# Patient Record
Sex: FEMALE | URBAN - METROPOLITAN AREA
[De-identification: names, ages, dates, MRNs, and addresses within clinical notes are randomized per-mention and may not be internally consistent; named-entity substitution may affect disease eponyms.]

---

## 2018-10-03 ENCOUNTER — OUTPATIENT (OUTPATIENT)
Dept: OUTPATIENT SERVICES | Facility: HOSPITAL | Age: 73
LOS: 1 days | Discharge: HOME | End: 2018-10-03
Payer: MEDICARE

## 2018-10-03 VITALS
WEIGHT: 167.99 LBS | OXYGEN SATURATION: 98 % | TEMPERATURE: 98 F | HEART RATE: 76 BPM | HEIGHT: 66 IN | SYSTOLIC BLOOD PRESSURE: 133 MMHG | DIASTOLIC BLOOD PRESSURE: 65 MMHG | RESPIRATION RATE: 16 BRPM

## 2018-10-03 DIAGNOSIS — Z98.1 ARTHRODESIS STATUS: Chronic | ICD-10-CM

## 2018-10-03 DIAGNOSIS — Z29.9 ENCOUNTER FOR PROPHYLACTIC MEASURES, UNSPECIFIED: ICD-10-CM

## 2018-10-03 DIAGNOSIS — M17.11 UNILATERAL PRIMARY OSTEOARTHRITIS, RIGHT KNEE: ICD-10-CM

## 2018-10-03 LAB
ABO RH CONFIRMATION: SIGNIFICANT CHANGE UP
ANION GAP SERPL CALC-SCNC: 7 MMOL/L — SIGNIFICANT CHANGE UP (ref 5–17)
APPEARANCE UR: CLEAR — SIGNIFICANT CHANGE UP
BASOPHILS # BLD AUTO: 0.08 K/UL — SIGNIFICANT CHANGE UP (ref 0–0.2)
BASOPHILS NFR BLD AUTO: 1.1 % — SIGNIFICANT CHANGE UP (ref 0–2)
BILIRUB UR-MCNC: NEGATIVE — SIGNIFICANT CHANGE UP
BLD GP AB SCN SERPL QL: SIGNIFICANT CHANGE UP
BUN SERPL-MCNC: 33 MG/DL — HIGH (ref 7–23)
CALCIUM SERPL-MCNC: 9 MG/DL — SIGNIFICANT CHANGE UP (ref 8.5–10.1)
CHLORIDE SERPL-SCNC: 105 MMOL/L — SIGNIFICANT CHANGE UP (ref 96–108)
CO2 SERPL-SCNC: 28 MMOL/L — SIGNIFICANT CHANGE UP (ref 22–31)
COLOR SPEC: YELLOW — SIGNIFICANT CHANGE UP
CREAT SERPL-MCNC: 0.76 MG/DL — SIGNIFICANT CHANGE UP (ref 0.5–1.3)
DIFF PNL FLD: ABNORMAL
EOSINOPHIL # BLD AUTO: 0.11 K/UL — SIGNIFICANT CHANGE UP (ref 0–0.5)
EOSINOPHIL NFR BLD AUTO: 1.5 % — SIGNIFICANT CHANGE UP (ref 0–6)
EPI CELLS # UR: SIGNIFICANT CHANGE UP
GLUCOSE SERPL-MCNC: 78 MG/DL — SIGNIFICANT CHANGE UP (ref 70–99)
GLUCOSE UR QL: NEGATIVE MG/DL — SIGNIFICANT CHANGE UP
HCT VFR BLD CALC: 43.2 % — SIGNIFICANT CHANGE UP (ref 34.5–45)
HGB BLD-MCNC: 14.4 G/DL — SIGNIFICANT CHANGE UP (ref 11.5–15.5)
IMM GRANULOCYTES NFR BLD AUTO: 0.5 % — SIGNIFICANT CHANGE UP (ref 0–1.5)
KETONES UR-MCNC: NEGATIVE — SIGNIFICANT CHANGE UP
LEUKOCYTE ESTERASE UR-ACNC: ABNORMAL
LYMPHOCYTES # BLD AUTO: 2.38 K/UL — SIGNIFICANT CHANGE UP (ref 1–3.3)
LYMPHOCYTES # BLD AUTO: 31.5 % — SIGNIFICANT CHANGE UP (ref 13–44)
MCHC RBC-ENTMCNC: 32.2 PG — SIGNIFICANT CHANGE UP (ref 27–34)
MCHC RBC-ENTMCNC: 33.3 GM/DL — SIGNIFICANT CHANGE UP (ref 32–36)
MCV RBC AUTO: 96.6 FL — SIGNIFICANT CHANGE UP (ref 80–100)
MONOCYTES # BLD AUTO: 0.71 K/UL — SIGNIFICANT CHANGE UP (ref 0–0.9)
MONOCYTES NFR BLD AUTO: 9.4 % — SIGNIFICANT CHANGE UP (ref 2–14)
MRSA PCR RESULT.: SIGNIFICANT CHANGE UP
NEUTROPHILS # BLD AUTO: 4.23 K/UL — SIGNIFICANT CHANGE UP (ref 1.8–7.4)
NEUTROPHILS NFR BLD AUTO: 56 % — SIGNIFICANT CHANGE UP (ref 43–77)
NITRITE UR-MCNC: NEGATIVE — SIGNIFICANT CHANGE UP
NRBC # BLD: 0 /100 WBCS — SIGNIFICANT CHANGE UP (ref 0–0)
PH UR: 6 — SIGNIFICANT CHANGE UP (ref 5–8)
PLATELET # BLD AUTO: 236 K/UL — SIGNIFICANT CHANGE UP (ref 150–400)
POTASSIUM SERPL-MCNC: 3.8 MMOL/L — SIGNIFICANT CHANGE UP (ref 3.5–5.3)
POTASSIUM SERPL-SCNC: 3.8 MMOL/L — SIGNIFICANT CHANGE UP (ref 3.5–5.3)
PROT UR-MCNC: NEGATIVE MG/DL — SIGNIFICANT CHANGE UP
RBC # BLD: 4.47 M/UL — SIGNIFICANT CHANGE UP (ref 3.8–5.2)
RBC # FLD: 13.2 % — SIGNIFICANT CHANGE UP (ref 10.3–14.5)
RBC CASTS # UR COMP ASSIST: SIGNIFICANT CHANGE UP /HPF (ref 0–4)
S AUREUS DNA NOSE QL NAA+PROBE: SIGNIFICANT CHANGE UP
SODIUM SERPL-SCNC: 140 MMOL/L — SIGNIFICANT CHANGE UP (ref 135–145)
SP GR SPEC: 1.01 — SIGNIFICANT CHANGE UP (ref 1.01–1.02)
TYPE + AB SCN PNL BLD: SIGNIFICANT CHANGE UP
UROBILINOGEN FLD QL: NEGATIVE MG/DL — SIGNIFICANT CHANGE UP
WBC # BLD: 7.55 K/UL — SIGNIFICANT CHANGE UP (ref 3.8–10.5)
WBC # FLD AUTO: 7.55 K/UL — SIGNIFICANT CHANGE UP (ref 3.8–10.5)
WBC UR QL: SIGNIFICANT CHANGE UP

## 2018-10-03 PROCEDURE — 71046 X-RAY EXAM CHEST 2 VIEWS: CPT | Mod: 26

## 2018-10-03 NOTE — H&P PST ADULT - PROBLEM SELECTOR PLAN 1
The Caprini score indicates that this patient is at high risk for a VTE event (score => 6).    Surgical patients in this group will benefit from both pharmacologic prophylaxis and intermittent compression devices.    The surgical team will determine the balance between VTE risk and bleeding risk, and other clinical considerations.

## 2018-10-03 NOTE — H&P PST ADULT - ASSESSMENT
73 year old female presents to PST for Right TKR  1. PST instructions given ; NPO post midnight   2. Pt instructed to take following meds with sip of water   3. EZ wash instructions given & mupirocin instructions given  4. Medical Evaluation with Dr Byrd   5. coags on day of surgery       CAPRINI SCORE [CLOT]    AGE RELATED RISK FACTORS                                                       MOBILITY RELATED FACTORS  [ ] Age 41-60 years                                            (1 Point)                  [ ] Bed rest                                                        (1 Point)  [ ] Age: 61-74 years                                           (2 Points)                 [ ] Plaster cast                                                   (2 Points)  [ ] Age= 75 years                                              (3 Points)                 [ ] Bed bound for more than 72 hours                 (2 Points)    DISEASE RELATED RISK FACTORS                                               GENDER SPECIFIC FACTORS  [ ] Edema in the lower extremities                       (1 Point)                  [ ] Pregnancy                                                     (1 Point)  [ ] Varicose veins                                               (1 Point)                  [ ] Post-partum < 6 weeks                                   (1 Point)             [ ] BMI > 25 Kg/m2                                            (1 Point)                  [ ] Hormonal therapy  or oral contraception          (1 Point)                 [ ] Sepsis (in the previous month)                        (1 Point)                  [ ] History of pregnancy complications                 (1 point)  [ ] Pneumonia or serious lung disease                                               [ ] Unexplained or recurrent                     (1 Point)           (in the previous month)                               (1 Point)  [ ] Abnormal pulmonary function test                     (1 Point)                 SURGERY RELATED RISK FACTORS  [ ] Acute myocardial infarction                              (1 Point)                 [ ]  Section                                             (1 Point)  [ ] Congestive heart failure (in the previous month)  (1 Point)               [ ] Minor surgery                                                  (1 Point)   [ ] Inflammatory bowel disease                             (1 Point)                 [ ] Arthroscopic surgery                                        (2 Points)  [ ] Central venous access                                      (2 Points)                [ ] General surgery lasting more than 45 minutes   (2 Points)       [ ] Stroke (in the previous month)                          (5 Points)               [ ] Elective arthroplasty                                         (5 Points)                                                                                                                                               HEMATOLOGY RELATED FACTORS                                                 TRAUMA RELATED RISK FACTORS  [ ] Prior episodes of VTE                                     (3 Points)                 [ ] Fracture of the hip, pelvis, or leg                       (5 Points)  [ ] Positive family history for VTE                         (3 Points)                 [ ] Acute spinal cord injury (in the previous month)  (5 Points)  [ ] Prothrombin 63216 A                                     (3 Points)                 [ ] Paralysis  (less than 1 month)                             (5 Points)  [ ] Factor V Leiden                                             (3 Points)                  [ ] Multiple Trauma within 1 month                        (5 Points)  [ ] Lupus anticoagulants                                     (3 Points)                                                           [ ] Anticardiolipin antibodies                               (3 Points)                                                       [ ] High homocysteine in the blood                      (3 Points)                                             [ ] Other congenital or acquired thrombophilia      (3 Points)                                                [ ] Heparin induced thrombocytopenia                  (3 Points)                                          Total Score [          ] 73 year old female presents to PST for Right TKR  1. PST instructions given ; NPO post midnight   2. labs ordered as per surgeon request   3. EZ wash instructions given & mupirocin instructions given  4. Medical Evaluation with Dr Byrd   5. coags on day of surgery       CAPRINI SCORE [CLOT]    AGE RELATED RISK FACTORS                                                       MOBILITY RELATED FACTORS  [ ] Age 41-60 years                                            (1 Point)                  [ ] Bed rest                                                        (1 Point)  [ ] Age: 61-74 years                                           (2 Points)                 [ ] Plaster cast                                                   (2 Points)  [ ] Age= 75 years                                              (3 Points)                 [ ] Bed bound for more than 72 hours                 (2 Points)    DISEASE RELATED RISK FACTORS                                               GENDER SPECIFIC FACTORS  [ ] Edema in the lower extremities                       (1 Point)                  [ ] Pregnancy                                                     (1 Point)  [ ] Varicose veins                                               (1 Point)                  [ ] Post-partum < 6 weeks                                   (1 Point)             [ ] BMI > 25 Kg/m2                                            (1 Point)                  [ ] Hormonal therapy  or oral contraception          (1 Point)                 [ ] Sepsis (in the previous month)                        (1 Point)                  [ ] History of pregnancy complications                 (1 point)  [ ] Pneumonia or serious lung disease                                               [ ] Unexplained or recurrent                     (1 Point)           (in the previous month)                               (1 Point)  [ ] Abnormal pulmonary function test                     (1 Point)                 SURGERY RELATED RISK FACTORS  [ ] Acute myocardial infarction                              (1 Point)                 [ ]  Section                                             (1 Point)  [ ] Congestive heart failure (in the previous month)  (1 Point)               [ ] Minor surgery                                                  (1 Point)   [ ] Inflammatory bowel disease                             (1 Point)                 [ ] Arthroscopic surgery                                        (2 Points)  [ ] Central venous access                                      (2 Points)                [ ] General surgery lasting more than 45 minutes   (2 Points)       [ ] Stroke (in the previous month)                          (5 Points)               [ ] Elective arthroplasty                                         (5 Points)                                                                                                                                               HEMATOLOGY RELATED FACTORS                                                 TRAUMA RELATED RISK FACTORS  [ ] Prior episodes of VTE                                     (3 Points)                 [ ] Fracture of the hip, pelvis, or leg                       (5 Points)  [ ] Positive family history for VTE                         (3 Points)                 [ ] Acute spinal cord injury (in the previous month)  (5 Points)  [ ] Prothrombin 00236 A                                     (3 Points)                 [ ] Paralysis  (less than 1 month)                             (5 Points)  [ ] Factor V Leiden                                             (3 Points)                  [ ] Multiple Trauma within 1 month                        (5 Points)  [ ] Lupus anticoagulants                                     (3 Points)                                                           [ ] Anticardiolipin antibodies                               (3 Points)                                                       [ ] High homocysteine in the blood                      (3 Points)                                             [ ] Other congenital or acquired thrombophilia      (3 Points)                                                [ ] Heparin induced thrombocytopenia                  (3 Points)                                          Total Score [          ] 73 year old female presents to PST for Right TKR  1. PST instructions given ; NPO post midnight   2. labs ordered as per surgeon request   3. EZ wash instructions given & mupirocin instructions given  4. Medical Evaluation with Dr Byrd   5. coags on day of surgery       CAPRINI SCORE [CLOT]    AGE RELATED RISK FACTORS                                                       MOBILITY RELATED FACTORS  [ ] Age 41-60 years                                            (1 Point)                  [ ] Bed rest                                                        (1 Point)  [ ] Age: 61-74 years                                           (2 Points)                 [ ] Plaster cast                                                   (2 Points)  [x ] Age= 75 years                                              (3 Points)                 [ ] Bed bound for more than 72 hours                 (2 Points)    DISEASE RELATED RISK FACTORS                                               GENDER SPECIFIC FACTORS  [ ] Edema in the lower extremities                       (1 Point)                  [ ] Pregnancy                                                     (1 Point)  [ ] Varicose veins                                               (1 Point)                  [ ] Post-partum < 6 weeks                                   (1 Point)             [x ] BMI > 25 Kg/m2                                            (1 Point)                  [ ] Hormonal therapy  or oral contraception          (1 Point)                 [ ] Sepsis (in the previous month)                        (1 Point)                  [ ] History of pregnancy complications                 (1 point)  [ ] Pneumonia or serious lung disease                                               [ ] Unexplained or recurrent                     (1 Point)           (in the previous month)                               (1 Point)  [ ] Abnormal pulmonary function test                     (1 Point)                 SURGERY RELATED RISK FACTORS  [ ] Acute myocardial infarction                              (1 Point)                 [ ]  Section                                             (1 Point)  [ ] Congestive heart failure (in the previous month)  (1 Point)               [ ] Minor surgery                                                  (1 Point)   [ ] Inflammatory bowel disease                             (1 Point)                 [ ] Arthroscopic surgery                                        (2 Points)  [ ] Central venous access                                      (2 Points)                [ ] General surgery lasting more than 45 minutes   (2 Points)       [ ] Stroke (in the previous month)                          (5 Points)               [x] Elective arthroplasty                                         (5 Points)                                                                                                                                               HEMATOLOGY RELATED FACTORS                                                 TRAUMA RELATED RISK FACTORS  [ ] Prior episodes of VTE                                     (3 Points)                 [ ] Fracture of the hip, pelvis, or leg                       (5 Points)  [ ] Positive family history for VTE                         (3 Points)                 [ ] Acute spinal cord injury (in the previous month)  (5 Points)  [ ] Prothrombin 79526 A                                     (3 Points)                 [ ] Paralysis  (less than 1 month)                             (5 Points)  [ ] Factor V Leiden                                             (3 Points)                  [ ] Multiple Trauma within 1 month                        (5 Points)  [ ] Lupus anticoagulants                                     (3 Points)                                                           [ ] Anticardiolipin antibodies                               (3 Points)                                                       [ ] High homocysteine in the blood                      (3 Points)                                             [ ] Other congenital or acquired thrombophilia      (3 Points)                                                [ ] Heparin induced thrombocytopenia                  (3 Points)                                          Total Score [    9      ]

## 2018-10-03 NOTE — H&P PST ADULT - HISTORY OF PRESENT ILLNESS
73 year old female c/o right knee pain due to OA she presents to PST for Right Total knee replacement

## 2018-10-03 NOTE — H&P PST ADULT - NSANTHOSAYNRD_GEN_A_CORE
No. MARIANNA screening performed.  STOP BANG Legend: 0-2 = LOW Risk; 3-4 = INTERMEDIATE Risk; 5-8 = HIGH Risk

## 2018-10-23 ENCOUNTER — TRANSCRIPTION ENCOUNTER (OUTPATIENT)
Age: 73
End: 2018-10-23

## 2018-10-23 ENCOUNTER — INPATIENT (INPATIENT)
Facility: HOSPITAL | Age: 73
LOS: 2 days | Discharge: HOME HEALTH | End: 2018-10-26
Attending: ORTHOPAEDIC SURGERY | Admitting: ORTHOPAEDIC SURGERY
Payer: MEDICARE

## 2018-10-23 ENCOUNTER — RESULT REVIEW (OUTPATIENT)
Age: 73
End: 2018-10-23

## 2018-10-23 VITALS
SYSTOLIC BLOOD PRESSURE: 128 MMHG | HEIGHT: 65 IN | RESPIRATION RATE: 16 BRPM | DIASTOLIC BLOOD PRESSURE: 52 MMHG | TEMPERATURE: 99 F | WEIGHT: 167.99 LBS | OXYGEN SATURATION: 98 % | HEART RATE: 78 BPM

## 2018-10-23 DIAGNOSIS — Z98.1 ARTHRODESIS STATUS: Chronic | ICD-10-CM

## 2018-10-23 LAB
ANION GAP SERPL CALC-SCNC: 8 MMOL/L — SIGNIFICANT CHANGE UP (ref 5–17)
APTT BLD: 29.3 SEC — SIGNIFICANT CHANGE UP (ref 27.5–37.4)
BUN SERPL-MCNC: 19 MG/DL — SIGNIFICANT CHANGE UP (ref 7–23)
CALCIUM SERPL-MCNC: 8.6 MG/DL — SIGNIFICANT CHANGE UP (ref 8.5–10.1)
CHLORIDE SERPL-SCNC: 110 MMOL/L — HIGH (ref 96–108)
CO2 SERPL-SCNC: 23 MMOL/L — SIGNIFICANT CHANGE UP (ref 22–31)
CREAT SERPL-MCNC: 0.71 MG/DL — SIGNIFICANT CHANGE UP (ref 0.5–1.3)
GLUCOSE SERPL-MCNC: 93 MG/DL — SIGNIFICANT CHANGE UP (ref 70–99)
HCT VFR BLD CALC: 35 % — SIGNIFICANT CHANGE UP (ref 34.5–45)
HGB BLD-MCNC: 11.6 G/DL — SIGNIFICANT CHANGE UP (ref 11.5–15.5)
INR BLD: 1.07 RATIO — SIGNIFICANT CHANGE UP (ref 0.88–1.16)
MCHC RBC-ENTMCNC: 32.6 PG — SIGNIFICANT CHANGE UP (ref 27–34)
MCHC RBC-ENTMCNC: 33.1 GM/DL — SIGNIFICANT CHANGE UP (ref 32–36)
MCV RBC AUTO: 98.3 FL — SIGNIFICANT CHANGE UP (ref 80–100)
NRBC # BLD: 0 /100 WBCS — SIGNIFICANT CHANGE UP (ref 0–0)
PLATELET # BLD AUTO: 199 K/UL — SIGNIFICANT CHANGE UP (ref 150–400)
POTASSIUM SERPL-MCNC: 3.7 MMOL/L — SIGNIFICANT CHANGE UP (ref 3.5–5.3)
POTASSIUM SERPL-SCNC: 3.7 MMOL/L — SIGNIFICANT CHANGE UP (ref 3.5–5.3)
PROTHROM AB SERPL-ACNC: 11.6 SEC — SIGNIFICANT CHANGE UP (ref 9.8–12.7)
RBC # BLD: 3.56 M/UL — LOW (ref 3.8–5.2)
RBC # FLD: 13.2 % — SIGNIFICANT CHANGE UP (ref 10.3–14.5)
SODIUM SERPL-SCNC: 141 MMOL/L — SIGNIFICANT CHANGE UP (ref 135–145)
WBC # BLD: 7 K/UL — SIGNIFICANT CHANGE UP (ref 3.8–10.5)
WBC # FLD AUTO: 7 K/UL — SIGNIFICANT CHANGE UP (ref 3.8–10.5)

## 2018-10-23 PROCEDURE — 99223 1ST HOSP IP/OBS HIGH 75: CPT

## 2018-10-23 PROCEDURE — 73560 X-RAY EXAM OF KNEE 1 OR 2: CPT | Mod: 26,RT

## 2018-10-23 PROCEDURE — 88305 TISSUE EXAM BY PATHOLOGIST: CPT | Mod: 26

## 2018-10-23 RX ORDER — RIVAROXABAN 15 MG-20MG
10 KIT ORAL DAILY
Qty: 0 | Refills: 0 | Status: DISCONTINUED | OUTPATIENT
Start: 2018-10-23 | End: 2018-10-26

## 2018-10-23 RX ORDER — DOCUSATE SODIUM 100 MG
1 CAPSULE ORAL
Qty: 14 | Refills: 0 | OUTPATIENT
Start: 2018-10-23 | End: 2018-10-29

## 2018-10-23 RX ORDER — INFLUENZA VIRUS VACCINE 15; 15; 15; 15 UG/.5ML; UG/.5ML; UG/.5ML; UG/.5ML
0.5 SUSPENSION INTRAMUSCULAR ONCE
Qty: 0 | Refills: 0 | Status: COMPLETED | OUTPATIENT
Start: 2018-10-23 | End: 2018-10-24

## 2018-10-23 RX ORDER — PANTOPRAZOLE SODIUM 20 MG/1
1 TABLET, DELAYED RELEASE ORAL
Qty: 30 | Refills: 0 | OUTPATIENT
Start: 2018-10-23 | End: 2018-11-21

## 2018-10-23 RX ORDER — ONDANSETRON 8 MG/1
4 TABLET, FILM COATED ORAL EVERY 6 HOURS
Qty: 0 | Refills: 0 | Status: DISCONTINUED | OUTPATIENT
Start: 2018-10-23 | End: 2018-10-26

## 2018-10-23 RX ORDER — ACETAMINOPHEN 500 MG
650 TABLET ORAL EVERY 6 HOURS
Qty: 0 | Refills: 0 | Status: DISCONTINUED | OUTPATIENT
Start: 2018-10-23 | End: 2018-10-26

## 2018-10-23 RX ORDER — ONDANSETRON 8 MG/1
4 TABLET, FILM COATED ORAL ONCE
Qty: 0 | Refills: 0 | Status: DISCONTINUED | OUTPATIENT
Start: 2018-10-23 | End: 2018-10-23

## 2018-10-23 RX ORDER — CELECOXIB 200 MG/1
200 CAPSULE ORAL ONCE
Qty: 0 | Refills: 0 | Status: COMPLETED | OUTPATIENT
Start: 2018-10-23 | End: 2018-10-23

## 2018-10-23 RX ORDER — HYDROMORPHONE HYDROCHLORIDE 2 MG/ML
0.5 INJECTION INTRAMUSCULAR; INTRAVENOUS; SUBCUTANEOUS
Qty: 0 | Refills: 0 | Status: DISCONTINUED | OUTPATIENT
Start: 2018-10-23 | End: 2018-10-26

## 2018-10-23 RX ORDER — SENNA PLUS 8.6 MG/1
2 TABLET ORAL AT BEDTIME
Qty: 0 | Refills: 0 | Status: DISCONTINUED | OUTPATIENT
Start: 2018-10-23 | End: 2018-10-26

## 2018-10-23 RX ORDER — ACETAMINOPHEN 500 MG
650 TABLET ORAL ONCE
Qty: 0 | Refills: 0 | Status: COMPLETED | OUTPATIENT
Start: 2018-10-23 | End: 2018-10-23

## 2018-10-23 RX ORDER — CELECOXIB 200 MG/1
200 CAPSULE ORAL
Qty: 0 | Refills: 0 | Status: DISCONTINUED | OUTPATIENT
Start: 2018-10-23 | End: 2018-10-26

## 2018-10-23 RX ORDER — POLYETHYLENE GLYCOL 3350 17 G/17G
17 POWDER, FOR SOLUTION ORAL DAILY
Qty: 0 | Refills: 0 | Status: DISCONTINUED | OUTPATIENT
Start: 2018-10-23 | End: 2018-10-26

## 2018-10-23 RX ORDER — CEFAZOLIN SODIUM 1 G
2000 VIAL (EA) INJECTION EVERY 8 HOURS
Qty: 0 | Refills: 0 | Status: COMPLETED | OUTPATIENT
Start: 2018-10-23 | End: 2018-10-24

## 2018-10-23 RX ORDER — ONDANSETRON 8 MG/1
4 TABLET, FILM COATED ORAL EVERY 6 HOURS
Qty: 0 | Refills: 0 | Status: DISCONTINUED | OUTPATIENT
Start: 2018-10-23 | End: 2018-10-23

## 2018-10-23 RX ORDER — OXYCODONE HYDROCHLORIDE 5 MG/1
10 TABLET ORAL EVERY 4 HOURS
Qty: 0 | Refills: 0 | Status: DISCONTINUED | OUTPATIENT
Start: 2018-10-23 | End: 2018-10-26

## 2018-10-23 RX ORDER — DOCUSATE SODIUM 100 MG
100 CAPSULE ORAL THREE TIMES A DAY
Qty: 0 | Refills: 0 | Status: DISCONTINUED | OUTPATIENT
Start: 2018-10-23 | End: 2018-10-26

## 2018-10-23 RX ORDER — FENTANYL CITRATE 50 UG/ML
25 INJECTION INTRAVENOUS
Qty: 0 | Refills: 0 | Status: DISCONTINUED | OUTPATIENT
Start: 2018-10-23 | End: 2018-10-23

## 2018-10-23 RX ORDER — SODIUM CHLORIDE 9 MG/ML
1000 INJECTION, SOLUTION INTRAVENOUS
Qty: 0 | Refills: 0 | Status: DISCONTINUED | OUTPATIENT
Start: 2018-10-23 | End: 2018-10-24

## 2018-10-23 RX ORDER — MAGNESIUM HYDROXIDE 400 MG/1
30 TABLET, CHEWABLE ORAL DAILY
Qty: 0 | Refills: 0 | Status: DISCONTINUED | OUTPATIENT
Start: 2018-10-23 | End: 2018-10-26

## 2018-10-23 RX ORDER — OXYCODONE HYDROCHLORIDE 5 MG/1
5 TABLET ORAL ONCE
Qty: 0 | Refills: 0 | Status: DISCONTINUED | OUTPATIENT
Start: 2018-10-23 | End: 2018-10-23

## 2018-10-23 RX ORDER — DOCUSATE SODIUM 100 MG
100 CAPSULE ORAL EVERY 12 HOURS
Qty: 0 | Refills: 0 | Status: DISCONTINUED | OUTPATIENT
Start: 2018-10-23 | End: 2018-10-24

## 2018-10-23 RX ORDER — SODIUM CHLORIDE 9 MG/ML
1000 INJECTION, SOLUTION INTRAVENOUS
Qty: 0 | Refills: 0 | Status: DISCONTINUED | OUTPATIENT
Start: 2018-10-23 | End: 2018-10-23

## 2018-10-23 RX ORDER — OXYCODONE HYDROCHLORIDE 5 MG/1
10 TABLET ORAL ONCE
Qty: 0 | Refills: 0 | Status: DISCONTINUED | OUTPATIENT
Start: 2018-10-23 | End: 2018-10-23

## 2018-10-23 RX ORDER — OXYCODONE HYDROCHLORIDE 5 MG/1
10 TABLET ORAL EVERY 12 HOURS
Qty: 0 | Refills: 0 | Status: DISCONTINUED | OUTPATIENT
Start: 2018-10-23 | End: 2018-10-26

## 2018-10-23 RX ORDER — OXYCODONE HYDROCHLORIDE 5 MG/1
5 TABLET ORAL EVERY 4 HOURS
Qty: 0 | Refills: 0 | Status: DISCONTINUED | OUTPATIENT
Start: 2018-10-23 | End: 2018-10-26

## 2018-10-23 RX ORDER — PANTOPRAZOLE SODIUM 20 MG/1
40 TABLET, DELAYED RELEASE ORAL ONCE
Qty: 0 | Refills: 0 | Status: COMPLETED | OUTPATIENT
Start: 2018-10-23 | End: 2018-10-23

## 2018-10-23 RX ADMIN — CELECOXIB 200 MILLIGRAM(S): 200 CAPSULE ORAL at 12:12

## 2018-10-23 RX ADMIN — OXYCODONE HYDROCHLORIDE 10 MILLIGRAM(S): 5 TABLET ORAL at 19:00

## 2018-10-23 RX ADMIN — OXYCODONE HYDROCHLORIDE 10 MILLIGRAM(S): 5 TABLET ORAL at 12:12

## 2018-10-23 RX ADMIN — Medication 100 MILLIGRAM(S): at 18:01

## 2018-10-23 RX ADMIN — Medication 650 MILLIGRAM(S): at 12:12

## 2018-10-23 RX ADMIN — PANTOPRAZOLE SODIUM 40 MILLIGRAM(S): 20 TABLET, DELAYED RELEASE ORAL at 12:12

## 2018-10-23 RX ADMIN — Medication 650 MILLIGRAM(S): at 18:01

## 2018-10-23 RX ADMIN — CELECOXIB 200 MILLIGRAM(S): 200 CAPSULE ORAL at 12:13

## 2018-10-23 RX ADMIN — Medication 650 MILLIGRAM(S): at 12:13

## 2018-10-23 RX ADMIN — RIVAROXABAN 10 MILLIGRAM(S): KIT at 22:04

## 2018-10-23 RX ADMIN — OXYCODONE HYDROCHLORIDE 10 MILLIGRAM(S): 5 TABLET ORAL at 12:13

## 2018-10-23 RX ADMIN — Medication 650 MILLIGRAM(S): at 19:00

## 2018-10-23 RX ADMIN — OXYCODONE HYDROCHLORIDE 10 MILLIGRAM(S): 5 TABLET ORAL at 18:01

## 2018-10-23 RX ADMIN — Medication 100 MILLIGRAM(S): at 22:05

## 2018-10-23 NOTE — DISCHARGE NOTE ADULT - MEDICATION SUMMARY - MEDICATIONS TO TAKE
I will START or STAY ON the medications listed below when I get home from the hospital:    Percocet 5/325 oral tablet  -- 1 tab(s) by mouth every 4 hours as needed for severe pain x 7 days MDD:6  -- Caution federal law prohibits the transfer of this drug to any person other  than the person for whom it was prescribed.  May cause drowsiness.  Alcohol may intensify this effect.  Use care when operating dangerous machinery.  This prescription cannot be refilled.  This product contains acetaminophen.  Do not use  with any other product containing acetaminophen to prevent possible liver damage.  Using more of this medication than prescribed may cause serious breathing problems.    -- Indication: For As needed for severe pain    Colace 100 mg oral capsule  -- 1 cap(s) by mouth 2 times a day while taking Percocet  -- Medication should be taken with plenty of water.    -- Indication: For Stool softener    Fish Oil oral capsule  -- 1 tab po daily  -- Indication: For Home med    Glucosamine Chondroitin oral capsule  -- 1 cap(s) by mouth once a day  -- Indication: For Home med    Protonix 40 mg oral delayed release tablet  -- 1 tab(s) by mouth once a day while on blood clot prevention medications  -- It is very important that you take or use this exactly as directed.  Do not skip doses or discontinue unless directed by your doctor.  Obtain medical advice before taking any non-prescription drugs as some may affect the action of this medication.  Swallow whole.  Do not crush.    -- Indication: For GI protection while taking blood clot prevention medication    Calcium 600+D 600 mg-200 intl units oral tablet  -- 1 tab(s) by mouth 2 times a day  -- Indication: For  Home med    Multiple Vitamins oral tablet  -- 1 tab(s) by mouth once a day  -- Indication: For Home med I will START or STAY ON the medications listed below when I get home from the hospital:    Percocet 5/325 oral tablet  -- 1 tab(s) by mouth every 4 hours as needed for severe pain x 7 days MDD:6  -- Caution federal law prohibits the transfer of this drug to any person other  than the person for whom it was prescribed.  May cause drowsiness.  Alcohol may intensify this effect.  Use care when operating dangerous machinery.  This prescription cannot be refilled.  This product contains acetaminophen.  Do not use  with any other product containing acetaminophen to prevent possible liver damage.  Using more of this medication than prescribed may cause serious breathing problems.    -- Indication: For As needed for severe pain    rivaroxaban 10 mg oral tablet  -- 1 tab by mouth once daily with dinner, as directed by [facility] [phone] MDD:10mg  -- Indication: For Blood clot prevention    Colace 100 mg oral capsule  -- 1 cap(s) by mouth 2 times a day while taking Percocet  -- Medication should be taken with plenty of water.    -- Indication: For Stool softener    Fish Oil oral capsule  -- 1 tab po daily  -- Indication: For Home med    Glucosamine Chondroitin oral capsule  -- 1 cap(s) by mouth once a day  -- Indication: For Home med    Protonix 40 mg oral delayed release tablet  -- 1 tab(s) by mouth once a day while on blood clot prevention medications  -- It is very important that you take or use this exactly as directed.  Do not skip doses or discontinue unless directed by your doctor.  Obtain medical advice before taking any non-prescription drugs as some may affect the action of this medication.  Swallow whole.  Do not crush.    -- Indication: For GI protection while taking blood clot prevention medication    Calcium 600+D 600 mg-200 intl units oral tablet  -- 1 tab(s) by mouth 2 times a day  -- Indication: For  Home med    Multiple Vitamins oral tablet  -- 1 tab(s) by mouth once a day  -- Indication: For Home med I will START or STAY ON the medications listed below when I get home from the hospital:    Percocet 5/325 oral tablet  -- 1 tab(s) by mouth every 4 hours as needed for severe pain x 7 days MDD:6  -- Caution federal law prohibits the transfer of this drug to any person other  than the person for whom it was prescribed.  May cause drowsiness.  Alcohol may intensify this effect.  Use care when operating dangerous machinery.  This prescription cannot be refilled.  This product contains acetaminophen.  Do not use  with any other product containing acetaminophen to prevent possible liver damage.  Using more of this medication than prescribed may cause serious breathing problems.    -- Indication: For As needed for severe pain    rivaroxaban 10 mg oral tablet  -- 1 tab by mouth once daily with dinner, as directed by [facility] [phone] MDD:10mg  -- Indication: For Blood clot prevention    Colace 100 mg oral capsule  -- 1 cap(s) by mouth 2 times a day while taking Percocet  -- Medication should be taken with plenty of water.    -- Indication: For Stool softener    Fish Oil oral capsule  -- 1 tab po daily  -- Indication: For Home med    Glucosamine Chondroitin oral capsule  -- 1 cap(s) by mouth once a day  -- Indication: For Home med    Protonix 40 mg oral delayed release tablet  -- 1 tab(s) by mouth once a day while on blood clot prevention medications  -- It is very important that you take or use this exactly as directed.  Do not skip doses or discontinue unless directed by your doctor.  Obtain medical advice before taking any non-prescription drugs as some may affect the action of this medication.  Swallow whole.  Do not crush.    -- Indication: For GI protection while taking blood clot prevention medication    Calcium 600+D 600 mg-200 intl units oral tablet  -- 1 tab(s) by mouth 2 times a day  -- Indication: For  Home med    Multiple Vitamins oral tablet  -- 1 tab(s) by mouth once a day  -- Indication: For Home med

## 2018-10-23 NOTE — DISCHARGE NOTE ADULT - PLAN OF CARE
Improved pain, Improved function, Return to ADLs Discharge Instructions for Right Total Knee Arthroplasty    1.  Diet: Resume previous diet  2. Activity: WBAT, Rolling walker, Daily PT. Gentle ROM 0-full as tolerated. Walk plenty.  Wear immobilizer only while asleep x 3 weeks.   3. Call with: fever over 101, wound redness, drainage or open area, calf pain/calf swelling  4. Wound Care: Remove old and place new Aquacel  bandage to Knee every 7 days. No bandage needed after staple removal.  5. RN to Remove Staples Post Op Day #14 (11/6/18) so long as wound is healed, no drainage or open area. OK to Shower with Aquacel; Must be and Aquacel bandage to shower.  Avoid direct water beating on bandage.  Continue ICE packs to knee.  6. DVT PE Prophylaxis: Managed by Anticoag Team. See Anticoagulation Instructions. See Med Rec.  7. Continue Protonix daily while on Anticoagulant. an eRx has been sent to your pharmacy.  8. Labs: Check H&H weekly while on Anticoagulation. Check INR if on Coumadin.  9. Follow Up: Dr. Hallman 1 month;  Call to schedule.  10. Pain medications:  If going home, eRX sent to your pharmacy for . Discharge Instructions for Right Total Knee Arthroplasty    1.  Diet: Resume previous diet  2. Activity: WBAT, Rolling walker, Daily PT. Gentle ROM 0-full as tolerated. Walk plenty.  Wear immobilizer only while asleep x 3 weeks.   3. Call with: fever over 101, wound redness, drainage or open area, calf pain/calf swelling  4. Wound Care: Remove old and place new Aquacel  bandage to Knee every 7 days. No bandage needed after staple removal.  5. RN to Remove Staples Post Op Day #14 (11/6/18) so long as wound is healed, no drainage or open area. OK to Shower with Aquacel; Must be and Aquacel bandage to shower.  Avoid direct water beating on bandage.  Continue ICE packs to knee.  6. DVT PE Prophylaxis: Managed by Anticoag Team. See Anticoagulation Instructions. See Med Rec.  7. Continue Protonix daily while on Anticoagulant. an eRx has been sent to your pharmacy.  8. Labs: Check H&H weekly while on Anticoagulation.  9. Follow Up: Dr. Hallman 1 month;  Call to schedule.  10. Pain medications:  If going home, eRX sent to your pharmacy for . Discharge Instructions for Right Total Knee Arthroplasty    1.  Diet: Resume previous diet  2. Activity: WBAT, Rolling walker, Daily PT. Gentle ROM 0-full as tolerated. Walk plenty.  Wear immobilizer only while asleep x 3 weeks.   3. Call with: fever over 101, wound redness, drainage or open area, calf pain/calf swelling  4. Wound Care: Remove old and place new Aquacel  bandage to Knee every 7 days. No bandage needed after staple removal.  5. RN to Remove Staples Post Op Day #14 (11/6/18) so long as wound is healed, no drainage or open area. OK to Shower with Aquacel; Must be and Aquacel bandage to shower.  Avoid direct water beating on bandage.  Continue ICE packs to knee.  6. DVT PE Prophylaxis: Managed by Anticoag Team. See Anticoagulation Instructions. See Med Rec. Xarelto.   7. Continue Protonix daily while on Anticoagulant. an eRx has been sent to your pharmacy.  8. Labs: Check H&H weekly while on Anticoagulation.  9. Follow Up: Dr. Hallman 1 month;  Call to schedule.  10. Pain medications:  If going home, eRX sent to your pharmacy for .

## 2018-10-23 NOTE — DISCHARGE NOTE ADULT - PATIENT PORTAL LINK FT
You can access the [facility], offered by [facility], by registering with the following website: [email or web address]

## 2018-10-23 NOTE — PROGRESS NOTE ADULT - SUBJECTIVE AND OBJECTIVE BOX
pt seen at bedside doing well, min pain right knee, denies N/T RLE     PE right knee   dressing c/d/i   compartments soft non tender   FROM ankle and toes  + EHL FHL GS TA   SILT   DP intact

## 2018-10-23 NOTE — DISCHARGE NOTE ADULT - CARE PLAN
Principal Discharge DX:	Primary osteoarthritis of right knee  Goal:	Improved pain, Improved function, Return to ADLs  Assessment and plan of treatment:	Discharge Instructions for Right Total Knee Arthroplasty    1.  Diet: Resume previous diet  2. Activity: WBAT, Rolling walker, Daily PT. Gentle ROM 0-full as tolerated. Walk plenty.  Wear immobilizer only while asleep x 3 weeks.   3. Call with: fever over 101, wound redness, drainage or open area, calf pain/calf swelling  4. Wound Care: Remove old and place new Aquacel  bandage to Knee every 7 days. No bandage needed after staple removal.  5. RN to Remove Staples Post Op Day #14 (11/6/18) so long as wound is healed, no drainage or open area. OK to Shower with Aquacel; Must be and Aquacel bandage to shower.  Avoid direct water beating on bandage.  Continue ICE packs to knee.  6. DVT PE Prophylaxis: Managed by Anticoag Team. See Anticoagulation Instructions. See Med Rec.  7. Continue Protonix daily while on Anticoagulant. an eRx has been sent to your pharmacy.  8. Labs: Check H&H weekly while on Anticoagulation. Check INR if on Coumadin.  9. Follow Up: Dr. Hallman 1 month;  Call to schedule.  10. Pain medications:  If going home, eRX sent to your pharmacy for . Principal Discharge DX:	Primary osteoarthritis of right knee  Goal:	Improved pain, Improved function, Return to ADLs  Assessment and plan of treatment:	Discharge Instructions for Right Total Knee Arthroplasty    1.  Diet: Resume previous diet  2. Activity: WBAT, Rolling walker, Daily PT. Gentle ROM 0-full as tolerated. Walk plenty.  Wear immobilizer only while asleep x 3 weeks.   3. Call with: fever over 101, wound redness, drainage or open area, calf pain/calf swelling  4. Wound Care: Remove old and place new Aquacel  bandage to Knee every 7 days. No bandage needed after staple removal.  5. RN to Remove Staples Post Op Day #14 (11/6/18) so long as wound is healed, no drainage or open area. OK to Shower with Aquacel; Must be and Aquacel bandage to shower.  Avoid direct water beating on bandage.  Continue ICE packs to knee.  6. DVT PE Prophylaxis: Managed by Anticoag Team. See Anticoagulation Instructions. See Med Rec.  7. Continue Protonix daily while on Anticoagulant. an eRx has been sent to your pharmacy.  8. Labs: Check H&H weekly while on Anticoagulation.  9. Follow Up: Dr. Hallman 1 month;  Call to schedule.  10. Pain medications:  If going home, eRX sent to your pharmacy for . Principal Discharge DX:	Primary osteoarthritis of right knee  Goal:	Improved pain, Improved function, Return to ADLs  Assessment and plan of treatment:	Discharge Instructions for Right Total Knee Arthroplasty    1.  Diet: Resume previous diet  2. Activity: WBAT, Rolling walker, Daily PT. Gentle ROM 0-full as tolerated. Walk plenty.  Wear immobilizer only while asleep x 3 weeks.   3. Call with: fever over 101, wound redness, drainage or open area, calf pain/calf swelling  4. Wound Care: Remove old and place new Aquacel  bandage to Knee every 7 days. No bandage needed after staple removal.  5. RN to Remove Staples Post Op Day #14 (11/6/18) so long as wound is healed, no drainage or open area. OK to Shower with Aquacel; Must be and Aquacel bandage to shower.  Avoid direct water beating on bandage.  Continue ICE packs to knee.  6. DVT PE Prophylaxis: Managed by Anticoag Team. See Anticoagulation Instructions. See Med Rec. Xarelto.   7. Continue Protonix daily while on Anticoagulant. an eRx has been sent to your pharmacy.  8. Labs: Check H&H weekly while on Anticoagulation.  9. Follow Up: Dr. Hallman 1 month;  Call to schedule.  10. Pain medications:  If going home, eRX sent to your pharmacy for .

## 2018-10-23 NOTE — DISCHARGE NOTE ADULT - HOSPITAL COURSE
H&P:  Pt is a 73y Female   PAST MEDICAL & SURGICAL HISTORY:  Erbs muscular dystrophy: left arm  Thyroid cyst  Osteoarthritis  H/O spinal fusion: 1997 lumbar     Now s/p Right Total Knee Arthroplasty. Pt is afebrile with stable vital signs. Pain is controlled. Alert and Oriented. Exam reveals intact EHL FHL TA GS, +DP. Dressing is clean and dry with a New Aquacel bandage on.    Vital Signs ****    Labs ****    Hospital Course:  Patient presented to [facility] medically cleared for elective Right Total Knee Replacement Surgery, having failed outpatient conservative management. Prophylactic antibiotics were started before the procedure and continued for 24 hours. They were admitted after surgery to the orthopedic floor. There were no complications during the hospital stay. All home medications were continued. **Pt received **U PRBC post op for Acute Blood loss Anemia.    Routine consults were obtained from the Anticoagulation Team for DVT/PE prophylaxis, from Physical Therapy for twice daily PT, and from the Hospitalist for Medical Co-management. Patient was placed on Coumadin and SC heparin until therapeutic vs ECASA 325 BID *** for anticoagulation.  Pertinent home medications were continued.  Daily labs were followed.      On POD 0 pt was stable overnight. Pt received twice daily PT and a new Aquacel dressing was applied prior to discharge. The plan is for DC to home with home PT** or to Rehab for ongoing PT**.  The orthopedic Attending is aware and agrees. H&P:  Pt is a 73y Female   PAST MEDICAL & SURGICAL HISTORY:  Erbs muscular dystrophy: left arm  Thyroid cyst  Osteoarthritis  H/O spinal fusion: 1997 lumbar     Now s/p Right Total Knee Arthroplasty. Pt is afebrile with stable vital signs. Pain is controlled. Alert and Oriented. Exam reveals intact EHL FHL TA GS, +DP. Dressing is clean and dry with a New Aquacel bandage on.    Vital Signs ****    Labs ****    Hospital Course:  Patient presented to [facility] medically cleared for elective Right Total Knee Replacement Surgery, having failed outpatient conservative management. Prophylactic antibiotics were started before the procedure and continued for 24 hours. They were admitted after surgery to the orthopedic floor. There were no complications during the hospital stay. All home medications were continued. **Pt received **U PRBC post op for Acute Blood loss Anemia.    Routine consults were obtained from the Anticoagulation Team for DVT/PE prophylaxis, from Physical Therapy for twice daily PT, and from the Hospitalist for Medical Co-management. Patient was placed on Xarelto for anticoagulation.  Pertinent home medications were continued.  Daily labs were followed.      On POD 0 pt was stable overnight. Pt received twice daily PT and a new Aquacel dressing was applied prior to discharge. The plan is for DC to home with home PT** or to Rehab for ongoing PT**.  The orthopedic Attending is aware and agrees. H&P:  Pt is a 73y Female   PAST MEDICAL & SURGICAL HISTORY:  Erbs muscular dystrophy: left arm  Thyroid cyst  Osteoarthritis  H/O spinal fusion: 1997 lumbar     Now s/p Right Total Knee Arthroplasty. Pt is afebrile with stable vital signs. Pain is controlled. Alert and Oriented. Exam reveals intact EHL FHL TA GS, +DP. Dressing is clean and dry with a New Aquacel bandage on.    Vitals**  Labs**      Hospital Course:  Patient presented to [facility] medically cleared for elective Right Total Knee Replacement Surgery, having failed outpatient conservative management. Prophylactic antibiotics were started before the procedure and continued for 24 hours. They were admitted after surgery to the orthopedic floor. She received fluid boluses for low BP POD1. There were no other complications during the hospital stay. All home medications were continued.     Routine consults were obtained from the Anticoagulation Team for DVT/PE prophylaxis, from Physical Therapy for twice daily PT, and from the Hospitalist for Medical Co-management. Patient was placed on Xarelto for anticoagulation.  Pertinent home medications were continued.  Daily labs were followed.      On POD 0 pt was stable overnight. Pt received twice daily PT and a new Aquacel dressing was applied prior to discharge. The plan is for DC to home with home PT.  The orthopedic Attending is aware and agrees. H&P:  Pt is a 73y Female   PAST MEDICAL & SURGICAL HISTORY:  Erbs muscular dystrophy: left arm  Thyroid cyst  Osteoarthritis  H/O spinal fusion: 1997 lumbar     Now s/p Right Total Knee Arthroplasty. Pt is afebrile with stable vital signs. Pain is controlled. Alert and Oriented. Exam reveals intact EHL FHL TA GS, +DP. Dressing is clean and dry with a New Aquacel bandage on.    Vitals  Vital Signs Last 24 Hrs  T(C): 37.1 (25 Oct 2018 23:20), Max: 37.1 (25 Oct 2018 23:20)  T(F): 98.7 (25 Oct 2018 23:20), Max: 98.7 (25 Oct 2018 23:20)  HR: 80 (25 Oct 2018 23:20) (73 - 80)  BP: 131/59 (25 Oct 2018 23:20) (107/49 - 131/59)  BP(mean): --  RR: 16 (25 Oct 2018 23:20) (16 - 16)  SpO2: 97% (25 Oct 2018 23:20) (96% - 97%)    Labs                          9.7    6.24  )-----------( 226      ( 26 Oct 2018 05:26 )             29.1       Hospital Course:  Patient presented to [facility] medically cleared for elective Right Total Knee Replacement Surgery, having failed outpatient conservative management. Prophylactic antibiotics were started before the procedure and continued for 24 hours. They were admitted after surgery to the orthopedic floor. She received fluid boluses for low BP POD1. There were no other complications during the hospital stay. All home medications were continued.     Routine consults were obtained from the Anticoagulation Team for DVT/PE prophylaxis, from Physical Therapy for twice daily PT, and from the Hospitalist for Medical Co-management. Patient was placed on Xarelto for anticoagulation.  Pertinent home medications were continued.  Daily labs were followed.      On POD 3 pt was stable overnight. Pt received twice daily PT and a new Aquacel dressing was applied prior to discharge. The plan is for DC to home with home PT.  The orthopedic Attending is aware and agrees.

## 2018-10-23 NOTE — CONSULT NOTE ADULT - ASSESSMENT
A:  72 yo female S/P right TKR with high thrombosis risk due to age, surgery and BMI      D/W pt her options for anticoagulation, pt requesting Xarelto.  Risks vs benefits discussed and is in agreement to use xarelto      P: D/W pt risks vs benefits and is agreeable to use Xarelto    Start Xarelto 10 mg po today and cont x 12 days  Protonix 40 mg po daily while on Xarelto  daily CBC, BMP  Venodynes BLE  INC Mobility as kelby    Thank you for the consult, will follow

## 2018-10-23 NOTE — PROGRESS NOTE ADULT - ASSESSMENT
A: 73F s/p Right TKA     P;  WBAT RLE   therapy   DVT ppx   post op abx   venodynes  incentive spirometer   follow labs  no pillow under knee   pain control

## 2018-10-23 NOTE — DISCHARGE NOTE ADULT - ABILITY TO HEAR (WITH HEARING AID OR HEARING APPLIANCE IF NORMALLY USED):
"10/12/2018       RE: Carri Estrada  [address] [facility]  [address] [place] [id number]     Dear Colleague,    Thank you for referring your patient, Carri Estrada, to the [facility] SURGICAL WEIGHT MANAGEMENT at [facility]. Please see a copy of my visit note below.    Carri Estrada is a 41 year old female presents today for new weight management nutrition consultation.  Patient was referred by Dr Ramirez(10/12/18).    Estimated body mass index is 38.72 kg/(m^2) as calculated from the following:    Height as of an earlier encounter on 10/12/18: 1.651 m (5' 5\").    Weight as of an earlier encounter on 10/12/18: 105.6 kg (232 lb 11.2 oz).     Nutrition history  See MD note for details.  Vegetarian going towards vegan  Allergy to tree nuts per patient    Breakfast: banana and coffee(black)  Lunch: leftovers  Dinner: noodles, vegetables and tofu, rice, frozen pizza, roasted vegetables, burritos ( cooks)  Snacks: PB and celery, occasionally chips  Beverages: water, alcohol 2-4 beers 4 times per week  Out to eat: 1-2 times per week    Nutrition Prescription  1300 calories/day (per MD)  [id number]  Starting Topiramate with MD 10/12/18    Nutrition Diagnosis  Food and nutrition related knowledge deficit r/t lack of prior exposure to calorie counting and nutrition education aeb pt unable to verbalize understanding of 2615-5819 calorie/day diet for weight loss.    Nutrition Intervention  Materials/education provided on 1595-7512 calorie/day diet with portion control and healthy food choices (What to Eat handout), meal and snack planning and websites, sample meal plans.  Patient reported feeling overwhelmed with trying to count calories, she has counted in the past but was eating mostly prepackaged foods at that point in time.  Discussed portion sizes and meals and 1500 calorie menu, discussed starting at 1500 calories and decreasing to 1300 once able to consistently plan 1500 " calorie days.  Patient uses a pea protein powder(plans to go completely vegan) discussed using shakes as meal replacements.    Patient Understanding: good  Expected Compliance: good  Follow-Up Plans: food tracking     Nutrition Goals  1) Follow 1500 calorie/day diet  2) Limit alcohol to three days per week  3) Walking two times per week    Follow-Up:  PRN    Time spent with patient: 45 minutes.      Again, thank you for allowing me to participate in the care of your patient.      Sincerely,    Gillian Padilla RD       Adequate: hears normal conversation without difficulty

## 2018-10-23 NOTE — DISCHARGE NOTE ADULT - CARE PROVIDER_API CALL
Deangelo Hallman (MD), Orthopaedic Surgery  [address] [place] [address]  Phone: [phone]  Fax: [phone]

## 2018-10-24 LAB
ANION GAP SERPL CALC-SCNC: 6 MMOL/L — SIGNIFICANT CHANGE UP (ref 5–17)
BUN SERPL-MCNC: 20 MG/DL — SIGNIFICANT CHANGE UP (ref 7–23)
CALCIUM SERPL-MCNC: 8.7 MG/DL — SIGNIFICANT CHANGE UP (ref 8.5–10.1)
CHLORIDE SERPL-SCNC: 106 MMOL/L — SIGNIFICANT CHANGE UP (ref 96–108)
CO2 SERPL-SCNC: 28 MMOL/L — SIGNIFICANT CHANGE UP (ref 22–31)
CREAT SERPL-MCNC: 0.76 MG/DL — SIGNIFICANT CHANGE UP (ref 0.5–1.3)
GLUCOSE SERPL-MCNC: 114 MG/DL — HIGH (ref 70–99)
HCT VFR BLD CALC: 34.3 % — LOW (ref 34.5–45)
HGB BLD-MCNC: 11.4 G/DL — LOW (ref 11.5–15.5)
MCHC RBC-ENTMCNC: 32.1 PG — SIGNIFICANT CHANGE UP (ref 27–34)
MCHC RBC-ENTMCNC: 33.2 GM/DL — SIGNIFICANT CHANGE UP (ref 32–36)
MCV RBC AUTO: 96.6 FL — SIGNIFICANT CHANGE UP (ref 80–100)
NRBC # BLD: 0 /100 WBCS — SIGNIFICANT CHANGE UP (ref 0–0)
PLATELET # BLD AUTO: 223 K/UL — SIGNIFICANT CHANGE UP (ref 150–400)
POTASSIUM SERPL-MCNC: 4.4 MMOL/L — SIGNIFICANT CHANGE UP (ref 3.5–5.3)
POTASSIUM SERPL-SCNC: 4.4 MMOL/L — SIGNIFICANT CHANGE UP (ref 3.5–5.3)
RBC # BLD: 3.55 M/UL — LOW (ref 3.8–5.2)
RBC # FLD: 13.1 % — SIGNIFICANT CHANGE UP (ref 10.3–14.5)
SODIUM SERPL-SCNC: 140 MMOL/L — SIGNIFICANT CHANGE UP (ref 135–145)
WBC # BLD: 10.47 K/UL — SIGNIFICANT CHANGE UP (ref 3.8–10.5)
WBC # FLD AUTO: 10.47 K/UL — SIGNIFICANT CHANGE UP (ref 3.8–10.5)

## 2018-10-24 PROCEDURE — 99233 SBSQ HOSP IP/OBS HIGH 50: CPT

## 2018-10-24 RX ORDER — SODIUM CHLORIDE 9 MG/ML
500 INJECTION INTRAMUSCULAR; INTRAVENOUS; SUBCUTANEOUS ONCE
Qty: 0 | Refills: 0 | Status: COMPLETED | OUTPATIENT
Start: 2018-10-24 | End: 2018-10-24

## 2018-10-24 RX ORDER — ACETAMINOPHEN 500 MG
1000 TABLET ORAL ONCE
Qty: 0 | Refills: 0 | Status: COMPLETED | OUTPATIENT
Start: 2018-10-24 | End: 2018-10-24

## 2018-10-24 RX ORDER — SODIUM CHLORIDE 9 MG/ML
1000 INJECTION INTRAMUSCULAR; INTRAVENOUS; SUBCUTANEOUS
Qty: 0 | Refills: 0 | Status: DISCONTINUED | OUTPATIENT
Start: 2018-10-24 | End: 2018-10-26

## 2018-10-24 RX ORDER — RIVAROXABAN 15 MG-20MG
1 KIT ORAL
Qty: 10 | Refills: 0 | OUTPATIENT
Start: 2018-10-24 | End: 2018-11-02

## 2018-10-24 RX ADMIN — SODIUM CHLORIDE 500 MILLILITER(S): 9 INJECTION INTRAMUSCULAR; INTRAVENOUS; SUBCUTANEOUS at 18:48

## 2018-10-24 RX ADMIN — OXYCODONE HYDROCHLORIDE 10 MILLIGRAM(S): 5 TABLET ORAL at 10:56

## 2018-10-24 RX ADMIN — Medication 1 TABLET(S): at 10:56

## 2018-10-24 RX ADMIN — OXYCODONE HYDROCHLORIDE 10 MILLIGRAM(S): 5 TABLET ORAL at 06:44

## 2018-10-24 RX ADMIN — SODIUM CHLORIDE 75 MILLILITER(S): 9 INJECTION INTRAMUSCULAR; INTRAVENOUS; SUBCUTANEOUS at 16:19

## 2018-10-24 RX ADMIN — Medication 100 MILLIGRAM(S): at 10:56

## 2018-10-24 RX ADMIN — Medication 400 MILLIGRAM(S): at 18:48

## 2018-10-24 RX ADMIN — CELECOXIB 200 MILLIGRAM(S): 200 CAPSULE ORAL at 08:13

## 2018-10-24 RX ADMIN — POLYETHYLENE GLYCOL 3350 17 GRAM(S): 17 POWDER, FOR SOLUTION ORAL at 10:56

## 2018-10-24 RX ADMIN — Medication 100 MILLIGRAM(S): at 05:42

## 2018-10-24 RX ADMIN — RIVAROXABAN 10 MILLIGRAM(S): KIT at 21:39

## 2018-10-24 RX ADMIN — OXYCODONE HYDROCHLORIDE 10 MILLIGRAM(S): 5 TABLET ORAL at 11:30

## 2018-10-24 RX ADMIN — INFLUENZA VIRUS VACCINE 0.5 MILLILITER(S): 15; 15; 15; 15 SUSPENSION INTRAMUSCULAR at 10:57

## 2018-10-24 RX ADMIN — OXYCODONE HYDROCHLORIDE 5 MILLIGRAM(S): 5 TABLET ORAL at 16:18

## 2018-10-24 RX ADMIN — OXYCODONE HYDROCHLORIDE 10 MILLIGRAM(S): 5 TABLET ORAL at 06:14

## 2018-10-24 RX ADMIN — Medication 650 MILLIGRAM(S): at 10:59

## 2018-10-24 RX ADMIN — Medication 1000 MILLIGRAM(S): at 19:18

## 2018-10-24 RX ADMIN — Medication 650 MILLIGRAM(S): at 11:30

## 2018-10-24 RX ADMIN — SODIUM CHLORIDE 500 MILLILITER(S): 9 INJECTION INTRAMUSCULAR; INTRAVENOUS; SUBCUTANEOUS at 16:19

## 2018-10-24 RX ADMIN — OXYCODONE HYDROCHLORIDE 5 MILLIGRAM(S): 5 TABLET ORAL at 16:50

## 2018-10-24 NOTE — CONSULT NOTE ADULT - SUBJECTIVE AND OBJECTIVE BOX
HPI:  72 y/o female with oa s/p right TKR.  Medicine consult requested for post op medical management    10/24/2018: BP dropped yest and felt a bit dizzy but now feels better; voiding fine; right knee is a bit sore but pain is controlled currently; no n/v/f/c; no cp, sob    PAST MEDICAL & SURGICAL HISTORY:  Erbs muscular dystrophy: left arm  Thyroid cyst  Osteoarthritis  H/O spinal fusion:  lumbar      FAMILY HISTORY:   MOTHER  AGE 73, MI  FATHER  IN HIS 90'S - DEMENTIA      SOCIAL HISTORY:  no smoking, POS 2 COCKTAILS NIGHTLY,  no drugs    REVIEW OF SYSTEMS:   All 10 systems reviewed in detailed and found to be negative with the exception of what has already been described above    MEDICATIONS  (STANDING):  acetaminophen   Tablet .. 650 milliGRAM(s) Oral every 6 hours  celecoxib 200 milliGRAM(s) Oral with breakfast  docusate sodium 100 milliGRAM(s) Oral every 12 hours  docusate sodium 100 milliGRAM(s) Oral three times a day  influenza   Vaccine 0.5 milliLiter(s) IntraMuscular once  lactated ringers. 1000 milliLiter(s) (75 mL/Hr) IV Continuous <Continuous>  multivitamin 1 Tablet(s) Oral daily  oxyCODONE  ER Tablet 10 milliGRAM(s) Oral every 12 hours  polyethylene glycol 3350 17 Gram(s) Oral daily  rivaroxaban 10 milliGRAM(s) Oral daily    MEDICATIONS  (PRN):  acetaminophen   Tablet .. 650 milliGRAM(s) Oral every 6 hours PRN Temp greater or equal to 38C (100.4F)  aluminum hydroxide/magnesium hydroxide/simethicone Suspension 30 milliLiter(s) Oral four times a day PRN Indigestion  HYDROmorphone  Injectable 0.5 milliGRAM(s) IV Push every 3 hours PRN Severe Pain (7 - 10)  magnesium hydroxide Suspension 30 milliLiter(s) Oral daily PRN Constipation  ondansetron Injectable 4 milliGRAM(s) IV Push every 6 hours PRN Nausea and/or Vomiting  oxyCODONE    IR 5 milliGRAM(s) Oral every 4 hours PRN Mild Pain (1 - 3)  oxyCODONE    IR 10 milliGRAM(s) Oral every 4 hours PRN Moderate Pain (4 - 6)  senna 2 Tablet(s) Oral at bedtime PRN Constipation      Allergies    No Known Allergies    Intolerances          PHYSICAL EXAM:    Vital Signs Last 24 Hrs  T(C): 36.2 (24 Oct 2018 03:36), Max: 37.1 (23 Oct 2018 11:51)  T(F): 97.2 (24 Oct 2018 03:36), Max: 98.7 (23 Oct 2018 11:51)  HR: 64 (24 Oct 2018 03:36) (64 - 92)  BP: 97/48 (24 Oct 2018 03:36) (97/43 - 139/53)  BP(mean): --  RR: 16 (23 Oct 2018 19:06) (11 - 17)  SpO2: 99% (24 Oct 2018 03:36) (96% - 100%)    GEN: A and O, NAD,  mood stable  HEENT:   NC/AT, EOMI, no oropharyngeal lesions    NECK:   supple    CV:  +S1, +S2, regular, no murmurs or rubs    RESP:   lungs clear to auscultation bilaterally, no wheezing, rales, rhonchi, good air entry bilaterally    GI:  abdomen soft, non-tender, non-distended, normal BS,  no abdominal masses, no palpable masses    RECTAL:  not examined    :  not examined    MSK:   normal muscle tone, no atrophy, no rigidity, no contractions    EXT:   no clubbing, no cyanosis, RIGHT KNEE EDEMA, DRESSING C/D/I  no calf pain, swelling or erythema    VASCULAR:  pulses equal and symmetric in the upper and lower extremities    NEURO:  AAOX3, no focal neurological deficits, follows all commands, able to move extremities spontaneously    SKIN:  no ulcers, lesions or rashes    LABS/IMAGIN.4   10.47 )-----------( 223      ( 24 Oct 2018 06:09 )             34.3     10-    140  |  106  |  20  ----------------------------<  114<H>  4.4   |  28  |  0.76    Ca    8.7      24 Oct 2018 06:09            PT/INR - ( 23 Oct 2018 12:43 )   PT: 11.6 sec;   INR: 1.07 ratio         PTT - ( 23 Oct 2018 12:43 )  PTT:29.3 sec                                  EKG:     NSR@86M, INC RBBB

## 2018-10-24 NOTE — PHYSICAL THERAPY INITIAL EVALUATION ADULT - GENERAL OBSERVATIONS, REHAB EVAL
Pt rec'd supine in bed, pleasant and cooperative with PT, no acute complaints at rest, dressing to right knee C/D/I.

## 2018-10-24 NOTE — CONSULT NOTE ADULT - ASSESSMENT
74 Y/O FEMALE WITH THE ABOVE MED HX S/P RIGHT TKR    *POSTPROCEDURAL STATE - POD# 1  PAIN CONTROL  ENCOURAGED IS  PHYSICAL THERAPY    * ANEMIA - SECONDARY TO ACUTE BLOOD LOSS  H/H STABLE    *POSTPROCEDURAL HYPOTENSION - LIKELY FROM PAIN MEDS  BOLUS IF SYMPTOMATIC AGAIN    *DVT PROPHY - ON XARELTO AS PER ANTICOAG SERVICES

## 2018-10-24 NOTE — PROGRESS NOTE ADULT - SUBJECTIVE AND OBJECTIVE BOX
Pt seen at bedside doing well, mild pain right knee, denies N/T RLE.   No overnight events.  Pain well controlled. No paresthesias.     Vital Signs Last 24 Hrs  T(C): 36.2 (24 Oct 2018 03:36), Max: 37.1 (23 Oct 2018 11:51)  T(F): 97.2 (24 Oct 2018 03:36), Max: 98.7 (23 Oct 2018 11:51)  HR: 64 (24 Oct 2018 03:36) (64 - 92)  BP: 97/48 (24 Oct 2018 03:36) (97/43 - 139/53)  BP(mean): --  RR: 16 (23 Oct 2018 19:06) (11 - 17)  SpO2: 99% (24 Oct 2018 03:36) (96% - 100%)    PE:  Right knee:  dressing c/d/i   compartments soft non tender   FROM ankle and toes  + EHL FHL GS TA   SILT throughout.  2+ DP/PT intact   Brisk capillary refill.  Foot well perfused/warm.     am labs pending

## 2018-10-24 NOTE — PROGRESS NOTE ADULT - SUBJECTIVE AND OBJECTIVE BOX
HPI:    Patient is a 73y old  Female who presents with a chief complaint of inc right knee pain, h/o OA, now S/P Right TKA (23 Oct 2018 15:22)    Consulted by Dr. Hallman   for VTE prophylaxis, risk stratification, and anticoagulation management.    PAST MEDICAL & SURGICAL HISTORY:  Erbs muscular dystrophy: left arm  Thyroid cyst  Osteoarthritis  H/O spinal fusion:  lumbar    CrCl: 81.7    Caprini VTE Risk Score:CAPRINI SCORE [CLOT]    AGE RELATED RISK FACTORS                                                       MOBILITY RELATED FACTORS  [ ] Age 41-60 years                                            (1 Point)                  [ ] Bed rest                                                        (1 Point)  [ ] Age: 61-74 years                                           (2 Points)                 [ ] Plaster cast                                                   (2 Points)  [x ] Age= 75 years                                              (3 Points)                 [ ] Bed bound for more than 72 hours                 (2 Points)    DISEASE RELATED RISK FACTORS                                               GENDER SPECIFIC FACTORS  [ ] Edema in the lower extremities                       (1 Point)                  [ ] Pregnancy                                                     (1 Point)  [ ] Varicose veins                                               (1 Point)                  [ ] Post-partum < 6 weeks                                   (1 Point)             [x ] BMI > 25 Kg/m2                                            (1 Point)                  [ ] Hormonal therapy  or oral contraception          (1 Point)                 [ ] Sepsis (in the previous month)                        (1 Point)                  [ ] History of pregnancy complications                 (1 point)  [ ] Pneumonia or serious lung disease                                               [ ] Unexplained or recurrent                     (1 Point)           (in the previous month)                               (1 Point)  [ ] Abnormal pulmonary function test                     (1 Point)                 SURGERY RELATED RISK FACTORS  [ ] Acute myocardial infarction                              (1 Point)                 [ ]  Section                                             (1 Point)  [ ] Congestive heart failure (in the previous month)  (1 Point)               [ ] Minor surgery                                                  (1 Point)   [ ] Inflammatory bowel disease                             (1 Point)                 [ ] Arthroscopic surgery                                        (2 Points)  [ ] Central venous access                                      (2 Points)                [ ] General surgery lasting more than 45 minutes   (2 Points)       [ ] Stroke (in the previous month)                          (5 Points)               [ x] Elective arthroplasty                                         (5 Points)            [ ] H/O malignancy ( present or previous)            ( 2 points)                                                                                                                                   HEMATOLOGY RELATED FACTORS                                                 TRAUMA RELATED RISK FACTORS  [ ] Prior episodes of VTE                                     (3 Points)                 [ ] Fracture of the hip, pelvis, or leg                       (5 Points)  [ ] Positive family history for VTE                         (3 Points)                 [ ] Acute spinal cord injury (in the previous month)  (5 Points)  [ ] Prothrombin 85585 A                                     (3 Points)                 [ ] Paralysis  (less than 1 month)                             (5 Points)  [ ] Factor V Leiden                                             (3 Points)                  [ ] Multiple Trauma within 1 month                        (5 Points)  [ ] Lupus anticoagulants                                     (3 Points)                                                           [ ] Anticardiolipin antibodies                               (3 Points)                                                       [ ] High homocysteine in the blood                      (3 Points)                                             [ ] Other congenital or acquired thrombophilia      (3 Points)                                                [ ] Heparin induced thrombocytopenia                  (3 Points)                                          Total Score [    9      ]      IMPROVE Bleeding Risk Score #2.5    Falls Risk:   High ( x )  Mod (  )  Low (  )    10/24: Patient seen at bedside. With "throbbing pain" in knee, awaiting medication. Discussed Xarelto with patient. Will send prescription to pharmacy.    FAMILY HISTORY:    Denies any personal or familial history of clotting or bleeding disorders.    Allergies    No Known Allergies    Intolerances    REVIEW OF SYSTEMS    (  )Fever	     (  )Constipation	(  )SOB				(  )Headache	(  )Dysuria  (  )Chills	     (  )Melena	(  )Dyspnea present on exertion	                    (  )Dizziness                    (  )Polyuria  (  )Nausea	     (  )Hematochezia	(  )Cough			                    (  )Syncope   	(  )Hematuria  (  )Vomiting    (  )Chest Pain	(  )Wheezing			(  )Weakness  (  )Diarrhea     (  )Palpitations	(  )Anorexia			(  )Myalgia    All other review of systems negative: Yes    PHYSICAL EXAM:    Constitutional: Appears Well    Neurological: A& O x 3    Skin: Warm    Respiratory and Thorax: normal effort; Breath sounds: normal; No rales/wheezing/rhonchi  	  Cardiovascular: S1, S2, regular, NMBR	    Gastrointestinal: BS + x 4Q, nontender	    Genitourinary:  Bladder nondistended, nontender    Musculoskeletal:   General Right:   no muscle/joint tenderness,   normal tone, no joint swelling,   ROM: limited	    General Left:   no muscle/joint tenderness,   normal tone, no joint swelling,   ROM: full      Knee:  Right: Incision: ; Dressing CDI;       Lower extrems:   Right: no calf tenderness              negative manju's sign               + pedal pulses    Left:   no calf tenderness              negative manju's sign               + pedal pulses                        11.4   10.47 )-----------( 223      ( 24 Oct 2018 06:09 )             34.3       10-24    140  |  106  |  20  ----------------------------<  114<H>  4.4   |  28  |  0.76    Ca    8.7      24 Oct 2018 06:09        PT/INR - ( 23 Oct 2018 12:43 )   PT: 11.6 sec;   INR: 1.07 ratio         PTT - ( 23 Oct 2018 12:43 )  PTT:29.3 sec                            11.6   7.00  )-----------( 199      ( 23 Oct 2018 15:42 )             35.0       10-23    141  |  110<H>  |  19  ----------------------------<  93  3.7   |  23  |  0.71    Ca    8.6      23 Oct 2018 15:42        PT/INR - ( 23 Oct 2018 12:43 )   PT: 11.6 sec;   INR: 1.07 ratio         PTT - ( 23 Oct 2018 12:43 )  PTT:29.3 sec				    MEDICATIONS  (STANDING):  acetaminophen   Tablet .. 650 milliGRAM(s) Oral every 6 hours  celecoxib 200 milliGRAM(s) Oral with breakfast  docusate sodium 100 milliGRAM(s) Oral every 12 hours  influenza   Vaccine 0.5 milliLiter(s) IntraMuscular once  lactated ringers. 1000 milliLiter(s) IV Continuous <Continuous>  multivitamin 1 Tablet(s) Oral daily  oxyCODONE  ER Tablet 10 milliGRAM(s) Oral every 12 hours  rivaroxaban 10 milliGRAM(s) Oral daily    Vital Signs Last 24 Hrs  T(C): 36.8 (10-24-18 @ 11:02), Max: 37.1 (10-23-18 @ 11:51)  T(F): 98.2 (10-24-18 @ 11:02), Max: 98.7 (10-23-18 @ 11:51)  HR: 67 (10-24-18 @ 11:02) (64 - 92)  BP: 96/44 (10-24-18 @ 11:02) (96/44 - 139/53)  BP(mean): --  RR: 16 (10-24-18 @ 11:02) (11 - 17)  SpO2: 98% (10-24-18 @ 11:02) (96% - 100%)    DVT Prophylaxis:  LMWH                   (  )  Heparin SQ           (  )  Coumadin             (  )  Xarelto                  (x  )  Eliquis                   (  )  Venodynes           (x  )  Ambulation          (  )  UFH                       (  )  Contraindicated  (  )

## 2018-10-24 NOTE — PHYSICAL THERAPY INITIAL EVALUATION ADULT - RANGE OF MOTION EXAMINATION, REHAB EVAL
bilateral lower extremity ROM was WFL (within functional limits)/LUE shoulder flexion PROM limited 0-100 deg/Right UE ROM was WFL (within functional limits)

## 2018-10-24 NOTE — PROGRESS NOTE ADULT - ASSESSMENT
A: 73F s/p Right TKA POD # 1    Pain control prn  WBAT RLE   PT/OT, OOB this am  DVT ppx per AC   post op abx x 24 hrs  venodynes B/L LE  incentive spirometer   fu am labs  no pillow under knee   Dispo planning

## 2018-10-24 NOTE — PROGRESS NOTE ADULT - ASSESSMENT
A:  72 yo female S/P right TKR with high thrombosis risk due to age, surgery and BMI      D/W pt her options for anticoagulation, pt requesting Xarelto.  Risks vs benefits discussed and is in agreement to use xarelto      P: D/W pt risks vs benefits and is agreeable to use Xarelto    :: Xarelto 10 mg po daily x 12 days  ::Protonix 40 mg po daily while on Xarelto  ::Daily CBC, BMP  ::Venodynes BLE  ::INC Mobility as kelby    Will continue to follow.

## 2018-10-25 LAB
ANION GAP SERPL CALC-SCNC: 7 MMOL/L — SIGNIFICANT CHANGE UP (ref 5–17)
BUN SERPL-MCNC: 20 MG/DL — SIGNIFICANT CHANGE UP (ref 7–23)
CALCIUM SERPL-MCNC: 8.2 MG/DL — LOW (ref 8.5–10.1)
CHLORIDE SERPL-SCNC: 110 MMOL/L — HIGH (ref 96–108)
CO2 SERPL-SCNC: 27 MMOL/L — SIGNIFICANT CHANGE UP (ref 22–31)
CREAT SERPL-MCNC: 0.57 MG/DL — SIGNIFICANT CHANGE UP (ref 0.5–1.3)
GLUCOSE SERPL-MCNC: 91 MG/DL — SIGNIFICANT CHANGE UP (ref 70–99)
HCT VFR BLD CALC: 29.5 % — LOW (ref 34.5–45)
HGB BLD-MCNC: 9.8 G/DL — LOW (ref 11.5–15.5)
MCHC RBC-ENTMCNC: 31.8 PG — SIGNIFICANT CHANGE UP (ref 27–34)
MCHC RBC-ENTMCNC: 33.2 GM/DL — SIGNIFICANT CHANGE UP (ref 32–36)
MCV RBC AUTO: 95.8 FL — SIGNIFICANT CHANGE UP (ref 80–100)
NRBC # BLD: 0 /100 WBCS — SIGNIFICANT CHANGE UP (ref 0–0)
PLATELET # BLD AUTO: 204 K/UL — SIGNIFICANT CHANGE UP (ref 150–400)
POTASSIUM SERPL-MCNC: 3.9 MMOL/L — SIGNIFICANT CHANGE UP (ref 3.5–5.3)
POTASSIUM SERPL-SCNC: 3.9 MMOL/L — SIGNIFICANT CHANGE UP (ref 3.5–5.3)
RBC # BLD: 3.08 M/UL — LOW (ref 3.8–5.2)
RBC # FLD: 13.4 % — SIGNIFICANT CHANGE UP (ref 10.3–14.5)
SODIUM SERPL-SCNC: 144 MMOL/L — SIGNIFICANT CHANGE UP (ref 135–145)
SURGICAL PATHOLOGY FINAL REPORT - CH: SIGNIFICANT CHANGE UP
WBC # BLD: 6.13 K/UL — SIGNIFICANT CHANGE UP (ref 3.8–10.5)
WBC # FLD AUTO: 6.13 K/UL — SIGNIFICANT CHANGE UP (ref 3.8–10.5)

## 2018-10-25 PROCEDURE — 99233 SBSQ HOSP IP/OBS HIGH 50: CPT

## 2018-10-25 RX ORDER — PANTOPRAZOLE SODIUM 20 MG/1
40 TABLET, DELAYED RELEASE ORAL
Qty: 0 | Refills: 0 | Status: DISCONTINUED | OUTPATIENT
Start: 2018-10-25 | End: 2018-10-26

## 2018-10-25 RX ADMIN — Medication 1 TABLET(S): at 10:51

## 2018-10-25 RX ADMIN — Medication 650 MILLIGRAM(S): at 23:32

## 2018-10-25 RX ADMIN — Medication 650 MILLIGRAM(S): at 12:20

## 2018-10-25 RX ADMIN — Medication 650 MILLIGRAM(S): at 12:19

## 2018-10-25 RX ADMIN — CELECOXIB 200 MILLIGRAM(S): 200 CAPSULE ORAL at 10:50

## 2018-10-25 RX ADMIN — Medication 650 MILLIGRAM(S): at 05:22

## 2018-10-25 RX ADMIN — Medication 650 MILLIGRAM(S): at 17:31

## 2018-10-25 RX ADMIN — Medication 650 MILLIGRAM(S): at 05:21

## 2018-10-25 RX ADMIN — OXYCODONE HYDROCHLORIDE 5 MILLIGRAM(S): 5 TABLET ORAL at 20:53

## 2018-10-25 RX ADMIN — CELECOXIB 200 MILLIGRAM(S): 200 CAPSULE ORAL at 10:51

## 2018-10-25 RX ADMIN — OXYCODONE HYDROCHLORIDE 5 MILLIGRAM(S): 5 TABLET ORAL at 20:23

## 2018-10-25 RX ADMIN — Medication 650 MILLIGRAM(S): at 01:09

## 2018-10-25 RX ADMIN — Medication 100 MILLIGRAM(S): at 05:21

## 2018-10-25 RX ADMIN — PANTOPRAZOLE SODIUM 40 MILLIGRAM(S): 20 TABLET, DELAYED RELEASE ORAL at 12:19

## 2018-10-25 RX ADMIN — Medication 100 MILLIGRAM(S): at 12:19

## 2018-10-25 RX ADMIN — POLYETHYLENE GLYCOL 3350 17 GRAM(S): 17 POWDER, FOR SOLUTION ORAL at 10:51

## 2018-10-25 RX ADMIN — RIVAROXABAN 10 MILLIGRAM(S): KIT at 22:05

## 2018-10-25 NOTE — PROGRESS NOTE ADULT - ASSESSMENT
74 Y/O FEMALE WITH THE ABOVE MED HX S/P RIGHT TKR    *POSTPROCEDURAL STATE - POD# 2  PAIN CONTROL  ENCOURAGED IS  PHYSICAL THERAPY    * ANEMIA - SECONDARY TO ACUTE BLOOD LOSS  H/H STABLE    *POSTPROCEDURAL HYPOTENSION - s/p multiple boluses and ivf -- cont ivf until after lunch, then monitor off fluids; bp better this am and dizziness resolving    *DVT PROPHY - ON XARELTO AS PER ANTICOAG SERVICES

## 2018-10-25 NOTE — PROGRESS NOTE ADULT - SUBJECTIVE AND OBJECTIVE BOX
Orthopedics    POD 2 Total Knee Arthroplasty  Pain is controlled. Pt feeling a lot better. No more lightheadedness since yesterday. No nausea or vomiting. Up in chair this AM tolerating well.    Vital Signs Last 24 Hrs  T(C): 36.9 (10-25-18 @ 05:28), Max: 36.9 (10-24-18 @ 21:46)  T(F): 98.4 (10-25-18 @ 05:28), Max: 98.5 (10-24-18 @ 21:46)  HR: 74 (10-25-18 @ 05:28) (60 - 74)  BP: 114/49 (10-25-18 @ 05:28) (96/44 - 114/49)  BP(mean): --  RR: 16 (10-25-18 @ 05:28) (16 - 16)  SpO2: 95% (10-25-18 @ 05:28) (95% - 100%)                        9.8    6.13  )-----------( 204      ( 25 Oct 2018 06:37 )             29.5     25 Oct 2018 06:37    144    |  110    |  20     ----------------------------<  91     3.9     |  27     |  0.57     Ca    8.2        25 Oct 2018 06:37      PT/INR - ( 23 Oct 2018 12:43 )   PT: 11.6 sec;   INR: 1.07 ratio         PTT - ( 23 Oct 2018 12:43 )  PTT:29.3 sec  Exam:  NAD AAOx3  Dressing clean and dry  +EHL FHL TA GS  SILT toes 1-5  +DP  Calf Soft NT    A/P:  Stable POD 2 RIGHT Total Knee Arthroplasty  -Analgesia  -DVT PE ppx  -OOB PT  -DC planning Home tomorrow

## 2018-10-25 NOTE — PROGRESS NOTE ADULT - ASSESSMENT
A:  72 yo female S/P right TKR with high thrombosis risk due to age, surgery and BMI  10/25 Gave pt literature on Xarelto, discussed side effects . Pt has no concerns . HH 9.8/ 29.5 . Continue with Xarelto.      P:     :: Xarelto 10 mg po daily x 12 days  ::Protonix 40 mg po daily while on Xarelto  ::Daily CBC, BMP  ::Venodynes BLE  ::INC Mobility as kelby    Will continue to follow. A:  72 yo female S/P right TKR with high thrombosis risk due to age, surgery and BMI        P:     :: Xarelto 10 mg po daily x 12 days  ::Protonix 40 mg po daily while on Xarelto  ::Daily CBC, BMP  ::Venodynes BLE  ::INC Mobility as kelby    Will continue to follow.

## 2018-10-25 NOTE — PROGRESS NOTE ADULT - SUBJECTIVE AND OBJECTIVE BOX
10/25/18: No cp, sob, n/v/f/c; dizziness yesterday now resolved after fluids; right knee pain is better controlled;    REVIEW OF SYSTEMS:   All 10 systems reviewed in detailed and found to be negative with the exception of what has already been described above        PHYSICAL EXAM:    Vital Signs Last 24 Hrs  T(C): 36.9 (25 Oct 2018 05:28), Max: 36.9 (24 Oct 2018 21:46)  T(F): 98.4 (25 Oct 2018 05:28), Max: 98.5 (24 Oct 2018 21:46)  HR: 74 (25 Oct 2018 05:28) (60 - 74)  BP: 114/49 (25 Oct 2018 05:28) (96/44 - 114/49)  BP(mean): --  RR: 16 (25 Oct 2018 05:28) (16 - 16)  SpO2: 95% (25 Oct 2018 05:28) (95% - 100%)    GEN: A and O, NAD,  mood stable, sitting up in chair and eating  HEENT:   NC/AT, EOMI, no oropharyngeal lesions    NECK:   supple    CV:  +S1, +S2, regular, no murmurs or rubs    RESP:   lungs clear to auscultation bilaterally, no wheezing, rales, rhonchi, good air entry bilaterally    GI:  abdomen soft, non-tender, non-distended, normal BS,  no abdominal masses, no palpable masses    RECTAL:  not examined    :  not examined    MSK:   normal muscle tone, no atrophy, no rigidity, no contractions    EXT:   no clubbing, no cyanosis, RIGHT KNEE EDEMA, DRESSING C/D/I  no calf pain, swelling or erythema    VASCULAR:  pulses equal and symmetric in the upper and lower extremities    NEURO:  AAOX3, no focal neurological deficits, follows all commands, able to move extremities spontaneously    SKIN:  no ulcers, lesions or rashes    LABS/IMAGIN.8    6.13  )-----------( 204      ( 25 Oct 2018 06:37 )             29.5     10-25    144  |  110<H>  |  20  ----------------------------<  91  3.9   |  27  |  0.57    Ca    8.2<L>      25 Oct 2018 06:37            PT/INR - ( 23 Oct 2018 12:43 )   PT: 11.6 sec;   INR: 1.07 ratio         PTT - ( 23 Oct 2018 12:43 )  PTT:29.3 sec      MEDICATIONS  (STANDING):  acetaminophen   Tablet .. 650 milliGRAM(s) Oral every 6 hours  celecoxib 200 milliGRAM(s) Oral with breakfast  docusate sodium 100 milliGRAM(s) Oral three times a day  multivitamin 1 Tablet(s) Oral daily  oxyCODONE  ER Tablet 10 milliGRAM(s) Oral every 12 hours  polyethylene glycol 3350 17 Gram(s) Oral daily  rivaroxaban 10 milliGRAM(s) Oral daily  sodium chloride 0.9%. 1000 milliLiter(s) (75 mL/Hr) IV Continuous <Continuous>    MEDICATIONS  (PRN):  acetaminophen   Tablet .. 650 milliGRAM(s) Oral every 6 hours PRN Temp greater or equal to 38C (100.4F)  aluminum hydroxide/magnesium hydroxide/simethicone Suspension 30 milliLiter(s) Oral four times a day PRN Indigestion  bisacodyl Suppository 10 milliGRAM(s) Rectal daily PRN If no bowel movement by postoperative day #2  HYDROmorphone  Injectable 0.5 milliGRAM(s) IV Push every 3 hours PRN Severe Pain (7 - 10)  magnesium hydroxide Suspension 30 milliLiter(s) Oral daily PRN Constipation  ondansetron Injectable 4 milliGRAM(s) IV Push every 6 hours PRN Nausea and/or Vomiting  oxyCODONE    IR 5 milliGRAM(s) Oral every 4 hours PRN Mild Pain (1 - 3)  oxyCODONE    IR 10 milliGRAM(s) Oral every 4 hours PRN Moderate Pain (4 - 6)  senna 2 Tablet(s) Oral at bedtime PRN Constipation

## 2018-10-25 NOTE — PROGRESS NOTE ADULT - SUBJECTIVE AND OBJECTIVE BOX
HPI:    Patient is a 73y old  Female who presents with a chief complaint of inc right knee pain, h/o OA, now S/P Right TKA (23 Oct 2018 15:22)    Consulted by Dr. Hallman   for VTE prophylaxis, risk stratification, and anticoagulation management.    PAST MEDICAL & SURGICAL HISTORY:  Erbs muscular dystrophy: left arm  Thyroid cyst  Osteoarthritis  H/O spinal fusion:  lumbar    CrCl: 102.31    Caprini VTE Risk Score:CAPRINI SCORE [CLOT]    AGE RELATED RISK FACTORS                                                       MOBILITY RELATED FACTORS  [ ] Age 41-60 years                                            (1 Point)                  [ ] Bed rest                                                        (1 Point)  [ ] Age: 61-74 years                                           (2 Points)                 [ ] Plaster cast                                                   (2 Points)  [x ] Age= 75 years                                              (3 Points)                 [ ] Bed bound for more than 72 hours                 (2 Points)    DISEASE RELATED RISK FACTORS                                               GENDER SPECIFIC FACTORS  [ ] Edema in the lower extremities                       (1 Point)                  [ ] Pregnancy                                                     (1 Point)  [ ] Varicose veins                                               (1 Point)                  [ ] Post-partum < 6 weeks                                   (1 Point)             [x ] BMI > 25 Kg/m2                                            (1 Point)                  [ ] Hormonal therapy  or oral contraception          (1 Point)                 [ ] Sepsis (in the previous month)                        (1 Point)                  [ ] History of pregnancy complications                 (1 point)  [ ] Pneumonia or serious lung disease                                               [ ] Unexplained or recurrent                     (1 Point)           (in the previous month)                               (1 Point)  [ ] Abnormal pulmonary function test                     (1 Point)                 SURGERY RELATED RISK FACTORS  [ ] Acute myocardial infarction                              (1 Point)                 [ ]  Section                                             (1 Point)  [ ] Congestive heart failure (in the previous month)  (1 Point)               [ ] Minor surgery                                                  (1 Point)   [ ] Inflammatory bowel disease                             (1 Point)                 [ ] Arthroscopic surgery                                        (2 Points)  [ ] Central venous access                                      (2 Points)                [ ] General surgery lasting more than 45 minutes   (2 Points)       [ ] Stroke (in the previous month)                          (5 Points)               [ x] Elective arthroplasty                                         (5 Points)            [ ] H/O malignancy ( present or previous)            ( 2 points)                                                                                                                                   HEMATOLOGY RELATED FACTORS                                                 TRAUMA RELATED RISK FACTORS  [ ] Prior episodes of VTE                                     (3 Points)                 [ ] Fracture of the hip, pelvis, or leg                       (5 Points)  [ ] Positive family history for VTE                         (3 Points)                 [ ] Acute spinal cord injury (in the previous month)  (5 Points)  [ ] Prothrombin 98089 A                                     (3 Points)                 [ ] Paralysis  (less than 1 month)                             (5 Points)  [ ] Factor V Leiden                                             (3 Points)                  [ ] Multiple Trauma within 1 month                        (5 Points)  [ ] Lupus anticoagulants                                     (3 Points)                                                           [ ] Anticardiolipin antibodies                               (3 Points)                                                       [ ] High homocysteine in the blood                      (3 Points)                                             [ ] Other congenital or acquired thrombophilia      (3 Points)                                                [ ] Heparin induced thrombocytopenia                  (3 Points)                                          Total Score [ 9]      IMPROVE Bleeding Risk Score #2.5    Falls Risk:   High (  )  Mod ( x )  Low (  )    10/24: Patient seen at bedside. With "throbbing pain" in knee, awaiting medication. Discussed Xarelto with patient. Will send prescription to pharmacy.  10/ 25 Pt seen at bedside, aao x 4 ,  gave literature on Xarelto. C/o pain and swelling in knee . Ortho residents aware,.    FAMILY HISTORY:    Denies any personal or familial history of clotting or bleeding disorders.    Allergies    No Known Allergies    Intolerances    REVIEW OF SYSTEMS    (  )Fever	     (  )Constipation	(  )SOB				(  )Headache	(  )Dysuria  (  )Chills	     (  )Melena	(  )Dyspnea present on exertion	                    (  )Dizziness                    (  )Polyuria  (  )Nausea	     (  )Hematochezia	(  )Cough			                    (  )Syncope   	(  )Hematuria  (  )Vomiting    (  )Chest Pain	(  )Wheezing			(  )Weakness  (  )Diarrhea     (  )Palpitations	(  )Anorexia			(  )Myalgia    All other review of systems negative: Yes    PHYSICAL EXAM:    Constitutional: Appears Well, WN, healthy    Neurological: A& O x 4    Skin: Warm, dry pink     Respiratory and Thorax: normal effort; Breath sounds: clear bilaterally No rales/wheezing/rhonchi  	  Cardiovascular: S1, S2, normal.  regular rate and rhythm  NMBR	    Gastrointestinal: BS + x 4Q, nontender	    Genitourinary:  Bladder nondistended, nontender    Musculoskeletal:   General Right:   no muscle/joint tenderness,   normal tone, no joint swelling,   ROM: limited	    General Left:   no muscle/joint tenderness,   normal tone, no joint swelling,   ROM: full      Knee:  Right: Incision: ; Dressing CDI; slight old bloody drainage under aquacel dressing  knee swollen, warm , slight redness      Lower extrems:   Right: no calf tenderness              negative manju's sign               + pedal pulses  + edema knee    Left:   no calf tenderness              negative manju's sign               + pedal pulses   - edema                         9.8    6.13  )-----------( 204      ( 25 Oct 2018 06:37 )             29.5       10-25    144  |  110<H>  |  20  ----------------------------<  91  3.9   |  27  |  0.57    Ca    8.2<L>      25 Oct 2018 06:37        PT/INR - ( 23 Oct 2018 12:43 )   PT: 11.6 sec;   INR: 1.07 ratio         PTT - ( 23 Oct 2018 12:43 )  PTT:29.3 sec                     11.4   10.47 )-----------( 223      ( 24 Oct 2018 06:09 )             34.3       10    140  |  106  |  20  ----------------------------<  114<H>  4.4   |  28  |  0.76    Ca    8.7      24 Oct 2018 06:09        PT/INR - ( 23 Oct 2018 12:43 )   PT: 11.6 sec;   INR: 1.07 ratio         PTT - ( 23 Oct 2018 12:43 )  PTT:29.3 sec                              11.6   7.00  )-----------( 199      ( 23 Oct 2018 15:42 )             35.0       10  141  |  110<H>  |  19  ----------------------------<  93  3.7   |  23  |  0.71    Ca    8.6      23 Oct 2018 15:42        PT/INR - ( 23 Oct 2018 12:43 )   PT: 11.6 sec;   INR: 1.07 ratio         PTT - ( 23 Oct 2018 12:43 )  PTT:29.3 sec				  MEDICATIONS  (STANDING):  acetaminophen   Tablet .. 650 milliGRAM(s) Oral every 6 hours  celecoxib 200 milliGRAM(s) Oral with breakfast  docusate sodium 100 milliGRAM(s) Oral three times a day  multivitamin 1 Tablet(s) Oral daily  oxyCODONE  ER Tablet 10 milliGRAM(s) Oral every 12 hours  pantoprazole    Tablet 40 milliGRAM(s) Oral before breakfast  polyethylene glycol 3350 17 Gram(s) Oral daily  rivaroxaban 10 milliGRAM(s) Oral daily  sodium chloride 0.9%. 1000 milliLiter(s) IV Continuous <Continuous>    Vital Signs Last 24 Hrs  T(C): 36.9 (10-25-18 @ 11:07), Max: 36.9 (10-24-18 @ 21:46)  T(F): 98.5 (10-25-18 @ 11:07), Max: 98.5 (10-24-18 @ 21:46)  HR: 73 (10-25-18 @ 11:07) (60 - 74)  BP: 107/53 (10-25-18 @ 11:07) (101/36 - 114/49)  BP(mean): --  RR: 16 (10-25-18 @ 11:07) (16 - 16)  SpO2: 97% (10-25-18 @ 11:07) (95% - 100%)      DVT Prophylaxis:  LMWH                   (  )  Heparin SQ           (  )  Coumadin             (  )  Xarelto                  (x  )  Eliquis                   (  )  Venodynes           (x  )  Ambulation          (  x)  UFH                       (  )  Contraindicated  (  )

## 2018-10-25 NOTE — PROGRESS NOTE ADULT - SUBJECTIVE AND OBJECTIVE BOX
Patient seen and examined. Pain controlled. No acute events overnight, Denies any fever/chills/cp/SOB.    MEDICATIONS  (STANDING):  acetaminophen   Tablet .. 650 milliGRAM(s) Oral every 6 hours  celecoxib 200 milliGRAM(s) Oral with breakfast  docusate sodium 100 milliGRAM(s) Oral three times a day  multivitamin 1 Tablet(s) Oral daily  oxyCODONE  ER Tablet 10 milliGRAM(s) Oral every 12 hours  polyethylene glycol 3350 17 Gram(s) Oral daily  rivaroxaban 10 milliGRAM(s) Oral daily  sodium chloride 0.9%. 1000 milliLiter(s) IV Continuous <Continuous>    Allergies    No Known Allergies    Intolerances                            11.4   10.47 )-----------( 223      ( 24 Oct 2018 06:09 )             34.3     24 Oct 2018 06:09    140    |  106    |  20     ----------------------------<  114    4.4     |  28     |  0.76     Ca    8.7        24 Oct 2018 06:09      PT/INR - ( 23 Oct 2018 12:43 )   PT: 11.6 sec;   INR: 1.07 ratio         PTT - ( 23 Oct 2018 12:43 )  PTT:29.3 sec  Vital Signs Last 24 Hrs  T(C): 36.9 (10-25-18 @ 05:28), Max: 36.9 (10-24-18 @ 21:46)  T(F): 98.4 (10-25-18 @ 05:28), Max: 98.5 (10-24-18 @ 21:46)  HR: 74 (10-25-18 @ 05:28) (60 - 74)  BP: 114/49 (10-25-18 @ 05:28) (96/44 - 114/49)  RR: 16 (10-25-18 @ 05:28) (16 - 16)  SpO2: 95% (10-25-18 @ 05:28) (95% - 100%)    Physical Exam  Gen: NAD  RLE:   Dressing c/d/i in KI  +ehl/fhl/ta/gs function  L2-S1 silt  Dp/pt pulse intact  No calf ttp  Compartments soft    A/P: 73y Female sp R TKA POD 2  KI removed in AM  Pain control  DVT ppx- Xarelto per AC  PT/WBAT/OOB  FU labs  Ice/elevate  Medical management appreciated  Incentive spirometry  Dispo planning- Home today vs Tomorrow

## 2018-10-26 VITALS
DIASTOLIC BLOOD PRESSURE: 48 MMHG | OXYGEN SATURATION: 98 % | HEART RATE: 88 BPM | RESPIRATION RATE: 17 BRPM | TEMPERATURE: 98 F | SYSTOLIC BLOOD PRESSURE: 107 MMHG

## 2018-10-26 LAB
ANION GAP SERPL CALC-SCNC: 6 MMOL/L — SIGNIFICANT CHANGE UP (ref 5–17)
BUN SERPL-MCNC: 13 MG/DL — SIGNIFICANT CHANGE UP (ref 7–23)
CALCIUM SERPL-MCNC: 8.3 MG/DL — LOW (ref 8.5–10.1)
CHLORIDE SERPL-SCNC: 111 MMOL/L — HIGH (ref 96–108)
CO2 SERPL-SCNC: 27 MMOL/L — SIGNIFICANT CHANGE UP (ref 22–31)
CREAT SERPL-MCNC: 0.49 MG/DL — LOW (ref 0.5–1.3)
GLUCOSE SERPL-MCNC: 90 MG/DL — SIGNIFICANT CHANGE UP (ref 70–99)
HCT VFR BLD CALC: 29.1 % — LOW (ref 34.5–45)
HGB BLD-MCNC: 9.7 G/DL — LOW (ref 11.5–15.5)
MCHC RBC-ENTMCNC: 31.9 PG — SIGNIFICANT CHANGE UP (ref 27–34)
MCHC RBC-ENTMCNC: 33.3 GM/DL — SIGNIFICANT CHANGE UP (ref 32–36)
MCV RBC AUTO: 95.7 FL — SIGNIFICANT CHANGE UP (ref 80–100)
NRBC # BLD: 0 /100 WBCS — SIGNIFICANT CHANGE UP (ref 0–0)
PLATELET # BLD AUTO: 226 K/UL — SIGNIFICANT CHANGE UP (ref 150–400)
POTASSIUM SERPL-MCNC: 4.1 MMOL/L — SIGNIFICANT CHANGE UP (ref 3.5–5.3)
POTASSIUM SERPL-SCNC: 4.1 MMOL/L — SIGNIFICANT CHANGE UP (ref 3.5–5.3)
RBC # BLD: 3.04 M/UL — LOW (ref 3.8–5.2)
RBC # FLD: 13.4 % — SIGNIFICANT CHANGE UP (ref 10.3–14.5)
SODIUM SERPL-SCNC: 144 MMOL/L — SIGNIFICANT CHANGE UP (ref 135–145)
WBC # BLD: 6.24 K/UL — SIGNIFICANT CHANGE UP (ref 3.8–10.5)
WBC # FLD AUTO: 6.24 K/UL — SIGNIFICANT CHANGE UP (ref 3.8–10.5)

## 2018-10-26 PROCEDURE — 99233 SBSQ HOSP IP/OBS HIGH 50: CPT

## 2018-10-26 RX ADMIN — Medication 650 MILLIGRAM(S): at 12:43

## 2018-10-26 RX ADMIN — CELECOXIB 200 MILLIGRAM(S): 200 CAPSULE ORAL at 08:46

## 2018-10-26 RX ADMIN — Medication 650 MILLIGRAM(S): at 05:43

## 2018-10-26 RX ADMIN — Medication 1 TABLET(S): at 12:43

## 2018-10-26 RX ADMIN — PANTOPRAZOLE SODIUM 40 MILLIGRAM(S): 20 TABLET, DELAYED RELEASE ORAL at 05:43

## 2018-10-26 RX ADMIN — POLYETHYLENE GLYCOL 3350 17 GRAM(S): 17 POWDER, FOR SOLUTION ORAL at 12:43

## 2018-10-26 RX ADMIN — Medication 650 MILLIGRAM(S): at 00:02

## 2018-10-26 RX ADMIN — Medication 100 MILLIGRAM(S): at 15:06

## 2018-10-26 NOTE — PROGRESS NOTE ADULT - ASSESSMENT
74 Y/O FEMALE WITH THE ABOVE MED HX S/P RIGHT TKR    *POSTPROCEDURAL STATE - POD# 3  PAIN CONTROL  ENCOURAGED IS  PHYSICAL THERAPY    * ANEMIA - SECONDARY TO ACUTE BLOOD LOSS  H/H STABLE    *POSTPROCEDURAL HYPOTENSION -NOW RESOLVED    *DVT PROPHY - ON XARELTO AS PER ANTICOAG SERVICES

## 2018-10-26 NOTE — PROGRESS NOTE ADULT - SUBJECTIVE AND OBJECTIVE BOX
Patient seen and examined. Pain controlled. No acute events overnight    Vital Signs Last 24 Hrs  T(C): 37.1 (10-25-18 @ 23:20), Max: 37.1 (10-25-18 @ 23:20)  T(F): 98.7 (10-25-18 @ 23:20), Max: 98.7 (10-25-18 @ 23:20)  HR: 80 (10-25-18 @ 23:20) (73 - 80)  BP: 131/59 (10-25-18 @ 23:20) (107/49 - 131/59)  BP(mean): --  RR: 16 (10-25-18 @ 23:20) (16 - 16)  SpO2: 97% (10-25-18 @ 23:20) (96% - 97%)    Physical Exam  Gen: NAD  RLE:   Dressing c/d/i  +EHL/FHL/Gsc/TA  SILT L3-S1  DP +  Compartments soft  No calf ttp    A/P: 73y Female sp R TKA  dressing changed  Pain control  DVT ppx  PT/OT, WBAT  FU labs  Dispo planning, DC home today  D/w attending

## 2018-10-26 NOTE — PROGRESS NOTE ADULT - SUBJECTIVE AND OBJECTIVE BOX
HPI:    Patient is a 73y old  Female who presents with a chief complaint of inc right knee pain, h/o OA, now S/P Right TKA (23 Oct 2018 15:22)    Consulted by Dr. Hallman   for VTE prophylaxis, risk stratification, and anticoagulation management.    PAST MEDICAL & SURGICAL HISTORY:  Erbs muscular dystrophy: left arm  Thyroid cyst  Osteoarthritis  H/O spinal fusion:  lumbar    CrCl: 102.31    Caprini VTE Risk Score:CAPRINI SCORE [CLOT]    AGE RELATED RISK FACTORS                                                       MOBILITY RELATED FACTORS  [ ] Age 41-60 years                                            (1 Point)                  [ ] Bed rest                                                        (1 Point)  [ ] Age: 61-74 years                                           (2 Points)                 [ ] Plaster cast                                                   (2 Points)  [x ] Age= 75 years                                              (3 Points)                 [ ] Bed bound for more than 72 hours                 (2 Points)    DISEASE RELATED RISK FACTORS                                               GENDER SPECIFIC FACTORS  [ ] Edema in the lower extremities                       (1 Point)                  [ ] Pregnancy                                                     (1 Point)  [ ] Varicose veins                                               (1 Point)                  [ ] Post-partum < 6 weeks                                   (1 Point)             [x ] BMI > 25 Kg/m2                                            (1 Point)                  [ ] Hormonal therapy  or oral contraception          (1 Point)                 [ ] Sepsis (in the previous month)                        (1 Point)                  [ ] History of pregnancy complications                 (1 point)  [ ] Pneumonia or serious lung disease                                               [ ] Unexplained or recurrent                     (1 Point)           (in the previous month)                               (1 Point)  [ ] Abnormal pulmonary function test                     (1 Point)                 SURGERY RELATED RISK FACTORS  [ ] Acute myocardial infarction                              (1 Point)                 [ ]  Section                                             (1 Point)  [ ] Congestive heart failure (in the previous month)  (1 Point)               [ ] Minor surgery                                                  (1 Point)   [ ] Inflammatory bowel disease                             (1 Point)                 [ ] Arthroscopic surgery                                        (2 Points)  [ ] Central venous access                                      (2 Points)                [ ] General surgery lasting more than 45 minutes   (2 Points)       [ ] Stroke (in the previous month)                          (5 Points)               [ x] Elective arthroplasty                                         (5 Points)            [ ] H/O malignancy ( present or previous)            ( 2 points)                                                                                                                                   HEMATOLOGY RELATED FACTORS                                                 TRAUMA RELATED RISK FACTORS  [ ] Prior episodes of VTE                                     (3 Points)                 [ ] Fracture of the hip, pelvis, or leg                       (5 Points)  [ ] Positive family history for VTE                         (3 Points)                 [ ] Acute spinal cord injury (in the previous month)  (5 Points)  [ ] Prothrombin 81164 A                                     (3 Points)                 [ ] Paralysis  (less than 1 month)                             (5 Points)  [ ] Factor V Leiden                                             (3 Points)                  [ ] Multiple Trauma within 1 month                        (5 Points)  [ ] Lupus anticoagulants                                     (3 Points)                                                           [ ] Anticardiolipin antibodies                               (3 Points)                                                       [ ] High homocysteine in the blood                      (3 Points)                                             [ ] Other congenital or acquired thrombophilia      (3 Points)                                                [ ] Heparin induced thrombocytopenia                  (3 Points)                                          Total Score [ 9]      IMPROVE Bleeding Risk Score #2.5    Falls Risk:   High (  )  Mod ( x )  Low (  )    10/24: Patient seen at bedside. With "throbbing pain" in knee, awaiting medication. Discussed Xarelto with patient. Will send prescription to pharmacy.  10/ 25 Pt seen at bedside, aao x 4 ,  gave literature on Xarelto. C/o pain and swelling in knee . Ortho residents aware,.  10-26-18 Pt seen at bedside oob in chair, Swelling less as per pt.  discussed her Xarelto for anticoagulation.  Questions answered will reinforce as needed. States she is going home today.   FAMILY HISTORY:    Denies any personal or familial history of clotting or bleeding disorders.    Allergies    No Known Allergies    Intolerances    REVIEW OF SYSTEMS    (  )Fever	     (  )Constipation	(  )SOB				(  )Headache	(  )Dysuria  (  )Chills	     (  )Melena	(  )Dyspnea present on exertion	                    (  )Dizziness                    (  )Polyuria  (  )Nausea	     (  )Hematochezia	(  )Cough			                    (  )Syncope   	(  )Hematuria  (  )Vomiting    (  )Chest Pain	(  )Wheezing			(  )Weakness  (  )Diarrhea     (  )Palpitations	(  )Anorexia			(  )Myalgia    All other review of systems negative: Yes    PHYSICAL EXAM:    Constitutional: Appears Well, WN, healthy    Neurological: A& O x 4    Skin: Warm, dry pink     Respiratory and Thorax: normal effort; Breath sounds: clear bilaterally No rales/wheezing/rhonchi  	  Cardiovascular: S1, S2, normal.  regular rate and rhythm  NMBR	    Gastrointestinal: BS + x 4Q, nontender	    Genitourinary:  Bladder nondistended, nontender    Musculoskeletal:   General Right:   no muscle/joint tenderness,   normal tone, no joint swelling,   ROM: limited	    General Left:   no muscle/joint tenderness,   normal tone, no joint swelling,   ROM: full      Knee:  Right: Incision: ; Dressing CDI;knee swollen      Lower extrems:   Right: no calf tenderness              negative manju's sign               + pedal pulses  + edema knee    Left:   no calf tenderness              negative manju's sign               + pedal pulses   - edema                         9.7    6.24  )-----------( 226      ( 26 Oct 2018 05:26 )             29.1       10    144  |  111<H>  |  13  ----------------------------<  90  4.1   |  27  |  0.49<L>    Ca    8.3<L>      26 Oct 2018 05:26                              9.8    6.13  )-----------( 204      ( 25 Oct 2018 06:37 )             29.5       10    144  |  110<H>  |  20  ----------------------------<  91  3.9   |  27  |  0.57    Ca    8.2<L>      25 Oct 2018 06:37        PT/INR - ( 23 Oct 2018 12:43 )   PT: 11.6 sec;   INR: 1.07 ratio         PTT - ( 23 Oct 2018 12:43 )  PTT:29.3 sec                     11.4   10.47 )-----------( 223      ( 24 Oct 2018 06:09 )             34.3       10-    140  |  106  |  20  ----------------------------<  114<H>  4.4   |  28  |  0.76    Ca    8.7      24 Oct 2018 06:09        PT/INR - ( 23 Oct 2018 12:43 )   PT: 11.6 sec;   INR: 1.07 ratio         PTT - ( 23 Oct 2018 12:43 )  PTT:29.3 sec                              11.6   7.00  )-----------( 199      ( 23 Oct 2018 15:42 )             35.0       10  141  |  110<H>  |  19  ----------------------------<  93  3.7   |  23  |  0.71    Ca    8.6      23 Oct 2018 15:42        PT/INR - ( 23 Oct 2018 12:43 )   PT: 11.6 sec;   INR: 1.07 ratio         PTT - ( 23 Oct 2018 12:43 )  PTT:29.3 sec				  MEDICATIONS  (STANDING):  acetaminophen   Tablet .. 650 milliGRAM(s) Oral every 6 hours  celecoxib 200 milliGRAM(s) Oral with breakfast  docusate sodium 100 milliGRAM(s) Oral three times a day  multivitamin 1 Tablet(s) Oral daily  oxyCODONE  ER Tablet 10 milliGRAM(s) Oral every 12 hours  pantoprazole    Tablet 40 milliGRAM(s) Oral before breakfast  polyethylene glycol 3350 17 Gram(s) Oral daily  rivaroxaban 10 milliGRAM(s) Oral daily  sodium chloride 0.9%. 1000 milliLiter(s) IV Continuous <Continuous>      Vital Signs Last 24 Hrs  T(C): 37.1 (10-25-18 @ 23:20), Max: 37.1 (10-25-18 @ 23:20)  T(F): 98.7 (10-25-18 @ 23:20), Max: 98.7 (10-25-18 @ 23:20)  HR: 80 (10-25-18 @ 23:20) (79 - 80)  BP: 131/59 (10-25-18 @ 23:20) (107/49 - 131/59)  BP(mean): --  RR: 16 (10-25-18 @ 23:20) (16 - 16)  SpO2: 97% (10-25-18 @ 23:20) (96% - 97%)    DVT Prophylaxis:  LMWH                   (  )  Heparin SQ           (  )  Coumadin             (  )  Xarelto                  (x  )  Eliquis                   (  )  Venodynes           (x  )  Ambulation          (  x)  UFH                       (  )  Contraindicated  (  )

## 2018-10-26 NOTE — PROGRESS NOTE ADULT - SUBJECTIVE AND OBJECTIVE BOX
10/25/18: No cp, sob, n/v/f/c; dizziness yesterday now resolved after fluids; right knee pain is better controlled;  10/26/18: No cp, sob, n/v/f/c; no further dizzy spells - did well with PT yest    REVIEW OF SYSTEMS:   All 10 systems reviewed in detailed and found to be negative with the exception of what has already been described above        PHYSICAL EXAM:    Vital Signs Last 24 Hrs  T(C): 37.1 (25 Oct 2018 23:20), Max: 37.1 (25 Oct 2018 23:20)  T(F): 98.7 (25 Oct 2018 23:20), Max: 98.7 (25 Oct 2018 23:20)  HR: 80 (25 Oct 2018 23:20) (73 - 80)  BP: 131/59 (25 Oct 2018 23:20) (107/49 - 131/59)  BP(mean): --  RR: 16 (25 Oct 2018 23:20) (16 - 16)  SpO2: 97% (25 Oct 2018 23:20) (96% - 97%)    GEN: A and O, NAD,  mood stable  HEENT:   NC/AT, EOMI, no oropharyngeal lesions    NECK:   supple    CV:  +S1, +S2, regular, no murmurs or rubs    RESP:   lungs clear to auscultation bilaterally, no wheezing, rales, rhonchi, good air entry bilaterally    GI:  abdomen soft, non-tender, non-distended, normal BS,  no abdominal masses, no palpable masses    RECTAL:  not examined    :  not examined    MSK:   normal muscle tone, no atrophy, no rigidity, no contractions    EXT:   no clubbing, no cyanosis, RIGHT KNEE EDEMA, DRESSING C/D/I  no calf pain, swelling or erythema    VASCULAR:  pulses equal and symmetric in the upper and lower extremities    NEURO:  AAOX3, no focal neurological deficits, follows all commands, able to move extremities spontaneously    SKIN:  no ulcers, lesions or rashes    LABS/IMAGIN.7    6.24  )-----------( 226      ( 26 Oct 2018 05:26 )             29.1     10-26    144  |  111<H>  |  13  ----------------------------<  90  4.1   |  27  |  0.49<L>    Ca    8.3<L>      26 Oct 2018 05:26      MEDICATIONS  (STANDING):  acetaminophen   Tablet .. 650 milliGRAM(s) Oral every 6 hours  celecoxib 200 milliGRAM(s) Oral with breakfast  docusate sodium 100 milliGRAM(s) Oral three times a day  multivitamin 1 Tablet(s) Oral daily  oxyCODONE  ER Tablet 10 milliGRAM(s) Oral every 12 hours  pantoprazole    Tablet 40 milliGRAM(s) Oral before breakfast  polyethylene glycol 3350 17 Gram(s) Oral daily  rivaroxaban 10 milliGRAM(s) Oral daily  sodium chloride 0.9%. 1000 milliLiter(s) (75 mL/Hr) IV Continuous <Continuous>    MEDICATIONS  (PRN):  acetaminophen   Tablet .. 650 milliGRAM(s) Oral every 6 hours PRN Temp greater or equal to 38C (100.4F)  aluminum hydroxide/magnesium hydroxide/simethicone Suspension 30 milliLiter(s) Oral four times a day PRN Indigestion  bisacodyl Suppository 10 milliGRAM(s) Rectal daily PRN If no bowel movement by postoperative day #2  HYDROmorphone  Injectable 0.5 milliGRAM(s) IV Push every 3 hours PRN Severe Pain (7 - 10)  magnesium hydroxide Suspension 30 milliLiter(s) Oral daily PRN Constipation  ondansetron Injectable 4 milliGRAM(s) IV Push every 6 hours PRN Nausea and/or Vomiting  oxyCODONE    IR 5 milliGRAM(s) Oral every 4 hours PRN Mild Pain (1 - 3)  oxyCODONE    IR 10 milliGRAM(s) Oral every 4 hours PRN Moderate Pain (4 - 6)  senna 2 Tablet(s) Oral at bedtime PRN Constipation

## 2018-10-26 NOTE — PROGRESS NOTE ADULT - ASSESSMENT
A:  74 yo female S/P right TKR with high thrombosis risk due to age, surgery and BMI    P:     :: Xarelto 10 mg po daily x 12 days  ::Protonix 40 mg po daily while on Xarelto  ::Repeat cbc/bmp next week as home draw.  ::Jennifer BLE  ::INC Mobility as [facility]  dispo:  home  Will continue to follow.

## 2018-10-31 DIAGNOSIS — Z90.721 ACQUIRED ABSENCE OF OVARIES, UNILATERAL: ICD-10-CM

## 2018-10-31 DIAGNOSIS — Z86.010 PERSONAL HISTORY OF COLONIC POLYPS: ICD-10-CM

## 2018-10-31 DIAGNOSIS — M17.11 UNILATERAL PRIMARY OSTEOARTHRITIS, RIGHT KNEE: ICD-10-CM

## 2018-10-31 DIAGNOSIS — I95.81 POSTPROCEDURAL HYPOTENSION: ICD-10-CM

## 2018-10-31 DIAGNOSIS — D62 ACUTE POSTHEMORRHAGIC ANEMIA: ICD-10-CM

## 2018-10-31 DIAGNOSIS — G71.02 FACIOSCAPULOHUMERAL MUSCULAR DYSTROPHY: ICD-10-CM

## 2018-10-31 DIAGNOSIS — I45.10 UNSPECIFIED RIGHT BUNDLE-BRANCH BLOCK: ICD-10-CM

## 2018-10-31 DIAGNOSIS — E04.2 NONTOXIC MULTINODULAR GOITER: ICD-10-CM

## 2018-11-07 ENCOUNTER — APPOINTMENT (OUTPATIENT)
Dept: CARDIOLOGY | Facility: CLINIC | Age: 73
End: 2018-11-07

## 2018-11-08 PROBLEM — Z00.00 ENCOUNTER FOR PREVENTIVE HEALTH EXAMINATION: Status: ACTIVE | Noted: 2018-11-08

## 2018-11-23 ENCOUNTER — TRANSCRIPTION ENCOUNTER (OUTPATIENT)
Age: 73
End: 2018-11-23

## 2019-01-27 ENCOUNTER — EMERGENCY (EMERGENCY)
Facility: HOSPITAL | Age: 74
LOS: 0 days | Discharge: HOME | End: 2019-01-27
Attending: EMERGENCY MEDICINE | Admitting: EMERGENCY MEDICINE
Payer: MEDICARE

## 2019-01-27 VITALS — WEIGHT: 164.91 LBS | HEIGHT: 66 IN

## 2019-01-27 VITALS
RESPIRATION RATE: 17 BRPM | DIASTOLIC BLOOD PRESSURE: 70 MMHG | HEART RATE: 66 BPM | OXYGEN SATURATION: 100 % | SYSTOLIC BLOOD PRESSURE: 130 MMHG | TEMPERATURE: 98 F

## 2019-01-27 DIAGNOSIS — R51 HEADACHE: ICD-10-CM

## 2019-01-27 DIAGNOSIS — M19.90 UNSPECIFIED OSTEOARTHRITIS, UNSPECIFIED SITE: ICD-10-CM

## 2019-01-27 DIAGNOSIS — Z98.1 ARTHRODESIS STATUS: Chronic | ICD-10-CM
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
## 2019-01-27 DIAGNOSIS — S02.2XXA FRACTURE OF NASAL BONES, INITIAL ENCOUNTER FOR CLOSED FRACTURE: ICD-10-CM
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
## 2019-01-27 PROBLEM — G71.0 MUSCULAR DYSTROPHY: Chronic | Status: ACTIVE | Noted: 2018-10-23

## 2019-01-27 PROBLEM — E04.1 NONTOXIC SINGLE THYROID NODULE: Chronic | Status: ACTIVE | Noted: 2018-10-03

## 2019-01-27 PROCEDURE — 99284 EMERGENCY DEPT VISIT MOD MDM: CPT | Mod: 25

## 2019-01-27 PROCEDURE — 76376 3D RENDER W/INTRP POSTPROCES: CPT | Mod: 26

## 2019-01-27 PROCEDURE — 70486 CT MAXILLOFACIAL W/O DYE: CPT | Mod: 26

## 2019-01-27 NOTE — ED ADULT NURSE NOTE - OBJECTIVE STATEMENT
Pt presents of trip and fall up 2 steps yesterday evening at 6pm. Pt denies blood thinners and LOC. Pt has bruising around both eyes. Pt is A&Ox3. Pt denies vomiting but reports mild nausea. Pt reports pain relief with ice packs. Pt reports normal PO intake.

## 2019-01-27 NOTE — ED STATDOCS - OBJECTIVE STATEMENT
72 y/o female with a PMHx of osteoarthritis presents to the ED s/p mechanical fall yesterday. Pt was walking up 2 steps, fell forward, and hit face on the floor. +left periorbital bruising. +facial pain. No LOC. Denies visual changes. Pt took Aleve PTA for pain. No anticoagulant use.

## 2019-01-27 NOTE — ED CLERICAL - NS ED CLERK NOTE PRE-ARRIVAL INFORMATION; ADDITIONAL PRE-ARRIVAL INFORMATION
This patient is enrolled in the comprehensive joint replacement (CJR) program and has active care navigation.  This patient can be followed up by the care navigation team within 24 hours. To arrange close follow-up or to obtain additional clinical information about this patient, please call the contact number above. Please call the hospitalist for medical consultation ([phone]) PRIOR to admission or observation.  The hospitalist is part of the care team and can assist in acute medical management. Please call the orthopedic team () for ALL patients who require admission.

## 2019-01-27 NOTE — ED STATDOCS - CARE PLAN
Principal Discharge DX:	Facial injury, initial encounter  Secondary Diagnosis:	Fall in home, initial encounter

## 2019-01-27 NOTE — ED STATDOCS - PROGRESS NOTE DETAILS
Pt is a 72 y/o F with no PMH p/w facial injury s/p mechanical fall at home last night. States she was walking up stairs when she fell forward directly on her face. States there was minimal bleeding from her nose. Denies headache, visual changes, chest pain, sob. States she has some facial pain, aleve provided some relief.   On PE: periorbital ecchymosis, L>R. no neurologic deficits. pt speaking and ambulating without difficulty.  Plan: CT face, reeval. -Argelia Gauthier PA-C Pt stable. Discussed CT results and strict return precautions. Pt understands and agrees with dc plan. -Argelia Gauthier PA-C

## 2019-01-27 NOTE — ED ADULT NURSE NOTE - NSIMPLEMENTINTERV_GEN_ALL_ED
Implemented All Fall Risk Interventions:  [place] to call system. Call bell, personal items and telephone within reach. Instruct patient to call for assistance. Room bathroom lighting operational. Non-slip footwear when patient is off stretcher. Physically safe environment: no spills, clutter or unnecessary equipment. Stretcher in lowest position, wheels locked, appropriate side rails in place. Provide visual cue, wrist band, yellow gown, etc. Monitor gait and stability. Monitor for mental status changes and reorient to person, place, and time. Review medications for side effects contributing to fall risk. Reinforce activity limits and safety measures with patient and family.

## 2019-01-27 NOTE — ED STATDOCS - SKIN, MLM
skin warm, dry and intact. +bilateral periorbital bruising, TTP right face, right side of nasal bridge, swelling of nasal bridge, no orbital trauma.

## 2019-01-27 NOTE — ED ADULT TRIAGE NOTE - CHIEF COMPLAINT QUOTE
pt presents to ED s/p mechanical trip and fall on bathrobe last night around 6 PM. pt reports hitting front of face onto stone floor, denies loc, not on blood thinners. pt w/ ecchymosis to L orbital area. denies vision changes. c/o 3/10 forehead pain. took an aleeve 1.5 hrs PTA w/ relief. pt ambulating w/ steady gait at triage, offered wheelchair but declined. denies other pain/complaints.

## 2019-02-23 ENCOUNTER — EMERGENCY (EMERGENCY)
Facility: HOSPITAL | Age: 74
LOS: 0 days | Discharge: HOME | End: 2019-02-23
Attending: EMERGENCY MEDICINE | Admitting: EMERGENCY MEDICINE
Payer: MEDICARE

## 2019-02-23 VITALS — WEIGHT: 162.92 LBS | HEIGHT: 66 IN

## 2019-02-23 VITALS
OXYGEN SATURATION: 100 % | TEMPERATURE: 98 F | SYSTOLIC BLOOD PRESSURE: 106 MMHG | DIASTOLIC BLOOD PRESSURE: 49 MMHG | RESPIRATION RATE: 18 BRPM | HEART RATE: 84 BPM

## 2019-02-23 DIAGNOSIS — Z98.1 ARTHRODESIS STATUS: Chronic | ICD-10-CM

## 2019-02-23 DIAGNOSIS — G71.02 FACIOSCAPULOHUMERAL MUSCULAR DYSTROPHY: ICD-10-CM

## 2019-02-23 DIAGNOSIS — R10.9 UNSPECIFIED ABDOMINAL PAIN: ICD-10-CM

## 2019-02-23 DIAGNOSIS — R11.0 NAUSEA: ICD-10-CM

## 2019-02-23 DIAGNOSIS — N13.2 HYDRONEPHROSIS WITH RENAL AND URETERAL CALCULOUS OBSTRUCTION: ICD-10-CM

## 2019-02-23 LAB
ALBUMIN SERPL ELPH-MCNC: 2.8 G/DL — LOW (ref 3.3–5)
ALP SERPL-CCNC: 115 U/L — SIGNIFICANT CHANGE UP (ref 40–120)
ALT FLD-CCNC: 28 U/L — SIGNIFICANT CHANGE UP (ref 12–78)
ANION GAP SERPL CALC-SCNC: 10 MMOL/L — SIGNIFICANT CHANGE UP (ref 5–17)
ANISOCYTOSIS BLD QL: SLIGHT — SIGNIFICANT CHANGE UP
APPEARANCE UR: CLEAR — SIGNIFICANT CHANGE UP
APTT BLD: 29.4 SEC — SIGNIFICANT CHANGE UP (ref 27.5–36.3)
AST SERPL-CCNC: 33 U/L — SIGNIFICANT CHANGE UP (ref 15–37)
BASOPHILS # BLD AUTO: 0 K/UL — SIGNIFICANT CHANGE UP (ref 0–0.2)
BASOPHILS NFR BLD AUTO: 0 % — SIGNIFICANT CHANGE UP (ref 0–2)
BILIRUB SERPL-MCNC: 0.5 MG/DL — SIGNIFICANT CHANGE UP (ref 0.2–1.2)
BILIRUB UR-MCNC: NEGATIVE — SIGNIFICANT CHANGE UP
BUN SERPL-MCNC: 60 MG/DL — HIGH (ref 7–23)
CALCIUM SERPL-MCNC: 9.1 MG/DL — SIGNIFICANT CHANGE UP (ref 8.5–10.1)
CHLORIDE SERPL-SCNC: 102 MMOL/L — SIGNIFICANT CHANGE UP (ref 96–108)
CO2 SERPL-SCNC: 25 MMOL/L — SIGNIFICANT CHANGE UP (ref 22–31)
COLOR SPEC: YELLOW — SIGNIFICANT CHANGE UP
COMMENT - URINE: SIGNIFICANT CHANGE UP
CREAT SERPL-MCNC: 1.72 MG/DL — HIGH (ref 0.5–1.3)
DIFF PNL FLD: ABNORMAL
ELLIPTOCYTES BLD QL SMEAR: SLIGHT — SIGNIFICANT CHANGE UP
EOSINOPHIL # BLD AUTO: 0 K/UL — SIGNIFICANT CHANGE UP (ref 0–0.5)
EOSINOPHIL NFR BLD AUTO: 0 % — SIGNIFICANT CHANGE UP (ref 0–6)
GLUCOSE SERPL-MCNC: 106 MG/DL — HIGH (ref 70–99)
GLUCOSE UR QL: NEGATIVE MG/DL — SIGNIFICANT CHANGE UP
HCT VFR BLD CALC: 39.7 % — SIGNIFICANT CHANGE UP (ref 34.5–45)
HGB BLD-MCNC: 13.7 G/DL — SIGNIFICANT CHANGE UP (ref 11.5–15.5)
INR BLD: 0.97 RATIO — SIGNIFICANT CHANGE UP (ref 0.88–1.16)
KETONES UR-MCNC: NEGATIVE — SIGNIFICANT CHANGE UP
LEUKOCYTE ESTERASE UR-ACNC: ABNORMAL
LIDOCAIN IGE QN: 98 U/L — SIGNIFICANT CHANGE UP (ref 73–393)
LYMPHOCYTES # BLD AUTO: 1.05 K/UL — SIGNIFICANT CHANGE UP (ref 1–3.3)
LYMPHOCYTES # BLD AUTO: 9 % — LOW (ref 13–44)
MANUAL SMEAR VERIFICATION: SIGNIFICANT CHANGE UP
MCHC RBC-ENTMCNC: 31.4 PG — SIGNIFICANT CHANGE UP (ref 27–34)
MCHC RBC-ENTMCNC: 34.5 GM/DL — SIGNIFICANT CHANGE UP (ref 32–36)
MCV RBC AUTO: 90.8 FL — SIGNIFICANT CHANGE UP (ref 80–100)
MONOCYTES # BLD AUTO: 0.23 K/UL — SIGNIFICANT CHANGE UP (ref 0–0.9)
MONOCYTES NFR BLD AUTO: 2 % — SIGNIFICANT CHANGE UP (ref 2–14)
NEUTROPHILS # BLD AUTO: 10.12 K/UL — HIGH (ref 1.8–7.4)
NEUTROPHILS NFR BLD AUTO: 76 % — SIGNIFICANT CHANGE UP (ref 43–77)
NEUTS BAND # BLD: 11 % — HIGH (ref 0–8)
NITRITE UR-MCNC: NEGATIVE — SIGNIFICANT CHANGE UP
NRBC # BLD: 0 /100 — SIGNIFICANT CHANGE UP (ref 0–0)
NRBC # BLD: SIGNIFICANT CHANGE UP /100 WBCS (ref 0–0)
PH UR: 6 — SIGNIFICANT CHANGE UP (ref 5–8)
PLAT MORPH BLD: NORMAL — SIGNIFICANT CHANGE UP
PLATELET # BLD AUTO: 116 K/UL — LOW (ref 150–400)
PLATELET COUNT - ESTIMATE: ABNORMAL
POIKILOCYTOSIS BLD QL AUTO: SLIGHT — SIGNIFICANT CHANGE UP
POTASSIUM SERPL-MCNC: 3.5 MMOL/L — SIGNIFICANT CHANGE UP (ref 3.5–5.3)
POTASSIUM SERPL-SCNC: 3.5 MMOL/L — SIGNIFICANT CHANGE UP (ref 3.5–5.3)
PROT SERPL-MCNC: 6.9 GM/DL — SIGNIFICANT CHANGE UP (ref 6–8.3)
PROT UR-MCNC: 100 MG/DL
PROTHROM AB SERPL-ACNC: 10.7 SEC — SIGNIFICANT CHANGE UP (ref 10–12.9)
RBC # BLD: 4.37 M/UL — SIGNIFICANT CHANGE UP (ref 3.8–5.2)
RBC # FLD: 14.2 % — SIGNIFICANT CHANGE UP (ref 10.3–14.5)
RBC BLD AUTO: SIGNIFICANT CHANGE UP
RBC CASTS # UR COMP ASSIST: SIGNIFICANT CHANGE UP /HPF (ref 0–4)
SODIUM SERPL-SCNC: 137 MMOL/L — SIGNIFICANT CHANGE UP (ref 135–145)
SP GR SPEC: 1.01 — SIGNIFICANT CHANGE UP (ref 1.01–1.02)
UROBILINOGEN FLD QL: NEGATIVE MG/DL — SIGNIFICANT CHANGE UP
VARIANT LYMPHS # BLD: 2 % — SIGNIFICANT CHANGE UP (ref 0–6)
WBC # BLD: 11.63 K/UL — HIGH (ref 3.8–10.5)
WBC # FLD AUTO: 11.63 K/UL — HIGH (ref 3.8–10.5)
WBC UR QL: >50

## 2019-02-23 PROCEDURE — 74176 CT ABD & PELVIS W/O CONTRAST: CPT | Mod: 26

## 2019-02-23 PROCEDURE — 76705 ECHO EXAM OF ABDOMEN: CPT | Mod: 26

## 2019-02-23 PROCEDURE — 99285 EMERGENCY DEPT VISIT HI MDM: CPT

## 2019-02-23 RX ORDER — CEFDINIR 250 MG/5ML
1 POWDER, FOR SUSPENSION ORAL
Qty: 14 | Refills: 0
Start: 2019-02-23 | End: 2019-03-01

## 2019-02-23 RX ORDER — OXYCODONE AND ACETAMINOPHEN 5; 325 MG/1; MG/1
1 TABLET ORAL
Qty: 12 | Refills: 0
Start: 2019-02-23

## 2019-02-23 RX ORDER — CEFTRIAXONE 500 MG/1
1000 INJECTION, POWDER, FOR SOLUTION INTRAMUSCULAR; INTRAVENOUS ONCE
Qty: 0 | Refills: 0 | Status: COMPLETED | OUTPATIENT
Start: 2019-02-23 | End: 2019-02-23

## 2019-02-23 RX ORDER — SODIUM CHLORIDE 9 MG/ML
1000 INJECTION INTRAMUSCULAR; INTRAVENOUS; SUBCUTANEOUS ONCE
Qty: 0 | Refills: 0 | Status: COMPLETED | OUTPATIENT
Start: 2019-02-23 | End: 2019-02-23

## 2019-02-23 RX ORDER — KETOROLAC TROMETHAMINE 30 MG/ML
30 SYRINGE (ML) INJECTION ONCE
Qty: 0 | Refills: 0 | Status: DISCONTINUED | OUTPATIENT
Start: 2019-02-23 | End: 2019-02-23

## 2019-02-23 RX ORDER — ONDANSETRON 8 MG/1
4 TABLET, FILM COATED ORAL ONCE
Qty: 0 | Refills: 0 | Status: COMPLETED | OUTPATIENT
Start: 2019-02-23 | End: 2019-02-23

## 2019-02-23 RX ORDER — LIDOCAINE 4 G/100G
1 CREAM TOPICAL ONCE
Qty: 0 | Refills: 0 | Status: COMPLETED | OUTPATIENT
Start: 2019-02-23 | End: 2019-02-23

## 2019-02-23 RX ADMIN — SODIUM CHLORIDE 1000 MILLILITER(S): 9 INJECTION INTRAMUSCULAR; INTRAVENOUS; SUBCUTANEOUS at 12:51

## 2019-02-23 RX ADMIN — SODIUM CHLORIDE 1000 MILLILITER(S): 9 INJECTION INTRAMUSCULAR; INTRAVENOUS; SUBCUTANEOUS at 13:50

## 2019-02-23 RX ADMIN — Medication 30 MILLIGRAM(S): at 16:36

## 2019-02-23 RX ADMIN — CEFTRIAXONE 1000 MILLIGRAM(S): 500 INJECTION, POWDER, FOR SOLUTION INTRAMUSCULAR; INTRAVENOUS at 16:36

## 2019-02-23 RX ADMIN — LIDOCAINE 1 PATCH: 4 CREAM TOPICAL at 14:27

## 2019-02-23 RX ADMIN — ONDANSETRON 4 MILLIGRAM(S): 8 TABLET, FILM COATED ORAL at 12:51

## 2019-02-23 NOTE — ED POST DISCHARGE NOTE - RESULT SUMMARY
called pharm was closed, resent to 24 hour pharm and left message to cancel rx at [facility] -González Rios PA-C

## 2019-02-23 NOTE — ED STATDOCS - CLINICAL SUMMARY MEDICAL DECISION MAKING FREE TEXT BOX
Patient reporting 3-4 days of left flank pain.  CT with +stone.  Case reviewed with urology, will treat with antibiotics and pain control.  ER precautions reviewed with patient. Patient reporting 3-4 days of left flank pain.  CT with +stone.  Case reviewed with urology, will treat with antibiotics and pain control, plan to follow up with Dr. Craig.  ER Strict return precautions reviewed with patient.

## 2019-02-23 NOTE — ED POST DISCHARGE NOTE - DETAILS
Patient called regarding +blood culture. Advised to return to ED as soon as possible. Pt states she will come to ED today. - Kevin Perez PA-C

## 2019-02-23 NOTE — ED STATDOCS - PROGRESS NOTE DETAILS
Patient seen and evaluated with ED attending at intake, ED attending note and orders reviewed, will continue with patient follow up and care -González Rios PA-C Reviewed all lab and CT results with patient.  +8mm stone, no sign of urine infection, she is afebrile and pain is controlled.  Case d/w Dr. Carig who recommends antibiotics and pain control and she can follow up in the office for stent placement.  Patient plans to go away for two weeks, strict ER precautions reviewed for any new or worsening symptoms.  -González Rios PA-C Reviewed all lab and CT results with patient.  +8mm stone, positive leukocytes on UA, negative nitrites, she is afebrile and pain is controlled.  Case d/w Dr. Craig who recommends antibiotics and pain control and she can follow up in the office for stent placement.  Patient plans to go away for two weeks, strict ER precautions reviewed for any new or worsening symptoms.  -González Rios PA-C

## 2019-02-23 NOTE — ED STATDOCS - OBJECTIVE STATEMENT
72 y/o female with PMHx of OA, s/p L spine fusion presents to the ED c/o urinary frequency x few days. +nausea, chills. Pt initially had radiating LUQ abd pain x4 days. Reports that yesterday, pt had onset of back pain. Saw Dr. Dorado this morning and was sent to ED for further evaluation. UA in office showed blood and WBC. Denies fever. PCP/Dr. Byrd.

## 2019-02-23 NOTE — ED ADULT TRIAGE NOTE - CHIEF COMPLAINT QUOTE
patient seen by dr reed today sent to ER for further evaluation.  reports increasesed urinary frequency starting on thursday,n UA shoed blood and WBC,  +dryheaves, chills.  patient states abdominal pain started in luq, then moved to llq and eventually to ruq and today radiated to right shoulder and upper back. 9/10 pain.  no hall.  denies diarrhea

## 2019-02-23 NOTE — ED ADULT NURSE NOTE - NSIMPLEMENTINTERV_GEN_ALL_ED
Implemented All Universal Safety Interventions:  [place] to call system. Call bell, personal items and telephone within reach. Instruct patient to call for assistance. Room bathroom lighting operational. Non-slip footwear when patient is off stretcher. Physically safe environment: no spills, clutter or unnecessary equipment. Stretcher in lowest position, wheels locked, appropriate side rails in place.

## 2019-02-23 NOTE — ED ADULT NURSE NOTE - OBJECTIVE STATEMENT
c/o LUQ abd pain that moved to lower abd pain and now to uppper back, and uriniary frequency with occasional burning

## 2019-02-24 ENCOUNTER — INPATIENT (INPATIENT)
Facility: HOSPITAL | Age: 74
LOS: 10 days | Discharge: HOME HEALTH | End: 2019-03-07
Attending: FAMILY MEDICINE | Admitting: FAMILY MEDICINE
Payer: MEDICARE

## 2019-02-24 VITALS — HEIGHT: 66 IN | WEIGHT: 162.92 LBS

## 2019-02-24 DIAGNOSIS — N30.00 ACUTE CYSTITIS WITHOUT HEMATURIA: ICD-10-CM

## 2019-02-24 DIAGNOSIS — N39.0 URINARY TRACT INFECTION, SITE NOT SPECIFIED: ICD-10-CM

## 2019-02-24 DIAGNOSIS — Z98.1 ARTHRODESIS STATUS: Chronic | ICD-10-CM

## 2019-02-24 DIAGNOSIS — R78.81 BACTEREMIA: ICD-10-CM

## 2019-02-24 DIAGNOSIS — N20.0 CALCULUS OF KIDNEY: ICD-10-CM

## 2019-02-24 LAB
-  CANDIDA ALBICANS: SIGNIFICANT CHANGE UP
-  CANDIDA GLABRATA: SIGNIFICANT CHANGE UP
-  CANDIDA KRUSEI: SIGNIFICANT CHANGE UP
-  CANDIDA PARAPSILOSIS: SIGNIFICANT CHANGE UP
-  CANDIDA TROPICALIS: SIGNIFICANT CHANGE UP
-  COAGULASE NEGATIVE STAPHYLOCOCCUS: SIGNIFICANT CHANGE UP
-  K. PNEUMONIAE GROUP: SIGNIFICANT CHANGE UP
-  KPC RESISTANCE GENE: SIGNIFICANT CHANGE UP
-  STREPTOCOCCUS SP. (NOT GRP A, B OR S PNEUMONIAE): SIGNIFICANT CHANGE UP
A BAUMANNII DNA SPEC QL NAA+PROBE: SIGNIFICANT CHANGE UP
ALBUMIN SERPL ELPH-MCNC: 2.6 G/DL — LOW (ref 3.3–5)
ALP SERPL-CCNC: 121 U/L — HIGH (ref 40–120)
ALT FLD-CCNC: 25 U/L — SIGNIFICANT CHANGE UP (ref 12–78)
ANION GAP SERPL CALC-SCNC: 12 MMOL/L — SIGNIFICANT CHANGE UP (ref 5–17)
ANISOCYTOSIS BLD QL: SLIGHT — SIGNIFICANT CHANGE UP
APPEARANCE UR: CLEAR — SIGNIFICANT CHANGE UP
APTT BLD: 26.2 SEC — LOW (ref 27.5–36.3)
AST SERPL-CCNC: 22 U/L — SIGNIFICANT CHANGE UP (ref 15–37)
BACTERIA # UR AUTO: ABNORMAL
BASOPHILS # BLD AUTO: 0 K/UL — SIGNIFICANT CHANGE UP (ref 0–0.2)
BASOPHILS NFR BLD AUTO: 0 % — SIGNIFICANT CHANGE UP (ref 0–2)
BILIRUB SERPL-MCNC: 0.4 MG/DL — SIGNIFICANT CHANGE UP (ref 0.2–1.2)
BILIRUB UR-MCNC: NEGATIVE — SIGNIFICANT CHANGE UP
BUN SERPL-MCNC: 49 MG/DL — HIGH (ref 7–23)
CALCIUM SERPL-MCNC: 8.6 MG/DL — SIGNIFICANT CHANGE UP (ref 8.5–10.1)
CHLORIDE SERPL-SCNC: 102 MMOL/L — SIGNIFICANT CHANGE UP (ref 96–108)
CO2 SERPL-SCNC: 23 MMOL/L — SIGNIFICANT CHANGE UP (ref 22–31)
COLOR SPEC: YELLOW — SIGNIFICANT CHANGE UP
COMMENT - URINE: SIGNIFICANT CHANGE UP
CREAT SERPL-MCNC: 1.33 MG/DL — HIGH (ref 0.5–1.3)
DIFF PNL FLD: ABNORMAL
E CLOAC COMP DNA BLD POS QL NAA+PROBE: SIGNIFICANT CHANGE UP
E COLI DNA BLD POS QL NAA+NON-PROBE: SIGNIFICANT CHANGE UP
ENTEROCOC DNA BLD POS QL NAA+NON-PROBE: SIGNIFICANT CHANGE UP
ENTEROCOC DNA BLD POS QL NAA+NON-PROBE: SIGNIFICANT CHANGE UP
EOSINOPHIL # BLD AUTO: 0.11 K/UL — SIGNIFICANT CHANGE UP (ref 0–0.5)
EOSINOPHIL NFR BLD AUTO: 1 % — SIGNIFICANT CHANGE UP (ref 0–6)
EPI CELLS # UR: SIGNIFICANT CHANGE UP
GLUCOSE SERPL-MCNC: 100 MG/DL — HIGH (ref 70–99)
GLUCOSE UR QL: NEGATIVE MG/DL — SIGNIFICANT CHANGE UP
GP B STREP DNA BLD POS QL NAA+NON-PROBE: SIGNIFICANT CHANGE UP
GRAM STN FLD: SIGNIFICANT CHANGE UP
HAEM INFLU DNA BLD POS QL NAA+NON-PROBE: SIGNIFICANT CHANGE UP
HCT VFR BLD CALC: 36 % — SIGNIFICANT CHANGE UP (ref 34.5–45)
HGB BLD-MCNC: 12.5 G/DL — SIGNIFICANT CHANGE UP (ref 11.5–15.5)
INR BLD: 1.03 RATIO — SIGNIFICANT CHANGE UP (ref 0.88–1.16)
K OXYTOCA DNA BLD POS QL NAA+NON-PROBE: SIGNIFICANT CHANGE UP
KETONES UR-MCNC: ABNORMAL
L MONOCYTOG DNA BLD POS QL NAA+NON-PROBE: SIGNIFICANT CHANGE UP
LACTATE SERPL-SCNC: 1.1 MMOL/L — SIGNIFICANT CHANGE UP (ref 0.7–2)
LEUKOCYTE ESTERASE UR-ACNC: ABNORMAL
LYMPHOCYTES # BLD AUTO: 0.23 K/UL — LOW (ref 1–3.3)
LYMPHOCYTES # BLD AUTO: 2 % — LOW (ref 13–44)
MANUAL SMEAR VERIFICATION: SIGNIFICANT CHANGE UP
MCHC RBC-ENTMCNC: 31.6 PG — SIGNIFICANT CHANGE UP (ref 27–34)
MCHC RBC-ENTMCNC: 34.7 GM/DL — SIGNIFICANT CHANGE UP (ref 32–36)
MCV RBC AUTO: 90.9 FL — SIGNIFICANT CHANGE UP (ref 80–100)
METHOD TYPE: SIGNIFICANT CHANGE UP
MONOCYTES # BLD AUTO: 1.13 K/UL — HIGH (ref 0–0.9)
MONOCYTES NFR BLD AUTO: 10 % — SIGNIFICANT CHANGE UP (ref 2–14)
MRSA SPEC QL CULT: SIGNIFICANT CHANGE UP
MSSA DNA SPEC QL NAA+PROBE: SIGNIFICANT CHANGE UP
N MEN ISLT CULT: SIGNIFICANT CHANGE UP
NEUTROPHILS # BLD AUTO: 9.74 K/UL — HIGH (ref 1.8–7.4)
NEUTROPHILS NFR BLD AUTO: 86 % — HIGH (ref 43–77)
NITRITE UR-MCNC: NEGATIVE — SIGNIFICANT CHANGE UP
NRBC # BLD: 0 /100 — SIGNIFICANT CHANGE UP (ref 0–0)
NRBC # BLD: SIGNIFICANT CHANGE UP /100 WBCS (ref 0–0)
P AERUGINOSA DNA BLD POS NAA+NON-PROBE: SIGNIFICANT CHANGE UP
PH UR: 5 — SIGNIFICANT CHANGE UP (ref 5–8)
PLAT MORPH BLD: NORMAL — SIGNIFICANT CHANGE UP
PLATELET # BLD AUTO: 128 K/UL — LOW (ref 150–400)
POIKILOCYTOSIS BLD QL AUTO: SLIGHT — SIGNIFICANT CHANGE UP
POTASSIUM SERPL-MCNC: 3.5 MMOL/L — SIGNIFICANT CHANGE UP (ref 3.5–5.3)
POTASSIUM SERPL-SCNC: 3.5 MMOL/L — SIGNIFICANT CHANGE UP (ref 3.5–5.3)
PROT SERPL-MCNC: 6.6 GM/DL — SIGNIFICANT CHANGE UP (ref 6–8.3)
PROT UR-MCNC: 100 MG/DL
PROTHROM AB SERPL-ACNC: 11.4 SEC — SIGNIFICANT CHANGE UP (ref 10–12.9)
RBC # BLD: 3.96 M/UL — SIGNIFICANT CHANGE UP (ref 3.8–5.2)
RBC # FLD: 14.6 % — HIGH (ref 10.3–14.5)
RBC BLD AUTO: ABNORMAL
RBC CASTS # UR COMP ASSIST: ABNORMAL /HPF (ref 0–4)
S MARCESCENS DNA BLD POS NAA+NON-PROBE: SIGNIFICANT CHANGE UP
S PNEUM DNA BLD POS QL NAA+NON-PROBE: SIGNIFICANT CHANGE UP
S PYO DNA BLD POS QL NAA+NON-PROBE: SIGNIFICANT CHANGE UP
SODIUM SERPL-SCNC: 137 MMOL/L — SIGNIFICANT CHANGE UP (ref 135–145)
SP GR SPEC: 1.01 — SIGNIFICANT CHANGE UP (ref 1.01–1.02)
TOXIC GRANULES BLD QL SMEAR: PRESENT — SIGNIFICANT CHANGE UP
UROBILINOGEN FLD QL: NEGATIVE MG/DL — SIGNIFICANT CHANGE UP
VARIANT LYMPHS # BLD: 1 % — SIGNIFICANT CHANGE UP (ref 0–6)
WBC # BLD: 11.33 K/UL — HIGH (ref 3.8–10.5)
WBC # FLD AUTO: 11.33 K/UL — HIGH (ref 3.8–10.5)
WBC UR QL: ABNORMAL

## 2019-02-24 PROCEDURE — 99222 1ST HOSP IP/OBS MODERATE 55: CPT

## 2019-02-24 PROCEDURE — 93010 ELECTROCARDIOGRAM REPORT: CPT

## 2019-02-24 PROCEDURE — 99285 EMERGENCY DEPT VISIT HI MDM: CPT

## 2019-02-24 RX ORDER — OXYCODONE AND ACETAMINOPHEN 5; 325 MG/1; MG/1
1 TABLET ORAL EVERY 4 HOURS
Qty: 0 | Refills: 0 | Status: DISCONTINUED | OUTPATIENT
Start: 2019-02-24 | End: 2019-03-02

## 2019-02-24 RX ORDER — CHOLECALCIFEROL (VITAMIN D3) 125 MCG
1000 CAPSULE ORAL DAILY
Qty: 0 | Refills: 0 | Status: DISCONTINUED | OUTPATIENT
Start: 2019-02-24 | End: 2019-03-07

## 2019-02-24 RX ORDER — MORPHINE SULFATE 50 MG/1
5 CAPSULE, EXTENDED RELEASE ORAL ONCE
Qty: 0 | Refills: 0 | Status: DISCONTINUED | OUTPATIENT
Start: 2019-02-24 | End: 2019-02-24

## 2019-02-24 RX ORDER — MORPHINE SULFATE 50 MG/1
4 CAPSULE, EXTENDED RELEASE ORAL ONCE
Qty: 0 | Refills: 0 | Status: DISCONTINUED | OUTPATIENT
Start: 2019-02-24 | End: 2019-02-24

## 2019-02-24 RX ORDER — HEPARIN SODIUM 5000 [USP'U]/ML
5000 INJECTION INTRAVENOUS; SUBCUTANEOUS EVERY 12 HOURS
Qty: 0 | Refills: 0 | Status: DISCONTINUED | OUTPATIENT
Start: 2019-02-24 | End: 2019-02-28

## 2019-02-24 RX ORDER — CEFTRIAXONE 500 MG/1
1 INJECTION, POWDER, FOR SOLUTION INTRAMUSCULAR; INTRAVENOUS ONCE
Qty: 0 | Refills: 0 | Status: DISCONTINUED | OUTPATIENT
Start: 2019-02-24 | End: 2019-02-24

## 2019-02-24 RX ORDER — CEFTRIAXONE 500 MG/1
1000 INJECTION, POWDER, FOR SOLUTION INTRAMUSCULAR; INTRAVENOUS ONCE
Qty: 0 | Refills: 0 | Status: COMPLETED | OUTPATIENT
Start: 2019-02-24 | End: 2019-02-24

## 2019-02-24 RX ORDER — SODIUM CHLORIDE 9 MG/ML
1000 INJECTION INTRAMUSCULAR; INTRAVENOUS; SUBCUTANEOUS
Qty: 0 | Refills: 0 | Status: DISCONTINUED | OUTPATIENT
Start: 2019-02-24 | End: 2019-02-25

## 2019-02-24 RX ADMIN — MORPHINE SULFATE 5 MILLIGRAM(S): 50 CAPSULE, EXTENDED RELEASE ORAL at 17:14

## 2019-02-24 RX ADMIN — MORPHINE SULFATE 4 MILLIGRAM(S): 50 CAPSULE, EXTENDED RELEASE ORAL at 11:53

## 2019-02-24 RX ADMIN — HEPARIN SODIUM 5000 UNIT(S): 5000 INJECTION INTRAVENOUS; SUBCUTANEOUS at 16:58

## 2019-02-24 RX ADMIN — MORPHINE SULFATE 5 MILLIGRAM(S): 50 CAPSULE, EXTENDED RELEASE ORAL at 16:59

## 2019-02-24 RX ADMIN — MORPHINE SULFATE 4 MILLIGRAM(S): 50 CAPSULE, EXTENDED RELEASE ORAL at 13:58

## 2019-02-24 RX ADMIN — SODIUM CHLORIDE 125 MILLILITER(S): 9 INJECTION INTRAMUSCULAR; INTRAVENOUS; SUBCUTANEOUS at 12:57

## 2019-02-24 RX ADMIN — CEFTRIAXONE 1000 MILLIGRAM(S): 500 INJECTION, POWDER, FOR SOLUTION INTRAMUSCULAR; INTRAVENOUS at 11:53

## 2019-02-24 RX ADMIN — OXYCODONE AND ACETAMINOPHEN 1 TABLET(S): 5; 325 TABLET ORAL at 22:59

## 2019-02-24 RX ADMIN — OXYCODONE AND ACETAMINOPHEN 1 TABLET(S): 5; 325 TABLET ORAL at 18:44

## 2019-02-24 NOTE — ED ADULT TRIAGE NOTE - CHIEF COMPLAINT QUOTE
pt presents to ED from home for + blood cultures. pt recently seen in ED for generalized body aches, dx w/ kidney stone as per pt. pt reports continued generalized pain minimally relieved by oxycodone.

## 2019-02-24 NOTE — CONSULT NOTE ADULT - PROBLEM SELECTOR RECOMMENDATION 9
Stone probably there for some time as there is no real hydro noted for such a large stone.  Advise Rx UTI, will request nephrostomy if obstruction increases etc.  Stone will be treated after patient well cured of infection.

## 2019-02-24 NOTE — H&P ADULT - NSHPREVIEWOFSYSTEMS_GEN_ALL_CORE
Review of Systems: as in HPI,     Eyes: no changes in vision    ENT/Mouth: no changes    Cardiovascular: no chest pain    Respiratory: no SOB    Gastrointestinal: no diarrhea, no nausea, no vomiting    Genitourinary:  L flank discomfort ,  dysuria,     Breast: no pain    Musculoskeletal: no pain    Integumentary: no itching    Neurological: No Headache, no tremor,    Psychiatric: no suicidal ideations    Endocrine: no excessive thirst,     Hematologic/Lymphatic: no swollen glands    Allergic/Immunologic: no itching

## 2019-02-24 NOTE — ED PROVIDER NOTE - CONSTITUTIONAL, MLM
normal... Well appearing, well nourished, awake, alert, oriented to person, place, time/situation +mild to moderate distress

## 2019-02-24 NOTE — ED PROVIDER NOTE - OBJECTIVE STATEMENT
74 y/o female with a PMHx of Erb's muscular dystrophy, thyroid cyst, osteoarthritis presents to the ED from home regarding positive blood cultures. Pt was seen in ED yesterday for generalized body aches and was diagnosed with a kidney stone. +urine sent home, sent home, called today with +blood cultures. Pt states she still has pain minimally relieved by Oxycodone. Now c/o pain above left shoulder blade. PCP: Dr. Diana Byrd.

## 2019-02-24 NOTE — ED STATDOCS - NS_ ATTENDINGSCRIBEDETAILS _ED_A_ED_FT
I Nacho Soto MD saw and examined the patient. Scribe documented for me and under my supervision. I have modified the scribe's documentation where necessary to reflect my history, physical exam and other relevant documentations pertinent to the care of the patient.

## 2019-02-24 NOTE — H&P ADULT - ASSESSMENT
72 y/o female with PMHx of DJD, s/p L spine fusion, R TKR,  who was seen in  ER on 2/23/19 for L flank pain, uper back pain, chills,dysuria, nausea, and was diagnoestd with L UVJ 0.8 cm stone, UTI, was given IV AB and discharged home on PO antibiotics. Her BC from 2/23/19 grew gram negative rods in the anaerobic bottle and she was called back to the ER. The patient states her symptoms are improved today.

## 2019-02-24 NOTE — ED STATDOCS - PROGRESS NOTE DETAILS
Riccardo Villeda for attending Dr. Soto: 74 y/o female with a PMHx of Erb's muscular dystrophy, thyroid cyst, osteoarthritis presents to the ED from home regarding positive blood cultures. Pt was seen in ED yesterday for generalized body aches and was diagnosed with a kidney stone. Pt states she still has pain minimally relieved by Oxycodone. PCP: Dr. Diana Byrd. No other complaints at this time. Will send pt to main ED for further evaluation.

## 2019-02-24 NOTE — H&P ADULT - NSHPPHYSICALEXAM_GEN_ALL_CORE
Physical Exam: Vital Signs Last 24 Hrs  T(C): 37 (24 Feb 2019 10:36), Max: 37 (24 Feb 2019 10:36)  T(F): 98.6 (24 Feb 2019 10:36), Max: 98.6 (24 Feb 2019 10:36)  HR: 90 (24 Feb 2019 13:31) (83 - 91)  BP: 97/68 (24 Feb 2019 13:31) (95/45 - 118/68)  BP(mean): --  RR: 18 (24 Feb 2019 10:36) (18 - 18)  SpO2: 99% (24 Feb 2019 10:36) (99% - 100%)        HEENT: PRRL EOMI    MOUTH/TEETH/GUMS: Clear    NECK: no JVD    LUNGS: Clear    HEART: S1,S2 RR    ABDOMEN: soft nontender    EXTREMITIES: leg edema:     MUSCULOSKELETAL: no joint swelling.    NEURO: no tremor, no focal signs.    SKIN: no rash    : CVA negative,

## 2019-02-24 NOTE — H&P ADULT - HISTORY OF PRESENT ILLNESS
The patient is a 72 y/o female with PMHx of DJD, s/p L spine fusion, R TKR, L arm Erb's palsy who was seen in  ER on 19 for L flank pain, uper back pain, chills,dysuria, nausea, and was diagnoestd with L UVJ 0.8 cm stone, UTI, was given IV AB and discharged home on PO antibiotics. Her BC from 19 grew gram negative rods in the anaerobic bottle and she was called back to the ER. The patient states her symptoms are improved today.     Family Hx.: Mother had MI at age 74,  father  of dementia at age 95.

## 2019-02-24 NOTE — H&P ADULT - NSHPLABSRESULTS_GEN_ALL_CORE
12.5   11.33 )-----------( 128      ( 24 Feb 2019 11:48 )             36.0       02-24    137  |  102  |  49<H>  ----------------------------<  100<H>  3.5   |  23  |  1.33<H>    Ca    8.6      24 Feb 2019 11:48    TPro  6.6  /  Alb  2.6<L>  /  TBili  0.4  /  DBili  x   /  AST  22  /  ALT  25  /  AlkPhos  121<H>  02-24    UA lg blood, wbc>50,  nitrates neg.  CT abd / pelvis : Mild left hydroureteronephrosis secondary to a 0.8 cm calculus at the   left ureterovesicular junction.

## 2019-02-24 NOTE — ED PROVIDER NOTE - CLINICAL SUMMARY MEDICAL DECISION MAKING FREE TEXT BOX
74 y/o female with a PMHx of Erb's muscular dystrophy, thyroid cyst, osteoarthritis presents to the ED from home regarding positive blood cultures. Pt was seen in ED yesterday for generalized body aches and was diagnosed with a kidney stone. +urine sent home, sent home, called today with +blood cultures. Now c/o left shoulder blade pain. Plan: IV abx, fluids, discuss with  and admit

## 2019-02-25 LAB
-  AMIKACIN: SIGNIFICANT CHANGE UP
-  AMPICILLIN/SULBACTAM: SIGNIFICANT CHANGE UP
-  AMPICILLIN: SIGNIFICANT CHANGE UP
-  AZTREONAM: SIGNIFICANT CHANGE UP
-  CEFAZOLIN: SIGNIFICANT CHANGE UP
-  CEFEPIME: SIGNIFICANT CHANGE UP
-  CEFOXITIN: SIGNIFICANT CHANGE UP
-  CEFTRIAXONE: SIGNIFICANT CHANGE UP
-  CIPROFLOXACIN: SIGNIFICANT CHANGE UP
-  ERTAPENEM: SIGNIFICANT CHANGE UP
-  GENTAMICIN: SIGNIFICANT CHANGE UP
-  LEVOFLOXACIN: SIGNIFICANT CHANGE UP
-  MEROPENEM: SIGNIFICANT CHANGE UP
-  NITROFURANTOIN: SIGNIFICANT CHANGE UP
-  PIPERACILLIN/TAZOBACTAM: SIGNIFICANT CHANGE UP
-  TOBRAMYCIN: SIGNIFICANT CHANGE UP
-  TRIMETHOPRIM/SULFAMETHOXAZOLE: SIGNIFICANT CHANGE UP
ANION GAP SERPL CALC-SCNC: 9 MMOL/L — SIGNIFICANT CHANGE UP (ref 5–17)
BASOPHILS # BLD AUTO: 0 K/UL — SIGNIFICANT CHANGE UP (ref 0–0.2)
BASOPHILS NFR BLD AUTO: 0 % — SIGNIFICANT CHANGE UP (ref 0–2)
BUN SERPL-MCNC: 26 MG/DL — HIGH (ref 7–23)
CALCIUM SERPL-MCNC: 8.3 MG/DL — LOW (ref 8.5–10.1)
CHLORIDE SERPL-SCNC: 107 MMOL/L — SIGNIFICANT CHANGE UP (ref 96–108)
CO2 SERPL-SCNC: 23 MMOL/L — SIGNIFICANT CHANGE UP (ref 22–31)
CREAT SERPL-MCNC: 0.82 MG/DL — SIGNIFICANT CHANGE UP (ref 0.5–1.3)
CULTURE RESULTS: SIGNIFICANT CHANGE UP
EOSINOPHIL # BLD AUTO: 0 K/UL — SIGNIFICANT CHANGE UP (ref 0–0.5)
EOSINOPHIL NFR BLD AUTO: 0 % — SIGNIFICANT CHANGE UP (ref 0–6)
GLUCOSE SERPL-MCNC: 118 MG/DL — HIGH (ref 70–99)
HCT VFR BLD CALC: 33.4 % — LOW (ref 34.5–45)
HGB BLD-MCNC: 11.4 G/DL — LOW (ref 11.5–15.5)
LYMPHOCYTES # BLD AUTO: 0.25 K/UL — LOW (ref 1–3.3)
LYMPHOCYTES # BLD AUTO: 2 % — LOW (ref 13–44)
MANUAL SMEAR VERIFICATION: SIGNIFICANT CHANGE UP
MCHC RBC-ENTMCNC: 31.2 PG — SIGNIFICANT CHANGE UP (ref 27–34)
MCHC RBC-ENTMCNC: 34.1 GM/DL — SIGNIFICANT CHANGE UP (ref 32–36)
MCV RBC AUTO: 91.5 FL — SIGNIFICANT CHANGE UP (ref 80–100)
METHOD TYPE: SIGNIFICANT CHANGE UP
MONOCYTES # BLD AUTO: 2.02 K/UL — HIGH (ref 0–0.9)
MONOCYTES NFR BLD AUTO: 16 % — HIGH (ref 2–14)
NEUTROPHILS # BLD AUTO: 10.36 K/UL — HIGH (ref 1.8–7.4)
NEUTROPHILS NFR BLD AUTO: 81 % — HIGH (ref 43–77)
NEUTS BAND # BLD: 1 % — SIGNIFICANT CHANGE UP (ref 0–8)
NRBC # BLD: 0 /100 — SIGNIFICANT CHANGE UP (ref 0–0)
NRBC # BLD: SIGNIFICANT CHANGE UP /100 WBCS (ref 0–0)
ORGANISM # SPEC MICROSCOPIC CNT: SIGNIFICANT CHANGE UP
ORGANISM # SPEC MICROSCOPIC CNT: SIGNIFICANT CHANGE UP
PLAT MORPH BLD: NORMAL — SIGNIFICANT CHANGE UP
PLATELET # BLD AUTO: 130 K/UL — LOW (ref 150–400)
POTASSIUM SERPL-MCNC: 3.4 MMOL/L — LOW (ref 3.5–5.3)
POTASSIUM SERPL-SCNC: 3.4 MMOL/L — LOW (ref 3.5–5.3)
RBC # BLD: 3.65 M/UL — LOW (ref 3.8–5.2)
RBC # FLD: 14.8 % — HIGH (ref 10.3–14.5)
RBC BLD AUTO: SIGNIFICANT CHANGE UP
SODIUM SERPL-SCNC: 139 MMOL/L — SIGNIFICANT CHANGE UP (ref 135–145)
SPECIMEN SOURCE: SIGNIFICANT CHANGE UP
TOXIC GRANULES BLD QL SMEAR: PRESENT — SIGNIFICANT CHANGE UP
WBC # BLD: 12.63 K/UL — HIGH (ref 3.8–10.5)
WBC # FLD AUTO: 12.63 K/UL — HIGH (ref 3.8–10.5)

## 2019-02-25 RX ORDER — CEFTRIAXONE 500 MG/1
2000 INJECTION, POWDER, FOR SOLUTION INTRAMUSCULAR; INTRAVENOUS EVERY 24 HOURS
Qty: 0 | Refills: 0 | Status: DISCONTINUED | OUTPATIENT
Start: 2019-02-25 | End: 2019-02-27

## 2019-02-25 RX ORDER — DOCUSATE SODIUM 100 MG
100 CAPSULE ORAL ONCE
Qty: 0 | Refills: 0 | Status: COMPLETED | OUTPATIENT
Start: 2019-02-25 | End: 2019-02-25

## 2019-02-25 RX ORDER — SODIUM CHLORIDE 9 MG/ML
1000 INJECTION INTRAMUSCULAR; INTRAVENOUS; SUBCUTANEOUS
Qty: 0 | Refills: 0 | Status: DISCONTINUED | OUTPATIENT
Start: 2019-02-25 | End: 2019-02-27

## 2019-02-25 RX ORDER — CEFTRIAXONE 500 MG/1
1000 INJECTION, POWDER, FOR SOLUTION INTRAMUSCULAR; INTRAVENOUS ONCE
Qty: 0 | Refills: 0 | Status: DISCONTINUED | OUTPATIENT
Start: 2019-02-25 | End: 2019-02-25

## 2019-02-25 RX ORDER — CEFTRIAXONE 500 MG/1
INJECTION, POWDER, FOR SOLUTION INTRAMUSCULAR; INTRAVENOUS
Qty: 0 | Refills: 0 | Status: DISCONTINUED | OUTPATIENT
Start: 2019-02-25 | End: 2019-02-25

## 2019-02-25 RX ADMIN — SODIUM CHLORIDE 60 MILLILITER(S): 9 INJECTION INTRAMUSCULAR; INTRAVENOUS; SUBCUTANEOUS at 22:10

## 2019-02-25 RX ADMIN — OXYCODONE AND ACETAMINOPHEN 1 TABLET(S): 5; 325 TABLET ORAL at 16:28

## 2019-02-25 RX ADMIN — HEPARIN SODIUM 5000 UNIT(S): 5000 INJECTION INTRAVENOUS; SUBCUTANEOUS at 05:02

## 2019-02-25 RX ADMIN — CEFTRIAXONE 2000 MILLIGRAM(S): 500 INJECTION, POWDER, FOR SOLUTION INTRAMUSCULAR; INTRAVENOUS at 12:58

## 2019-02-25 RX ADMIN — Medication 100 MILLIGRAM(S): at 22:10

## 2019-02-25 RX ADMIN — SODIUM CHLORIDE 125 MILLILITER(S): 9 INJECTION INTRAMUSCULAR; INTRAVENOUS; SUBCUTANEOUS at 12:57

## 2019-02-25 RX ADMIN — SODIUM CHLORIDE 125 MILLILITER(S): 9 INJECTION INTRAMUSCULAR; INTRAVENOUS; SUBCUTANEOUS at 02:26

## 2019-02-25 RX ADMIN — OXYCODONE AND ACETAMINOPHEN 1 TABLET(S): 5; 325 TABLET ORAL at 21:21

## 2019-02-25 RX ADMIN — Medication 1000 UNIT(S): at 12:59

## 2019-02-25 RX ADMIN — OXYCODONE AND ACETAMINOPHEN 1 TABLET(S): 5; 325 TABLET ORAL at 05:03

## 2019-02-25 RX ADMIN — OXYCODONE AND ACETAMINOPHEN 1 TABLET(S): 5; 325 TABLET ORAL at 20:51

## 2019-02-25 RX ADMIN — HEPARIN SODIUM 5000 UNIT(S): 5000 INJECTION INTRAVENOUS; SUBCUTANEOUS at 20:51

## 2019-02-25 NOTE — PROGRESS NOTE ADULT - SUBJECTIVE AND OBJECTIVE BOX
History of Present Illness: 	  The patient is a 72 y/o female with PMHx of DJD, s/p L spine fusion, R TKR, L arm Erb's palsy who was seen in  ER on 19 for L flank pain, uper back pain, chills,dysuria, nausea, and was diagnoestd with L UVJ 0.8 cm stone, UTI, was given IV AB and discharged home on PO antibiotics. Her BC from 19 grew gram negative rods in the anaerobic bottle and she was called back to the ER. The patient states her symptoms are improved today.     Family Hx.: Mother had MI at age 74,  father  of dementia at age 95.   - report thoracic spine pain    HEENT: PRRL EOMI    	MOUTH/TEETH/GUMS: Clear    	NECK: no JVD    	LUNGS: Clear    	HEART: S1,S2 RR    	ABDOMEN: soft nontender    	EXTREMITIES: leg edema:     	MUSCULOSKELETAL: no joint swelling.has upper thoracic spine tenderness    	NEURO: no tremor, no focal signs.    	SKIN: no rash      PHYSICAL EXAM:    Daily     Daily     ICU Vital Signs Last 24 Hrs  T(C): 36.7 (2019 11:43), Max: 37.4 (2019 16:11)  T(F): 98.1 (2019 11:43), Max: 99.3 (2019 16:11)  HR: 91 (2019 11:43) (87 - 91)  BP: 123/53 (2019 11:43) (115/51 - 130/24)  BP(mean): --  ABP: --  ABP(mean): --  RR: 18 (2019 11:43) (18 - 18)  SpO2: 92% (2019 11:43) (92% - 100%)                                11.4   12.63 )-----------( 130      ( 2019 06:41 )             33.4       CBC Full  -  ( 2019 06:41 )  WBC Count : 12.63 K/uL  Hemoglobin : 11.4 g/dL  Hematocrit : 33.4 %  Platelet Count - Automated : 130 K/uL  Mean Cell Volume : 91.5 fl  Mean Cell Hemoglobin : 31.2 pg  Mean Cell Hemoglobin Concentration : 34.1 gm/dL  Auto Neutrophil # : 10.36 K/uL  Auto Lymphocyte # : 0.25 K/uL  Auto Monocyte # : 2.02 K/uL  Auto Eosinophil # : 0.00 K/uL  Auto Basophil # : 0.00 K/uL  Auto Neutrophil % : 81.0 %  Auto Lymphocyte % : 2.0 %  Auto Monocyte % : 16.0 %  Auto Eosinophil % : 0.0 %  Auto Basophil % : 0.0 %          139  |  107  |  26<H>  ----------------------------<  118<H>  3.4<L>   |  23  |  0.82    Ca    8.3<L>      2019 06:41    TPro  6.6  /  Alb  2.6<L>  /  TBili  0.4  /  DBili  x   /  AST  22  /  ALT  25  /  AlkPhos  121<H>        LIVER FUNCTIONS - ( 2019 11:48 )  Alb: 2.6 g/dL / Pro: 6.6 gm/dL / ALK PHOS: 121 U/L / ALT: 25 U/L / AST: 22 U/L / GGT: x             PT/INR - ( 2019 11:48 )   PT: 11.4 sec;   INR: 1.03 ratio         PTT - ( 2019 11:48 )  PTT:26.2 sec          Urinalysis Basic - ( 2019 22:00 )    Color: Yellow / Appearance: Clear / S.010 / pH: x  Gluc: x / Ketone: Trace  / Bili: Negative / Urobili: Negative mg/dL   Blood: x / Protein: 100 mg/dL / Nitrite: Negative   Leuk Esterase: Moderate / RBC: 6-10 /HPF / WBC 11-25   Sq Epi: x / Non Sq Epi: Few / Bacteria: Moderate            MEDICATIONS  (STANDING):  cefTRIAXone Injectable. 2000 milliGRAM(s) IV Push every 24 hours  cholecalciferol 1000 Unit(s) Oral daily  heparin  Injectable 5000 Unit(s) SubCutaneous every 12 hours  sodium chloride 0.9%. 1000 milliLiter(s) (125 mL/Hr) IV Continuous <Continuous>

## 2019-02-25 NOTE — PROGRESS NOTE ADULT - ASSESSMENT
· Assessment		  72 y/o female with PMHx of DJD, s/p L spine fusion, R TKR,  who was seen in  ER on 2/23/19 for L flank pain, uper back pain, chills,dysuria, nausea, and was diagnoestd with L UVJ 0.8 cm stone, UTI, was given IV AB and discharged home on PO antibiotics. Her BC from 2/23/19 grew gram negative rods in the anaerobic bottle and she was called back to the ER. The patient states her symptoms are improved today.      Bacteremia. likely secondary to UTI and renal stones   on IV Ceftriaxone   pending sensitivity,   ID consult apprceiated     Kidney stone without hydronephrosis.    follow up urology as putpatient,   urology did not recommend inpatient uro procedure  d/c vit c megadose    # upper back pain /hx of fall 3 weeks ago   will do xray spine  # DVT prophylaxis  SCD

## 2019-02-25 NOTE — CONSULT NOTE ADULT - ASSESSMENT
72 y/o female with PMHx of DJD, s/p L spine fusion, R TKR, L arm Erb's palsy who was seen in  ER on 2/23/19 for L flank pain, upper back pain, chills, dysuria, nausea, and was diagnoestd with L UVJ 0.8 cm stone, UTI, was given IV AB and discharged home on PO antibiotics. Her BC from 2/23/19 grew gram negative rods in the anaerobic bottle and she was called back to the ER. Eval by urology, blood cx/urine cx growing GNRs was given IV rocephin.     1. GNR sepsis-bacteremia/pyelonephritis-cystitis/L UVJ stone   - agree with rocephin increase to [id number]   - f/u id/sensitivities of urine/blood cx  - imaging reviewed  - urology eval appreciated   - further stone tx in future  - has some upper back pain ? further imaging had previous fall in January  - monitor temps  - tolerating abx well so far; no side effects noted  - reason for abx use and side effects reviewed with patient  - supportive care  - fu cbc

## 2019-02-26 LAB
-  AMIKACIN: SIGNIFICANT CHANGE UP
-  AMPICILLIN/SULBACTAM: SIGNIFICANT CHANGE UP
-  AMPICILLIN: SIGNIFICANT CHANGE UP
-  AZTREONAM: SIGNIFICANT CHANGE UP
-  CEFAZOLIN: SIGNIFICANT CHANGE UP
-  CEFEPIME: SIGNIFICANT CHANGE UP
-  CEFOXITIN: SIGNIFICANT CHANGE UP
-  CEFTRIAXONE: SIGNIFICANT CHANGE UP
-  CIPROFLOXACIN: SIGNIFICANT CHANGE UP
-  ERTAPENEM: SIGNIFICANT CHANGE UP
-  GENTAMICIN: SIGNIFICANT CHANGE UP
-  LEVOFLOXACIN: SIGNIFICANT CHANGE UP
-  MEROPENEM: SIGNIFICANT CHANGE UP
-  PIPERACILLIN/TAZOBACTAM: SIGNIFICANT CHANGE UP
-  TOBRAMYCIN: SIGNIFICANT CHANGE UP
-  TRIMETHOPRIM/SULFAMETHOXAZOLE: SIGNIFICANT CHANGE UP
ANION GAP SERPL CALC-SCNC: 8 MMOL/L — SIGNIFICANT CHANGE UP (ref 5–17)
BASOPHILS # BLD AUTO: 0.07 K/UL — SIGNIFICANT CHANGE UP (ref 0–0.2)
BASOPHILS NFR BLD AUTO: 0.7 % — SIGNIFICANT CHANGE UP (ref 0–2)
BUN SERPL-MCNC: 15 MG/DL — SIGNIFICANT CHANGE UP (ref 7–23)
CALCIUM SERPL-MCNC: 7.8 MG/DL — LOW (ref 8.5–10.1)
CHLORIDE SERPL-SCNC: 108 MMOL/L — SIGNIFICANT CHANGE UP (ref 96–108)
CO2 SERPL-SCNC: 25 MMOL/L — SIGNIFICANT CHANGE UP (ref 22–31)
CREAT SERPL-MCNC: 0.59 MG/DL — SIGNIFICANT CHANGE UP (ref 0.5–1.3)
CULTURE RESULTS: NO GROWTH — SIGNIFICANT CHANGE UP
CULTURE RESULTS: SIGNIFICANT CHANGE UP
CULTURE RESULTS: SIGNIFICANT CHANGE UP
EOSINOPHIL # BLD AUTO: 0.05 K/UL — SIGNIFICANT CHANGE UP (ref 0–0.5)
EOSINOPHIL NFR BLD AUTO: 0.5 % — SIGNIFICANT CHANGE UP (ref 0–6)
GLUCOSE SERPL-MCNC: 110 MG/DL — HIGH (ref 70–99)
HCT VFR BLD CALC: 30.3 % — LOW (ref 34.5–45)
HGB BLD-MCNC: 10.6 G/DL — LOW (ref 11.5–15.5)
IMM GRANULOCYTES NFR BLD AUTO: 1.8 % — HIGH (ref 0–1.5)
LYMPHOCYTES # BLD AUTO: 1.11 K/UL — SIGNIFICANT CHANGE UP (ref 1–3.3)
LYMPHOCYTES # BLD AUTO: 11.3 % — LOW (ref 13–44)
MCHC RBC-ENTMCNC: 31.5 PG — SIGNIFICANT CHANGE UP (ref 27–34)
MCHC RBC-ENTMCNC: 35 GM/DL — SIGNIFICANT CHANGE UP (ref 32–36)
MCV RBC AUTO: 90.2 FL — SIGNIFICANT CHANGE UP (ref 80–100)
METHOD TYPE: SIGNIFICANT CHANGE UP
MONOCYTES # BLD AUTO: 1.16 K/UL — HIGH (ref 0–0.9)
MONOCYTES NFR BLD AUTO: 11.8 % — SIGNIFICANT CHANGE UP (ref 2–14)
NEUTROPHILS # BLD AUTO: 7.29 K/UL — SIGNIFICANT CHANGE UP (ref 1.8–7.4)
NEUTROPHILS NFR BLD AUTO: 73.9 % — SIGNIFICANT CHANGE UP (ref 43–77)
NRBC # BLD: 0 /100 WBCS — SIGNIFICANT CHANGE UP (ref 0–0)
ORGANISM # SPEC MICROSCOPIC CNT: SIGNIFICANT CHANGE UP
PLATELET # BLD AUTO: 149 K/UL — LOW (ref 150–400)
POTASSIUM SERPL-MCNC: 3.4 MMOL/L — LOW (ref 3.5–5.3)
POTASSIUM SERPL-SCNC: 3.4 MMOL/L — LOW (ref 3.5–5.3)
RBC # BLD: 3.36 M/UL — LOW (ref 3.8–5.2)
RBC # FLD: 14.7 % — HIGH (ref 10.3–14.5)
SODIUM SERPL-SCNC: 141 MMOL/L — SIGNIFICANT CHANGE UP (ref 135–145)
SPECIMEN SOURCE: SIGNIFICANT CHANGE UP
WBC # BLD: 9.86 K/UL — SIGNIFICANT CHANGE UP (ref 3.8–10.5)
WBC # FLD AUTO: 9.86 K/UL — SIGNIFICANT CHANGE UP (ref 3.8–10.5)

## 2019-02-26 PROCEDURE — 93010 ELECTROCARDIOGRAM REPORT: CPT

## 2019-02-26 PROCEDURE — 72040 X-RAY EXAM NECK SPINE 2-3 VW: CPT | Mod: 26

## 2019-02-26 PROCEDURE — 71110 X-RAY EXAM RIBS BIL 3 VIEWS: CPT | Mod: 26

## 2019-02-26 PROCEDURE — 72070 X-RAY EXAM THORAC SPINE 2VWS: CPT | Mod: 26

## 2019-02-26 PROCEDURE — 71045 X-RAY EXAM CHEST 1 VIEW: CPT | Mod: 26,59

## 2019-02-26 PROCEDURE — 93970 EXTREMITY STUDY: CPT | Mod: 26

## 2019-02-26 PROCEDURE — 74018 RADEX ABDOMEN 1 VIEW: CPT | Mod: 26

## 2019-02-26 RX ORDER — LIDOCAINE 4 G/100G
1 CREAM TOPICAL EVERY 24 HOURS
Qty: 0 | Refills: 0 | Status: DISCONTINUED | OUTPATIENT
Start: 2019-02-26 | End: 2019-03-07

## 2019-02-26 RX ORDER — POLYETHYLENE GLYCOL 3350 17 G/17G
17 POWDER, FOR SOLUTION ORAL DAILY
Qty: 0 | Refills: 0 | Status: DISCONTINUED | OUTPATIENT
Start: 2019-02-26 | End: 2019-03-07

## 2019-02-26 RX ORDER — POTASSIUM CHLORIDE 20 MEQ
40 PACKET (EA) ORAL ONCE
Qty: 0 | Refills: 0 | Status: COMPLETED | OUTPATIENT
Start: 2019-02-26 | End: 2019-02-26

## 2019-02-26 RX ORDER — DOCUSATE SODIUM 100 MG
100 CAPSULE ORAL THREE TIMES A DAY
Qty: 0 | Refills: 0 | Status: DISCONTINUED | OUTPATIENT
Start: 2019-02-26 | End: 2019-03-07

## 2019-02-26 RX ORDER — SENNA PLUS 8.6 MG/1
2 TABLET ORAL AT BEDTIME
Qty: 0 | Refills: 0 | Status: DISCONTINUED | OUTPATIENT
Start: 2019-02-26 | End: 2019-03-07

## 2019-02-26 RX ORDER — FUROSEMIDE 40 MG
40 TABLET ORAL ONCE
Qty: 0 | Refills: 0 | Status: COMPLETED | OUTPATIENT
Start: 2019-02-26 | End: 2019-02-26

## 2019-02-26 RX ORDER — HYDROMORPHONE HYDROCHLORIDE 2 MG/ML
0.2 INJECTION INTRAMUSCULAR; INTRAVENOUS; SUBCUTANEOUS EVERY 4 HOURS
Qty: 0 | Refills: 0 | Status: DISCONTINUED | OUTPATIENT
Start: 2019-02-26 | End: 2019-02-27

## 2019-02-26 RX ADMIN — LIDOCAINE 1 PATCH: 4 CREAM TOPICAL at 11:56

## 2019-02-26 RX ADMIN — CEFTRIAXONE 2000 MILLIGRAM(S): 500 INJECTION, POWDER, FOR SOLUTION INTRAMUSCULAR; INTRAVENOUS at 11:37

## 2019-02-26 RX ADMIN — Medication 40 MILLIGRAM(S): at 17:52

## 2019-02-26 RX ADMIN — OXYCODONE AND ACETAMINOPHEN 1 TABLET(S): 5; 325 TABLET ORAL at 05:00

## 2019-02-26 RX ADMIN — POLYETHYLENE GLYCOL 3350 17 GRAM(S): 17 POWDER, FOR SOLUTION ORAL at 11:34

## 2019-02-26 RX ADMIN — OXYCODONE AND ACETAMINOPHEN 1 TABLET(S): 5; 325 TABLET ORAL at 05:30

## 2019-02-26 RX ADMIN — OXYCODONE AND ACETAMINOPHEN 1 TABLET(S): 5; 325 TABLET ORAL at 21:07

## 2019-02-26 RX ADMIN — OXYCODONE AND ACETAMINOPHEN 1 TABLET(S): 5; 325 TABLET ORAL at 00:58

## 2019-02-26 RX ADMIN — OXYCODONE AND ACETAMINOPHEN 1 TABLET(S): 5; 325 TABLET ORAL at 01:28

## 2019-02-26 RX ADMIN — Medication 1000 UNIT(S): at 11:33

## 2019-02-26 RX ADMIN — OXYCODONE AND ACETAMINOPHEN 1 TABLET(S): 5; 325 TABLET ORAL at 21:37

## 2019-02-26 RX ADMIN — HEPARIN SODIUM 5000 UNIT(S): 5000 INJECTION INTRAVENOUS; SUBCUTANEOUS at 05:02

## 2019-02-26 RX ADMIN — HYDROMORPHONE HYDROCHLORIDE 0.2 MILLIGRAM(S): 2 INJECTION INTRAMUSCULAR; INTRAVENOUS; SUBCUTANEOUS at 15:28

## 2019-02-26 RX ADMIN — Medication 40 MILLIEQUIVALENT(S): at 11:33

## 2019-02-26 RX ADMIN — OXYCODONE AND ACETAMINOPHEN 1 TABLET(S): 5; 325 TABLET ORAL at 10:10

## 2019-02-26 RX ADMIN — Medication 100 MILLIGRAM(S): at 21:07

## 2019-02-26 RX ADMIN — SENNA PLUS 2 TABLET(S): 8.6 TABLET ORAL at 21:07

## 2019-02-26 RX ADMIN — LIDOCAINE 1 PATCH: 4 CREAM TOPICAL at 19:13

## 2019-02-26 RX ADMIN — HEPARIN SODIUM 5000 UNIT(S): 5000 INJECTION INTRAVENOUS; SUBCUTANEOUS at 17:17

## 2019-02-26 RX ADMIN — LIDOCAINE 1 PATCH: 4 CREAM TOPICAL at 23:34

## 2019-02-26 NOTE — CONSULT NOTE ADULT - ASSESSMENT
- new bilateral effusions likely secondary to fluids received with the sepsis rule out chf and could also be related to third spacing with the low albumin  - upper mid back pain likely musculosketal and spine related and un likely PE with the less clinical and pain is worsening with the movement with no clear associated respiratory symptoms   - renal stone with urosepsis treated with the rocephin with the negative cultures .with the repeat   - significant arthritis of spine     PLAN     - continue with the antibiotics for the P.E .  - suggest diuretics with the new effusions to get negative balance   - analgesics for the pain   - follow up up of the echo to know EF   - if new respiratory symptoms develop then would consider ct scan . - new bilateral effusions likely secondary to fluids received with the sepsis rule out chf and could also be related to third spacing with the low albumin  - upper mid back pain likely musculosketal and spine related and un likely PE with the less clinical and pain is worsening with the movement with no clear associated respiratory symptoms   - renal stone with urosepsis treated with the rocephin with the negative cultures .with the repeat   - significant arthritis of spine     PLAN     - continue with the antibiotics for the sepsis .  - suggest diuretics with the new effusions to get negative balance   - analgesics for the pain   - follow up up of the echo to know EF   - if new respiratory symptoms develop then would consider ct scan .

## 2019-02-26 NOTE — PROGRESS NOTE ADULT - SUBJECTIVE AND OBJECTIVE BOX
Date of service: 02-26-19 @ 12:28    pt seen and examined  urinating w/o difficulty  afebrile  still w/ upper back pain    ROS: no fever or chills; denies dizziness, no HA, no SOB or cough, no abdominal pain, no diarrhea or constipation; no legs pain, no rashes    MEDICATIONS  (STANDING):  cefTRIAXone Injectable. 2000 milliGRAM(s) IV Push every 24 hours  cholecalciferol 1000 Unit(s) Oral daily  docusate sodium 100 milliGRAM(s) Oral three times a day  heparin  Injectable 5000 Unit(s) SubCutaneous every 12 hours  lidocaine   Patch 1 Patch Transdermal every 24 hours  polyethylene glycol 3350 17 Gram(s) Oral daily  senna 2 Tablet(s) Oral at bedtime  sodium chloride 0.9%. 1000 milliLiter(s) (60 mL/Hr) IV Continuous <Continuous>      Vital Signs Last 24 Hrs  T(C): 37.4 (26 Feb 2019 11:27), Max: 37.4 (25 Feb 2019 21:04)  T(F): 99.3 (26 Feb 2019 11:27), Max: 99.3 (25 Feb 2019 21:04)  HR: 82 (26 Feb 2019 11:27) (82 - 95)  BP: 136/60 (26 Feb 2019 11:27) (136/60 - 143/51)  BP(mean): --  RR: 18 (26 Feb 2019 11:27) (18 - 19)  SpO2: 96% (26 Feb 2019 11:27) (95% - 96%)      PE:  Constitutional: frail looking  HEENT: NC/AT, EOMI, PERRLA, conjunctivae clear; ears and nose atraumatic; pharynx benign  Neck: supple; thyroid not palpable  Back: no tenderness  Respiratory: respiratory effort normal; clear to auscultation  Cardiovascular: S1S2 regular, no murmurs  Abdomen: soft, not tender, not distended, positive BS; liver and spleen WNL  Genitourinary: no suprapubic tenderness L CVA tenderness  Lymphatic: no LN palpable  Musculoskeletal: no muscle tenderness, no joint swelling or tenderness  Extremities: no pedal edema  Neurological/ Psychiatric: moving all extremities  Skin: no rashes; no palpable lesions    Labs: all available labs reviewed                        10.6   9.86  )-----------( 149      ( 26 Feb 2019 07:03 )             30.3     02-26    141  |  108  |  15  ----------------------------<  110<H>  3.4<L>   |  25  |  0.59    Ca    7.8<L>      26 Feb 2019 07:03        Culture - Urine (02.24.19 @ 22:00)    Specimen Source: .Urine Clean Catch (Midstream)    Culture Results:   No growth    Culture - Blood (02.24.19 @ 11:39)    Specimen Source: .Blood None    Culture Results:   No growth to date.    Culture - Blood (02.23.19 @ 12:47)    Gram Stain:   Growth in anaerobic bottle: Gram Negative Rods  Growth in aerobic bottle: Gram Negative Rods    -  Amikacin: S <=16    -  Ampicillin: S <=8 These ampicillin results predict results for amoxicillin    -  Ampicillin/Sulbactam: S <=8/4    -  Aztreonam: S <=4    -  Cefazolin: S <=8    -  Multidrug (KPC pos) resistant organism: Nondet    -  Staphylococcus aureus: Nondet    -  Methicillin resistant Staphylococcus aureus (MRSA): Nondet    -  Coagulase negative Staphylococcus: Nondet    -  Enterococcus species: Nondet    -  Vancomycin resistant Enterococcus sp.: Nondet    -  Escherichia coli: Nondet    -  Klebsiella oxytoca: Nondet    -  Klebsiella pneumoniae: Nondet    -  Serratia marcescens: Nondet    -  Haemophilus influenzae: Nondet    -  Listeria monocytogenes: Nondet    -  Neisseria meningitidis: Nondet    -  Pseudomonas aeruginosa: Nondet    -  Acinetobacter baumanii: Nondet    -  Enterobacter cloacae complex: Nondet    -  Streptococcus sp. (Not Grp A, B or S pneumoniae): Nondet    -  Streptococcus agalactiae (Group B): Nondet    -  Streptococcus pyogenes (Group A): Nondet    -  Streptococcus pneumoniae: Nondet    -  Candida albicans: Nondet    -  Candida glabrata: Nondet    -  Candida krusei: Nondet    -  Candida parapsilosis: Nondet    -  Candida tropicalis: Nondet    -  Cefepime: S <=4    -  Cefoxitin: S <=8    -  Ceftriaxone: S <=1 Enterobacter, Citrobacter, and Serratia may develop resistance during prolonged therapy    -  Ciprofloxacin: S <=1    -  Ertapenem: S <=1    -  Gentamicin: S <=4    -  Levofloxacin: S <=2    -  Meropenem: S <=1    -  Piperacillin/Tazobactam: S <=16    -  Tobramycin: S <=4    -  Trimethoprim/Sulfamethoxazole: S <=2/38    Specimen Source: .Blood None    Organism: Blood Culture PCR    Organism: Proteus mirabilis    Culture Results:   Growth in aerobic and anaerobic bottles: Proteus mirabilis  "Due to technical problems, Proteus sp. will Not be reported as part of  the BCID panel until further notice"  ***Blood Panel PCR results on this specimen are available  approximately 3 hours after the Gram stain result.***  Gram stain, PCR, and/or culture results may not always  correspond due to difference in methodologies.  ************************************************************  This PCR assay was performed using [employer].  The following targets are tested for: Enterococcus,  vancomycin resistant enterococci, Listeria monocytogenes,  coagulase negative staphylococci, S. aureus,  methicillin resistant S. aureus, Streptococcus agalactiae  (Group B), S. pneumoniae, S. pyogenes (Group A),  Acinetobacter baumannii, Enterobacter cloacae, E. coli,  Klebsiella oxytoca, K. pneumoniae, Proteus sp.,  Serratia marcescens, Haemophilus influenzae,  Neisseria meningitidis, Pseudomonas aeruginosa, Candida  albicans, C. glabrata, C krusei, C parapsilosis,  C. tropicalis and the KPC resistance gene.    Organism Identification: Blood Culture PCR  Proteus mirabilis    Method Type: PCR    Method Type: BENITO        Radiology: all available radiological tests reviewed    EXAM:  CT ABDOMEN AND PELVIS                            PROCEDURE DATE:  02/23/2019          INTERPRETATION:  Exam Type:  CT ABDOMEN AND PELVIS   Date and Time: 2/23/2019 2:23 PM  Indication: Abdominal pain and hematuria  Compared to: Ultrasound abdomen 2/23/2019  Technique: CT of the ABDOMEN and PELVIS:  No intravenous contrast was   administered at physician request. This limits sensitivity for certain   processes. Oral contrast was not administered.    COMMENTS:    LOWER LUNGS AND PLEURA: Trace to small left pleural effusion with minimal   left basilar atelectasis. Coronary vascular calcification.    LIVER: Hepatomegaly. Surface contour nodularity suggesting cirrhosis. No   focal hepatic masses.  BILE DUCTS: normal caliber.  GALLBLADDER:     no wall thickening. Gallstones are not excluded.  PANCREAS: within normal limits.  SPLEEN: within normal limits.  ADRENALS: within normal limits.    KIDNEYS, URETERS AND BLADDER: Mild left hydroureteronephrosis secondary   to a 0.8 cm calculus at the left ureterovesicular junction. Minimal left   perinephric stranding and trace left perinephric fluid. Nonobstructive   left intrarenal calculi. No right hydronephrosis. Right ureter appears   unremarkable. Bladder is otherwise within normal limits.    PELVIS: Fundal uterine calcifications. No adnexal masses. No pelvic   adenopathy or pelvic free fluid.       BOWEL: The unopacified bowel is of normal course and caliber without   evidence of obstruction or bowel wall thickening. Periampullaryduodenal   diverticula..  PERITONEUM: no ascites or free air, no fluid collection.  RETROPERITONEUM: within normal limits.      VESSELS: atherosclerotic changes.      ABDOMINAL WALL: within normal limits.  BONES: Degenerative changes. Prior pedicle fusion.     IMPRESSION:     Mild left hydroureteronephrosis secondary to a 0.8 cm calculus at the   left ureterovesicular junction.        Advanced directives addressed: full resuscitation

## 2019-02-26 NOTE — PROGRESS NOTE ADULT - ASSESSMENT
· Assessment		  74 y/o female with PMHx of DJD, s/p L spine fusion, R TKR,  who was seen in  ER on 2/23/19 for L flank pain, uper back pain, chills,dysuria, nausea, and was diagnoestd with L UVJ 0.8 cm stone, UTI, was given IV AB and discharged home on PO antibiotics. Her BC from 2/23/19 grew gram negative rods in the anaerobic bottle and she was called back to the ER. The patient states her symptoms are improved today.      Bacteremia. likely secondary to UTI and renal stones   on IV Ceftriaxone   , anticipating switch to po in am  ID consult apprceiated  Kidney stone without  significant hydronephrosis.    follow up urology as outpatient  urology did not recommend inpatient urological  intervention  d/c vit c megadose    # upper back pain/pleuritic /hx of fall 3 weeks ago  left pleural effusion vs pleuritis   doub acute cholecystitis (abd US neg)  doubt PE (no tachycardia ,not hypoxic, no calf pain )or aortic dissection, no significant BP differential B/l arms ,awaiting officieal report CXR and xray spine r/o fx  will consider Xray rib series, EKG r/o pericarditis , echo ,clinically no signs of pericardial tamponade , will also do venous doppler  pulm consult      # DVT prophylaxis  lovenox SC · Assessment		  72 y/o female with PMHx of DJD, s/p L spine fusion, R TKR,  who was seen in  ER on 2/23/19 for L flank pain, uper back pain, chills,dysuria, nausea, and was diagnoestd with L UVJ 0.8 cm stone, UTI, was given IV AB and discharged home on PO antibiotics. Her BC from 2/23/19 grew gram negative rods in the anaerobic bottle and she was called back to the ER. The patient states her symptoms are improved today.      Bacteremia. likely secondary to UTI and renal stones   on IV Ceftriaxone   , anticipating switch to po in am  ID consult apprceiated  Kidney stone without  significant hydronephrosis.    follow up urology as outpatient  urology did not recommend inpatient urological  intervention  d/c vit c megadose    # upper back pain/pleuritic /hx of fall 3 weeks ago  left pleural effusion vs pleuritis   doub acute cholecystitis (abd US neg)  doubt PE (no tachycardia ,not hypoxic, no calf pain )or aortic dissection, no significant BP differential B/l arms ,awaiting officieal report CXR and xray spine r/o fx  will consider Xray rib series, EKG no acute ST/T abnormalities ,unlikely pericarditis , echo ,clinically no signs of pericardial tamponade , will also do venous doppler  pulm consult      # DVT prophylaxis  lovenox SC

## 2019-02-26 NOTE — PROGRESS NOTE ADULT - ASSESSMENT
74 y/o female with PMHx of DJD, s/p L spine fusion, R TKR, L arm Erb's palsy who was seen in  ER on 2/23/19 for L flank pain, upper back pain, chills, dysuria, nausea, and was diagnoestd with L UVJ 0.8 cm stone, UTI, was given IV AB and discharged home on PO antibiotics. Her BC from 2/23/19 grew gram negative rods in the anaerobic bottle and she was called back to the ER. Eval by urology, blood cx/urine cx growing GNRs was given IV rocephin.     1. proteus sepsis-bacteremia/pyelonephritis-cystitis/L UVJ stone  - on rocephin increase to [id number] #2  - blood cx-urine cx 2/23 proteus S cephalosporins repeat cx 2/24 (-)  - switch to po ceftin in am complete abx course until 3/5 10 day course  - imaging reviewed  - urology eval appreciated   - further stone tx in future  - has some upper back pain ? further imaging had previous fall in January ? T12 compression fx  - monitor temps  - tolerating abx well so far; no side effects noted  - reason for abx use and side effects reviewed with patient  - supportive care  - fu cbc

## 2019-02-26 NOTE — CDI QUERY NOTE - NSCDIOTHERTXTBX2_GEN_ALL_CORE_FT
Creatinine Trend Noted:  Creatinine, Serum: 0.59 mg/dL [0.50 - 1.30] (02-26-19)  Creatinine, Serum: 0.82 mg/dL [0.50 - 1.30] (02-25-19)  Creatinine, Serum: 1.33 mg/dL <H> [0.50 - 1.30] (02-24-19)  Creatinine, Serum: 1.72 mg/dL <H> [0.50 - 1.30] (02-23-19)    [facility] policy based on KDIGO guidelines defines REYNA (applicable to both adult and pediatric patients) as any of the following;  •	Increase Creatinine = 0.3 mg/dl from baseline within 48 hours; or  •	Increase in Creatinine level to = 1.5x baseline, which is known or presumed to have occurred within the prior 7 days; or    Can you please clarify if the above clinical indicators can support a diagnosis?  A) REYNA  B) Other, please specify  C) Creatinine levels not significant

## 2019-02-26 NOTE — PROGRESS NOTE ADULT - SUBJECTIVE AND OBJECTIVE BOX
· Subjective and Objective: 	  History of Present Illness: 	  The patient is a 72 y/o female with PMHx of DJD, s/p L spine fusion, R TKR, L arm Erb's palsy who was seen in  ER on 19 for L flank pain, uper back pain, chills,dysuria, nausea, and was diagnoestd with L UVJ 0.8 cm stone, UTI, was given IV AB and discharged home on PO antibiotics. Her BC from 19 grew gram negative rods in the anaerobic bottle and she was called back to the ER. The patient states her symptoms are improved today.     Family Hx.: Mother had MI at age 74,  father  of dementia at age 95.   - report thoracic spine pain  - still with intractable upper back pain,     HEENT: PRRL EOMI    	MOUTH/TEETH/GUMS: Clear    	NECK: no JVD    	LUNGS: Clear    	HEART: S1,S2 RR    	ABDOMEN: soft nontender    	EXTREMITIES: leg edema:   MUSCULOSKELETAL: no joint swelling.has upper thoracic spine tenderness    	NEURO: no tremor, no focal signs.    	SKIN: no rash      PHYSICAL EXAM:    Daily     Daily     ICU Vital Signs Last 24 Hrs  T(C): 37.4 (2019 11:27), Max: 37.4 (2019 21:04)  T(F): 99.3 (2019 11:27), Max: 99.3 (2019 21:04)  HR: 82 (2019 11:27) (82 - 95)  BP: 136/60 (2019 11:27) (136/60 - 143/51)  BP(mean): --  ABP: --  ABP(mean): --  RR: 18 (2019 11:27) (18 - 19)  SpO2: 96% (2019 11:27) (95% - 96%)                                10.6   9.86  )-----------( 149      ( 2019 07:03 )             30.3       CBC Full  -  ( 2019 07:03 )  WBC Count : 9.86 K/uL  Hemoglobin : 10.6 g/dL  Hematocrit : 30.3 %  Platelet Count - Automated : 149 K/uL  Mean Cell Volume : 90.2 fl  Mean Cell Hemoglobin : 31.5 pg  Mean Cell Hemoglobin Concentration : 35.0 gm/dL  Auto Neutrophil # : 7.29 K/uL  Auto Lymphocyte # : 1.11 K/uL  Auto Monocyte # : 1.16 K/uL  Auto Eosinophil # : 0.05 K/uL  Auto Basophil # : 0.07 K/uL  Auto Neutrophil % : 73.9 %  Auto Lymphocyte % : 11.3 %  Auto Monocyte % : 11.8 %  Auto Eosinophil % : 0.5 %  Auto Basophil % : 0.7 %          141  |  108  |  15  ----------------------------<  110<H>  3.4<L>   |  25  |  0.59    Ca    7.8<L>      2019 07:03                      Urinalysis Basic - ( 2019 22:00 )    Color: Yellow / Appearance: Clear / S.010 / pH: x  Gluc: x / Ketone: Trace  / Bili: Negative / Urobili: Negative mg/dL   Blood: x / Protein: 100 mg/dL / Nitrite: Negative   Leuk Esterase: Moderate / RBC: 6-10 /HPF / WBC 11-25   Sq Epi: x / Non Sq Epi: Few / Bacteria: Moderate            MEDICATIONS  (STANDING):  cefTRIAXone Injectable. 2000 milliGRAM(s) IV Push every 24 hours  cholecalciferol 1000 Unit(s) Oral daily  docusate sodium 100 milliGRAM(s) Oral three times a day  heparin  Injectable 5000 Unit(s) SubCutaneous every 12 hours  lidocaine   Patch 1 Patch Transdermal every 24 hours  polyethylene glycol 3350 17 Gram(s) Oral daily  senna 2 Tablet(s) Oral at bedtime  sodium chloride 0.9%. 1000 milliLiter(s) (60 mL/Hr) IV Continuous <Continuous>

## 2019-02-26 NOTE — PROGRESS NOTE ADULT - SUBJECTIVE AND OBJECTIVE BOX
CHIEF COMPLAINT:Ureterolithiasis    HISTORY OF PRESENT ILLNESS:Creatinine normal, WBC, normal, urine culture normal, no  sx    PAST MEDICAL & SURGICAL HISTORY:  Erbs muscular dystrophy: left arm  Thyroid cyst  Osteoarthritis  H/O spinal fusion:  lumbar      REVIEW OF SYSTEMS:    CONSTITUTIONAL: No weakness, fevers or chills  EYES/ENT: No visual changes;  No vertigo or throat pain   NECK: No pain or stiffness  RESPIRATORY: No cough, wheezing, hemoptysis; No shortness of breath  CARDIOVASCULAR: No chest pain or palpitations  GASTROINTESTINAL: No abdominal or epigastric pain. No nausea, vomiting, or hematemesis; No diarrhea or constipation. No melena or hematochezia.  GENITOURINARY: No dysuria, frequency or hematuria  NEUROLOGICAL: No numbness or weakness  SKIN: No itching, burning, rashes, or lesions   All other review of systems is negative unless indicated above.    MEDICATIONS  (STANDING):  cefTRIAXone Injectable. 2000 milliGRAM(s) IV Push every 24 hours  cholecalciferol 1000 Unit(s) Oral daily  docusate sodium 100 milliGRAM(s) Oral three times a day  heparin  Injectable 5000 Unit(s) SubCutaneous every 12 hours  lidocaine   Patch 1 Patch Transdermal every 24 hours  polyethylene glycol 3350 17 Gram(s) Oral daily  senna 2 Tablet(s) Oral at bedtime  sodium chloride 0.9%. 1000 milliLiter(s) (60 mL/Hr) IV Continuous <Continuous>    MEDICATIONS  (PRN):  HYDROmorphone  Injectable 0.2 milliGRAM(s) IV Push every 4 hours PRN Severe Pain (7 - 10)  oxyCODONE    5 mG/acetaminophen 325 mG 1 Tablet(s) Oral every 4 hours PRN Moderate Pain (4 - 6)      Allergies    No Known Allergies    Intolerances        SOCIAL HISTORY:    FAMILY HISTORY:      Vital Signs Last 24 Hrs  T(C): 37.9 (2019 17:51), Max: 37.9 (2019 17:51)  T(F): 100.3 (2019 17:51), Max: 100.3 (2019 17:51)  HR: 100 (2019 17:51) (82 - 100)  BP: 152/72 (2019 17:51) (136/60 - 152/72)  BP(mean): --  RR: 18 (2019 11:27) (18 - 19)  SpO2: 96% (2019 11:27) (95% - 96%)    PHYSICAL EXAM:    Constitutional: NAD, well-developed  HEENT: JORGE, EOMI, Normal Hearing, MMM  Neck: No LAD, No JVD  Back: Normal spine flexure, No CVA tenderness  Respiratory: CTAB   Cardiovascular: S1 and S2, RRR, no M/G/R  Abd: BS+, soft, NT/ND, No CVAT  : Normal phallus,open meatus,bilateral descended testes, no masses  LEIGHA: Normal prostate, no masses  Extremities: No peripheral edema  Vascular: 2+ peripheral pulses  Neurological: A/O x 3, no focal deficits  Psychiatric: Normal mood, normal affect  Musculoskeletal: 5/5 strength b/l upper and lower extremities  Skin: No rashes    LABS:                        10.6   9.86  )-----------( 149      ( 2019 07:03 )             30.3     02-26    141  |  108  |  15  ----------------------------<  110<H>  3.4<L>   |  25  |  0.59    Ca    7.8<L>      2019 07:03        Urinalysis Basic - ( 2019 22:00 )    Color: Yellow / Appearance: Clear / S.010 / pH: x  Gluc: x / Ketone: Trace  / Bili: Negative / Urobili: Negative mg/dL   Blood: x / Protein: 100 mg/dL / Nitrite: Negative   Leuk Esterase: Moderate / RBC: 6-10 /HPF / WBC 11-25   Sq Epi: x / Non Sq Epi: Few / Bacteria: Moderate      Urine Culture:     RADIOLOGY & ADDITIONAL STUDIES:

## 2019-02-27 LAB
ADD ON TEST-SPECIMEN IN LAB: SIGNIFICANT CHANGE UP
ALBUMIN SERPL ELPH-MCNC: 2 G/DL — LOW (ref 3.3–5)
ALP SERPL-CCNC: 171 U/L — HIGH (ref 40–120)
ALT FLD-CCNC: 43 U/L — SIGNIFICANT CHANGE UP (ref 12–78)
ANION GAP SERPL CALC-SCNC: 8 MMOL/L — SIGNIFICANT CHANGE UP (ref 5–17)
ANISOCYTOSIS BLD QL: SLIGHT — SIGNIFICANT CHANGE UP
AST SERPL-CCNC: 30 U/L — SIGNIFICANT CHANGE UP (ref 15–37)
BASOPHILS # BLD AUTO: 0 K/UL — SIGNIFICANT CHANGE UP (ref 0–0.2)
BASOPHILS NFR BLD AUTO: 0 % — SIGNIFICANT CHANGE UP (ref 0–2)
BILIRUB DIRECT SERPL-MCNC: 0.2 MG/DL — SIGNIFICANT CHANGE UP (ref 0–0.2)
BILIRUB INDIRECT FLD-MCNC: 0.3 MG/DL — SIGNIFICANT CHANGE UP (ref 0.2–1)
BILIRUB SERPL-MCNC: 0.5 MG/DL — SIGNIFICANT CHANGE UP (ref 0.2–1.2)
BUN SERPL-MCNC: 9 MG/DL — SIGNIFICANT CHANGE UP (ref 7–23)
CALCIUM SERPL-MCNC: 8.5 MG/DL — SIGNIFICANT CHANGE UP (ref 8.5–10.1)
CHLORIDE SERPL-SCNC: 104 MMOL/L — SIGNIFICANT CHANGE UP (ref 96–108)
CO2 SERPL-SCNC: 28 MMOL/L — SIGNIFICANT CHANGE UP (ref 22–31)
CREAT SERPL-MCNC: 0.53 MG/DL — SIGNIFICANT CHANGE UP (ref 0.5–1.3)
EOSINOPHIL # BLD AUTO: 0 K/UL — SIGNIFICANT CHANGE UP (ref 0–0.5)
EOSINOPHIL NFR BLD AUTO: 0 % — SIGNIFICANT CHANGE UP (ref 0–6)
ERYTHROCYTE [SEDIMENTATION RATE] IN BLOOD: 63 MM/HR — HIGH (ref 0–20)
GIANT PLATELETS BLD QL SMEAR: PRESENT — SIGNIFICANT CHANGE UP
GLUCOSE SERPL-MCNC: 105 MG/DL — HIGH (ref 70–99)
HCT VFR BLD CALC: 33.2 % — LOW (ref 34.5–45)
HGB BLD-MCNC: 11.6 G/DL — SIGNIFICANT CHANGE UP (ref 11.5–15.5)
LYMPHOCYTES # BLD AUTO: 1.07 K/UL — SIGNIFICANT CHANGE UP (ref 1–3.3)
LYMPHOCYTES # BLD AUTO: 12 % — LOW (ref 13–44)
MANUAL SMEAR VERIFICATION: SIGNIFICANT CHANGE UP
MCHC RBC-ENTMCNC: 31.3 PG — SIGNIFICANT CHANGE UP (ref 27–34)
MCHC RBC-ENTMCNC: 34.9 GM/DL — SIGNIFICANT CHANGE UP (ref 32–36)
MCV RBC AUTO: 89.5 FL — SIGNIFICANT CHANGE UP (ref 80–100)
METAMYELOCYTES # FLD: 1 % — HIGH (ref 0–0)
MICROCYTES BLD QL: SLIGHT — SIGNIFICANT CHANGE UP
MONOCYTES # BLD AUTO: 0.98 K/UL — HIGH (ref 0–0.9)
MONOCYTES NFR BLD AUTO: 11 % — SIGNIFICANT CHANGE UP (ref 2–14)
NEUTROPHILS # BLD AUTO: 6.69 K/UL — SIGNIFICANT CHANGE UP (ref 1.8–7.4)
NEUTROPHILS NFR BLD AUTO: 74 % — SIGNIFICANT CHANGE UP (ref 43–77)
NEUTS BAND # BLD: 1 % — SIGNIFICANT CHANGE UP (ref 0–8)
NRBC # BLD: 0 /100 — SIGNIFICANT CHANGE UP (ref 0–0)
NRBC # BLD: SIGNIFICANT CHANGE UP /100 WBCS (ref 0–0)
PLAT MORPH BLD: ABNORMAL
PLATELET # BLD AUTO: 228 K/UL — SIGNIFICANT CHANGE UP (ref 150–400)
POTASSIUM SERPL-MCNC: 3.5 MMOL/L — SIGNIFICANT CHANGE UP (ref 3.5–5.3)
POTASSIUM SERPL-SCNC: 3.5 MMOL/L — SIGNIFICANT CHANGE UP (ref 3.5–5.3)
PROT SERPL-MCNC: 5.9 GM/DL — LOW (ref 6–8.3)
RBC # BLD: 3.71 M/UL — LOW (ref 3.8–5.2)
RBC # FLD: 14.7 % — HIGH (ref 10.3–14.5)
RBC BLD AUTO: ABNORMAL
SODIUM SERPL-SCNC: 140 MMOL/L — SIGNIFICANT CHANGE UP (ref 135–145)
VARIANT LYMPHS # BLD: 1 % — SIGNIFICANT CHANGE UP (ref 0–6)
WBC # BLD: 8.92 K/UL — SIGNIFICANT CHANGE UP (ref 3.8–10.5)
WBC # FLD AUTO: 8.92 K/UL — SIGNIFICANT CHANGE UP (ref 3.8–10.5)

## 2019-02-27 PROCEDURE — 72110 X-RAY EXAM L-2 SPINE 4/>VWS: CPT | Mod: 26

## 2019-02-27 PROCEDURE — 71260 CT THORAX DX C+: CPT | Mod: 26

## 2019-02-27 PROCEDURE — 93306 TTE W/DOPPLER COMPLETE: CPT | Mod: 26

## 2019-02-27 PROCEDURE — 74177 CT ABD & PELVIS W/CONTRAST: CPT | Mod: 26

## 2019-02-27 RX ORDER — FUROSEMIDE 40 MG
40 TABLET ORAL ONCE
Qty: 0 | Refills: 0 | Status: COMPLETED | OUTPATIENT
Start: 2019-02-27 | End: 2019-02-27

## 2019-02-27 RX ORDER — POTASSIUM CHLORIDE 20 MEQ
40 PACKET (EA) ORAL ONCE
Qty: 0 | Refills: 0 | Status: COMPLETED | OUTPATIENT
Start: 2019-02-27 | End: 2019-02-27

## 2019-02-27 RX ORDER — HYDROMORPHONE HYDROCHLORIDE 2 MG/ML
0.5 INJECTION INTRAMUSCULAR; INTRAVENOUS; SUBCUTANEOUS EVERY 6 HOURS
Qty: 0 | Refills: 0 | Status: DISCONTINUED | OUTPATIENT
Start: 2019-02-27 | End: 2019-03-02

## 2019-02-27 RX ORDER — SODIUM CHLORIDE 9 MG/ML
1000 INJECTION INTRAMUSCULAR; INTRAVENOUS; SUBCUTANEOUS
Qty: 0 | Refills: 0 | Status: DISCONTINUED | OUTPATIENT
Start: 2019-02-27 | End: 2019-02-28

## 2019-02-27 RX ORDER — CEFTRIAXONE 500 MG/1
1 INJECTION, POWDER, FOR SOLUTION INTRAMUSCULAR; INTRAVENOUS EVERY 24 HOURS
Qty: 0 | Refills: 0 | Status: DISCONTINUED | OUTPATIENT
Start: 2019-02-27 | End: 2019-02-27

## 2019-02-27 RX ORDER — MINERAL OIL
133 OIL (ML) MISCELLANEOUS ONCE
Qty: 0 | Refills: 0 | Status: COMPLETED | OUTPATIENT
Start: 2019-02-27 | End: 2019-02-27

## 2019-02-27 RX ORDER — MINERAL OIL
133 OIL (ML) MISCELLANEOUS ONCE
Qty: 0 | Refills: 0 | Status: DISCONTINUED | OUTPATIENT
Start: 2019-02-27 | End: 2019-02-27

## 2019-02-27 RX ORDER — ACETAMINOPHEN 500 MG
650 TABLET ORAL EVERY 6 HOURS
Qty: 0 | Refills: 0 | Status: DISCONTINUED | OUTPATIENT
Start: 2019-02-27 | End: 2019-03-07

## 2019-02-27 RX ORDER — CEFTRIAXONE 500 MG/1
1000 INJECTION, POWDER, FOR SOLUTION INTRAMUSCULAR; INTRAVENOUS EVERY 24 HOURS
Qty: 0 | Refills: 0 | Status: DISCONTINUED | OUTPATIENT
Start: 2019-02-27 | End: 2019-02-28

## 2019-02-27 RX ORDER — CYCLOBENZAPRINE HYDROCHLORIDE 10 MG/1
5 TABLET, FILM COATED ORAL THREE TIMES A DAY
Qty: 0 | Refills: 0 | Status: DISCONTINUED | OUTPATIENT
Start: 2019-02-27 | End: 2019-03-07

## 2019-02-27 RX ORDER — CEFUROXIME AXETIL 250 MG
500 TABLET ORAL EVERY 12 HOURS
Qty: 0 | Refills: 0 | Status: DISCONTINUED | OUTPATIENT
Start: 2019-02-27 | End: 2019-02-27

## 2019-02-27 RX ADMIN — Medication 40 MILLIGRAM(S): at 10:23

## 2019-02-27 RX ADMIN — LIDOCAINE 1 PATCH: 4 CREAM TOPICAL at 19:00

## 2019-02-27 RX ADMIN — HYDROMORPHONE HYDROCHLORIDE 0.5 MILLIGRAM(S): 2 INJECTION INTRAMUSCULAR; INTRAVENOUS; SUBCUTANEOUS at 10:38

## 2019-02-27 RX ADMIN — Medication 100 MILLIGRAM(S): at 05:18

## 2019-02-27 RX ADMIN — Medication 100 MILLIGRAM(S): at 12:52

## 2019-02-27 RX ADMIN — Medication 40 MILLIEQUIVALENT(S): at 12:52

## 2019-02-27 RX ADMIN — HEPARIN SODIUM 5000 UNIT(S): 5000 INJECTION INTRAVENOUS; SUBCUTANEOUS at 05:18

## 2019-02-27 RX ADMIN — OXYCODONE AND ACETAMINOPHEN 1 TABLET(S): 5; 325 TABLET ORAL at 12:17

## 2019-02-27 RX ADMIN — SODIUM CHLORIDE 60 MILLILITER(S): 9 INJECTION INTRAMUSCULAR; INTRAVENOUS; SUBCUTANEOUS at 17:26

## 2019-02-27 RX ADMIN — Medication 133 MILLILITER(S): at 12:53

## 2019-02-27 RX ADMIN — Medication 1000 UNIT(S): at 10:23

## 2019-02-27 RX ADMIN — POLYETHYLENE GLYCOL 3350 17 GRAM(S): 17 POWDER, FOR SOLUTION ORAL at 10:22

## 2019-02-27 RX ADMIN — Medication 650 MILLIGRAM(S): at 23:12

## 2019-02-27 RX ADMIN — LIDOCAINE 1 PATCH: 4 CREAM TOPICAL at 10:27

## 2019-02-27 RX ADMIN — HYDROMORPHONE HYDROCHLORIDE 0.5 MILLIGRAM(S): 2 INJECTION INTRAMUSCULAR; INTRAVENOUS; SUBCUTANEOUS at 23:12

## 2019-02-27 RX ADMIN — HEPARIN SODIUM 5000 UNIT(S): 5000 INJECTION INTRAVENOUS; SUBCUTANEOUS at 17:25

## 2019-02-27 RX ADMIN — OXYCODONE AND ACETAMINOPHEN 1 TABLET(S): 5; 325 TABLET ORAL at 17:24

## 2019-02-27 RX ADMIN — HYDROMORPHONE HYDROCHLORIDE 0.5 MILLIGRAM(S): 2 INJECTION INTRAMUSCULAR; INTRAVENOUS; SUBCUTANEOUS at 10:23

## 2019-02-27 RX ADMIN — Medication 650 MILLIGRAM(S): at 12:52

## 2019-02-27 RX ADMIN — OXYCODONE AND ACETAMINOPHEN 1 TABLET(S): 5; 325 TABLET ORAL at 05:50

## 2019-02-27 RX ADMIN — CYCLOBENZAPRINE HYDROCHLORIDE 5 MILLIGRAM(S): 10 TABLET, FILM COATED ORAL at 12:52

## 2019-02-27 RX ADMIN — CEFTRIAXONE 1000 MILLIGRAM(S): 500 INJECTION, POWDER, FOR SOLUTION INTRAMUSCULAR; INTRAVENOUS at 13:27

## 2019-02-27 RX ADMIN — SODIUM CHLORIDE 250 MILLILITER(S): 9 INJECTION INTRAMUSCULAR; INTRAVENOUS; SUBCUTANEOUS at 12:17

## 2019-02-27 RX ADMIN — SODIUM CHLORIDE 250 MILLILITER(S): 9 INJECTION INTRAMUSCULAR; INTRAVENOUS; SUBCUTANEOUS at 13:26

## 2019-02-27 RX ADMIN — CYCLOBENZAPRINE HYDROCHLORIDE 5 MILLIGRAM(S): 10 TABLET, FILM COATED ORAL at 23:13

## 2019-02-27 RX ADMIN — OXYCODONE AND ACETAMINOPHEN 1 TABLET(S): 5; 325 TABLET ORAL at 01:49

## 2019-02-27 RX ADMIN — OXYCODONE AND ACETAMINOPHEN 1 TABLET(S): 5; 325 TABLET ORAL at 01:19

## 2019-02-27 RX ADMIN — OXYCODONE AND ACETAMINOPHEN 1 TABLET(S): 5; 325 TABLET ORAL at 05:20

## 2019-02-27 RX ADMIN — OXYCODONE AND ACETAMINOPHEN 1 TABLET(S): 5; 325 TABLET ORAL at 13:17

## 2019-02-27 NOTE — PROGRESS NOTE ADULT - SUBJECTIVE AND OBJECTIVE BOX
· Subjective and Objective: 	  History of Present Illness: 	  The patient is a 74 y/o female with PMHx of DJD, s/p L spine fusion, R TKR, L arm Erb's palsy who was seen in  ER on 19 for L flank pain, uper back pain, chills,dysuria, nausea, and was diagnoestd with L UVJ 0.8 cm stone, UTI, was given IV AB and discharged home on PO antibiotics. Her BC from 19 grew gram negative rods in the anaerobic bottle and she was called back to the ER. The patient states her symptoms are improved today.     Family Hx.: Mother had MI at age 74,  father  of dementia at age 95.   - report thoracic spine pain  - still with intractable upper back pain, no BM for 7days, pasing gases, no nausea    HEENT: PRRL EOMI    	MOUTH/TEETH/GUMS: Clear    	NECK: no JVD    	LUNGS:inspiratory crackles bibasilar lung fields    	HEART: S1,S2 RR    	ABDOMEN: soft  mild RUq tenderness  2EXTREMITIES:  mild leg edema:   MUSCULOSKELETAL: no joint swelling.has upper thoracic spine tenderness      PHYSICAL EXAM:    Daily     Daily     ICU Vital Signs Last 24 Hrs  T(C): 37.7 (2019 16:48), Max: 38.3 (2019 11:05)  T(F): 99.9 (2019 16:48), Max: 101 (2019 11:05)  HR: 109 (2019 11:00) (93 - 109)  BP: 144/52 (2019 11:00) (127/58 - 153/68)  BP(mean): --  ABP: --  ABP(mean): --  RR: 18 (2019 11:00) (18 - 19)  SpO2: 96% (2019 11:00) (95% - 97%)                              11.6   8.92  )-----------( 228      ( 2019 07:24 )             33.2       CBC Full  -  ( 2019 07:24 )  WBC Count : 8.92 K/uL  Hemoglobin : 11.6 g/dL  Hematocrit : 33.2 %  Platelet Count - Automated : 228 K/uL  Mean Cell Volume : 89.5 fl  Mean Cell Hemoglobin : 31.3 pg  Mean Cell Hemoglobin Concentration : 34.9 gm/dL  Auto Neutrophil # : 6.69 K/uL  Auto Lymphocyte # : 1.07 K/uL  Auto Monocyte # : 0.98 K/uL  Auto Eosinophil # : 0.00 K/uL  Auto Basophil # : 0.00 K/uL  Auto Neutrophil % : 74.0 %  Auto Lymphocyte % : 12.0 %  Auto Monocyte % : 11.0 %  Auto Eosinophil % : 0.0 %  Auto Basophil % : 0.0 %          140  |  104  |  9   ----------------------------<  105<H>  3.5   |  28  |  0.53    Ca    8.5      2019 07:24    TPro  5.9<L>  /  Alb  2.0<L>  /  TBili  0.5  /  DBili  0.2  /  AST  30  /  ALT  43  /  AlkPhos  171<H>        LIVER FUNCTIONS - ( 2019 07:24 )  Alb: 2.0 g/dL / Pro: 5.9 gm/dL / ALK PHOS: 171 U/L / ALT: 43 U/L / AST: 30 U/L / GGT: x                               MEDICATIONS  (STANDING):  cefTRIAXone Injectable. 1000 milliGRAM(s) IV Push every 24 hours  cholecalciferol 1000 Unit(s) Oral daily  cyclobenzaprine 5 milliGRAM(s) Oral three times a day  docusate sodium 100 milliGRAM(s) Oral three times a day  heparin  Injectable 5000 Unit(s) SubCutaneous every 12 hours  lidocaine   Patch 1 Patch Transdermal every 24 hours  polyethylene glycol 3350 17 Gram(s) Oral daily  senna 2 Tablet(s) Oral at bedtime  sodium chloride 0.9%. 1000 milliLiter(s) (250 mL/Hr) IV Continuous <Continuous>  sodium chloride 0.9%. 1000 milliLiter(s) (60 mL/Hr) IV Continuous <Continuous>

## 2019-02-27 NOTE — CONSULT NOTE ADULT - ASSESSMENT
73F with thoracic back pain    FU ESR/CRP  FU MRI whole spine to rule out infectious process - may also demonstrate acute compression fracture to explain pain  Further recommendations pending MRI results  Please contact with worsening or change in symptoms  Pain control  Discussed with Dr Kwon, who agrees with above

## 2019-02-27 NOTE — CDI QUERY NOTE - NSCDIOTHERTXTBX2_GEN_ALL_CORE_FT
Creatinine Trend Noted:  Creatinine, Serum: 0.59 mg/dL [0.50 - 1.30] (02-26-19)  Creatinine, Serum: 0.82 mg/dL [0.50 - 1.30] (02-25-19)  Creatinine, Serum: 1.33 mg/dL <H> [0.50 - 1.30] (02-24-19)  Creatinine, Serum: 1.72 mg/dL <H> [0.50 - 1.30] (02-23-19)    [facility] policy based on KDIGO guidelines defines REYNA (applicable to both adult and pediatric patients) as any of the following;  •	Increase Creatinine = 0.3 mg/dl from baseline within 48 hours; or  •	Increase in Creatinine level to = 1.5x baseline, which is known or presumed to have occurred within the prior 7 days; or    Can you please clarify if the above clinical indicators can support a diagnosis?  A) REYNA  B) Other, please specify  C) Creatinine levels not significant

## 2019-02-27 NOTE — PROGRESS NOTE ADULT - SUBJECTIVE AND OBJECTIVE BOX
SUBJECTIVE     patient still has some back discomfort   mild cough and diuresed well with the lasix yesterday no sob or wheezing       PAST MEDICAL & SURGICAL HISTORY:  Erbs muscular dystrophy: left arm  Thyroid cyst  Osteoarthritis  H/O spinal fusion: 1997 lumbar    OBJECTIVE   Vital Signs Last 24 Hrs  T(C): 37 (27 Feb 2019 05:25), Max: 37.9 (26 Feb 2019 17:51)  T(F): 98.6 (27 Feb 2019 05:25), Max: 100.3 (26 Feb 2019 17:51)  HR: 93 (27 Feb 2019 05:25) (82 - 100)  BP: 137/72 (27 Feb 2019 05:25) (127/58 - 152/72)  BP(mean): --  RR: 19 (27 Feb 2019 05:25) (18 - 19)  SpO2: 96% (27 Feb 2019 05:25) (95% - 96%)    Review of systems   as dictated in the history of present illness with the review of other systems non contributory     PHYSICAL EXAM:  Constitutional: , awake and alert, not in distress.  HEENT: Normo cephalic atraumatic  Neck: Soft and supple, No J.V.D   Respiratory: vesicular breathing has decreased breath sounds in the bases   Cardiovascular: S1 and S2, regular rate .   Gastrointestinal:  soft, nontender,   Extremities 1 plus leg edema with no calf tenderness   Neurological: No new  focal deficits.    MEDICATIONS  (STANDING):  cefTRIAXone Injectable. 2000 milliGRAM(s) IV Push every 24 hours  cholecalciferol 1000 Unit(s) Oral daily  docusate sodium 100 milliGRAM(s) Oral three times a day  heparin  Injectable 5000 Unit(s) SubCutaneous every 12 hours  lidocaine   Patch 1 Patch Transdermal every 24 hours  polyethylene glycol 3350 17 Gram(s) Oral daily  senna 2 Tablet(s) Oral at bedtime  sodium chloride 0.9%. 1000 milliLiter(s) (60 mL/Hr) IV Continuous <Continuous>                            11.6   8.92  )-----------( 228      ( 27 Feb 2019 07:24 )             33.2     02-27    140  |  104  |  9   ----------------------------<  105<H>  3.5   |  28  |  0.53    Ca    8.5      27 Feb 2019 07:24        Culture - Urine (collected 24 Feb 2019 22:00)  Source: .Urine Clean Catch (Midstream)  Final Report (26 Feb 2019 07:27):    No growth    Culture - Blood (collected 24 Feb 2019 11:48)  Source: .Blood None  Preliminary Report (25 Feb 2019 18:02):    No growth to date.    Culture - Blood (collected 24 Feb 2019 11:39)  Source: .Blood None  Preliminary Report (25 Feb 2019 18:02):    No growth to date.         < from: Xray Chest 1 View- PORTABLE-Routine (02.26.19 @ 09:30) >  NTERPRETATION:  CLINICAL INDICATION:  r/o pna, pt with pleuritic back   pain    TECHNIQUE: Single view AP chest radiograph was performed.    COMPARISON:  Chest radiograph dated 10/3/2018.    FINDINGS:    There are small bilateral pleural effusions with associated bibasilar   passive atelectasis. There is redemonstration of chronic mild-moderate   biapical pleural/parenchymal scarring. No pneumothorax or vascular   congestion.    The cardiac silhouette size is within normal limits.    Redemonstration of partially imaged lumbar surgical fusion hardware.    IMPRESSION:    Small bilateral pleural effusions and associated bibasilar passive   atelectasis.        < end of copied text >  < from: Xray Chest 1 View- PORTABLE-Routine (02.26.19 @ 09:30) >  NTERPRETATION:  CLINICAL INDICATION:  r/o pna, pt with pleuritic back   pain    TECHNIQUE: Single view AP chest radiograph was performed.    COMPARISON:  Chest radiograph dated 10/3/2018.    FINDINGS:    There are small bilateral pleural effusions with associated bibasilar   passive atelectasis. There is redemonstration of chronic mild-moderate   biapical pleural/parenchymal scarring. No pneumothorax or vascular   congestion.    The cardiac silhouette size is within normal limits.    Redemonstration of partially imaged lumbar surgical fusion hardware.    IMPRESSION:    Small bilateral pleural effusions and associated bibasilar passive   atelectasis.

## 2019-02-27 NOTE — CDI QUERY NOTE - NSCDIOTHERTXTBX_GEN_ALL_CORE_HH
73 year old note with bacteremia, UTI, and kidney stone.    SUPPORTING DOCUMENTATION AND/OR CLINICAL EVIDENCE:  Vital Signs on Admission: Temp 98.6, HR 80-95, B/P /58-68, RR 18, Spo2  on R/A.   WBC: WBC Count: 9.86 K/uL [3.80 - 10.50] (02-26-19)  WBC Count: 12.63 K/uL <H> [3.80 - 10.50] (02-25-19)  WBC Count: 11.33 K/uL <H> [3.80 - 10.50] (02-24-19)  WBC Count: 11.63 K/uL <H> [3.80 - 10.50] (02-23-19)                                  Lactate: Lactate, Blood: 1.1 mmol/L [0.7 - 2.0] (02-24-19)  2/25 ID Note: GNR sepsis-bacteremia/pyelonephritis-cystitis/L UVJ stone   2/25 Hospitalist Note:  Bacteremia. likely secondary to UTI and renal stones     Please clarify if sepsis is ruled in or ruled out, and if ruled in if present on admission?  A) Sepsis ruled in, present on admission  B) Early sepsis, present on admission  C Sepsis ruled out, Bacteremia only  D) Other, please specify  E) Not clinically significant

## 2019-02-27 NOTE — PROGRESS NOTE ADULT - ASSESSMENT
74 y/o female with PMHx of DJD, s/p L spine fusion, R TKR, L arm Erb's palsy who was seen in  ER on 2/23/19 for L flank pain, upper back pain, chills, dysuria, nausea, and was diagnoestd with L UVJ 0.8 cm stone, UTI, was given IV AB and discharged home on PO antibiotics. Her BC from 2/23/19 grew gram negative rods in the anaerobic bottle and she was called back to the ER. Eval by urology, blood cx/urine cx growing GNRs was given IV rocephin.     1. proteus sepsis-bacteremia/pyelonephritis-cystitis/L UVJ stone  -  improving still w/ back pain   - ortho to eval, xray reviewed, plan for CT for further eval  - blood cx-urine cx 2/23 proteus S cephalosporins repeat cx 2/24 (-)  - switch to po ceftin - complete abx course until 3/5 10 day course  - imaging reviewed  - urology eval appreciated   - further stone tx in future  - has some upper back pain ? further imaging had previous fall in January ? T12 compression fx  - monitor temps  - tolerating abx well so far; no side effects noted  - reason for abx use and side effects reviewed with patient  - supportive care  - fu cbc 74 y/o female with PMHx of DJD, s/p L spine fusion, R TKR, L arm Erb's palsy who was seen in  ER on 2/23/19 for L flank pain, upper back pain, chills, dysuria, nausea, and was diagnoestd with L UVJ 0.8 cm stone, UTI, was given IV AB and discharged home on PO antibiotics. Her BC from 2/23/19 grew gram negative rods in the anaerobic bottle and she was called back to the ER. Eval by urology, blood cx/urine cx growing GNRs was given IV rocephin.     1. proteus sepsis-bacteremia/pyelonephritis-cystitis/L UVJ stone  - persistent back pain, fever noted   - ortho to eval, xray reviewed, plan for CT for further eval, unable to get MRI  - constipated-abd distension - on stool regimen - passing gas ?ileus   - consider repeat abd imaging  - if fevers persist, recheck blood cx  - blood cx-urine cx 2/23 proteus S cephalosporins repeat cx 2/24 (-)  - on IV rocephin [id number] #[id number] continue with abx coverage  - imaging reviewed  - urology eval appreciated   - further stone tx in future  - monitor temps  - tolerating abx well so far; no side effects noted  - reason for abx use and side effects reviewed with patient  - fu cbc

## 2019-02-27 NOTE — PROGRESS NOTE ADULT - ASSESSMENT
· Assessment		  74 y/o female with PMHx of DJD, s/p L spine fusion, R TKR,  who was seen in  ER on 2/23/19 for L flank pain, uper back pain, chills,dysuria, nausea, and was diagnoestd with L UVJ 0.8 cm stone, UTI, was given IV AB and discharged home on PO antibiotics. Her BC from 2/23/19 grew gram negative rods in the anaerobic bottle and she was called back to the ER. The patient states her symptoms are improved today.      #Bacteremia/sepsis present at time of admission . likely secondary to UTI/pyelonephritis  and renal stones  #now new fever/DDX r/o thoarcic spine discitis vs r/o intrabdominal infection  vs left pleural effusion ? PNA  will pan scan Chest and abd/pelvis  would await orthospine to decide if spine hardware compatible tiwth MRI, the would consider MRI with keith of thoracic spien  continue  IV Ceftriaxone  for now  slow cautious  IVF if no significant pleural effusion on CT chest, due to risk of volume overload  ID consult apprceiated  Kidney stone without  significant hydronephrosis.    follow up urology as outpatient  urology did not recommend inpatient urological  intervention  d/c vit c megadose    #liver cirrhosis hepatomegaly/stable  f/u as outpatient with gi  cautious fluid IV due to risk of volume overload      # DVT prophylaxis  lovenox SCheparin SC · Assessment		  74 y/o female with PMHx of DJD, s/p L spine fusion, R TKR,  who was seen in  ER on 2/23/19 for L flank pain, uper back pain, chills,dysuria, nausea, and was diagnoestd with L UVJ 0.8 cm stone, UTI, was given IV AB and discharged home on PO antibiotics. Her BC from 2/23/19 grew gram negative rods in the anaerobic bottle and she was called back to the ER. The patient states her symptoms are improved today.      #Bacteremia/sepsis present at time of admission . likely secondary to UTI/pyelonephritis  and renal stones  #now new fever/DDX r/o thoarcic spine discitis vs r/o intrabdominal infection  vs left pleural effusion ? PNA  will pan scan Chest and abd/pelvis  would await orthospine to decide if spine hardware compatible tiwth MRI, the would consider MRI with keith of thoracic spien  continue  IV Ceftriaxone  for now  slow cautious  IVF if no significant pleural effusion on CT chest, due to risk of volume overload  ID consult apprceiated  Kidney stone without  significant hydronephrosis.    follow up urology as outpatient  urology did not recommend inpatient urological  intervention  d/c vit c megadose    #liver cirrhosis hepatomegaly/stable  f/u as outpatient with gi  cautious fluid IV due to risk of volume overload    # on admission mild REYNA from prerenal azotemia which has now resolved with IVF      # DVT prophylaxis  lovenox SCheparin SC

## 2019-02-27 NOTE — PROGRESS NOTE ADULT - SUBJECTIVE AND OBJECTIVE BOX
Date of service: 02-27-19 @ 10:46    pt seen and examined  afebrile  c/o upper back pain   flank pain/dysuria resolved    ROS: no fever or chills; denies dizziness, no HA, no SOB or cough, no abdominal pain, no diarrhea or constipation; no legs pain, no rashes      MEDICATIONS  (STANDING):  cefuroxime   Tablet 500 milliGRAM(s) Oral every 12 hours  cholecalciferol 1000 Unit(s) Oral daily  docusate sodium 100 milliGRAM(s) Oral three times a day  heparin  Injectable 5000 Unit(s) SubCutaneous every 12 hours  lidocaine   Patch 1 Patch Transdermal every 24 hours  polyethylene glycol 3350 17 Gram(s) Oral daily  senna 2 Tablet(s) Oral at bedtime  sodium chloride 0.9%. 1000 milliLiter(s) (60 mL/Hr) IV Continuous <Continuous>      Vital Signs Last 24 Hrs  T(C): 37 (27 Feb 2019 05:25), Max: 37.9 (26 Feb 2019 17:51)  T(F): 98.6 (27 Feb 2019 05:25), Max: 100.3 (26 Feb 2019 17:51)  HR: 101 (27 Feb 2019 10:41) (82 - 101)  BP: 153/68 (27 Feb 2019 10:41) (127/58 - 153/68)  BP(mean): --  RR: 18 (27 Feb 2019 10:41) (18 - 19)  SpO2: 97% (27 Feb 2019 10:41) (95% - 97%)      PE:  Constitutional: frail looking  HEENT: NC/AT, EOMI, PERRLA, conjunctivae clear; ears and nose atraumatic; pharynx benign  Neck: supple; thyroid not palpable  Back: no tenderness  Respiratory: respiratory effort normal; clear to auscultation  Cardiovascular: S1S2 regular, no murmurs  Abdomen: soft, not tender, not distended, positive BS; liver and spleen WNL  Genitourinary: no suprapubic tenderness L CVA tenderness  Lymphatic: no LN palpable  Musculoskeletal: no muscle tenderness, no joint swelling or tenderness  Extremities: no pedal edema  Neurological/ Psychiatric: moving all extremities  Skin: no rashes; no palpable lesions    Labs: all available labs reviewed                                   11.6   8.92  )-----------( 228      ( 27 Feb 2019 07:24 )             33.2     02-27    140  |  104  |  9   ----------------------------<  105<H>  3.5   |  28  |  0.53    Ca    8.5      27 Feb 2019 07:24        Cultures:     Culture - Urine (02.24.19 @ 22:00)    Specimen Source: .Urine Clean Catch (Midstream)    Culture Results:   No growth    Culture - Blood (02.24.19 @ 11:39)    Specimen Source: .Blood None    Culture Results:   No growth to date.    Culture - Blood (02.23.19 @ 12:47)    Gram Stain:   Growth in anaerobic bottle: Gram Negative Rods  Growth in aerobic bottle: Gram Negative Rods    -  Amikacin: S <=16    -  Ampicillin: S <=8 These ampicillin results predict results for amoxicillin    -  Ampicillin/Sulbactam: S <=8/4    -  Aztreonam: S <=4    -  Cefazolin: S <=8    -  Multidrug (KPC pos) resistant organism: Nondet    -  Staphylococcus aureus: Nondet    -  Methicillin resistant Staphylococcus aureus (MRSA): Nondet    -  Coagulase negative Staphylococcus: Nondet    -  Enterococcus species: Nondet    -  Vancomycin resistant Enterococcus sp.: Nondet    -  Escherichia coli: Nondet    -  Klebsiella oxytoca: Nondet    -  Klebsiella pneumoniae: Nondet    -  Serratia marcescens: Nondet    -  Haemophilus influenzae: Nondet    -  Listeria monocytogenes: Nondet    -  Neisseria meningitidis: Nondet    -  Pseudomonas aeruginosa: Nondet    -  Acinetobacter baumanii: Nondet    -  Enterobacter cloacae complex: Nondet    -  Streptococcus sp. (Not Grp A, B or S pneumoniae): Nondet    -  Streptococcus agalactiae (Group B): Nondet    -  Streptococcus pyogenes (Group A): Nondet    -  Streptococcus pneumoniae: Nondet    -  Candida albicans: Nondet    -  Candida glabrata: Nondet    -  Candida krusei: Nondet    -  Candida parapsilosis: Nondet    -  Candida tropicalis: Nondet    -  Cefepime: S <=4    -  Cefoxitin: S <=8    -  Ceftriaxone: S <=1 Enterobacter, Citrobacter, and Serratia may develop resistance during prolonged therapy    -  Ciprofloxacin: S <=1    -  Ertapenem: S <=1    -  Gentamicin: S <=4    -  Levofloxacin: S <=2    -  Meropenem: S <=1    -  Piperacillin/Tazobactam: S <=16    -  Tobramycin: S <=4    -  Trimethoprim/Sulfamethoxazole: S <=2/38    Specimen Source: .Blood None    Organism: Blood Culture PCR    Organism: Proteus mirabilis    Culture Results:   Growth in aerobic and anaerobic bottles: Proteus mirabilis  "Due to technical problems, Proteus sp. will Not be reported as part of  the BCID panel until further notice"  ***Blood Panel PCR results on this specimen are available  approximately 3 hours after the Gram stain result.***  Gram stain, PCR, and/or culture results may not always  correspond due to difference in methodologies.  ************************************************************  This PCR assay was performed using [employer].  The following targets are tested for: Enterococcus,  vancomycin resistant enterococci, Listeria monocytogenes,  coagulase negative staphylococci, S. aureus,  methicillin resistant S. aureus, Streptococcus agalactiae  (Group B), S. pneumoniae, S. pyogenes (Group A),  Acinetobacter baumannii, Enterobacter cloacae, E. coli,  Klebsiella oxytoca, K. pneumoniae, Proteus sp.,  Serratia marcescens, Haemophilus influenzae,  Neisseria meningitidis, Pseudomonas aeruginosa, Candida  albicans, C. glabrata, C krusei, C parapsilosis,  C. tropicalis and the KPC resistance gene.    Organism Identification: Blood Culture PCR  Proteus mirabilis    Method Type: PCR    Method Type: BENITO      Radiology: all available radiological tests reviewed  < from: Xray Thoracic Spine 2 View (02.26.19 @ 09:29) >    EXAM:  XR T SPINE 2 VIEWS                            PROCEDURE DATE:  02/26/2019          INTERPRETATION:  XR T SPINE 2 VIEWS    CLINICAL INDICATION: x-ray of thoracic spine s/p fall    VIEWS/TECHNIQUE: AP and lateral views of the thoracic spine were obtained.    COMPARISON:  Chest radiograph dated 10/3/2018.      FINDINGS:      There is thoracic process.    There is a chronic moderate anterior T8 vertebral body wedge deformity,   unchanged. Remaining thoracic vertebral body heights are maintained.   Thoracic vertebral body alignment is preserved.    There is mild multilevel thoracic intervertebral disc height loss and   small endplate osteophytosis.    There is redemonstration of partially imaged lumbar surgical fusion   hardware.     IMPRESSION:    No acute fracture of the thoracic spine.    Unchanged moderate anterior T8 vertebral body wedge deformity.    Partially imaged lumbar surgical fusion hardware.      < end of copied text >    EXAM:  CT ABDOMEN AND PELVIS                              INTERPRETATION:  Exam Type:  CT ABDOMEN AND PELVIS   Date and Time: 2/23/2019 2:23 PM  Indication: Abdominal pain and hematuria  Compared to: Ultrasound abdomen 2/23/2019  Technique: CT of the ABDOMEN and PELVIS:  No intravenous contrast was   administered at physician request. This limits sensitivity for certain   processes. Oral contrast was not administered.    COMMENTS:    LOWER LUNGS AND PLEURA: Trace to small left pleural effusion with minimal   left basilar atelectasis. Coronary vascular calcification.    LIVER: Hepatomegaly. Surface contour nodularity suggesting cirrhosis. No   focal hepatic masses.  BILE DUCTS: normal caliber.  GALLBLADDER:     no wall thickening. Gallstones are not excluded.  PANCREAS: within normal limits.  SPLEEN: within normal limits.  ADRENALS: within normal limits.    KIDNEYS, URETERS AND BLADDER: Mild left hydroureteronephrosis secondary   to a 0.8 cm calculus at the left ureterovesicular junction. Minimal left   perinephric stranding and trace left perinephric fluid. Nonobstructive   left intrarenal calculi. No right hydronephrosis. Right ureter appears   unremarkable. Bladder is otherwise within normal limits.    PELVIS: Fundal uterine calcifications. No adnexal masses. No pelvic   adenopathy or pelvic free fluid.       BOWEL: The unopacified bowel is of normal course and caliber without   evidence of obstruction or bowel wall thickening. Periampullaryduodenal   diverticula..  PERITONEUM: no ascites or free air, no fluid collection.  RETROPERITONEUM: within normal limits.      VESSELS: atherosclerotic changes.      ABDOMINAL WALL: within normal limits.  BONES: Degenerative changes. Prior pedicle fusion.     IMPRESSION:     Mild left hydroureteronephrosis secondary to a 0.8 cm calculus at the   left ureterovesicular junction.        Advanced directives addressed: full resuscitation Date of service: 02-27-19 @ 10:46    pt seen and examined  Tmax 101  c/o upper back pain/constipated - no bm since tuesday  flank pain/dysuria resolved    ROS:denies dizziness, no HA, no SOB or cough, no abdominal pain, no diarrhea or constipation; no legs pain, no rashes      MEDICATIONS  (STANDING):  cefuroxime   Tablet 500 milliGRAM(s) Oral every 12 hours  cholecalciferol 1000 Unit(s) Oral daily  docusate sodium 100 milliGRAM(s) Oral three times a day  heparin  Injectable 5000 Unit(s) SubCutaneous every 12 hours  lidocaine   Patch 1 Patch Transdermal every 24 hours  polyethylene glycol 3350 17 Gram(s) Oral daily  senna 2 Tablet(s) Oral at bedtime  sodium chloride 0.9%. 1000 milliLiter(s) (60 mL/Hr) IV Continuous <Continuous>      Vital Signs Last 24 Hrs  T(C): 37 (27 Feb 2019 05:25), Max: 37.9 (26 Feb 2019 17:51)  T(F): 98.6 (27 Feb 2019 05:25), Max: 100.3 (26 Feb 2019 17:51)  HR: 101 (27 Feb 2019 10:41) (82 - 101)  BP: 153/68 (27 Feb 2019 10:41) (127/58 - 153/68)  BP(mean): --  RR: 18 (27 Feb 2019 10:41) (18 - 19)  SpO2: 97% (27 Feb 2019 10:41) (95% - 97%)      PE:  Constitutional: frail looking  HEENT: NC/AT, EOMI, PERRLA, conjunctivae clear; ears and nose atraumatic; pharynx benign  Neck: supple; thyroid not palpable  Back: no tenderness  Respiratory: respiratory effort normal; clear to auscultation  Cardiovascular: S1S2 regular, no murmurs  Abdomen: soft, not tender, not distended, positive BS; liver and spleen WNL  Genitourinary: no suprapubic tenderness L CVA tenderness  Lymphatic: no LN palpable  Musculoskeletal: no muscle tenderness, no joint swelling or tenderness  Extremities: no pedal edema  Neurological/ Psychiatric: moving all extremities  Skin: no rashes; no palpable lesions    Labs: all available labs reviewed                                   11.6   8.92  )-----------( 228      ( 27 Feb 2019 07:24 )             33.2     02-27    140  |  104  |  9   ----------------------------<  105<H>  3.5   |  28  |  0.53    Ca    8.5      27 Feb 2019 07:24        Cultures:     Culture - Urine (02.24.19 @ 22:00)    Specimen Source: .Urine Clean Catch (Midstream)    Culture Results:   No growth    Culture - Blood (02.24.19 @ 11:39)    Specimen Source: .Blood None    Culture Results:   No growth to date.    Culture - Blood (02.23.19 @ 12:47)    Gram Stain:   Growth in anaerobic bottle: Gram Negative Rods  Growth in aerobic bottle: Gram Negative Rods    -  Amikacin: S <=16    -  Ampicillin: S <=8 These ampicillin results predict results for amoxicillin    -  Ampicillin/Sulbactam: S <=8/4    -  Aztreonam: S <=4    -  Cefazolin: S <=8    -  Multidrug (KPC pos) resistant organism: Nondet    -  Staphylococcus aureus: Nondet    -  Methicillin resistant Staphylococcus aureus (MRSA): Nondet    -  Coagulase negative Staphylococcus: Nondet    -  Enterococcus species: Nondet    -  Vancomycin resistant Enterococcus sp.: Nondet    -  Escherichia coli: Nondet    -  Klebsiella oxytoca: Nondet    -  Klebsiella pneumoniae: Nondet    -  Serratia marcescens: Nondet    -  Haemophilus influenzae: Nondet    -  Listeria monocytogenes: Nondet    -  Neisseria meningitidis: Nondet    -  Pseudomonas aeruginosa: Nondet    -  Acinetobacter baumanii: Nondet    -  Enterobacter cloacae complex: Nondet    -  Streptococcus sp. (Not Grp A, B or S pneumoniae): Nondet    -  Streptococcus agalactiae (Group B): Nondet    -  Streptococcus pyogenes (Group A): Nondet    -  Streptococcus pneumoniae: Nondet    -  Candida albicans: Nondet    -  Candida glabrata: Nondet    -  Candida krusei: Nondet    -  Candida parapsilosis: Nondet    -  Candida tropicalis: Nondet    -  Cefepime: S <=4    -  Cefoxitin: S <=8    -  Ceftriaxone: S <=1 Enterobacter, Citrobacter, and Serratia may develop resistance during prolonged therapy    -  Ciprofloxacin: S <=1    -  Ertapenem: S <=1    -  Gentamicin: S <=4    -  Levofloxacin: S <=2    -  Meropenem: S <=1    -  Piperacillin/Tazobactam: S <=16    -  Tobramycin: S <=4    -  Trimethoprim/Sulfamethoxazole: S <=2/38    Specimen Source: .Blood None    Organism: Blood Culture PCR    Organism: Proteus mirabilis    Culture Results:   Growth in aerobic and anaerobic bottles: Proteus mirabilis  "Due to technical problems, Proteus sp. will Not be reported as part of  the BCID panel until further notice"  ***Blood Panel PCR results on this specimen are available  approximately 3 hours after the Gram stain result.***  Gram stain, PCR, and/or culture results may not always  correspond due to difference in methodologies.  ************************************************************  This PCR assay was performed using [employer].  The following targets are tested for: Enterococcus,  vancomycin resistant enterococci, Listeria monocytogenes,  coagulase negative staphylococci, S. aureus,  methicillin resistant S. aureus, Streptococcus agalactiae  (Group B), S. pneumoniae, S. pyogenes (Group A),  Acinetobacter baumannii, Enterobacter cloacae, E. coli,  Klebsiella oxytoca, K. pneumoniae, Proteus sp.,  Serratia marcescens, Haemophilus influenzae,  Neisseria meningitidis, Pseudomonas aeruginosa, Candida  albicans, C. glabrata, C krusei, C parapsilosis,  C. tropicalis and the KPC resistance gene.    Organism Identification: Blood Culture PCR  Proteus mirabilis    Method Type: PCR    Method Type: BENITO      Radiology: all available radiological tests reviewed  < from: Xray Thoracic Spine 2 View (02.26.19 @ 09:29) >    EXAM:  XR T SPINE 2 VIEWS                            PROCEDURE DATE:  02/26/2019          INTERPRETATION:  XR T SPINE 2 VIEWS    CLINICAL INDICATION: x-ray of thoracic spine s/p fall    VIEWS/TECHNIQUE: AP and lateral views of the thoracic spine were obtained.    COMPARISON:  Chest radiograph dated 10/3/2018.      FINDINGS:      There is thoracic process.    There is a chronic moderate anterior T8 vertebral body wedge deformity,   unchanged. Remaining thoracic vertebral body heights are maintained.   Thoracic vertebral body alignment is preserved.    There is mild multilevel thoracic intervertebral disc height loss and   small endplate osteophytosis.    There is redemonstration of partially imaged lumbar surgical fusion   hardware.     IMPRESSION:    No acute fracture of the thoracic spine.    Unchanged moderate anterior T8 vertebral body wedge deformity.    Partially imaged lumbar surgical fusion hardware.      < end of copied text >    EXAM:  CT ABDOMEN AND PELVIS                              INTERPRETATION:  Exam Type:  CT ABDOMEN AND PELVIS   Date and Time: 2/23/2019 2:23 PM  Indication: Abdominal pain and hematuria  Compared to: Ultrasound abdomen 2/23/2019  Technique: CT of the ABDOMEN and PELVIS:  No intravenous contrast was   administered at physician request. This limits sensitivity for certain   processes. Oral contrast was not administered.    COMMENTS:    LOWER LUNGS AND PLEURA: Trace to small left pleural effusion with minimal   left basilar atelectasis. Coronary vascular calcification.    LIVER: Hepatomegaly. Surface contour nodularity suggesting cirrhosis. No   focal hepatic masses.  BILE DUCTS: normal caliber.  GALLBLADDER:     no wall thickening. Gallstones are not excluded.  PANCREAS: within normal limits.  SPLEEN: within normal limits.  ADRENALS: within normal limits.    KIDNEYS, URETERS AND BLADDER: Mild left hydroureteronephrosis secondary   to a 0.8 cm calculus at the left ureterovesicular junction. Minimal left   perinephric stranding and trace left perinephric fluid. Nonobstructive   left intrarenal calculi. No right hydronephrosis. Right ureter appears   unremarkable. Bladder is otherwise within normal limits.    PELVIS: Fundal uterine calcifications. No adnexal masses. No pelvic   adenopathy or pelvic free fluid.       BOWEL: The unopacified bowel is of normal course and caliber without   evidence of obstruction or bowel wall thickening. Periampullaryduodenal   diverticula..  PERITONEUM: no ascites or free air, no fluid collection.  RETROPERITONEUM: within normal limits.      VESSELS: atherosclerotic changes.      ABDOMINAL WALL: within normal limits.  BONES: Degenerative changes. Prior pedicle fusion.     IMPRESSION:     Mild left hydroureteronephrosis secondary to a 0.8 cm calculus at the   left ureterovesicular junction.        Advanced directives addressed: full resuscitation

## 2019-02-27 NOTE — PROGRESS NOTE ADULT - ASSESSMENT
- new bilateral effusions likely secondary to fluids received with the sepsis rule out chf and could also be related to third spacing with the low albumin  - upper mid back pain likely musculosketal and spine related and un likely PE with the less clinical and pain is worsening with the movement with no clear associated respiratory symptoms   - renal stone with urosepsis treated with the rocephin with the negative cultures .with the repeat   - significant arthritis of spine     PLAN     - continue with the antibiotics for the sepsis .  - continue with the diuretics until the effusion improves  - analgesics for the pain   - follow up up of the echo to know EF .

## 2019-02-28 DIAGNOSIS — M46.44 DISCITIS, UNSPECIFIED, THORACIC REGION: ICD-10-CM

## 2019-02-28 DIAGNOSIS — G06.2 EXTRADURAL AND SUBDURAL ABSCESS, UNSPECIFIED: ICD-10-CM

## 2019-02-28 LAB
ALBUMIN SERPL ELPH-MCNC: 1.9 G/DL — LOW (ref 3.3–5)
ALP SERPL-CCNC: 172 U/L — HIGH (ref 40–120)
ALT FLD-CCNC: 40 U/L — SIGNIFICANT CHANGE UP (ref 12–78)
ANION GAP SERPL CALC-SCNC: 6 MMOL/L — SIGNIFICANT CHANGE UP (ref 5–17)
ANION GAP SERPL CALC-SCNC: 8 MMOL/L — SIGNIFICANT CHANGE UP (ref 5–17)
APTT BLD: 33.6 SEC — SIGNIFICANT CHANGE UP (ref 27.5–36.3)
AST SERPL-CCNC: 36 U/L — SIGNIFICANT CHANGE UP (ref 15–37)
BASOPHILS # BLD AUTO: 0.05 K/UL — SIGNIFICANT CHANGE UP (ref 0–0.2)
BASOPHILS # BLD AUTO: 0.05 K/UL — SIGNIFICANT CHANGE UP (ref 0–0.2)
BASOPHILS NFR BLD AUTO: 0.7 % — SIGNIFICANT CHANGE UP (ref 0–2)
BASOPHILS NFR BLD AUTO: 0.8 % — SIGNIFICANT CHANGE UP (ref 0–2)
BILIRUB DIRECT SERPL-MCNC: 0.2 MG/DL — SIGNIFICANT CHANGE UP (ref 0–0.2)
BILIRUB INDIRECT FLD-MCNC: 0.3 MG/DL — SIGNIFICANT CHANGE UP (ref 0.2–1)
BILIRUB SERPL-MCNC: 0.5 MG/DL — SIGNIFICANT CHANGE UP (ref 0.2–1.2)
BLD GP AB SCN SERPL QL: SIGNIFICANT CHANGE UP
BUN SERPL-MCNC: 12 MG/DL — SIGNIFICANT CHANGE UP (ref 7–23)
BUN SERPL-MCNC: 16 MG/DL — SIGNIFICANT CHANGE UP (ref 7–23)
CALCIUM SERPL-MCNC: 8 MG/DL — LOW (ref 8.5–10.1)
CALCIUM SERPL-MCNC: 8.1 MG/DL — LOW (ref 8.5–10.1)
CHLORIDE SERPL-SCNC: 101 MMOL/L — SIGNIFICANT CHANGE UP (ref 96–108)
CHLORIDE SERPL-SCNC: 103 MMOL/L — SIGNIFICANT CHANGE UP (ref 96–108)
CO2 SERPL-SCNC: 28 MMOL/L — SIGNIFICANT CHANGE UP (ref 22–31)
CO2 SERPL-SCNC: 29 MMOL/L — SIGNIFICANT CHANGE UP (ref 22–31)
CREAT SERPL-MCNC: 0.63 MG/DL — SIGNIFICANT CHANGE UP (ref 0.5–1.3)
CREAT SERPL-MCNC: 0.67 MG/DL — SIGNIFICANT CHANGE UP (ref 0.5–1.3)
CRP SERPL-MCNC: 24.68 MG/DL — HIGH (ref 0–0.4)
EOSINOPHIL # BLD AUTO: 0.04 K/UL — SIGNIFICANT CHANGE UP (ref 0–0.5)
EOSINOPHIL # BLD AUTO: 0.04 K/UL — SIGNIFICANT CHANGE UP (ref 0–0.5)
EOSINOPHIL NFR BLD AUTO: 0.5 % — SIGNIFICANT CHANGE UP (ref 0–6)
EOSINOPHIL NFR BLD AUTO: 0.7 % — SIGNIFICANT CHANGE UP (ref 0–6)
GLUCOSE SERPL-MCNC: 100 MG/DL — HIGH (ref 70–99)
GLUCOSE SERPL-MCNC: 100 MG/DL — HIGH (ref 70–99)
HCT VFR BLD CALC: 32 % — LOW (ref 34.5–45)
HCT VFR BLD CALC: 32.1 % — LOW (ref 34.5–45)
HCT VFR BLD CALC: 32.9 % — LOW (ref 34.5–45)
HGB BLD-MCNC: 11.2 G/DL — LOW (ref 11.5–15.5)
HGB BLD-MCNC: 11.4 G/DL — LOW (ref 11.5–15.5)
HGB BLD-MCNC: 11.5 G/DL — SIGNIFICANT CHANGE UP (ref 11.5–15.5)
IMM GRANULOCYTES NFR BLD AUTO: 3.6 % — HIGH (ref 0–1.5)
IMM GRANULOCYTES NFR BLD AUTO: 4.8 % — HIGH (ref 0–1.5)
INR BLD: 1.26 RATIO — HIGH (ref 0.88–1.16)
LACTATE SERPL-SCNC: 0.7 MMOL/L — SIGNIFICANT CHANGE UP (ref 0.7–2)
LYMPHOCYTES # BLD AUTO: 0.83 K/UL — LOW (ref 1–3.3)
LYMPHOCYTES # BLD AUTO: 1.04 K/UL — SIGNIFICANT CHANGE UP (ref 1–3.3)
LYMPHOCYTES # BLD AUTO: 11 % — LOW (ref 13–44)
LYMPHOCYTES # BLD AUTO: 17.2 % — SIGNIFICANT CHANGE UP (ref 13–44)
MCHC RBC-ENTMCNC: 30.7 PG — SIGNIFICANT CHANGE UP (ref 27–34)
MCHC RBC-ENTMCNC: 31.1 PG — SIGNIFICANT CHANGE UP (ref 27–34)
MCHC RBC-ENTMCNC: 31.7 PG — SIGNIFICANT CHANGE UP (ref 27–34)
MCHC RBC-ENTMCNC: 34.7 GM/DL — SIGNIFICANT CHANGE UP (ref 32–36)
MCHC RBC-ENTMCNC: 34.9 GM/DL — SIGNIFICANT CHANGE UP (ref 32–36)
MCHC RBC-ENTMCNC: 35.9 GM/DL — SIGNIFICANT CHANGE UP (ref 32–36)
MCV RBC AUTO: 88.2 FL — SIGNIFICANT CHANGE UP (ref 80–100)
MCV RBC AUTO: 88.7 FL — SIGNIFICANT CHANGE UP (ref 80–100)
MCV RBC AUTO: 89.2 FL — SIGNIFICANT CHANGE UP (ref 80–100)
MONOCYTES # BLD AUTO: 0.8 K/UL — SIGNIFICANT CHANGE UP (ref 0–0.9)
MONOCYTES # BLD AUTO: 0.88 K/UL — SIGNIFICANT CHANGE UP (ref 0–0.9)
MONOCYTES NFR BLD AUTO: 11.7 % — SIGNIFICANT CHANGE UP (ref 2–14)
MONOCYTES NFR BLD AUTO: 13.2 % — SIGNIFICANT CHANGE UP (ref 2–14)
NEUTROPHILS # BLD AUTO: 3.84 K/UL — SIGNIFICANT CHANGE UP (ref 1.8–7.4)
NEUTROPHILS # BLD AUTO: 5.46 K/UL — SIGNIFICANT CHANGE UP (ref 1.8–7.4)
NEUTROPHILS NFR BLD AUTO: 63.3 % — SIGNIFICANT CHANGE UP (ref 43–77)
NEUTROPHILS NFR BLD AUTO: 72.5 % — SIGNIFICANT CHANGE UP (ref 43–77)
NRBC # BLD: 0 /100 WBCS — SIGNIFICANT CHANGE UP (ref 0–0)
PLATELET # BLD AUTO: 249 K/UL — SIGNIFICANT CHANGE UP (ref 150–400)
PLATELET # BLD AUTO: 269 K/UL — SIGNIFICANT CHANGE UP (ref 150–400)
PLATELET # BLD AUTO: 292 K/UL — SIGNIFICANT CHANGE UP (ref 150–400)
POTASSIUM SERPL-MCNC: 3.5 MMOL/L — SIGNIFICANT CHANGE UP (ref 3.5–5.3)
POTASSIUM SERPL-MCNC: 3.7 MMOL/L — SIGNIFICANT CHANGE UP (ref 3.5–5.3)
POTASSIUM SERPL-SCNC: 3.5 MMOL/L — SIGNIFICANT CHANGE UP (ref 3.5–5.3)
POTASSIUM SERPL-SCNC: 3.7 MMOL/L — SIGNIFICANT CHANGE UP (ref 3.5–5.3)
PROT SERPL-MCNC: 5.7 GM/DL — LOW (ref 6–8.3)
PROTHROM AB SERPL-ACNC: 14.1 SEC — HIGH (ref 10–12.9)
RBC # BLD: 3.6 M/UL — LOW (ref 3.8–5.2)
RBC # BLD: 3.63 M/UL — LOW (ref 3.8–5.2)
RBC # BLD: 3.71 M/UL — LOW (ref 3.8–5.2)
RBC # FLD: 14.6 % — HIGH (ref 10.3–14.5)
RBC # FLD: 15 % — HIGH (ref 10.3–14.5)
RBC # FLD: 15.1 % — HIGH (ref 10.3–14.5)
SODIUM SERPL-SCNC: 135 MMOL/L — SIGNIFICANT CHANGE UP (ref 135–145)
SODIUM SERPL-SCNC: 140 MMOL/L — SIGNIFICANT CHANGE UP (ref 135–145)
TYPE + AB SCN PNL BLD: SIGNIFICANT CHANGE UP
WBC # BLD: 6.06 K/UL — SIGNIFICANT CHANGE UP (ref 3.8–10.5)
WBC # BLD: 6.87 K/UL — SIGNIFICANT CHANGE UP (ref 3.8–10.5)
WBC # BLD: 7.53 K/UL — SIGNIFICANT CHANGE UP (ref 3.8–10.5)
WBC # FLD AUTO: 6.06 K/UL — SIGNIFICANT CHANGE UP (ref 3.8–10.5)
WBC # FLD AUTO: 6.87 K/UL — SIGNIFICANT CHANGE UP (ref 3.8–10.5)
WBC # FLD AUTO: 7.53 K/UL — SIGNIFICANT CHANGE UP (ref 3.8–10.5)

## 2019-02-28 PROCEDURE — 72156 MRI NECK SPINE W/O & W/DYE: CPT | Mod: 26

## 2019-02-28 PROCEDURE — 72157 MRI CHEST SPINE W/O & W/DYE: CPT | Mod: 26

## 2019-02-28 PROCEDURE — 99222 1ST HOSP IP/OBS MODERATE 55: CPT

## 2019-02-28 PROCEDURE — 72158 MRI LUMBAR SPINE W/O & W/DYE: CPT | Mod: 26

## 2019-02-28 RX ORDER — CEFEPIME 1 G/1
1000 INJECTION, POWDER, FOR SOLUTION INTRAMUSCULAR; INTRAVENOUS ONCE
Qty: 0 | Refills: 0 | Status: DISCONTINUED | OUTPATIENT
Start: 2019-02-28 | End: 2019-02-28

## 2019-02-28 RX ORDER — SODIUM CHLORIDE 9 MG/ML
1000 INJECTION, SOLUTION INTRAVENOUS
Qty: 0 | Refills: 0 | Status: DISCONTINUED | OUTPATIENT
Start: 2019-02-28 | End: 2019-03-01

## 2019-02-28 RX ORDER — CEFTRIAXONE 500 MG/1
INJECTION, POWDER, FOR SOLUTION INTRAMUSCULAR; INTRAVENOUS
Qty: 0 | Refills: 0 | Status: DISCONTINUED | OUTPATIENT
Start: 2019-02-28 | End: 2019-02-28

## 2019-02-28 RX ORDER — VANCOMYCIN HCL 1 G
1000 VIAL (EA) INTRAVENOUS ONCE
Qty: 0 | Refills: 0 | Status: COMPLETED | OUTPATIENT
Start: 2019-02-28 | End: 2019-02-28

## 2019-02-28 RX ORDER — IBUPROFEN 200 MG
600 TABLET ORAL ONCE
Qty: 0 | Refills: 0 | Status: COMPLETED | OUTPATIENT
Start: 2019-02-28 | End: 2019-02-28

## 2019-02-28 RX ORDER — CEFEPIME 1 G/1
1000 INJECTION, POWDER, FOR SOLUTION INTRAMUSCULAR; INTRAVENOUS ONCE
Qty: 0 | Refills: 0 | Status: COMPLETED | OUTPATIENT
Start: 2019-02-28 | End: 2019-02-28

## 2019-02-28 RX ORDER — CEFTRIAXONE 500 MG/1
2000 INJECTION, POWDER, FOR SOLUTION INTRAMUSCULAR; INTRAVENOUS EVERY 24 HOURS
Qty: 0 | Refills: 0 | Status: DISCONTINUED | OUTPATIENT
Start: 2019-02-28 | End: 2019-03-07

## 2019-02-28 RX ORDER — CEFTRIAXONE 500 MG/1
1000 INJECTION, POWDER, FOR SOLUTION INTRAMUSCULAR; INTRAVENOUS ONCE
Qty: 0 | Refills: 0 | Status: DISCONTINUED | OUTPATIENT
Start: 2019-02-28 | End: 2019-02-28

## 2019-02-28 RX ORDER — VANCOMYCIN HCL 1 G
1000 VIAL (EA) INTRAVENOUS EVERY 12 HOURS
Qty: 0 | Refills: 0 | Status: DISCONTINUED | OUTPATIENT
Start: 2019-02-28 | End: 2019-03-07

## 2019-02-28 RX ADMIN — Medication 600 MILLIGRAM(S): at 02:00

## 2019-02-28 RX ADMIN — HEPARIN SODIUM 5000 UNIT(S): 5000 INJECTION INTRAVENOUS; SUBCUTANEOUS at 17:16

## 2019-02-28 RX ADMIN — Medication 100 MILLIGRAM(S): at 17:13

## 2019-02-28 RX ADMIN — CYCLOBENZAPRINE HYDROCHLORIDE 5 MILLIGRAM(S): 10 TABLET, FILM COATED ORAL at 06:16

## 2019-02-28 RX ADMIN — Medication 600 MILLIGRAM(S): at 01:30

## 2019-02-28 RX ADMIN — OXYCODONE AND ACETAMINOPHEN 1 TABLET(S): 5; 325 TABLET ORAL at 06:16

## 2019-02-28 RX ADMIN — HYDROMORPHONE HYDROCHLORIDE 0.5 MILLIGRAM(S): 2 INJECTION INTRAMUSCULAR; INTRAVENOUS; SUBCUTANEOUS at 17:15

## 2019-02-28 RX ADMIN — LIDOCAINE 1 PATCH: 4 CREAM TOPICAL at 11:26

## 2019-02-28 RX ADMIN — CEFEPIME 1000 MILLIGRAM(S): 1 INJECTION, POWDER, FOR SOLUTION INTRAMUSCULAR; INTRAVENOUS at 01:01

## 2019-02-28 RX ADMIN — SODIUM CHLORIDE 75 MILLILITER(S): 9 INJECTION, SOLUTION INTRAVENOUS at 20:10

## 2019-02-28 RX ADMIN — HYDROMORPHONE HYDROCHLORIDE 0.5 MILLIGRAM(S): 2 INJECTION INTRAMUSCULAR; INTRAVENOUS; SUBCUTANEOUS at 17:13

## 2019-02-28 RX ADMIN — CYCLOBENZAPRINE HYDROCHLORIDE 5 MILLIGRAM(S): 10 TABLET, FILM COATED ORAL at 17:13

## 2019-02-28 RX ADMIN — OXYCODONE AND ACETAMINOPHEN 1 TABLET(S): 5; 325 TABLET ORAL at 11:45

## 2019-02-28 RX ADMIN — LIDOCAINE 1 PATCH: 4 CREAM TOPICAL at 21:04

## 2019-02-28 RX ADMIN — HEPARIN SODIUM 5000 UNIT(S): 5000 INJECTION INTRAVENOUS; SUBCUTANEOUS at 06:16

## 2019-02-28 RX ADMIN — LIDOCAINE 1 PATCH: 4 CREAM TOPICAL at 23:21

## 2019-02-28 RX ADMIN — OXYCODONE AND ACETAMINOPHEN 1 TABLET(S): 5; 325 TABLET ORAL at 12:45

## 2019-02-28 RX ADMIN — Medication 100 MILLIGRAM(S): at 06:16

## 2019-02-28 RX ADMIN — Medication 650 MILLIGRAM(S): at 18:22

## 2019-02-28 RX ADMIN — Medication 250 MILLIGRAM(S): at 01:01

## 2019-02-28 RX ADMIN — Medication 250 MILLIGRAM(S): at 20:10

## 2019-02-28 RX ADMIN — Medication 1000 UNIT(S): at 11:31

## 2019-02-28 RX ADMIN — CEFTRIAXONE 2000 MILLIGRAM(S): 500 INJECTION, POWDER, FOR SOLUTION INTRAMUSCULAR; INTRAVENOUS at 11:31

## 2019-02-28 NOTE — PROGRESS NOTE ADULT - SUBJECTIVE AND OBJECTIVE BOX
SUBJECTIVE     Patient prior events noted including recurrent fevers . has mild no sob and leg edema is better   and ct scan chest noted   has marginal infiltrates with the more of atelectasis with the small effusion   with the recurrent fevers while on antibiotics concern for the urosepsis      PAST MEDICAL & SURGICAL HISTORY:  Erbs muscular dystrophy: left arm  Thyroid cyst  Osteoarthritis  H/O spinal fusion: 1997 lumbar    OBJECTIVE   Vital Signs Last 24 Hrs  T(C): 36.8 (28 Feb 2019 05:02), Max: 39 (27 Feb 2019 23:10)  T(F): 98.3 (28 Feb 2019 05:02), Max: 102.2 (27 Feb 2019 23:10)  HR: 75 (28 Feb 2019 05:02) (75 - 115)  BP: 114/52 (28 Feb 2019 05:02) (114/52 - 150/75)  BP(mean): --  RR: 16 (28 Feb 2019 05:02) (15 - 18)  SpO2: 93% (28 Feb 2019 05:02) (91% - 96%)    Review of systems   as dictated in the history of present illness with the review of other systems non contributory     PHYSICAL EXAM:  Constitutional: , awake and alert, not in distress.  HEENT: Normo cephalic atraumatic  Neck: Soft and supple, No J.V.D   Respiratory: vesicular breathing has decreased breath sounds with the atelectasis   Cardiovascular: S1 and S2, regular rate .   Gastrointestinal:  soft, nontender,   Extremities: has mild edema of the feet with no calf tenderness .  Neurological: No new  focal deficits.    MEDICATIONS  (STANDING):  cefTRIAXone Injectable. 2000 milliGRAM(s) IV Push every 24 hours  cholecalciferol 1000 Unit(s) Oral daily  cyclobenzaprine 5 milliGRAM(s) Oral three times a day  docusate sodium 100 milliGRAM(s) Oral three times a day  heparin  Injectable 5000 Unit(s) SubCutaneous every 12 hours  lidocaine   Patch 1 Patch Transdermal every 24 hours  polyethylene glycol 3350 17 Gram(s) Oral daily  senna 2 Tablet(s) Oral at bedtime  sodium chloride 0.9%. 1000 milliLiter(s) (250 mL/Hr) IV Continuous <Continuous>  sodium chloride 0.9%. 1000 milliLiter(s) (60 mL/Hr) IV Continuous <Continuous>                            11.4   6.06  )-----------( 269      ( 28 Feb 2019 07:15 )             32.9     02-28    140  |  103  |  12  ----------------------------<  100<H>  3.5   |  29  |  0.63    Ca    8.1<L>      28 Feb 2019 07:15    TPro  5.7<L>  /  Alb  1.9<L>  /  TBili  0.5  /  DBili  0.2  /  AST  36  /  ALT  40  /  AlkPhos  172<H>  02-28               < from: CT Chest w/ IV Cont (02.27.19 @ 22:42) >  LINICAL INFORMATION (all 3 exams):      Cough sepsis abdominal pain        TECHNIQUE:  Contiguous axial 3 mm sections were obtained through the   chest, abdomen and pelvis using single helical acquisition.  Images were   acquired during the rapid bolus administration of 95 cc of Omnipaque 350/   5 cc discarded.  Imaging post processing software was employed to   generate reformatted images in 3 mm sagittal and coronal imaging planes.     No Oral contrast was administered.   This scan was performed using   automatic exposure control (radiation dose reduction software) to obtain   a diagnostic image quality scan with patient dose as low as reasonably   achievable.         FINDINGS:   No previous examinations are available for review.    The thyroid gland appears intact.    The lungs demonstrate mild BILATERAL pleural effusions with underlying   infiltrates. Atelectasis seen within the lingula. The trachea and major   bronchi are patent.    No enlarged axillary, hilar or mediastinal lymph nodes are found.   The   heart size is normal. The chest wall and thoracic spine are unremarkable.    The liver demonstrates homogeneous attenuation without focal lesion or   abnormal enhancement.  Hepatic size and contours are maintained. Hepatic   and portal veins are patent and not displaced.  No intrahepatic or common   ductal dilatation is recognized.  The gallbladder is intact without   calcified calculi or wall thickening.  The pancreas is intact without   ductal dilatation or focal lesion.  The spleen is normal in size.    The adrenal glands are intact.  The kidneys demonstrate symmetric   nephrograms. Haziness noted in the LEFT renal hilum may reflect mild   pyelitis. No ureteral dilatation is noted. No suspicious renal mass is   found. 6 mm calculus is seen in the lower pole of the LEFT kidney.  No   hydronephrosis or perinephric infiltration is found.The bladder appears   unremarkable.    Small calcified fibroid in uterus    No enlarged lymph nodes are found.  No ascites is present.   The osseous   structures show lumbar fusion at L1-L4.      < from: CT Chest w/ IV Cont (02.27.19 @ 22:42) >  he bowel shows mild stool retention.  No obstruction, perforation or   abscess is recognized.           IMPRESSION:          1.   Chest:   mild BILATERAL pleural effusions with underlying   infiltrates. Atelectasis seen within the lingula.       2.   Abdomen and pelvis: Haziness noted in the LEFT renal hilum may   reflect mild pyelitis. No ureteral dilatation is noted . Mild stool   retention.  6 mm calculus in lower pole of LEFT kidney.      < end of copied text >

## 2019-02-28 NOTE — PROGRESS NOTE ADULT - SUBJECTIVE AND OBJECTIVE BOX
Pt with UTI and Proteus bacteremia with pshx of lumbar fusion approx x 20 years ago, R TKR 10/2018 seen resting in bed. Pt c/o thoracic pain since 1 week without trauma. Pt states thoracic pain has improved today. Pt denies lower extremities pain, numbness, and weakness. Pt voids on own without complications. Pt denies cervical and lumbar pain. Pt states she had a fall in mid January and was seen by Dr. Epperson for back pain. She states thoracic pain was not as bad since the last week. Movement worsens the pain, although she has better movement today. Resting alleviates the pain.     PE  Gen appearance: NAD  Motor strength: 5/5 of b/l hamstrings, quads, ant tib, gastrocs, EHL  Sensation: intact to light touch of lower extremities  Thoracic: tenderness to palpation of the higher thoracic region and paraspinal region.  Non-tender to palpation of the lumbar and cervical spine.       Ct abd/pelv/ chest:  mild BILATERAL pleural effusions with underlying   infiltrates. Atelectasis seen within the lingula.       2.   Abdomen and pelvis: Haziness noted in the LEFT renal hilum may   reflect mild pyelitis. No ureteral dilatation is noted . Mild stool   retention.  6 mm calculus in lower pole of LEFT kidney.      thoracic xray (2/26/19)No acute fracture of the thoracic spine.  Unchanged moderate anterior T8 vertebral body wedge deformity.    Lumbar xray (2/27/19): No acute lumbar spine fracture. No instability on extension/flexion   views, although there is lack of mobility likely secondary to fusion   hardware.  Posterior lumbar surgical fusion at L1-L4, with intact hardware.  Mild reversal of the normal lumbar lordosis. Mild retrolisthesis of L5   upon S1.  Severe multilevel degenerative disc disease particularly at T12-L1, L4-L5   and L5-S1.      Plan  Tmax:102.2  WBC: 6.06, H/H:11.4/32.9L, Sed rate (2/27/2019): 63H, CRP (2/27/2019):24.68H  Awaiting MRI of the thoracic region  Further treatment plan pending MRI  CT chest/abd/ pelv findings eval per Medicine  Continue management per Medicine and ID.

## 2019-02-28 NOTE — PROGRESS NOTE ADULT - ASSESSMENT
· Assessment		  74 y/o female with PMHx of DJD, s/p L spine fusion, R TKR,  who was seen in  ER on 2/23/19 for L flank pain, uper back pain, chills,dysuria, nausea, and was diagnoestd with L UVJ 0.8 cm stone, UTI, was given IV AB and discharged home on PO antibiotics. Her BC from 2/23/19 grew gram negative rods in the anaerobic bottle and she was called back to the ER. The patient states her symptoms are improved today.      #Bacteremia/sepsis present at time of admission . likely secondary to UTI/pyelonephritis  and renal stones  #now new fever/DDX r/o thoarcic spine discitis vs r/o intrabdominal infection  vs left pleural effusion ? PNA  will pan scan Chest and abd/pelvis  would await orthospine to decide if spine hardware compatible tiwth MRI, the would consider MRI with keith of thoracic spien  continue  IV Ceftriaxone  for now  slow cautious  IVF if no significant pleural effusion on CT chest, due to risk of volume overload  ID consult apprceiated  Kidney stone without  significant hydronephrosis.    follow up urology as outpatient  urology did not recommend inpatient urological  intervention  d/c vit c megadose    #liver cirrhosis hepatomegaly/stablef/u as outpatient with gi  cautious fluid IV due to risk of volume overload    # on admission mild REYNA from prerenal azotemia which has now resolved with IVF      # DVT prophylaxis  lovenox SCheparin SC · Assessment		  74 y/o female with PMHx of DJD, s/p L spine fusion, R TKR,  who was seen in  ER on 2/23/19 for L flank pain, uper back pain, chills,dysuria, nausea, and was diagnoestd with L UVJ 0.8 cm stone, UTI, was given IV AB and discharged home on PO antibiotics. Her BC from 2/23/19 grew gram negative rods in the anaerobic bottle and she was called back to the ER. The patient states her symptoms are improved today.      #Bacteremia/sepsis present at time of admission . likely secondary to UTI/pyelonephritis  and renal stones  #then  new fever/DDX r/o thoarcic spine discitis vs questionable B/l lower lobe infiltrates (less likely PNA as per pulm and ID)    pan  scan Chest and abd/pelvis- showed residual mild pyelitis(no hydronephrosis) ,noindication for  in patient stone extraction or nephrostomy as per dr pradhan-I discussed with him as no suspcion for pyonephrosis and no hydronephrosis on repeat  CT  MRI spine with keith pending  continue  IV Ceftriaxone  for now per ID  slow cautious  IVF if no significant pleural effusion on CT chest, due to risk of volume overload  ID consult apprceiated, spine surgery consult appreciated   follow up urology as outpatient  urology did not recommend inpatient urological  intervention  d/c vit c megadose    #liver cirrhosis hepatomegaly/stable  # suspect left pleural effsuion mild secondary to low albumin   f/u as outpatient with gi  cautious fluid IV due to risk of volume overload    # on admission mild REYNA from prerenal azotemia which has now resolved with IVF      # DVT prophylaxis  chemical prophylaxis · Assessment		  74 y/o female with PMHx of DJD, s/p L spine fusion, R TKR,  who was seen in  ER on 2/23/19 for L flank pain, uper back pain, chills,dysuria, nausea, and was diagnoestd with L UVJ 0.8 cm stone, UTI, was given IV AB and discharged home on PO antibiotics. Her BC from 2/23/19 grew gram negative rods in the anaerobic bottle and she was called back to the ER. The patient states her symptoms are improved today.      #Bacteremia/sepsis present at time of admission . likely secondary to UTI/pyelonephritis  and renal stones  #then  new fever/DDX r/o thoarcic spine discitis vs questionable B/l lower lobe infiltrates (less likely PNA as per pulm and ID)    pan  scan Chest and abd/pelvis- showed residual mild pyelitis(no hydronephrosis) ,noindication for  in patient stone extraction or nephrostomy as per dr pradhan-I discussed with him as no suspcion for pyonephrosis and no hydronephrosis on repeat  CT  MRI spine with keith pending  continue  IV Ceftriaxone  for now per ID  slow cautious  IVF if no significant pleural effusion on CT chest, due to risk of volume overload  ID consult apprceiated, spine surgery consult appreciated   follow up urology as outpatient  urology did not recommend inpatient urological  intervention  d/c vit c megadose    #liver cirrhosis hepatomegaly/stable  # suspect left pleural effsuion mild secondary to low albumin   f/u as outpatient with gi  cautious fluid IV due to risk of volume overload    # on admission mild REYNA from prerenal azotemia which has now resolved with IVF      # DVT prophylaxis  chemical prophylaxis on heparin sc, needs to be held if spine suergry intervention planned

## 2019-02-28 NOTE — PROGRESS NOTE ADULT - SUBJECTIVE AND OBJECTIVE BOX
Date of service: 02-27-19 @ 10:46    pt seen and examined  noted with fever to 102 o/n   back pain improved this am  no urinary difficulty no dysuria or flank pain  remains constipated but passing gas       ROS: denies dizziness, no HA, no SOB or cough, no abdominal pain, no diarrhea or constipation; no legs pain, no rashes    Date of service: 02-28-19 @ 11:41    ROS: no fever or chills; denies dizziness, no HA, no SOB or cough, no abdominal pain, no diarrhea or constipation; no dysuria, no urinary frequency, no legs pain, no rashes    MEDICATIONS  (STANDING):  cefTRIAXone Injectable. 2000 milliGRAM(s) IV Push every 24 hours  cholecalciferol 1000 Unit(s) Oral daily  cyclobenzaprine 5 milliGRAM(s) Oral three times a day  docusate sodium 100 milliGRAM(s) Oral three times a day  heparin  Injectable 5000 Unit(s) SubCutaneous every 12 hours  lidocaine   Patch 1 Patch Transdermal every 24 hours  polyethylene glycol 3350 17 Gram(s) Oral daily  senna 2 Tablet(s) Oral at bedtime  sodium chloride 0.9%. 1000 milliLiter(s) (250 mL/Hr) IV Continuous <Continuous>  sodium chloride 0.9%. 1000 milliLiter(s) (60 mL/Hr) IV Continuous <Continuous>      Vital Signs Last 24 Hrs  T(C): 36.6 (28 Feb 2019 11:35), Max: 39 (27 Feb 2019 23:10)  T(F): 97.8 (28 Feb 2019 11:35), Max: 102.2 (27 Feb 2019 23:10)  HR: 81 (28 Feb 2019 11:35) (75 - 115)  BP: 103/38 (28 Feb 2019 11:35) (103/38 - 150/75)  BP(mean): --  RR: 16 (28 Feb 2019 11:35) (15 - 18)  SpO2: 97% (28 Feb 2019 11:35) (91% - 97%)      PE:  Constitutional: frail looking  HEENT: NC/AT, EOMI, PERRLA, conjunctivae clear; ears and nose atraumatic; pharynx benign  Neck: supple; thyroid not palpable  Back: no tenderness  Respiratory: respiratory effort normal; clear to auscultation  Cardiovascular: S1S2 regular, no murmurs  Abdomen: soft, not tender, not distended, positive BS; liver and spleen WNL  Genitourinary: no suprapubic tenderness L CVA tenderness  Lymphatic: no LN palpable  Musculoskeletal: no muscle tenderness, no joint swelling or tenderness  Extremities: no pedal edema  Neurological/ Psychiatric: moving all extremities  Skin: no rashes; no palpable lesions    Labs: all available labs reviewed                                   11.4   6.06  )-----------( 269      ( 28 Feb 2019 07:15 )             32.9     02-28    140  |  103  |  12  ----------------------------<  100<H>  3.5   |  29  |  0.63    Ca    8.1<L>      28 Feb 2019 07:15    TPro  5.7<L>  /  Alb  1.9<L>  /  TBili  0.5  /  DBili  0.2  /  AST  36  /  ALT  40  /  AlkPhos  172<H>  02-28           Culture - Urine (02.24.19 @ 22:00)    Specimen Source: .Urine Clean Catch (Midstream)    Culture Results:   No growth    Culture - Blood (02.24.19 @ 11:39)    Specimen Source: .Blood None    Culture Results:   No growth to date.    Culture - Blood (02.23.19 @ 12:47)    Gram Stain:   Growth in anaerobic bottle: Gram Negative Rods  Growth in aerobic bottle: Gram Negative Rods    -  Amikacin: S <=16    -  Ampicillin: S <=8 These ampicillin results predict results for amoxicillin    -  Ampicillin/Sulbactam: S <=8/4    -  Aztreonam: S <=4    -  Cefazolin: S <=8    -  Multidrug (KPC pos) resistant organism: Nondet    -  Staphylococcus aureus: Nondet    -  Methicillin resistant Staphylococcus aureus (MRSA): Nondet    -  Coagulase negative Staphylococcus: Nondet    -  Enterococcus species: Nondet    -  Vancomycin resistant Enterococcus sp.: Nondet    -  Escherichia coli: Nondet    -  Klebsiella oxytoca: Nondet    -  Klebsiella pneumoniae: Nondet    -  Serratia marcescens: Nondet    -  Haemophilus influenzae: Nondet    -  Listeria monocytogenes: Nondet    -  Neisseria meningitidis: Nondet    -  Pseudomonas aeruginosa: Nondet    -  Acinetobacter baumanii: Nondet    -  Enterobacter cloacae complex: Nondet    -  Streptococcus sp. (Not Grp A, B or S pneumoniae): Nondet    -  Streptococcus agalactiae (Group B): Nondet    -  Streptococcus pyogenes (Group A): Nondet    -  Streptococcus pneumoniae: Nondet    -  Candida albicans: Nondet    -  Candida glabrata: Nondet    -  Candida krusei: Nondet    -  Candida parapsilosis: Nondet    -  Candida tropicalis: Nondet    -  Cefepime: S <=4    -  Cefoxitin: S <=8    -  Ceftriaxone: S <=1 Enterobacter, Citrobacter, and Serratia may develop resistance during prolonged therapy    -  Ciprofloxacin: S <=1    -  Ertapenem: S <=1    -  Gentamicin: S <=4    -  Levofloxacin: S <=2    -  Meropenem: S <=1    -  Piperacillin/Tazobactam: S <=16    -  Tobramycin: S <=4    -  Trimethoprim/Sulfamethoxazole: S <=2/38    Specimen Source: .Blood None    Organism: Blood Culture PCR    Organism: Proteus mirabilis    Culture Results:   Growth in aerobic and anaerobic bottles: Proteus mirabilis  "Due to technical problems, Proteus sp. will Not be reported as part of  the BCID panel until further notice"  ***Blood Panel PCR results on this specimen are available  approximately 3 hours after the Gram stain result.***  Gram stain, PCR, and/or culture results may not always  correspond due to difference in methodologies.  ************************************************************  This PCR assay was performed using [employer].  The following targets are tested for: Enterococcus,  vancomycin resistant enterococci, Listeria monocytogenes,  coagulase negative staphylococci, S. aureus,  methicillin resistant S. aureus, Streptococcus agalactiae  (Group B), S. pneumoniae, S. pyogenes (Group A),  Acinetobacter baumannii, Enterobacter cloacae, E. coli,  Klebsiella oxytoca, K. pneumoniae, Proteus sp.,  Serratia marcescens, Haemophilus influenzae,  Neisseria meningitidis, Pseudomonas aeruginosa, Candida  albicans, C. glabrata, C krusei, C parapsilosis,  C. tropicalis and the KPC resistance gene.    Organism Identification: Blood Culture PCR  Proteus mirabilis    Method Type: PCR    Method Type: BENITO      Radiology: all available radiological tests reviewed    < from: CT Chest w/ IV Cont (02.27.19 @ 22:42) >    EXAM:  CT ABDOMEN AND PELVIS OC IC                          EXAM:  CT CHEST IC                            PROCEDURE DATE:  02/27/2019          INTERPRETATION:      Three examinations were performed on this patient:   1. CT scan of the chest with intravenous contrast  2. CT scan of the abdomen with intravenous contrast  3. CT scan of the pelvis with intravenous contrast        CLINICAL INFORMATION (all 3 exams):      Cough sepsis abdominal pain        TECHNIQUE:  Contiguous axial 3 mm sections were obtained through the   chest, abdomen and pelvis using single helical acquisition.  Images were   acquired during the rapid bolus administration of 95 cc of Omnipaque 350/   5 cc discarded.  Imaging post processing software was employed to   generate reformatted images in 3 mm sagittal and coronal imaging planes.     No Oral contrast was administered.   This scan was performed using   automatic exposure control (radiation dose reduction software) to obtain   a diagnostic image quality scan with patient dose as low as reasonably   achievable.         FINDINGS:   No previous examinations are available for review.    The thyroid gland appears intact.    The lungs demonstrate mild BILATERAL pleural effusions with underlying   infiltrates. Atelectasis seen within the lingula. The trachea and major   bronchi are patent.    No enlarged axillary, hilar or mediastinal lymph nodes are found.   The   heart size is normal. The chest wall and thoracic spine are unremarkable.    The liver demonstrates homogeneous attenuation without focal lesion or   abnormal enhancement.  Hepatic size and contours are maintained. Hepatic   and portal veins are patent and not displaced.  No intrahepatic or common   ductal dilatation is recognized.  The gallbladder is intact without   calcified calculi or wall thickening.  The pancreas is intact without   ductal dilatation or focal lesion.  The spleen is normal in size.    The adrenal glands are intact.  The kidneys demonstrate symmetric   nephrograms. Haziness noted in the LEFT renal hilum may reflect mild   pyelitis. No ureteral dilatation is noted. No suspicious renal mass is   found. 6 mm calculus is seen in the lower pole of the LEFT kidney.  No   hydronephrosis or perinephric infiltration is found.The bladder appears   unremarkable.    Small calcified fibroid in uterus    No enlarged lymph nodes are found.  No ascites is present.   The osseous   structures show lumbar fusion at L1-L4.          The bowel shows mild stool retention.  No obstruction, perforation or   abscess is recognized.           IMPRESSION:          1.   Chest:   mild BILATERAL pleural effusions with underlying   infiltrates. Atelectasis seen within the lingula.       2.   Abdomen and pelvis: Haziness noted in the LEFT renal hilum may   reflect mild pyelitis. No ureteral dilatation is noted . Mild stool   retention.  6 mm calculus in lower pole of LEFT kidney.      < from: Xray Thoracic Spine 2 View (02.26.19 @ 09:29) >    EXAM:  XR T SPINE 2 VIEWS                            PROCEDURE DATE:  02/26/2019          INTERPRETATION:  XR T SPINE 2 VIEWS    CLINICAL INDICATION: x-ray of thoracic spine s/p fall    VIEWS/TECHNIQUE: AP and lateral views of the thoracic spine were obtained.    COMPARISON:  Chest radiograph dated 10/3/2018.      FINDINGS:      There is thoracic process.    There is a chronic moderate anterior T8 vertebral body wedge deformity,   unchanged. Remaining thoracic vertebral body heights are maintained.   Thoracic vertebral body alignment is preserved.    There is mild multilevel thoracic intervertebral disc height loss and   small endplate osteophytosis.    There is redemonstration of partially imaged lumbar surgical fusion   hardware.     IMPRESSION:    No acute fracture of the thoracic spine.    Unchanged moderate anterior T8 vertebral body wedge deformity.    Partially imaged lumbar surgical fusion hardware.      < end of copied text >    EXAM:  CT ABDOMEN AND PELVIS                              INTERPRETATION:  Exam Type:  CT ABDOMEN AND PELVIS   Date and Time: 2/23/2019 2:23 PM  Indication: Abdominal pain and hematuria  Compared to: Ultrasound abdomen 2/23/2019  Technique: CT of the ABDOMEN and PELVIS:  No intravenous contrast was   administered at physician request. This limits sensitivity for certain   processes. Oral contrast was not administered.    COMMENTS:    LOWER LUNGS AND PLEURA: Trace to small left pleural effusion with minimal   left basilar atelectasis. Coronary vascular calcification.    LIVER: Hepatomegaly. Surface contour nodularity suggesting cirrhosis. No   focal hepatic masses.  BILE DUCTS: normal caliber.  GALLBLADDER:     no wall thickening. Gallstones are not excluded.  PANCREAS: within normal limits.  SPLEEN: within normal limits.  ADRENALS: within normal limits.    KIDNEYS, URETERS AND BLADDER: Mild left hydroureteronephrosis secondary   to a 0.8 cm calculus at the left ureterovesicular junction. Minimal left   perinephric stranding and trace left perinephric fluid. Nonobstructive   left intrarenal calculi. No right hydronephrosis. Right ureter appears   unremarkable. Bladder is otherwise within normal limits.    PELVIS: Fundal uterine calcifications. No adnexal masses. No pelvic   adenopathy or pelvic free fluid.       BOWEL: The unopacified bowel is of normal course and caliber without   evidence of obstruction or bowel wall thickening. Periampullaryduodenal   diverticula..  PERITONEUM: no ascites or free air, no fluid collection.  RETROPERITONEUM: within normal limits.      VESSELS: atherosclerotic changes.      ABDOMINAL WALL: within normal limits.  BONES: Degenerative changes. Prior pedicle fusion.     IMPRESSION:     Mild left hydroureteronephrosis secondary to a 0.8 cm calculus at the   left ureterovesicular junction.        Advanced directives addressed: full resuscitation Date of service: 02-28-19 @ 11:41    pt seen and examined  noted with fever to 102 o/n   back pain improved this am  no urinary difficulty no dysuria or flank pain  remains constipated but passing gas       ROS: denies dizziness, no HA, no SOB or cough, no abdominal pain, no diarrhea or constipation; no legs pain, no rashes      MEDICATIONS  (STANDING):  cefTRIAXone Injectable. 2000 milliGRAM(s) IV Push every 24 hours  cholecalciferol 1000 Unit(s) Oral daily  cyclobenzaprine 5 milliGRAM(s) Oral three times a day  docusate sodium 100 milliGRAM(s) Oral three times a day  heparin  Injectable 5000 Unit(s) SubCutaneous every 12 hours  lidocaine   Patch 1 Patch Transdermal every 24 hours  polyethylene glycol 3350 17 Gram(s) Oral daily  senna 2 Tablet(s) Oral at bedtime  sodium chloride 0.9%. 1000 milliLiter(s) (250 mL/Hr) IV Continuous <Continuous>  sodium chloride 0.9%. 1000 milliLiter(s) (60 mL/Hr) IV Continuous <Continuous>      Vital Signs Last 24 Hrs  T(C): 36.6 (28 Feb 2019 11:35), Max: 39 (27 Feb 2019 23:10)  T(F): 97.8 (28 Feb 2019 11:35), Max: 102.2 (27 Feb 2019 23:10)  HR: 81 (28 Feb 2019 11:35) (75 - 115)  BP: 103/38 (28 Feb 2019 11:35) (103/38 - 150/75)  BP(mean): --  RR: 16 (28 Feb 2019 11:35) (15 - 18)  SpO2: 97% (28 Feb 2019 11:35) (91% - 97%)      PE:  Constitutional: frail looking  HEENT: NC/AT, EOMI, PERRLA, conjunctivae clear; ears and nose atraumatic; pharynx benign  Neck: supple; thyroid not palpable  Back: no tenderness  Respiratory: respiratory effort normal; clear to auscultation  Cardiovascular: S1S2 regular, no murmurs  Abdomen: soft, not tender, not distended, positive BS; liver and spleen WNL  Genitourinary: no suprapubic tenderness L CVA tenderness  Lymphatic: no LN palpable  Musculoskeletal: no muscle tenderness, no joint swelling or tenderness  Extremities: no pedal edema  Neurological/ Psychiatric: moving all extremities  Skin: no rashes; no palpable lesions    Labs: all available labs reviewed                                   11.4   6.06  )-----------( 269      ( 28 Feb 2019 07:15 )             32.9     02-28    140  |  103  |  12  ----------------------------<  100<H>  3.5   |  29  |  0.63    Ca    8.1<L>      28 Feb 2019 07:15    TPro  5.7<L>  /  Alb  1.9<L>  /  TBili  0.5  /  DBili  0.2  /  AST  36  /  ALT  40  /  AlkPhos  172<H>  02-28           Culture - Urine (02.24.19 @ 22:00)    Specimen Source: .Urine Clean Catch (Midstream)    Culture Results:   No growth    Culture - Blood (02.24.19 @ 11:39)    Specimen Source: .Blood None    Culture Results:   No growth to date.    Culture - Blood (02.23.19 @ 12:47)    Gram Stain:   Growth in anaerobic bottle: Gram Negative Rods  Growth in aerobic bottle: Gram Negative Rods    -  Amikacin: S <=16    -  Ampicillin: S <=8 These ampicillin results predict results for amoxicillin    -  Ampicillin/Sulbactam: S <=8/4    -  Aztreonam: S <=4    -  Cefazolin: S <=8    -  Multidrug (KPC pos) resistant organism: Nondet    -  Staphylococcus aureus: Nondet    -  Methicillin resistant Staphylococcus aureus (MRSA): Nondet    -  Coagulase negative Staphylococcus: Nondet    -  Enterococcus species: Nondet    -  Vancomycin resistant Enterococcus sp.: Nondet    -  Escherichia coli: Nondet    -  Klebsiella oxytoca: Nondet    -  Klebsiella pneumoniae: Nondet    -  Serratia marcescens: Nondet    -  Haemophilus influenzae: Nondet    -  Listeria monocytogenes: Nondet    -  Neisseria meningitidis: Nondet    -  Pseudomonas aeruginosa: Nondet    -  Acinetobacter baumanii: Nondet    -  Enterobacter cloacae complex: Nondet    -  Streptococcus sp. (Not Grp A, B or S pneumoniae): Nondet    -  Streptococcus agalactiae (Group B): Nondet    -  Streptococcus pyogenes (Group A): Nondet    -  Streptococcus pneumoniae: Nondet    -  Candida albicans: Nondet    -  Candida glabrata: Nondet    -  Candida krusei: Nondet    -  Candida parapsilosis: Nondet    -  Candida tropicalis: Nondet    -  Cefepime: S <=4    -  Cefoxitin: S <=8    -  Ceftriaxone: S <=1 Enterobacter, Citrobacter, and Serratia may develop resistance during prolonged therapy    -  Ciprofloxacin: S <=1    -  Ertapenem: S <=1    -  Gentamicin: S <=4    -  Levofloxacin: S <=2    -  Meropenem: S <=1    -  Piperacillin/Tazobactam: S <=16    -  Tobramycin: S <=4    -  Trimethoprim/Sulfamethoxazole: S <=2/38    Specimen Source: .Blood None    Organism: Blood Culture PCR    Organism: Proteus mirabilis    Culture Results:   Growth in aerobic and anaerobic bottles: Proteus mirabilis  "Due to technical problems, Proteus sp. will Not be reported as part of  the BCID panel until further notice"  ***Blood Panel PCR results on this specimen are available  approximately 3 hours after the Gram stain result.***  Gram stain, PCR, and/or culture results may not always  correspond due to difference in methodologies.  ************************************************************  This PCR assay was performed using [employer].  The following targets are tested for: Enterococcus,  vancomycin resistant enterococci, Listeria monocytogenes,  coagulase negative staphylococci, S. aureus,  methicillin resistant S. aureus, Streptococcus agalactiae  (Group B), S. pneumoniae, S. pyogenes (Group A),  Acinetobacter baumannii, Enterobacter cloacae, E. coli,  Klebsiella oxytoca, K. pneumoniae, Proteus sp.,  Serratia marcescens, Haemophilus influenzae,  Neisseria meningitidis, Pseudomonas aeruginosa, Candida  albicans, C. glabrata, C krusei, C parapsilosis,  C. tropicalis and the KPC resistance gene.    Organism Identification: Blood Culture PCR  Proteus mirabilis    Method Type: PCR    Method Type: BENITO      Radiology: all available radiological tests reviewed    < from: CT Chest w/ IV Cont (02.27.19 @ 22:42) >    EXAM:  CT ABDOMEN AND PELVIS OC IC                          EXAM:  CT CHEST IC                            PROCEDURE DATE:  02/27/2019          INTERPRETATION:      Three examinations were performed on this patient:   1. CT scan of the chest with intravenous contrast  2. CT scan of the abdomen with intravenous contrast  3. CT scan of the pelvis with intravenous contrast        CLINICAL INFORMATION (all 3 exams):      Cough sepsis abdominal pain        TECHNIQUE:  Contiguous axial 3 mm sections were obtained through the   chest, abdomen and pelvis using single helical acquisition.  Images were   acquired during the rapid bolus administration of 95 cc of Omnipaque 350/   5 cc discarded.  Imaging post processing software was employed to   generate reformatted images in 3 mm sagittal and coronal imaging planes.     No Oral contrast was administered.   This scan was performed using   automatic exposure control (radiation dose reduction software) to obtain   a diagnostic image quality scan with patient dose as low as reasonably   achievable.         FINDINGS:   No previous examinations are available for review.    The thyroid gland appears intact.    The lungs demonstrate mild BILATERAL pleural effusions with underlying   infiltrates. Atelectasis seen within the lingula. The trachea and major   bronchi are patent.    No enlarged axillary, hilar or mediastinal lymph nodes are found.   The   heart size is normal. The chest wall and thoracic spine are unremarkable.    The liver demonstrates homogeneous attenuation without focal lesion or   abnormal enhancement.  Hepatic size and contours are maintained. Hepatic   and portal veins are patent and not displaced.  No intrahepatic or common   ductal dilatation is recognized.  The gallbladder is intact without   calcified calculi or wall thickening.  The pancreas is intact without   ductal dilatation or focal lesion.  The spleen is normal in size.    The adrenal glands are intact.  The kidneys demonstrate symmetric   nephrograms. Haziness noted in the LEFT renal hilum may reflect mild   pyelitis. No ureteral dilatation is noted. No suspicious renal mass is   found. 6 mm calculus is seen in the lower pole of the LEFT kidney.  No   hydronephrosis or perinephric infiltration is found.The bladder appears   unremarkable.    Small calcified fibroid in uterus    No enlarged lymph nodes are found.  No ascites is present.   The osseous   structures show lumbar fusion at L1-L4.          The bowel shows mild stool retention.  No obstruction, perforation or   abscess is recognized.           IMPRESSION:          1.   Chest:   mild BILATERAL pleural effusions with underlying   infiltrates. Atelectasis seen within the lingula.       2.   Abdomen and pelvis: Haziness noted in the LEFT renal hilum may   reflect mild pyelitis. No ureteral dilatation is noted . Mild stool   retention.  6 mm calculus in lower pole of LEFT kidney.      < from: Xray Thoracic Spine 2 View (02.26.19 @ 09:29) >    EXAM:  XR T SPINE 2 VIEWS                            PROCEDURE DATE:  02/26/2019          INTERPRETATION:  XR T SPINE 2 VIEWS    CLINICAL INDICATION: x-ray of thoracic spine s/p fall    VIEWS/TECHNIQUE: AP and lateral views of the thoracic spine were obtained.    COMPARISON:  Chest radiograph dated 10/3/2018.      FINDINGS:      There is thoracic process.    There is a chronic moderate anterior T8 vertebral body wedge deformity,   unchanged. Remaining thoracic vertebral body heights are maintained.   Thoracic vertebral body alignment is preserved.    There is mild multilevel thoracic intervertebral disc height loss and   small endplate osteophytosis.    There is redemonstration of partially imaged lumbar surgical fusion   hardware.     IMPRESSION:    No acute fracture of the thoracic spine.    Unchanged moderate anterior T8 vertebral body wedge deformity.    Partially imaged lumbar surgical fusion hardware.      < end of copied text >    EXAM:  CT ABDOMEN AND PELVIS                              INTERPRETATION:  Exam Type:  CT ABDOMEN AND PELVIS   Date and Time: 2/23/2019 2:23 PM  Indication: Abdominal pain and hematuria  Compared to: Ultrasound abdomen 2/23/2019  Technique: CT of the ABDOMEN and PELVIS:  No intravenous contrast was   administered at physician request. This limits sensitivity for certain   processes. Oral contrast was not administered.    COMMENTS:    LOWER LUNGS AND PLEURA: Trace to small left pleural effusion with minimal   left basilar atelectasis. Coronary vascular calcification.    LIVER: Hepatomegaly. Surface contour nodularity suggesting cirrhosis. No   focal hepatic masses.  BILE DUCTS: normal caliber.  GALLBLADDER:     no wall thickening. Gallstones are not excluded.  PANCREAS: within normal limits.  SPLEEN: within normal limits.  ADRENALS: within normal limits.    KIDNEYS, URETERS AND BLADDER: Mild left hydroureteronephrosis secondary   to a 0.8 cm calculus at the left ureterovesicular junction. Minimal left   perinephric stranding and trace left perinephric fluid. Nonobstructive   left intrarenal calculi. No right hydronephrosis. Right ureter appears   unremarkable. Bladder is otherwise within normal limits.    PELVIS: Fundal uterine calcifications. No adnexal masses. No pelvic   adenopathy or pelvic free fluid.       BOWEL: The unopacified bowel is of normal course and caliber without   evidence of obstruction or bowel wall thickening. Periampullaryduodenal   diverticula..  PERITONEUM: no ascites or free air, no fluid collection.  RETROPERITONEUM: within normal limits.      VESSELS: atherosclerotic changes.      ABDOMINAL WALL: within normal limits.  BONES: Degenerative changes. Prior pedicle fusion.     IMPRESSION:     Mild left hydroureteronephrosis secondary to a 0.8 cm calculus at the   left ureterovesicular junction.        Advanced directives addressed: full resuscitation

## 2019-02-28 NOTE — PROGRESS NOTE ADULT - SUBJECTIVE AND OBJECTIVE BOX
· Subjective and Objective: 	  History of Present Illness: 	  The patient is a 72 y/o female with PMHx of DJD, s/p L spine fusion, R TKR, L arm Erb's palsy who was seen in  ER on 19 for L flank pain, uper back pain, chills,dysuria, nausea, and was diagnoestd with L UVJ 0.8 cm stone, UTI, was given IV AB and discharged home on PO antibiotics. Her BC from 19 grew gram negative rods in the anaerobic bottle and she was called back to the ER. The patient states her symptoms are improved today.     Family Hx.: Mother had MI at age 74,  father  of dementia at age 95.   - report thoracic spine pain  - still with intractable upper back pain, no BM for 7days, pasing gases, no nausea    HEENT: PRRL EOMI    	MOUTH/TEETH/GUMS: Clear    	NECK: no JVD    	LUNGS:inspiratory crackles bibasilar lung fields    	HEART: S1,S2 RRABDOMEN: soft  mild RUq tenderness  2EXTREMITIES:  mild leg edema:   MUSCULOSKELETAL: no joint swelling.has upper thoracic spine tenderness · Subjective and Objective: 	  History of Present Illness: 	  The patient is a 74 y/o female with PMHx of DJD, s/p L spine fusion, R TKR, L arm Erb's palsy who was seen in  ER on 19 for L flank pain, uper back pain, chills,dysuria, nausea, and was diagnoestd with L UVJ 0.8 cm stone, UTI, was given IV AB and discharged home on PO antibiotics. Her BC from 19 grew gram negative rods in the anaerobic bottle and she was called back to the ER. The patient states her symptoms are improved today.     Family Hx.: Mother had MI at age 74,  father  of dementia at age 95.   - report thoracic spine pain  - still with intractable upper back pain, no BM for 7days, pasing gases, no nausea   still with upper back pain,pending MRI spine, patient was seen at 10am     HEENT: PRRL EOMI    	MOUTH/TEETH/GUMS: Clear    	NECK: no JVD    	LUNGS:inspiratory crackles bibasilar lung fields    	HEART: S1,S2 RRABDOMEN: soft  , nontender, had bowel movement today  2EXTREMITIES:  mild leg edema:   MUSCULOSKELETAL: no joint swelling.has upper thoracic spine tenderness        PHYSICAL EXAM:    Daily     Daily     ICU Vital Signs Last 24 Hrs  T(C): 38.7 (2019 18:20), Max: 39 (2019 23:10)  T(F): 101.6 (2019 18:20), Max: 102.2 (2019 23:10)  HR: 98 (2019 18:20) (75 - 115)  BP: 131/64 (2019 18:20) (103/38 - 150/75)  BP(mean): --  ABP: --  ABP(mean): --  RR: 16 (2019 18:20) (15 - 18)  SpO2: 96% (2019 18:20) (91% - 97%)                                11.4   6.06  )-----------( 269      ( 2019 07:15 )             32.9       CBC Full  -  ( 2019 07:15 )  WBC Count : 6.06 K/uL  Hemoglobin : 11.4 g/dL  Hematocrit : 32.9 %  Platelet Count - Automated : 269 K/uL  Mean Cell Volume : 88.7 fl  Mean Cell Hemoglobin : 30.7 pg  Mean Cell Hemoglobin Concentration : 34.7 gm/dL  Auto Neutrophil # : 3.84 K/uL  Auto Lymphocyte # : 1.04 K/uL  Auto Monocyte # : 0.80 K/uL  Auto Eosinophil # : 0.04 K/uL  Auto Basophil # : 0.05 K/uL  Auto Neutrophil % : 63.3 %  Auto Lymphocyte % : 17.2 %  Auto Monocyte % : 13.2 %  Auto Eosinophil % : 0.7 %  Auto Basophil % : 0.8 %          140  |  103  |  12  ----------------------------<  100<H>  3.5   |  29  |  0.63    Ca    8.1<L>      2019 07:15    TPro  5.7<L>  /  Alb  1.9<L>  /  TBili  0.5  /  DBili  0.2  /  AST  36  /  ALT  40  /  AlkPhos  172<H>        LIVER FUNCTIONS - ( 2019 07:15 )  Alb: 1.9 g/dL / Pro: 5.7 gm/dL / ALK PHOS: 172 U/L / ALT: 40 U/L / AST: 36 U/L / GGT: x                               MEDICATIONS  (STANDING):  cefTRIAXone Injectable. 2000 milliGRAM(s) IV Push every 24 hours  cholecalciferol 1000 Unit(s) Oral daily  cyclobenzaprine 5 milliGRAM(s) Oral three times a day  docusate sodium 100 milliGRAM(s) Oral three times a day  lactated ringers. 1000 milliLiter(s) (75 mL/Hr) IV Continuous <Continuous>  lidocaine   Patch 1 Patch Transdermal every 24 hours  polyethylene glycol 3350 17 Gram(s) Oral daily  senna 2 Tablet(s) Oral at bedtime  vancomycin  IVPB 1000 milliGRAM(s) IV Intermittent every 12 hours

## 2019-02-28 NOTE — PROGRESS NOTE ADULT - ASSESSMENT
74 y/o female with PMHx of DJD, s/p L spine fusion, R TKR, L arm Erb's palsy who was seen in  ER on 2/23/19 for L flank pain, upper back pain, chills, dysuria, nausea, and was diagnoestd with L UVJ 0.8 cm stone, UTI, was given IV AB and discharged home on PO antibiotics. Her BC from 2/23/19 grew gram negative rods in the anaerobic bottle and she was called back to the ER. Eval by urology, blood cx/urine cx growing GNRs was given IV rocephin.     1. fever/proteus sepsis/pyelonephritis-cystitis/L UVJ stone/back pain  - fevers noted - ? passed stone repeat imaging reviewed, improvement in changes along kidney from prior ? mild pyelitis  - back pain improved but plan for further MRI imaging to assess  - s/p vanco/cefepime o/n f/u repeat cx  - blood cx-urine cx 2/23 proteus S cephalosporins repeat cx 2/24 (-)  - on IV rocephin increase to [id number] #5   - continue with abx coverage   - urology eval appreciated   - further stone tx in future  - monitor temps  - tolerating abx well so far; no side effects noted  - reason for abx use and side effects reviewed with patient  - fu cbc

## 2019-02-28 NOTE — CHART NOTE - NSCHARTNOTEFT_GEN_A_CORE
72 y/o female with PMHx of DJD, s/p L spine fusion, R TKR, L arm Erb's palsy who was seen in  ER on 2/23/19 for L flank pain, upper back pain, chills, dysuria, nausea, and was diagnosed with L UVJ 0.8 cm stone, UTI, was given IV AB and discharged home on PO antibiotics. Her BC from 2/23/19 grew gram negative rods in the anaerobic bottle and she was called back to the ER. Eval by urology, blood cx/urine cx growing GNRs was given IV Rocephin.    Called by RN to report temp of 102.2     CT chest showed:  mild BILATERAL pleural effusions with underlying   infiltrates. Atelectasis seen within the lingula.   CT Abdomen and pelvis: Haziness noted in the LEFT renal hilum may   reflect mild pyelitis. No ureteral dilatation is noted . Mild stool   retention.  6 mm calculus in lower pole of LEFT kidney.    Vital Signs Last 24 Hrs  T(C): 37.6 (28 Feb 2019 00:10), Max: 39 (27 Feb 2019 23:10)  T(F): 99.6 (28 Feb 2019 00:10), Max: 102.2 (27 Feb 2019 23:10)  HR: 111 (28 Feb 2019 00:10) (93 - 115)  BP: 138/50 (28 Feb 2019 00:10) (137/72 - 153/68)  RR: 18 (28 Feb 2019 00:10) (18 - 19)  SpO2: 92% (28 Feb 2019 00:10) (92% - 97%)    Bilateral infiltrate   Mild Pyelitis  -Start on Cefepime and Vanco  -ID f/u in am  -Tylenol PRN for fever  -Repeat CBC and blood cultures x 2  -bp is stable hold off on IVF  -Urology f/u    Case discussed with Dr. Parra 74 y/o female with PMHx of DJD, s/p L spine fusion, R TKR, L arm Erb's palsy who was seen in  ER on 2/23/19 for L flank pain, upper back pain, chills, dysuria, nausea, and was diagnosed with L UVJ 0.8 cm stone, UTI, was given IV AB and discharged home on PO antibiotics. Her BC from 2/23/19 grew gram negative rods in the anaerobic bottle and she was called back to the ER. Eval by urology, blood cx/urine cx growing GNRs was given IV Rocephin.    Called by RN to report temp of 102.2     CT chest showed:  mild BILATERAL pleural effusions with underlying   infiltrates. Atelectasis seen within the lingula.   CT Abdomen and pelvis: Haziness noted in the LEFT renal hilum may   reflect mild pyelitis. No ureteral dilatation is noted . Mild stool   retention.  6 mm calculus in lower pole of LEFT kidney.      Bilateral infiltrate   Mild Pyelitis  -Start on Cefepime and Vanco  -ID f/u in am  -Tylenol PRN for fever  -Repeat CBC and blood cultures x 2  -bp is stable at 150/75, hold off on IVF  -Urology f/u    Case discussed with Dr. Parra 74 y/o female with PMHx of DJD, s/p L spine fusion, R TKR, L arm Erb's palsy who was seen in  ER on 2/23/19 for L flank pain, upper back pain, chills, dysuria, nausea, and was diagnosed with L UVJ 0.8 cm stone, UTI, was given IV AB and discharged home on PO antibiotics. Her BC from 2/23/19 grew gram negative rods in the anaerobic bottle and she was called back to the ER. Eval by urology, blood cx/urine cx growing GNRs was given IV Rocephin.    Called by RN to report temp of 102.2 Pt reports cough with occasional phlegm     CT chest showed:  mild BILATERAL pleural effusions with underlying   infiltrates. Atelectasis seen within the lingula.   CT Abdomen and pelvis: Haziness noted in the LEFT renal hilum may   reflect mild pyelitis. No ureteral dilatation is noted . Mild stool   retention.  6 mm calculus in lower pole of LEFT kidney.    Vital Signs Last 24 Hrs  T(C): 37.6 (28 Feb 2019 00:10), Max: 39 (27 Feb 2019 23:10)  T(F): 99.6 (28 Feb 2019 00:10), Max: 102.2 (27 Feb 2019 23:10)  HR: 111 (28 Feb 2019 00:10) (93 - 115)  BP: 138/50 (28 Feb 2019 00:10) (137/72 - 153/68)  RR: 18 (28 Feb 2019 00:10) (18 - 19)  SpO2: 92% (28 Feb 2019 00:10) (92% - 97%)    Gen: alert and oriented x 3  Cardiac: S1 s2 regular  Lungs: + rhonchi b/l  Abd; + BS, soft, NT  Ext: + edema b/l    Bilateral infiltrate   Mild Pyelitis  -Start on Cefepime and Vanco  -ID f/u in am  -Tylenol PRN for fever  -Repeat CBC and blood cultures x 2  -Urology f/u    Case discussed with Dr. Parra

## 2019-02-28 NOTE — PROGRESS NOTE ADULT - ASSESSMENT
- bilateral mild infiltrates with atelectasis noted in the ct scan of the chest while the patient is on antibiotics for the urosepsis with the recurrent fever with stone with the mild hydronephrosis concern is for the urosepsis rather than pneumonia accounting for the fevers    - upper mid back pain likely spine related   - renal stone with urosepsis likely source of the fever.  - significant arthritis of spine     PLAN     - change of iv antibiotics to more broad spectrum while pending repeat cultures after the I.D follow up if indicated zosyn  and repeat pan cultures   - no further lasix with the recurrent fevers   - suggested reconsult urology for the renal stone with either stent or removal   - incentive spirometry and symptomatic relief for the cough

## 2019-02-28 NOTE — PROGRESS NOTE ADULT - SUBJECTIVE AND OBJECTIVE BOX
Patient seen chart reviewed.    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  T1-2 discitis with ventral collection. no cord compression noted.    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)  Otherwise no focal weakness in UE, LE  No ankle clonus, downgoing babinski, 2/4 left knee jerk, decreased rigth knee jerk (S/P TKR), decreased reflexes at ankles    IMP: T1-2 discits, ventral epidural inflammation, ?phlegmon  Neuro intact  + Proteus in blood, but thi sis unusual source of discitis    SUGGEST:  Suggest starting VANCO again for Gr + coverage  Needs to be in Neuro q 1 hr checks - to ICU  Maybe able to manage non-operatively is pain is controlled and neuro remains intact, and she defervesces    Discussed surgical indications with patient as well as timing    Attempted to discuss with Dr Anthony - hernán Epperson MD

## 2019-03-01 LAB
ANION GAP SERPL CALC-SCNC: 7 MMOL/L — SIGNIFICANT CHANGE UP (ref 5–17)
BASOPHILS # BLD AUTO: 0.07 K/UL — SIGNIFICANT CHANGE UP (ref 0–0.2)
BASOPHILS NFR BLD AUTO: 1 % — SIGNIFICANT CHANGE UP (ref 0–2)
BUN SERPL-MCNC: 12 MG/DL — SIGNIFICANT CHANGE UP (ref 7–23)
CALCIUM SERPL-MCNC: 8 MG/DL — LOW (ref 8.5–10.1)
CHLORIDE SERPL-SCNC: 100 MMOL/L — SIGNIFICANT CHANGE UP (ref 96–108)
CO2 SERPL-SCNC: 30 MMOL/L — SIGNIFICANT CHANGE UP (ref 22–31)
CREAT SERPL-MCNC: 0.65 MG/DL — SIGNIFICANT CHANGE UP (ref 0.5–1.3)
CULTURE RESULTS: SIGNIFICANT CHANGE UP
CULTURE RESULTS: SIGNIFICANT CHANGE UP
EOSINOPHIL # BLD AUTO: 0.06 K/UL — SIGNIFICANT CHANGE UP (ref 0–0.5)
EOSINOPHIL NFR BLD AUTO: 0.8 % — SIGNIFICANT CHANGE UP (ref 0–6)
GLUCOSE SERPL-MCNC: 120 MG/DL — HIGH (ref 70–99)
HCT VFR BLD CALC: 32.2 % — LOW (ref 34.5–45)
HGB BLD-MCNC: 11 G/DL — LOW (ref 11.5–15.5)
IMM GRANULOCYTES NFR BLD AUTO: 2.2 % — HIGH (ref 0–1.5)
LYMPHOCYTES # BLD AUTO: 1.72 K/UL — SIGNIFICANT CHANGE UP (ref 1–3.3)
LYMPHOCYTES # BLD AUTO: 23.4 % — SIGNIFICANT CHANGE UP (ref 13–44)
MCHC RBC-ENTMCNC: 30.9 PG — SIGNIFICANT CHANGE UP (ref 27–34)
MCHC RBC-ENTMCNC: 34.2 GM/DL — SIGNIFICANT CHANGE UP (ref 32–36)
MCV RBC AUTO: 90.4 FL — SIGNIFICANT CHANGE UP (ref 80–100)
MONOCYTES # BLD AUTO: 0.79 K/UL — SIGNIFICANT CHANGE UP (ref 0–0.9)
MONOCYTES NFR BLD AUTO: 10.7 % — SIGNIFICANT CHANGE UP (ref 2–14)
NEUTROPHILS # BLD AUTO: 4.55 K/UL — SIGNIFICANT CHANGE UP (ref 1.8–7.4)
NEUTROPHILS NFR BLD AUTO: 61.9 % — SIGNIFICANT CHANGE UP (ref 43–77)
NRBC # BLD: 0 /100 WBCS — SIGNIFICANT CHANGE UP (ref 0–0)
PLATELET # BLD AUTO: 313 K/UL — SIGNIFICANT CHANGE UP (ref 150–400)
POTASSIUM SERPL-MCNC: 3.8 MMOL/L — SIGNIFICANT CHANGE UP (ref 3.5–5.3)
POTASSIUM SERPL-SCNC: 3.8 MMOL/L — SIGNIFICANT CHANGE UP (ref 3.5–5.3)
RBC # BLD: 3.56 M/UL — LOW (ref 3.8–5.2)
RBC # FLD: 15.1 % — HIGH (ref 10.3–14.5)
SODIUM SERPL-SCNC: 137 MMOL/L — SIGNIFICANT CHANGE UP (ref 135–145)
SPECIMEN SOURCE: SIGNIFICANT CHANGE UP
SPECIMEN SOURCE: SIGNIFICANT CHANGE UP
WBC # BLD: 7.35 K/UL — SIGNIFICANT CHANGE UP (ref 3.8–10.5)
WBC # FLD AUTO: 7.35 K/UL — SIGNIFICANT CHANGE UP (ref 3.8–10.5)

## 2019-03-01 RX ADMIN — LIDOCAINE 1 PATCH: 4 CREAM TOPICAL at 12:43

## 2019-03-01 RX ADMIN — HYDROMORPHONE HYDROCHLORIDE 0.5 MILLIGRAM(S): 2 INJECTION INTRAMUSCULAR; INTRAVENOUS; SUBCUTANEOUS at 03:00

## 2019-03-01 RX ADMIN — LIDOCAINE 1 PATCH: 4 CREAM TOPICAL at 21:56

## 2019-03-01 RX ADMIN — CEFTRIAXONE 2000 MILLIGRAM(S): 500 INJECTION, POWDER, FOR SOLUTION INTRAMUSCULAR; INTRAVENOUS at 09:37

## 2019-03-01 RX ADMIN — HYDROMORPHONE HYDROCHLORIDE 0.5 MILLIGRAM(S): 2 INJECTION INTRAMUSCULAR; INTRAVENOUS; SUBCUTANEOUS at 12:20

## 2019-03-01 RX ADMIN — HYDROMORPHONE HYDROCHLORIDE 0.5 MILLIGRAM(S): 2 INJECTION INTRAMUSCULAR; INTRAVENOUS; SUBCUTANEOUS at 02:43

## 2019-03-01 RX ADMIN — Medication 250 MILLIGRAM(S): at 17:33

## 2019-03-01 RX ADMIN — Medication 250 MILLIGRAM(S): at 05:35

## 2019-03-01 RX ADMIN — CYCLOBENZAPRINE HYDROCHLORIDE 5 MILLIGRAM(S): 10 TABLET, FILM COATED ORAL at 14:42

## 2019-03-01 RX ADMIN — Medication 100 MILLIGRAM(S): at 14:42

## 2019-03-01 RX ADMIN — HYDROMORPHONE HYDROCHLORIDE 0.5 MILLIGRAM(S): 2 INJECTION INTRAMUSCULAR; INTRAVENOUS; SUBCUTANEOUS at 13:00

## 2019-03-01 RX ADMIN — CYCLOBENZAPRINE HYDROCHLORIDE 5 MILLIGRAM(S): 10 TABLET, FILM COATED ORAL at 21:55

## 2019-03-01 RX ADMIN — SODIUM CHLORIDE 75 MILLILITER(S): 9 INJECTION, SOLUTION INTRAVENOUS at 12:44

## 2019-03-01 NOTE — PROGRESS NOTE ADULT - SUBJECTIVE AND OBJECTIVE BOX
Date of service: 02-27-19 @ 10:46    pt seen and examined  noted with fever to 102 o/n   back pain improved this am  no urinary difficulty no dysuria or flank pain  remains constipated but passing gas       ROS: denies dizziness, no HA, no SOB or cough, no abdominal pain, no diarrhea or constipation; no legs pain, no rashes    Date of service: 02-28-19 @ 11:41    ROS: no fever or chills; denies dizziness, no HA, no SOB or cough, no abdominal pain, no diarrhea or constipation; no dysuria, no urinary frequency, no legs pain, no rashes    MEDICATIONS  (STANDING):  cefTRIAXone Injectable. 2000 milliGRAM(s) IV Push every 24 hours  cholecalciferol 1000 Unit(s) Oral daily  cyclobenzaprine 5 milliGRAM(s) Oral three times a day  docusate sodium 100 milliGRAM(s) Oral three times a day  heparin  Injectable 5000 Unit(s) SubCutaneous every 12 hours  lidocaine   Patch 1 Patch Transdermal every 24 hours  polyethylene glycol 3350 17 Gram(s) Oral daily  senna 2 Tablet(s) Oral at bedtime  sodium chloride 0.9%. 1000 milliLiter(s) (250 mL/Hr) IV Continuous <Continuous>  sodium chloride 0.9%. 1000 milliLiter(s) (60 mL/Hr) IV Continuous <Continuous>      Vital Signs Last 24 Hrs  T(C): 36.6 (28 Feb 2019 11:35), Max: 39 (27 Feb 2019 23:10)  T(F): 97.8 (28 Feb 2019 11:35), Max: 102.2 (27 Feb 2019 23:10)  HR: 81 (28 Feb 2019 11:35) (75 - 115)  BP: 103/38 (28 Feb 2019 11:35) (103/38 - 150/75)  BP(mean): --  RR: 16 (28 Feb 2019 11:35) (15 - 18)  SpO2: 97% (28 Feb 2019 11:35) (91% - 97%)      PE:  Constitutional: frail looking  HEENT: NC/AT, EOMI, PERRLA, conjunctivae clear; ears and nose atraumatic; pharynx benign  Neck: supple; thyroid not palpable  Back: no tenderness  Respiratory: respiratory effort normal; clear to auscultation  Cardiovascular: S1S2 regular, no murmurs  Abdomen: soft, not tender, not distended, positive BS; liver and spleen WNL  Genitourinary: no suprapubic tenderness L CVA tenderness  Lymphatic: no LN palpable  Musculoskeletal: no muscle tenderness, no joint swelling or tenderness  Extremities: no pedal edema  Neurological/ Psychiatric: moving all extremities  Skin: no rashes; no palpable lesions    Labs: all available labs reviewed                                   11.4   6.06  )-----------( 269      ( 28 Feb 2019 07:15 )             32.9     02-28    140  |  103  |  12  ----------------------------<  100<H>  3.5   |  29  |  0.63    Ca    8.1<L>      28 Feb 2019 07:15    TPro  5.7<L>  /  Alb  1.9<L>  /  TBili  0.5  /  DBili  0.2  /  AST  36  /  ALT  40  /  AlkPhos  172<H>  02-28           Culture - Urine (02.24.19 @ 22:00)    Specimen Source: .Urine Clean Catch (Midstream)    Culture Results:   No growth    Culture - Blood (02.24.19 @ 11:39)    Specimen Source: .Blood None    Culture Results:   No growth to date.    Culture - Blood (02.23.19 @ 12:47)    Gram Stain:   Growth in anaerobic bottle: Gram Negative Rods  Growth in aerobic bottle: Gram Negative Rods    -  Amikacin: S <=16    -  Ampicillin: S <=8 These ampicillin results predict results for amoxicillin    -  Ampicillin/Sulbactam: S <=8/4    -  Aztreonam: S <=4    -  Cefazolin: S <=8    -  Multidrug (KPC pos) resistant organism: Nondet    -  Staphylococcus aureus: Nondet    -  Methicillin resistant Staphylococcus aureus (MRSA): Nondet    -  Coagulase negative Staphylococcus: Nondet    -  Enterococcus species: Nondet    -  Vancomycin resistant Enterococcus sp.: Nondet    -  Escherichia coli: Nondet    -  Klebsiella oxytoca: Nondet    -  Klebsiella pneumoniae: Nondet    -  Serratia marcescens: Nondet    -  Haemophilus influenzae: Nondet    -  Listeria monocytogenes: Nondet    -  Neisseria meningitidis: Nondet    -  Pseudomonas aeruginosa: Nondet    -  Acinetobacter baumanii: Nondet    -  Enterobacter cloacae complex: Nondet    -  Streptococcus sp. (Not Grp A, B or S pneumoniae): Nondet    -  Streptococcus agalactiae (Group B): Nondet    -  Streptococcus pyogenes (Group A): Nondet    -  Streptococcus pneumoniae: Nondet    -  Candida albicans: Nondet    -  Candida glabrata: Nondet    -  Candida krusei: Nondet    -  Candida parapsilosis: Nondet    -  Candida tropicalis: Nondet    -  Cefepime: S <=4    -  Cefoxitin: S <=8    -  Ceftriaxone: S <=1 Enterobacter, Citrobacter, and Serratia may develop resistance during prolonged therapy    -  Ciprofloxacin: S <=1    -  Ertapenem: S <=1    -  Gentamicin: S <=4    -  Levofloxacin: S <=2    -  Meropenem: S <=1    -  Piperacillin/Tazobactam: S <=16    -  Tobramycin: S <=4    -  Trimethoprim/Sulfamethoxazole: S <=2/38    Specimen Source: .Blood None    Organism: Blood Culture PCR    Organism: Proteus mirabilis    Culture Results:   Growth in aerobic and anaerobic bottles: Proteus mirabilis  "Due to technical problems, Proteus sp. will Not be reported as part of  the BCID panel until further notice"  ***Blood Panel PCR results on this specimen are available  approximately 3 hours after the Gram stain result.***  Gram stain, PCR, and/or culture results may not always  correspond due to difference in methodologies.  ************************************************************  This PCR assay was performed using [employer].  The following targets are tested for: Enterococcus,  vancomycin resistant enterococci, Listeria monocytogenes,  coagulase negative staphylococci, S. aureus,  methicillin resistant S. aureus, Streptococcus agalactiae  (Group B), S. pneumoniae, S. pyogenes (Group A),  Acinetobacter baumannii, Enterobacter cloacae, E. coli,  Klebsiella oxytoca, K. pneumoniae, Proteus sp.,  Serratia marcescens, Haemophilus influenzae,  Neisseria meningitidis, Pseudomonas aeruginosa, Candida  albicans, C. glabrata, C krusei, C parapsilosis,  C. tropicalis and the KPC resistance gene.    Organism Identification: Blood Culture PCR  Proteus mirabilis    Method Type: PCR    Method Type: BENITO      Radiology: all available radiological tests reviewed    < from: CT Chest w/ IV Cont (02.27.19 @ 22:42) >    EXAM:  CT ABDOMEN AND PELVIS OC IC                          EXAM:  CT CHEST IC                            PROCEDURE DATE:  02/27/2019          INTERPRETATION:      Three examinations were performed on this patient:   1. CT scan of the chest with intravenous contrast  2. CT scan of the abdomen with intravenous contrast  3. CT scan of the pelvis with intravenous contrast        CLINICAL INFORMATION (all 3 exams):      Cough sepsis abdominal pain        TECHNIQUE:  Contiguous axial 3 mm sections were obtained through the   chest, abdomen and pelvis using single helical acquisition.  Images were   acquired during the rapid bolus administration of 95 cc of Omnipaque 350/   5 cc discarded.  Imaging post processing software was employed to   generate reformatted images in 3 mm sagittal and coronal imaging planes.     No Oral contrast was administered.   This scan was performed using   automatic exposure control (radiation dose reduction software) to obtain   a diagnostic image quality scan with patient dose as low as reasonably   achievable.         FINDINGS:   No previous examinations are available for review.    The thyroid gland appears intact.    The lungs demonstrate mild BILATERAL pleural effusions with underlying   infiltrates. Atelectasis seen within the lingula. The trachea and major   bronchi are patent.    No enlarged axillary, hilar or mediastinal lymph nodes are found.   The   heart size is normal. The chest wall and thoracic spine are unremarkable.    The liver demonstrates homogeneous attenuation without focal lesion or   abnormal enhancement.  Hepatic size and contours are maintained. Hepatic   and portal veins are patent and not displaced.  No intrahepatic or common   ductal dilatation is recognized.  The gallbladder is intact without   calcified calculi or wall thickening.  The pancreas is intact without   ductal dilatation or focal lesion.  The spleen is normal in size.    The adrenal glands are intact.  The kidneys demonstrate symmetric   nephrograms. Haziness noted in the LEFT renal hilum may reflect mild   pyelitis. No ureteral dilatation is noted. No suspicious renal mass is   found. 6 mm calculus is seen in the lower pole of the LEFT kidney.  No   hydronephrosis or perinephric infiltration is found.The bladder appears   unremarkable.    Small calcified fibroid in uterus    No enlarged lymph nodes are found.  No ascites is present.   The osseous   structures show lumbar fusion at L1-L4.          The bowel shows mild stool retention.  No obstruction, perforation or   abscess is recognized.           IMPRESSION:          1.   Chest:   mild BILATERAL pleural effusions with underlying   infiltrates. Atelectasis seen within the lingula.       2.   Abdomen and pelvis: Haziness noted in the LEFT renal hilum may   reflect mild pyelitis. No ureteral dilatation is noted . Mild stool   retention.  6 mm calculus in lower pole of LEFT kidney.      < from: Xray Thoracic Spine 2 View (02.26.19 @ 09:29) >    EXAM:  XR T SPINE 2 VIEWS                            PROCEDURE DATE:  02/26/2019          INTERPRETATION:  XR T SPINE 2 VIEWS    CLINICAL INDICATION: x-ray of thoracic spine s/p fall    VIEWS/TECHNIQUE: AP and lateral views of the thoracic spine were obtained.    COMPARISON:  Chest radiograph dated 10/3/2018.      FINDINGS:      There is thoracic process.    There is a chronic moderate anterior T8 vertebral body wedge deformity,   unchanged. Remaining thoracic vertebral body heights are maintained.   Thoracic vertebral body alignment is preserved.    There is mild multilevel thoracic intervertebral disc height loss and   small endplate osteophytosis.    There is redemonstration of partially imaged lumbar surgical fusion   hardware.     IMPRESSION:    No acute fracture of the thoracic spine.    Unchanged moderate anterior T8 vertebral body wedge deformity.    Partially imaged lumbar surgical fusion hardware.      < end of copied text >    EXAM:  CT ABDOMEN AND PELVIS                              INTERPRETATION:  Exam Type:  CT ABDOMEN AND PELVIS   Date and Time: 2/23/2019 2:23 PM  Indication: Abdominal pain and hematuria  Compared to: Ultrasound abdomen 2/23/2019  Technique: CT of the ABDOMEN and PELVIS:  No intravenous contrast was   administered at physician request. This limits sensitivity for certain   processes. Oral contrast was not administered.    COMMENTS:    LOWER LUNGS AND PLEURA: Trace to small left pleural effusion with minimal   left basilar atelectasis. Coronary vascular calcification.    LIVER: Hepatomegaly. Surface contour nodularity suggesting cirrhosis. No   focal hepatic masses.  BILE DUCTS: normal caliber.  GALLBLADDER:     no wall thickening. Gallstones are not excluded.  PANCREAS: within normal limits.  SPLEEN: within normal limits.  ADRENALS: within normal limits.    KIDNEYS, URETERS AND BLADDER: Mild left hydroureteronephrosis secondary   to a 0.8 cm calculus at the left ureterovesicular junction. Minimal left   perinephric stranding and trace left perinephric fluid. Nonobstructive   left intrarenal calculi. No right hydronephrosis. Right ureter appears   unremarkable. Bladder is otherwise within normal limits.    PELVIS: Fundal uterine calcifications. No adnexal masses. No pelvic   adenopathy or pelvic free fluid.       BOWEL: The unopacified bowel is of normal course and caliber without   evidence of obstruction or bowel wall thickening. Periampullaryduodenal   diverticula..  PERITONEUM: no ascites or free air, no fluid collection.  RETROPERITONEUM: within normal limits.      VESSELS: atherosclerotic changes.      ABDOMINAL WALL: within normal limits.  BONES: Degenerative changes. Prior pedicle fusion.     IMPRESSION:     Mild left hydroureteronephrosis secondary to a 0.8 cm calculus at the   left ureterovesicular junction.        Advanced directives addressed: full resuscitation Date of service: 03-01-19 @ 12:08    pt seen and examined  had MRI spine 2/28 showing T1-T2 diskitis/OM/epidural abscess? phlegmon  tx to ICU for further care  noted with low grade temps  back pain this am 7/10  no urinary difficulty, flank pain resolving  having bms       ROS: denies dizziness, no HA, no SOB or cough, no abdominal pain, no diarrhea or constipation; no legs pain, no rashes    MEDICATIONS  (STANDING):  cefTRIAXone Injectable. 2000 milliGRAM(s) IV Push every 24 hours  cholecalciferol 1000 Unit(s) Oral daily  cyclobenzaprine 5 milliGRAM(s) Oral three times a day  docusate sodium 100 milliGRAM(s) Oral three times a day  lactated ringers. 1000 milliLiter(s) (75 mL/Hr) IV Continuous <Continuous>  lidocaine   Patch 1 Patch Transdermal every 24 hours  polyethylene glycol 3350 17 Gram(s) Oral daily  senna 2 Tablet(s) Oral at bedtime  vancomycin  IVPB 1000 milliGRAM(s) IV Intermittent every 12 hours      Vital Signs Last 24 Hrs  T(C): 37.3 (01 Mar 2019 09:00), Max: 38.7 (28 Feb 2019 18:20)  T(F): 99.2 (01 Mar 2019 09:00), Max: 101.6 (28 Feb 2019 18:20)  HR: 73 (01 Mar 2019 11:00) (71 - 98)  BP: 118/55 (01 Mar 2019 11:00) (112/42 - 135/50)  BP(mean): 68 (01 Mar 2019 11:00) (57 - 72)  RR: 19 (01 Mar 2019 11:00) (16 - 32)  SpO2: 95% (01 Mar 2019 10:00) (88% - 99%)        PE:  Constitutional: frail looking  HEENT: NC/AT, EOMI, PERRLA, conjunctivae clear; ears and nose atraumatic; pharynx benign  Neck: supple; thyroid not palpable  Back: no tenderness  Respiratory: respiratory effort normal; clear to auscultation  Cardiovascular: S1S2 regular, no murmurs  Abdomen: soft, not tender, not distended, positive BS; liver and spleen WNL  Genitourinary: no suprapubic tenderness L CVA tenderness  Lymphatic: no LN palpable  Musculoskeletal: T1-T2 spinal tenderness  Extremities: no pedal edema  Neurological/ Psychiatric: moving all extremities  Skin: no rashes; no palpable lesions    Labs: all available labs reviewed                          11.0   7.35  )-----------( 313      ( 01 Mar 2019 06:58 )             32.2     03-01    137  |  100  |  12  ----------------------------<  120<H>  3.8   |  30  |  0.65    Ca    8.0<L>      01 Mar 2019 06:58    TPro  5.7<L>  /  Alb  1.9<L>  /  TBili  0.5  /  DBili  0.2  /  AST  36  /  ALT  40  /  AlkPhos  172<H>  02-28                Culture - Urine (02.24.19 @ 22:00)    Specimen Source: .Urine Clean Catch (Midstream)    Culture Results:   No growth    Culture - Blood (02.24.19 @ 11:39)    Specimen Source: .Blood None    Culture Results:   No growth to date.    Culture - Blood (02.23.19 @ 12:47)    Gram Stain:   Growth in anaerobic bottle: Gram Negative Rods  Growth in aerobic bottle: Gram Negative Rods    -  Amikacin: S <=16    -  Ampicillin: S <=8 These ampicillin results predict results for amoxicillin    -  Ampicillin/Sulbactam: S <=8/4    -  Aztreonam: S <=4    -  Cefazolin: S <=8    -  Multidrug (KPC pos) resistant organism: Nondet    -  Staphylococcus aureus: Nondet    -  Methicillin resistant Staphylococcus aureus (MRSA): Nondet    -  Coagulase negative Staphylococcus: Nondet    -  Enterococcus species: Nondet    -  Vancomycin resistant Enterococcus sp.: Nondet    -  Escherichia coli: Nondet    -  Klebsiella oxytoca: Nondet    -  Klebsiella pneumoniae: Nondet    -  Serratia marcescens: Nondet    -  Haemophilus influenzae: Nondet    -  Listeria monocytogenes: Nondet    -  Neisseria meningitidis: Nondet    -  Pseudomonas aeruginosa: Nondet    -  Acinetobacter baumanii: Nondet    -  Enterobacter cloacae complex: Nondet    -  Streptococcus sp. (Not Grp A, B or S pneumoniae): Nondet    -  Streptococcus agalactiae (Group B): Nondet    -  Streptococcus pyogenes (Group A): Nondet    -  Streptococcus pneumoniae: Nondet    -  Candida albicans: Nondet    -  Candida glabrata: Nondet    -  Candida krusei: Nondet    -  Candida parapsilosis: Nondet    -  Candida tropicalis: Nondet    -  Cefepime: S <=4    -  Cefoxitin: S <=8    -  Ceftriaxone: S <=1 Enterobacter, Citrobacter, and Serratia may develop resistance during prolonged therapy    -  Ciprofloxacin: S <=1    -  Ertapenem: S <=1    -  Gentamicin: S <=4    -  Levofloxacin: S <=2    -  Meropenem: S <=1    -  Piperacillin/Tazobactam: S <=16    -  Tobramycin: S <=4    -  Trimethoprim/Sulfamethoxazole: S <=2/38    Specimen Source: .Blood None    Organism: Blood Culture PCR    Organism: Proteus mirabilis    Culture Results:   Growth in aerobic and anaerobic bottles: Proteus mirabilis  "Due to technical problems, Proteus sp. will Not be reported as part of  the BCID panel until further notice"  ***Blood Panel PCR results on this specimen are available  approximately 3 hours after the Gram stain result.***  Gram stain, PCR, and/or culture results may not always  correspond due to difference in methodologies.  ************************************************************  This PCR assay was performed using [employer].  The following targets are tested for: Enterococcus,  vancomycin resistant enterococci, Listeria monocytogenes,  coagulase negative staphylococci, S. aureus,  methicillin resistant S. aureus, Streptococcus agalactiae  (Group B), S. pneumoniae, S. pyogenes (Group A),  Acinetobacter baumannii, Enterobacter cloacae, E. coli,  Klebsiella oxytoca, K. pneumoniae, Proteus sp.,  Serratia marcescens, Haemophilus influenzae,  Neisseria meningitidis, Pseudomonas aeruginosa, Candida  albicans, C. glabrata, C krusei, C parapsilosis,  C. tropicalis and the KPC resistance gene.    Organism Identification: Blood Culture PCR  Proteus mirabilis    Method Type: PCR    Method Type: BENITO      Radiology: all available radiological tests reviewed    < from: CT Chest w/ IV Cont (02.27.19 @ 22:42) >    EXAM:  CT ABDOMEN AND PELVIS OC IC                          EXAM:  CT CHEST IC                            PROCEDURE DATE:  02/27/2019          INTERPRETATION:      Three examinations were performed on this patient:   1. CT scan of the chest with intravenous contrast  2. CT scan of the abdomen with intravenous contrast  3. CT scan of the pelvis with intravenous contrast        CLINICAL INFORMATION (all 3 exams):      Cough sepsis abdominal pain        TECHNIQUE:  Contiguous axial 3 mm sections were obtained through the   chest, abdomen and pelvis using single helical acquisition.  Images were   acquired during the rapid bolus administration of 95 cc of Omnipaque 350/   5 cc discarded.  Imaging post processing software was employed to   generate reformatted images in 3 mm sagittal and coronal imaging planes.     No Oral contrast was administered.   This scan was performed using   automatic exposure control (radiation dose reduction software) to obtain   a diagnostic image quality scan with patient dose as low as reasonably   achievable.         FINDINGS:   No previous examinations are available for review.    The thyroid gland appears intact.    The lungs demonstrate mild BILATERAL pleural effusions with underlying   infiltrates. Atelectasis seen within the lingula. The trachea and major   bronchi are patent.    No enlarged axillary, hilar or mediastinal lymph nodes are found.   The   heart size is normal. The chest wall and thoracic spine are unremarkable.    The liver demonstrates homogeneous attenuation without focal lesion or   abnormal enhancement.  Hepatic size and contours are maintained. Hepatic   and portal veins are patent and not displaced.  No intrahepatic or common   ductal dilatation is recognized.  The gallbladder is intact without   calcified calculi or wall thickening.  The pancreas is intact without   ductal dilatation or focal lesion.  The spleen is normal in size.    The adrenal glands are intact.  The kidneys demonstrate symmetric   nephrograms. Haziness noted in the LEFT renal hilum may reflect mild   pyelitis. No ureteral dilatation is noted. No suspicious renal mass is   found. 6 mm calculus is seen in the lower pole of the LEFT kidney.  No   hydronephrosis or perinephric infiltration is found.The bladder appears   unremarkable.    Small calcified fibroid in uterus    No enlarged lymph nodes are found.  No ascites is present.   The osseous   structures show lumbar fusion at L1-L4.          The bowel shows mild stool retention.  No obstruction, perforation or   abscess is recognized.           IMPRESSION:          1.   Chest:   mild BILATERAL pleural effusions with underlying   infiltrates. Atelectasis seen within the lingula.       2.   Abdomen and pelvis: Haziness noted in the LEFT renal hilum may   reflect mild pyelitis. No ureteral dilatation is noted . Mild stool   retention.  6 mm calculus in lower pole of LEFT kidney.      < from: Xray Thoracic Spine 2 View (02.26.19 @ 09:29) >    EXAM:  XR T SPINE 2 VIEWS                            PROCEDURE DATE:  02/26/2019          INTERPRETATION:  XR T SPINE 2 VIEWS    CLINICAL INDICATION: x-ray of thoracic spine s/p fall    VIEWS/TECHNIQUE: AP and lateral views of the thoracic spine were obtained.    COMPARISON:  Chest radiograph dated 10/3/2018.      FINDINGS:      There is thoracic process.    There is a chronic moderate anterior T8 vertebral body wedge deformity,   unchanged. Remaining thoracic vertebral body heights are maintained.   Thoracic vertebral body alignment is preserved.    There is mild multilevel thoracic intervertebral disc height loss and   small endplate osteophytosis.    There is redemonstration of partially imaged lumbar surgical fusion   hardware.     IMPRESSION:    No acute fracture of the thoracic spine.    Unchanged moderate anterior T8 vertebral body wedge deformity.    Partially imaged lumbar surgical fusion hardware.      < end of copied text >    EXAM:  CT ABDOMEN AND PELVIS                              INTERPRETATION:  Exam Type:  CT ABDOMEN AND PELVIS   Date and Time: 2/23/2019 2:23 PM  Indication: Abdominal pain and hematuria  Compared to: Ultrasound abdomen 2/23/2019  Technique: CT of the ABDOMEN and PELVIS:  No intravenous contrast was   administered at physician request. This limits sensitivity for certain   processes. Oral contrast was not administered.    COMMENTS:    LOWER LUNGS AND PLEURA: Trace to small left pleural effusion with minimal   left basilar atelectasis. Coronary vascular calcification.    LIVER: Hepatomegaly. Surface contour nodularity suggesting cirrhosis. No   focal hepatic masses.  BILE DUCTS: normal caliber.  GALLBLADDER:     no wall thickening. Gallstones are not excluded.  PANCREAS: within normal limits.  SPLEEN: within normal limits.  ADRENALS: within normal limits.    KIDNEYS, URETERS AND BLADDER: Mild left hydroureteronephrosis secondary   to a 0.8 cm calculus at the left ureterovesicular junction. Minimal left   perinephric stranding and trace left perinephric fluid. Nonobstructive   left intrarenal calculi. No right hydronephrosis. Right ureter appears   unremarkable. Bladder is otherwise within normal limits.    PELVIS: Fundal uterine calcifications. No adnexal masses. No pelvic   adenopathy or pelvic free fluid.       BOWEL: The unopacified bowel is of normal course and caliber without   evidence of obstruction or bowel wall thickening. Periampullaryduodenal   diverticula..  PERITONEUM: no ascites or free air, no fluid collection.  RETROPERITONEUM: within normal limits.      VESSELS: atherosclerotic changes.      ABDOMINAL WALL: within normal limits.  BONES: Degenerative changes. Prior pedicle fusion.     IMPRESSION:     Mild left hydroureteronephrosis secondary to a 0.8 cm calculus at the   left ureterovesicular junction.        Advanced directives addressed: full resuscitation

## 2019-03-01 NOTE — PHYSICAL THERAPY INITIAL EVALUATION ADULT - PRECAUTIONS/LIMITATIONS, REHAB EVAL
fall precautions/spinal precautions/no OOB order, called spine LAYLA Minor re: clarification-PA to confer w/ Dr. Epperson, awaiting response

## 2019-03-01 NOTE — PROGRESS NOTE ADULT - SUBJECTIVE AND OBJECTIVE BOX
SUBJECTIVE     patient prior events noted and moved to ICU for close observation with the discitis with the epidural abscess which is likely source of the fever not pneumonia with only mild patchy infiltrates or atelectasis   mild cough with no sob and wheezing and still has fever and seen by the spine  . add vancomycin for the broad coverage     PAST MEDICAL & SURGICAL HISTORY:  Erbs muscular dystrophy: left arm  Thyroid cyst  Osteoarthritis  H/O spinal fusion: 1997 lumbar    OBJECTIVE   Vital Signs Last 24 Hrs  T(C): 38 (01 Mar 2019 06:00), Max: 38.7 (28 Feb 2019 18:20)  T(F): 100.4 (01 Mar 2019 06:00), Max: 101.6 (28 Feb 2019 18:20)  HR: 86 (01 Mar 2019 07:00) (73 - 98)  BP: 123/49 (01 Mar 2019 07:00) (103/38 - 135/50)  BP(mean): 67 (01 Mar 2019 07:00) (57 - 72)  RR: 27 (01 Mar 2019 07:00) (16 - 32)  SpO2: 96% (01 Mar 2019 07:00) (88% - 99%)    Review of systems   as dictated in the history of present illness with the review of other systems non contributory except still has back pain     PHYSICAL EXAM:  Constitutional: , awake and alert, not in distress.  HEENT: Normo cephalic atraumatic  Neck: Soft and supple, No J.V.D   Respiratory: vesicular breathing ,has decreased breath sounds in the bases   Cardiovascular: S1 and S2, regular rate .   Gastrointestinal:  soft, nontender,   Extremities: No  edema or calf tenderness .  Neurological: No new  focal deficits and moving all the extremities     MEDICATIONS  (STANDING):  cefTRIAXone Injectable. 2000 milliGRAM(s) IV Push every 24 hours  cholecalciferol 1000 Unit(s) Oral daily  cyclobenzaprine 5 milliGRAM(s) Oral three times a day  docusate sodium 100 milliGRAM(s) Oral three times a day  lactated ringers. 1000 milliLiter(s) (75 mL/Hr) IV Continuous <Continuous>  lidocaine   Patch 1 Patch Transdermal every 24 hours  polyethylene glycol 3350 17 Gram(s) Oral daily  senna 2 Tablet(s) Oral at bedtime  vancomycin  IVPB 1000 milliGRAM(s) IV Intermittent every 12 hours                            11.0   7.35  )-----------( 313      ( 01 Mar 2019 06:58 )             32.2     03-01    137  |  100  |  12  ----------------------------<  120<H>  3.8   |  30  |  0.65    Ca    8.0<L>      01 Mar 2019 06:58    TPro  5.7<L>  /  Alb  1.9<L>  /  TBili  0.5  /  DBili  0.2  /  AST  36  /  ALT  40  /  AlkPhos  172<H>  02-28      Culture - Blood (collected 27 Feb 2019 14:36)  Source: .Blood None  Preliminary Report (28 Feb 2019 18:02):    No growth to date.    Culture - Blood (collected 27 Feb 2019 14:31)  Source: .Blood None  Preliminary Report (28 Feb 2019 18:02):    No growth to date.       < from: MR Thoracic Spine w/wo IV Cont (02.28.19 @ 15:29) >    FINDINGS:    CERVICAL SPINE:  Structures at the craniocervical and cervicomedullary junction are   intact. There is no evidence for osteomyelitis/discitis of the cervical   spine.    Cervical vertebral body heights are maintained. There is 0.3 cm   retrolisthesis of C3 upon C4 with associated pseudobulge, resulting in   complete effacement of the CSF space and mild impression upon the ventral   cervical cord, with mild hyperintense cervical cord signal suggestive of   myelomalacia (sagittal image 9 series 2 axial image 16 series 8).   Remaining cervical cord is within normal limits without abnormal signal   or enhancement.    There is a well-circumscribed cystic T2/FLAIR hyperintenselesion at the   left C6 pedicle/left C5-C6 neural foramen and slightly extending into the   left C6-C7 neural foramen causing chronic bony scalloping of the C5-C6   vertebral bodies and and widening of the left C5-C6 neural foramen. The   cyst demonstrates a couple of thin enhancing septa. Findings are   suggestive of a cystic schwannoma, perineural (Tarlov) cyst, synovial   cyst, versus primary benign bone lesion. Findings result in mild left   C5-C6 and C6-C7 neural foraminal stenosis, althoughthere is no resultant   spinal canal stenosis. Remaining bone marrow signal is within normal   limits.    There is multilevel intervertebral disc height loss, severe at C3-C4 and   C4-C5. There are endplate osteophytosis at C3-C5.    C2-C3: Mild right neural foraminal stenosis secondary to   uncovertebral/facet arthrosis.  C3-C4: Severe bilateral neural foraminal stenosis secondary to mild   retrolisthesis, osteophyte/disc complex and uncovertebral/facet joint   arthrosis.  C4-C5: Mild-moderate bilateral neural foraminal stenosis secondary to     < end of copied text >  < from: MR Thoracic Spine w/wo IV Cont (02.28.19 @ 15:29) >  THORACIC SPINE:  There is abnormal signal and enhancement at the intervertebral disc space   at T1-T2 with findings suggestive of discitis/osteomyelitis. No   significant endplate bony erosion. There is abnormal edema and   enhancement of the T1 and T2 vertebral bodies without bony destruction.   Findings concerning for a ventral epidural abscess at the level of T1-T2   measuring 0.8 x 2.2 cm (TR by CC), resulting in effacement of the ventral   CSF space and slightly contacting the ventral cervical cord without   significant mass effect. The thoracic cord is of normal caliber, signal   intensity without abnormal enhancement or corona compression. There is   small amount of paravertebral edema/phlegmon at the levels of C7-T2.    There is a chronic mild anterior vertebral body wedge deformity at T8.   Remaining thoracic vertebral body heights are maintained. Thoracic   vertebral bodies are aligned. Thereare small Schmorl nodes at the   endplates of T8 and T10.    There is mild multilevel intervertebral disc height loss. No disc   herniation.    There are small bilateral pleural effusions again noted.    LUMBAR SPINE:  Posterior lumbar surgical fusion hardware is again noted, resulting in   significant susceptibility artifact resulting in nondiagnostic imaging of   the lumbar spine.    There is no evidence for paravertebral or paraspinal soft tissue abscess.    Nonspecific moderately distended urinary bladder.      < end of copied text >  < from: MR Thoracic Spine w/wo IV Cont (02.28.19 @ 15:29) >  IMPRESSION:      CERVICAL SPINE:  No evidence for osteomyelitis/discitis.    C3-C4: Mild retrolisthesis, osteophyte/disc complex and   uncovertebral/facet arthrosis resulting in mild impression upon the   ventral cervical cord with associated mild abnormal cord signal,   suggestive of myelomalacia. Findings also result in severe bilateral   neural foraminal stenosis.    Well-circumscribed cystic lesion at the left C6 pedicle/left C5-C6 neural   foramen, slightly extending into the left C6-C7 neural foramen, causing   chronic bony scalloping of the C5-C6 vertebral bodies and and widening of   the left C5-C6 neural foramen. The cyst demonstrates a couple of thin   enhancing septa. Findings are suggestive of a cystic schwannoma,  perineural (Tarlov) cyst, or synovial cyst vs primary benign bone lesion.   Findings do not result in spinal canal stenosis. Findings result in mild   left C5-C6 and C6-C7 neural foraminal stenosis.    THORACIC SPINE:  T1-T2 discitis/osteomyelitis with abnormal enhancement/signal at the disc   space, without significant endplate bony erosion or destructive bony   lesion. Associated ventral epidural 0.8 x 2.2 cm (TR by CC) abscess at   the level of T1-T2, resulting in effacement of the ventral CSF space and   slightly contacting the ventral thoracic cord without corona compression     < from: MR Thoracic Spine w/wo IV Cont (02.28.19 @ 15:29) >  or abnormal cord signal/enhancement. Mild paravertebral edema/phlegmon at   the levels of C7-T1.    Small bilateral pleural effusions are again noted.    LUMBAR SPINE:  Lumbar surgical fusion hardware resulting in significant susceptibility   artifact resulting in nondiagnostic imaging of the lumbar spine. No   paravertebral/paraspinal soft tissue abscess. If clinical concern persist   a lumbar spine CT with contrast can be performed for further   characterization.    Results discussed with  (orthopedic resident), by radiologist     < end of copied text >

## 2019-03-01 NOTE — PHYSICAL THERAPY INITIAL EVALUATION ADULT - DIAGNOSIS, PT EVAL
Proteus  Bacteremia, sepsis POA due to UTI, Pyelonephritis, Nephrolithiasis, T1-T2 diskitis  with epidural abscess/phlegmon, B pl effusion, atelectasis

## 2019-03-01 NOTE — PROGRESS NOTE ADULT - SUBJECTIVE AND OBJECTIVE BOX
Pt seen resting in bed with  at bedside. Pt states she feels a "little better" today. Pt denies upper and lower ext pain. She has chronic weakness of the LUE unchanged. Pt denies lower ext numbness and weakness. Voided on own without complications.    PE  Gen appearance: NAD  Motor strength: left bicep,, triceps, deltoids: 4/5 otherwise 5/5 of b/l upper ext  Sensation: intact to light touch  Motor strength: 5/5 of b/l lower ext (quads, hamstrings, EHL, gastrocs, ant tib)  Sensation: intact to light touch  Thoracic pain tenderness in the superior aspect appro T1 region    Plan  Tmax:101.6, BP:103/38 eval per med  WBC:7.35, H/H:11/32.2  Continue abx per ID   Neuro check Q1H. Will manage non-operatively at this time, If progressing symptoms consider surgical intervention Pt seen resting in bed with  at bedside. Pt states she feels a "little better" today. Pt denies upper and lower ext pain. She has chronic weakness of the LUE unchanged. Pt denies lower ext numbness and weakness. Voided on own without complications. Pain tolerable with current medications.     PE  Gen appearance: NAD  Motor strength: left bicep,, triceps, deltoids: 4/5 otherwise 5/5 of b/l upper ext  Sensation: intact to light touch  Motor strength: 5/5 of b/l lower ext (quads, hamstrings, EHL, gastrocs, ant tib)  Sensation: intact to light touch  Thoracic pain tenderness in the superior aspect appro T1 region    Plan---T1-2 discits, ventral epidural inflammation, ?phlegmon  Tmax:101.6, BP:103/38 eval per med  WBC:7.35, H/H:11/32.2  Continue abx per ID   Neuro check Q1H. Will manage non-operatively at this time, If progressing symptoms consider surgical intervention

## 2019-03-01 NOTE — CHART NOTE - NSCHARTNOTEFT_GEN_A_CORE
Case d/w dr montoya and pt seen and examined with ICU team this morning  Briefly, 72 y/o female recently admitted with UTI and renal stone--treated with IV abx and DC home on PO abx--asked to return to  for proteus bacteremia (2/23 BCx).  Pt now found to have Lumbar spine discitis and is tx to ICU for hourly neuro checks.  Presently on IV vanco and CTX  ROS o/w negative   PMH DJD  Meds reviewed  Shx NC  SxH NC  No known allergy    On exam  Stable vitals low grade temps  Neuro intact  Chest clear  Heart Nl     IMP:    Lumbar discitis--proteus sepsis--grossly intact neuro     Suggest:    Abx  Neuro checks  PT  DVT prophy    Will not follow--please reconsult as needed

## 2019-03-01 NOTE — PHYSICAL THERAPY INITIAL EVALUATION ADULT - PERTINENT HX OF CURRENT PROBLEM, REHAB EVAL
seen in  ER on 2/23/19 for L flank pain, upper back pain, chills, dysuria, nausea, and was diagnosed with L UVJ 0.8 cm stone, UTI, was given IV AB and discharged home on PO antibiotics. Her BC from 2/23/19 grew gram negative rods in the anaerobic bottle and she was called back to the ER, + Blood culture for Proteus. T1-2 Discitis with ventral epidural collection without cord compression, ?phlegmon . Adm to ICU for Q1hr neuro checks.

## 2019-03-01 NOTE — PHYSICAL THERAPY INITIAL EVALUATION ADULT - MANUAL MUSCLE TESTING RESULTS, REHAB EVAL
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

## 2019-03-01 NOTE — PHYSICAL THERAPY INITIAL EVALUATION ADULT - ACTIVE RANGE OF MOTION EXAMINATION, REHAB EVAL
bilateral  lower extremity Active ROM was WFL (within functional limits)/bilateral upper extremity Active ROM was WFL (within functional limits)/B hip flex < 90 deg supine

## 2019-03-01 NOTE — PROGRESS NOTE ADULT - SUBJECTIVE AND OBJECTIVE BOX
patient seen and examined    She feels better    Low grade temps    Detailed neuro exam demonstrates baseline level of motor and sens  No long tract signs    Less tender over upper T-spine    Discussed case with Medicine and I.D. team (Gabrielle)  Atypical Presentation and bacteria for discitis  ?Gram negative    Epidural collection may be more of phlegmon/inflammatory tissue than actual abscess - especially if it is Gr -    Will observe for now    OK to have regular diet  Close F/U  Neuro checks  Consider down grading level of care if she is afebrile and clinically feels better  Hold on surgery for now    ELIAZAR Epperson MD

## 2019-03-01 NOTE — PROGRESS NOTE ADULT - ASSESSMENT
- bilateral mild infiltrates with atelectasis noted in the ct scan of the chest - unlikely source of the infection given the recent findings in the MRI and likely source is spine abscess   - renal stone with urosepsis treated still has stone   - recurrent fevers likely related with epidural abscess   - significant arthritis of spine with the epidural abscess seen by the spine and moved to the ICU for the close observation      PLAN     - continue with the broad spectrum therapy for the spine abscess and spine surgery follow up   - iv antibiotics will also cover for the pneumonia   - effusions are small with no active intervention for now   - close monitoring in the ICU

## 2019-03-01 NOTE — PROGRESS NOTE ADULT - ASSESSMENT
74 y/o female with PMHx of DJD, s/p L spine fusion, R TKR, L arm Erb's palsy who was seen in  ER on 2/23/19 for L flank pain, upper back pain, chills, dysuria, nausea, and was diagnoestd with L UVJ 0.8 cm stone, UTI, was given IV AB and discharged home on PO antibiotics. Her BC from 2/23/19 grew gram negative rods in the anaerobic bottle and she was called back to the ER. Eval by urology, blood cx/urine cx growing GNRs was given IV rocephin.     1. fever/proteus sepsis/pyelonephritis-cystitis/L UVJ stone/back pain  - fevers noted - ? passed stone repeat imaging reviewed, improvement in changes along kidney from prior ? mild pyelitis  - back pain improved but plan for further MRI imaging to assess  - s/p vanco/cefepime o/n f/u repeat cx  - blood cx-urine cx 2/23 proteus S cephalosporins repeat cx 2/24 (-)  - on IV rocephin increase to [id number] #5   - continue with abx coverage   - urology eval appreciated   - further stone tx in future  - monitor temps  - tolerating abx well so far; no side effects noted  - reason for abx use and side effects reviewed with patient  - fu cbc 74 y/o female with PMHx of DJD, s/p L spine fusion, R TKR, L arm Erb's palsy who was seen in  ER on 2/23/19 for L flank pain, upper back pain, chills, dysuria, nausea, and was diagnoestd with L UVJ 0.8 cm stone, UTI, was given IV AB and discharged home on PO antibiotics. Her BC from 2/23/19 grew gram negative rods in the anaerobic bottle and she was called back to the ER. Eval by urology, blood cx/urine cx growing GNRs was given IV rocephin.     1. T1-T2 diskitis. epidural abscess ? phlegmon.  resolved proteus sepsis d/t pyelo-cystitis. nephrolithiasis  - MRI spine reviewed, appreciate spine surgery eval  - started on vancomycin [id number], check trough prior to 4th dose  - on rocephin [id number] #6  - continue with IV abx coverage  - trend ESR/CRP  - blood cx-urine cx 2/23 proteus S cephalosporins repeat cx 2/24 (-)  - continue with abx coverage   - CT chest/abd/pelvis reviewed  - urology eval appreciated   - further stone tx in future  - will require long term IV abx coverage   - if any clinical worsening/neurological compromise will require surgical intervention  - monitor temps  - tolerating abx well so far; no side effects noted  - reason for abx use and side effects reviewed with patient  - fu cbc

## 2019-03-01 NOTE — PROGRESS NOTE ADULT - ASSESSMENT
· Assessment		     72 y/o female with PMHx of DJD, s/p L spine fusion, R TKR,  who was seen in  ER on 2/23/19 for L flank pain, uper back pain, chills, dysuria, nausea, and was diagnosed with L UVJ 0.8 cm stone, UTI, was given IV AB and discharged home on PO antibiotics. Her BC from 2/23/19 grew gram negative rods in the anaerobic bottle and she was called back to the ER. The patient states her symptoms are improved today.     * Proteus  Bacteremia, sepsis POA due to UTI, Pyelonephritis, Nephrolithiasis  * T1-T2 diskitis  with epidural abscess/phlegmon  * Bilateral pleural effusions, atelectasis    - ICU for neuro checks q1 h   - CT  Chest and abd/pelvis- showed residual mild pyelitis(no hydronephrosis) ,no indication for  in patient stone extraction or nephrostomy as per dr pradhan  -I discussed with him as no suspicion for pyonephrosis and no hydronephrosis on repeat  CT  - MRI spine  noted   - 2 d echo  - EF wnl, no vegetations  - ortho spine consult  d/w Dr. Ward following the patient - conservative management at this time  - clear liquid diet , if no surgical interventions, can advance the diet  - continue  IV Ceftriaxone, IV vanco   for now per ID  - s/p  IVF   - follow up urology as outpatient  - d/c vit c megadose  - incentive spirometry   - repeat cultures     * REYNA , POA from prerenal azotemia   which has now resolved with IVF, will stop IV fluids    * Hypoalbuminemia  - add supplements   - restart diet as soon as cleared from surgical prospective     *  DVT prophylaxis  chemical prophylaxis on heparin sc, needs to be held if spine surgery intervention planned

## 2019-03-02 LAB
ANION GAP SERPL CALC-SCNC: 9 MMOL/L — SIGNIFICANT CHANGE UP (ref 5–17)
BUN SERPL-MCNC: 10 MG/DL — SIGNIFICANT CHANGE UP (ref 7–23)
CALCIUM SERPL-MCNC: 8.3 MG/DL — LOW (ref 8.5–10.1)
CHLORIDE SERPL-SCNC: 102 MMOL/L — SIGNIFICANT CHANGE UP (ref 96–108)
CO2 SERPL-SCNC: 28 MMOL/L — SIGNIFICANT CHANGE UP (ref 22–31)
CREAT SERPL-MCNC: 0.52 MG/DL — SIGNIFICANT CHANGE UP (ref 0.5–1.3)
CRP SERPL-MCNC: 12.08 MG/DL — HIGH (ref 0–0.4)
ERYTHROCYTE [SEDIMENTATION RATE] IN BLOOD: 53 MM/HR — HIGH (ref 0–20)
GLUCOSE SERPL-MCNC: 113 MG/DL — HIGH (ref 70–99)
HCT VFR BLD CALC: 33.3 % — LOW (ref 34.5–45)
HGB BLD-MCNC: 11.7 G/DL — SIGNIFICANT CHANGE UP (ref 11.5–15.5)
MCHC RBC-ENTMCNC: 31.5 PG — SIGNIFICANT CHANGE UP (ref 27–34)
MCHC RBC-ENTMCNC: 35.1 GM/DL — SIGNIFICANT CHANGE UP (ref 32–36)
MCV RBC AUTO: 89.8 FL — SIGNIFICANT CHANGE UP (ref 80–100)
NRBC # BLD: 0 /100 WBCS — SIGNIFICANT CHANGE UP (ref 0–0)
PLATELET # BLD AUTO: 332 K/UL — SIGNIFICANT CHANGE UP (ref 150–400)
POTASSIUM SERPL-MCNC: 3.6 MMOL/L — SIGNIFICANT CHANGE UP (ref 3.5–5.3)
POTASSIUM SERPL-SCNC: 3.6 MMOL/L — SIGNIFICANT CHANGE UP (ref 3.5–5.3)
RBC # BLD: 3.71 M/UL — LOW (ref 3.8–5.2)
RBC # FLD: 15.1 % — HIGH (ref 10.3–14.5)
SODIUM SERPL-SCNC: 139 MMOL/L — SIGNIFICANT CHANGE UP (ref 135–145)
VANCOMYCIN TROUGH SERPL-MCNC: 16.2 UG/ML — SIGNIFICANT CHANGE UP (ref 10–20)
VANCOMYCIN TROUGH SERPL-MCNC: 29.6 UG/ML — CRITICAL HIGH (ref 10–20)
WBC # BLD: 6.46 K/UL — SIGNIFICANT CHANGE UP (ref 3.8–10.5)
WBC # FLD AUTO: 6.46 K/UL — SIGNIFICANT CHANGE UP (ref 3.8–10.5)

## 2019-03-02 RX ORDER — KETOROLAC TROMETHAMINE 30 MG/ML
15 SYRINGE (ML) INJECTION ONCE
Qty: 0 | Refills: 0 | Status: DISCONTINUED | OUTPATIENT
Start: 2019-03-02 | End: 2019-03-02

## 2019-03-02 RX ORDER — OXYCODONE AND ACETAMINOPHEN 5; 325 MG/1; MG/1
1 TABLET ORAL EVERY 4 HOURS
Qty: 0 | Refills: 0 | Status: DISCONTINUED | OUTPATIENT
Start: 2019-03-02 | End: 2019-03-05

## 2019-03-02 RX ORDER — HYDROMORPHONE HYDROCHLORIDE 2 MG/ML
2 INJECTION INTRAMUSCULAR; INTRAVENOUS; SUBCUTANEOUS EVERY 4 HOURS
Qty: 0 | Refills: 0 | Status: DISCONTINUED | OUTPATIENT
Start: 2019-03-02 | End: 2019-03-05

## 2019-03-02 RX ORDER — HYDROMORPHONE HYDROCHLORIDE 2 MG/ML
0.5 INJECTION INTRAMUSCULAR; INTRAVENOUS; SUBCUTANEOUS EVERY 6 HOURS
Qty: 0 | Refills: 0 | Status: DISCONTINUED | OUTPATIENT
Start: 2019-03-02 | End: 2019-03-07

## 2019-03-02 RX ADMIN — CYCLOBENZAPRINE HYDROCHLORIDE 5 MILLIGRAM(S): 10 TABLET, FILM COATED ORAL at 15:39

## 2019-03-02 RX ADMIN — Medication 250 MILLIGRAM(S): at 06:14

## 2019-03-02 RX ADMIN — OXYCODONE AND ACETAMINOPHEN 1 TABLET(S): 5; 325 TABLET ORAL at 21:48

## 2019-03-02 RX ADMIN — LIDOCAINE 1 PATCH: 4 CREAM TOPICAL at 23:30

## 2019-03-02 RX ADMIN — OXYCODONE AND ACETAMINOPHEN 1 TABLET(S): 5; 325 TABLET ORAL at 12:29

## 2019-03-02 RX ADMIN — OXYCODONE AND ACETAMINOPHEN 1 TABLET(S): 5; 325 TABLET ORAL at 21:00

## 2019-03-02 RX ADMIN — Medication 15 MILLIGRAM(S): at 05:05

## 2019-03-02 RX ADMIN — Medication 250 MILLIGRAM(S): at 17:55

## 2019-03-02 RX ADMIN — LIDOCAINE 1 PATCH: 4 CREAM TOPICAL at 00:30

## 2019-03-02 RX ADMIN — CEFTRIAXONE 2000 MILLIGRAM(S): 500 INJECTION, POWDER, FOR SOLUTION INTRAMUSCULAR; INTRAVENOUS at 08:39

## 2019-03-02 RX ADMIN — LIDOCAINE 1 PATCH: 4 CREAM TOPICAL at 11:42

## 2019-03-02 RX ADMIN — CYCLOBENZAPRINE HYDROCHLORIDE 5 MILLIGRAM(S): 10 TABLET, FILM COATED ORAL at 05:06

## 2019-03-02 RX ADMIN — Medication 1000 UNIT(S): at 11:41

## 2019-03-02 RX ADMIN — HYDROMORPHONE HYDROCHLORIDE 0.5 MILLIGRAM(S): 2 INJECTION INTRAMUSCULAR; INTRAVENOUS; SUBCUTANEOUS at 00:30

## 2019-03-02 RX ADMIN — Medication 15 MILLIGRAM(S): at 05:30

## 2019-03-02 RX ADMIN — HYDROMORPHONE HYDROCHLORIDE 0.5 MILLIGRAM(S): 2 INJECTION INTRAMUSCULAR; INTRAVENOUS; SUBCUTANEOUS at 00:05

## 2019-03-02 RX ADMIN — Medication 100 MILLIGRAM(S): at 21:43

## 2019-03-02 RX ADMIN — OXYCODONE AND ACETAMINOPHEN 1 TABLET(S): 5; 325 TABLET ORAL at 12:50

## 2019-03-02 RX ADMIN — LIDOCAINE 1 PATCH: 4 CREAM TOPICAL at 19:19

## 2019-03-02 RX ADMIN — CYCLOBENZAPRINE HYDROCHLORIDE 5 MILLIGRAM(S): 10 TABLET, FILM COATED ORAL at 21:43
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
## 2019-03-02 NOTE — PROGRESS NOTE ADULT - SUBJECTIVE AND OBJECTIVE BOX
[facility]                                                           Orthopedic Spine Progress Note      SUBJECTIVE: Patient seen and examined, OOB in chair, ambulated some w/PT, neuro/motor intact and at baseline (chronic LUE deltoid, bicep, tricep weakness), pain minimal at this time    Current Pain Management:  [ ] PCA   [x] Po Analgesics [ ] IM /IV Anagesics     Vital Signs Last 24 Hrs  T(C): 36.6 (02 Mar 2019 08:00), Max: 37.6 (01 Mar 2019 17:00)  T(F): 97.9 (02 Mar 2019 08:00), Max: 99.6 (01 Mar 2019 17:00)  HR: 74 (02 Mar 2019 11:00) (67 - 87)  BP: 97/27 (02 Mar 2019 11:00) (97/27 - 141/56)  BP(mean): 43 (02 Mar 2019 11:00) (43 - 101)  RR: 18 (02 Mar 2019 11:00) (14 - 24)  SpO2: 98% (02 Mar 2019 10:00) (92% - 99%)  I&O's Detail    01 Mar 2019 07:01  -  02 Mar 2019 07:00  --------------------------------------------------------  IN:    IV PiggyBack: 500 mL    lactated ringers.: 825 mL    Oral Fluid: 810 mL  Total IN: 2135 mL    OUT:    Incontinent per Collection Ba mL  Total OUT: 2000 mL    Total NET: 135 mL          OBJECTIVE:       Cervical ROM: wnl  Lumbar ROM: not tested  Neurological: A/O x 3              Sensation: [x] intact to light touch  [ ] decreased:          Motor exam: [x]          [x] Upper extremity    Delt      Bicp       Tri      Wrist ext  Wrst Flex       Digit Ext Digit Flex                               R         5/5       5/5        5/5       5/5          5/5           5/5       5/5          5/5                              L          4/5        4/5        4/5       5/5          5/5           5/5       5/5          5/5         [x] Lower ext.     Hip Flx  Quad   Hamstrg   TA       EHL      GS                         R        5/5       5/5       5/5         5/5      5/5      5/5                               L         5/5       5/5       5/5         5/5      5/5      5/5                                                                [x] Vascular: intact           Tension Signs: none          Long Tract Findings: none                                                LABS:                        11.7   6.46  )-----------( 332      ( 02 Mar 2019 06:32 )             33.3         139  |  102  |  10  ----------------------------<  113<H>  3.6   |  28  |  0.52    Ca    8.3<L>      02 Mar 2019 06:32      PT/INR - ( 2019 20:27 )   PT: 14.1 sec;   INR: 1.26 ratio         PTT - ( 2019 20:27 )  PTT:33.6 sec    Blood Culture:  @ 12:47 Organism: Proteus mirabilis Gram Stain Blood -- Organism ID Proteus [email or web address]    Wound Culture:  @ 12:47 Wound: -- Organism: Proteus mirabilis Organism ID: [email or web address]

## 2019-03-02 NOTE — PROGRESS NOTE ADULT - SUBJECTIVE AND OBJECTIVE BOX
Pt S/E at bedside, no acute events overnight, pain improving, afebrile overnight    ICU Vital Signs Last 24 Hrs  T(C): 36.9 (02 Mar 2019 04:00), Max: 37.6 (01 Mar 2019 17:00)  T(F): 98.5 (02 Mar 2019 04:00), Max: 99.6 (01 Mar 2019 17:00)  HR: 77 (02 Mar 2019 08:00) (67 - 87)  BP: 119/57 (02 Mar 2019 08:00) (102/48 - 141/56)  BP(mean): 69 (02 Mar 2019 08:00) (54 - 78)  ABP: --  ABP(mean): --  RR: 19 (02 Mar 2019 08:00) (14 - 24)  SpO2: 96% (02 Mar 2019 08:00) (92% - 99%)    Gen: NAD, AAOx3    Spine:  no focal deficits, no long tract signs  baseline motors in UE and LE present, no focal weakness  SILT L3-S1  SILT C5-T1  +DP/PT Pulses  +Radial Pulse  Compartments soft  No calf TTP B/L    73F with T1-2 Discitis with ventral epidural collection without cord compression  -pain control  -WBAT  -Regualr diet  -Abx per ID  -FU ID re long term antibiotics  -no surgery at this time  -possible downgrade to regular floors today

## 2019-03-02 NOTE — PROGRESS NOTE ADULT - SUBJECTIVE AND OBJECTIVE BOX
· Subjective and Objective: 	  History of Present Illness: 	  The patient is a 72 y/o female with PMHx of DJD, s/p L spine fusion, R TKR, L arm Erb's palsy who was seen in  ER on 19 for L flank pain, upper back pain, chills, dysuria, nausea, and was diagnosed with L UVJ 0.8 cm stone, UTI, was given IV AB and discharged home on PO antibiotics. Her BC from 19 grew gram negative rods in the anaerobic bottle and she was called back to the ER. The patient states her symptoms are improved today.     Family Hx.: Mother had MI at age 74,  father  of dementia at age 95.     - report thoracic spine pain  - still with intractable upper back pain, no BM for 7days, pasing gases, no nausea   still with upper back pain, pending MRI spine, patient was seen at 10am   3/1 - pt seen and examined, denies cp, palpitations, + back pain, denies numbness , weakness, tolerating clear liquid diet, + low grade fever    ROS - all other systems negative except mention above    T(C): 37.6 (19 @ 17:00), Max: 38.3 (19 @ 21:30)  T(F): 99.6 (19 @ 17:00), Max: 100.9 (19 @ 21:30)  HR: 76 (19 @ 17:00) (71 - 94)  BP: 125/61 (19 @ 17:00) (112/42 - 141/56)  RR: 19 (19 @ 17:00) (16 - 32)  SpO2: 98% (19 @ 17:00) (88% - 99%)  Wt(kg): --      PHYSICAL EXAM:    Constitutional: NAD, awake and alert, well-developed  HEENT: PERR, EOMI, Normal Hearing, MMM  Neck: Soft and supple, No LAD, No JVD  Respiratory: Breath sounds are clear bilaterally, No wheezing, rales or rhonchi  Cardiovascular: S1 and S2, regular rate and rhythm, no Murmurs, gallops or rubs  Gastrointestinal: Bowel Sounds present, soft, nontender, nondistended, no guarding, no rebound  Extremities: No peripheral edema  Vascular: 2+ peripheral pulses  Neurological: A/O x 3, no focal deficits   Musculoskeletal: 5/5 strength b/l upper and lower extremities  Skin: No rashes  rectal : deferred, not indicated        137  |  100  |  12  ----------------------------<  120<H>  3.8   |  30  |  0.65    Ca    8.0<L>      01 Mar 2019 06:58    TPro  5.7<L>  /  Alb  1.9<L>  /  TBili  0.5  /  DBili  0.2  /  AST  36  /  ALT  40  /  AlkPhos  172<H>                              11.0   7.35  )-----------( 313      ( 01 Mar 2019 06:58 )             32.2       LIVER FUNCTIONS - ( 2019 07:15 )  Alb: 1.9 g/dL / Pro: 5.7 gm/dL / ALK PHOS: 172 U/L / ALT: 40 U/L / AST: 36 U/L / GGT: x             PT/INR - ( 2019 20:27 )   PT: 14.1 sec;   INR: 1.26 ratio         PTT - ( 2019 20:27 )  PTT:33.6 sec  Culture - Blood (19 @ 00:53)    Specimen Source: .Blood None    Culture Results:   No growth to date.    Culture - Blood (19 @ 12:47)    Gram Stain:   Growth in anaerobic bottle: Gram Negative Rods  Growth in aerobic bottle: Gram Negative Rods    -  Amikacin: S <=16    -  Ampicillin: S <=8 These ampicillin results predict results for amoxicillin    -  Ampicillin/Sulbactam: S <=8/4    -  Aztreonam: S <=4    -  Cefazolin: S <=8    -  Cefepime: S <=4    -  Cefoxitin: S <=8    -  Multidrug (KPC pos) resistant organism: Nondet    -  Staphylococcus aureus: Nondet    -  Methicillin resistant Staphylococcus aureus (MRSA): Nondet    -  Coagulase negative Staphylococcus: Nondet    -  Enterococcus species: Nondet    -  Vancomycin resistant Enterococcus sp.: Nondet    -  Escherichia coli: Nondet    -  Klebsiella oxytoca: Nondet    -  Klebsiella pneumoniae: Nondet    -  Serratia marcescens: Nondet    -  Haemophilus influenzae: Nondet    -  Listeria monocytogenes: Nondet    -  Neisseria meningitidis: Nondet    -  Pseudomonas aeruginosa: Nondet    -  Acinetobacter baumanii: Nondet    -  Enterobacter cloacae complex: Nondet    -  Streptococcus sp. (Not Grp A, B or S pneumoniae): Nondet    -  Streptococcus agalactiae (Group B): Nondet    -  Streptococcus pyogenes (Group A): Nondet    -  Streptococcus pneumoniae: Nondet    -  Candida albicans: Nondet    -  Candida glabrata: Nondet    -  Candida krusei: Nondet    -  Candida parapsilosis: Nondet    -  Candida tropicalis: Nondet    -  Ceftriaxone: S <=1 Enterobacter, Citrobacter, and Serratia may develop resistance during prolonged therapy    -  Ciprofloxacin: S <=1    -  Ertapenem: S <=1    -  Gentamicin: S <=4    -  Levofloxacin: S <=2    -  Meropenem: S <=1    -  Piperacillin/Tazobactam: S <=16    -  Tobramycin: S <=4    -  Trimethoprim/Sulfamethoxazole: S <=2/38    Specimen Source: .Blood None    Organism: Blood Culture PCR    Organism: Proteus mirabilis    Culture Results:   Growth in aerobic and anaerobic bottles: Proteus mirabilis  "Due to technical problems, Proteus sp. will Not be reported as part of  the BCID panel until further notice"  ***Blood Panel PCR results on this specimen are available  approximately 3 hours after the Gram stain result.***  Gram stain, PCR, and/or culture results may not always  correspond due to difference in methodologies.  ************************************************************  This PCR assay was performed using [employer].  The following targets are tested for: Enterococcus,  vancomycin resistant enterococci, Listeria monocytogenes,  coagulase negative staphylococci, S. aureus,  methicillin resistant S. aureus, Streptococcus agalactiae  (Group B), S. pneumoniae, S. pyogenes (Group A),  Acinetobacter baumannii, Enterobacter cloacae, E. coli,  Klebsiella oxytoca, K. pneumoniae, Proteus sp.,  Serratia marcescens, Haemophilus influenzae,  Neisseria meningitidis, Pseudomonas aeruginosa, Candida  albicans, C. glabrata, C krusei, C parapsilosis,  C. tropicalis and the KPC resistance gene.    Organism Identification: Blood Culture PCR  Proteus mirabilis    Method Type: PCR    Method Type: BENITO    < from: Transthoracic Echocardiogram (19 @ 09:58) >   The left ventricle is normal in size, wall motion and contractility.   Mild concentric left ventricular hypertrophy is present.   Estimated left ventricular ejection fraction is 60-65 %.   Normal appearing right ventricle structure and function.   Normal aortic valve structure and function.   Normal appearing mitral valve structure and function.   Trace mitral regurgitation is present.    < end of copied text >    < from: MR Thoracic Spine w/wo IV Cont (19 @ 15:29) >  CERVICAL SPINE:  No evidence for osteomyelitis/discitis.    C3-C4: Mild retrolisthesis, osteophyte/disc complex and   uncovertebral/facet arthrosis resulting in mild impression upon the   ventral cervical cord with associated mild abnormal cord signal,   suggestive of myelomalacia. Findings also result in severe bilateral   neural foraminal stenosis.    Well-circumscribed cystic lesion at the left C6 pedicle/left C5-C6 neural   foramen, slightly extending into the left C6-C7 neural foramen, causing   chronic bony scalloping of the C5-C6 vertebral bodies and and widening of   the left C5-C6 neural foramen. The cyst demonstrates a couple of thin   enhancing septa. Findings are suggestive of a cystic schwannoma,  perineural (Tarlov) cyst, or synovial cyst vs primary benign bone lesion.   Findings do not result in spinal canal stenosis. Findings result in mild   left C5-C6 and C6-C7 neural foraminal stenosis.    THORACIC SPINE:  T1-T2 discitis/osteomyelitis with abnormal enhancement/signal at the disc   space, without significant endplate bony erosion or destructive bony   lesion. Associated ventral epidural 0.8 x 2.2 cm (TR by CC) abscess at   the level of T1-T2, resulting in effacement of the ventral CSF space and   slightly contacting the ventral thoracic cord without corona compression   or abnormal cord signal/enhancement. Mild paravertebral edema/phlegmon at   the levels of C7-T1.    Small bilateral pleural effusions are again noted.    LUMBAR SPINE:  Lumbar surgical fusion hardware resulting in significant susceptibility   artifact resulting in nondiagnostic imaging of the lumbar spine. No   paravertebral/paraspinal soft tissue abscess. If clinical concern persist   a lumbar spine CT with contrast can be performed for further   characterization.    Results discussed with  (orthopedic resident), by radiologist   Dr. Romero at 4:40 PM on 2019.        < end of copied text >  < from: CT Chest w/ IV Cont (19 @ 22:42) >  IMPRESSION:          1.   Chest:   mild BILATERAL pleural effusions with underlying   infiltrates. Atelectasis seen within the lingula.       2.   Abdomen and pelvis: Haziness noted in the LEFT renal hilum may   reflect mild pyelitis. No ureteral dilatation is noted . Mild stool   retention.  6 mm calculus in lower pole of LEFT kidney.      < end of copied text >  < from: US Duplex Venous Lower Ext Complete, Bilateral (19 @ 16:35) >  IMPRESSION:     No evidence of bilateral lower extremity deep venous thrombosis.      < end of copied text >      MEDICATIONS  (STANDING):  cefTRIAXone Injectable. 2000 milliGRAM(s) IV Push every 24 hours  cholecalciferol 1000 Unit(s) Oral daily  cyclobenzaprine 5 milliGRAM(s) Oral three times a day  docusate sodium 100 milliGRAM(s) Oral three times a day  lactated ringers. 1000 milliLiter(s) (75 mL/Hr) IV Continuous <Continuous>  lidocaine   Patch 1 Patch Transdermal every 24 hours  polyethylene glycol 3350 17 Gram(s) Oral daily  senna 2 Tablet(s) Oral at bedtime  vancomycin  IVPB 1000 milliGRAM(s) IV Intermittent every 12 hours    MEDICATIONS  (PRN):  acetaminophen   Tablet .. 650 milliGRAM(s) Oral every 6 hours PRN Temp greater or equal to 38C (100.4F)  HYDROmorphone  Injectable 0.5 milliGRAM(s) IV Push every 6 hours PRN Severe Pain (7 - 10)  oxyCODONE    5 mG/acetaminophen 325 mG 1 Tablet(s) Oral every 4 hours PRN Moderate Pain (4 - 6) · Subjective and Objective: 	  History of Present Illness: 	  The patient is a 74 y/o female with PMHx of DJD, s/p L spine fusion, R TKR, L arm Erb's palsy who was seen in  ER on 19 for L flank pain, upper back pain, chills, dysuria, nausea, and was diagnosed with L UVJ 0.8 cm stone, UTI, was given IV AB and discharged home on PO antibiotics. Her BC from 19 grew gram negative rods in the anaerobic bottle and she was called back to the ER. The patient states her symptoms are improved today.     Family Hx.: Mother had MI at age 74,  father  of dementia at age 95.     - report thoracic spine pain  - still with intractable upper back pain, no BM for 7days, pasing gases, no nausea   still with upper back pain, pending MRI spine, patient was seen at 10am   3/1 - pt seen and examined, denies cp, palpitations, + back pain, denies numbness , weakness, tolerating clear liquid diet, + low grade fever  3/2 - pt seen and examined, back pain persist, ambulated with PT, tolerates po diet, afebrile  ROS - all other systems negative except mention above    T(C): 37.1 (19 @ 16:00), Max: 37.3 (19 @ 00:00)  T(F): 98.7 (19 @ 16:00), Max: 99.1 (19 @ 00:00)  HR: 85 (19 @ 19:00) (67 - 90)  BP: 104/46 (19 @ 19:00) (89/55 - 125/93)  RR: 23 (19 @ 19:00) (14 - 24)  SpO2: 96% (19 @ 19:00) (94% - 99%)  Wt(kg): --    PHYSICAL EXAM:    Constitutional: NAD, awake and alert, well-developed  HEENT: PERR, EOMI, Normal Hearing, MMM  Neck: Soft and supple, No LAD, No JVD  Respiratory: Breath sounds are clear bilaterally, No wheezing, rales or rhonchi  Cardiovascular: S1 and S2, regular rate and rhythm, no Murmurs, gallops or rubs  Gastrointestinal: Bowel Sounds present, soft, nontender, nondistended, no guarding, no rebound  Extremities: No peripheral edema  Vascular: 2+ peripheral pulses  Neurological: A/O x 3, no focal deficits   Musculoskeletal: 5/5 strength b/l upper and lower extremities  Skin: No rashes  rectal : deferred, not indicated      139  |  102  |  10  ----------------------------<  113<H>  3.6   |  28  |  0.52    Ca    8.3<L>      02 Mar 2019 06:32                         11.7   6.46  )-----------( 332      ( 02 Mar 2019 06:32 )             33.3           137  |  100  |  12  ----------------------------<  120<H>  3.8   |  30  |  0.65    Ca    8.0<L>      01 Mar 2019 06:58    TPro  5.7<L>  /  Alb  1.9<L>  /  TBili  0.5  /  DBili  0.2  /  AST  36  /  ALT  40  /  AlkPhos  172<H>                              11.0   7.35  )-----------( 313      ( 01 Mar 2019 06:58 )             32.2       LIVER FUNCTIONS - ( 2019 07:15 )  Alb: 1.9 g/dL / Pro: 5.7 gm/dL / ALK PHOS: 172 U/L / ALT: 40 U/L / AST: 36 U/L / GGT: x             PT/INR - ( 2019 20:27 )   PT: 14.1 sec;   INR: 1.26 ratio         PTT - ( 2019 20:27 )  PTT:33.6 sec  Culture - Blood (19 @ 00:53)    Specimen Source: .Blood None    Culture Results:   No growth to date.    Culture - Blood (19 @ 12:47)    Gram Stain:   Growth in anaerobic bottle: Gram Negative Rods  Growth in aerobic bottle: Gram Negative Rods    -  Amikacin: S <=16    -  Ampicillin: S <=8 These ampicillin results predict results for amoxicillin    -  Ampicillin/Sulbactam: S <=8/4    -  Aztreonam: S <=4    -  Cefazolin: S <=8    -  Cefepime: S <=4    -  Cefoxitin: S <=8    -  Multidrug (KPC pos) resistant organism: Nondet    -  Staphylococcus aureus: Nondet    -  Methicillin resistant Staphylococcus aureus (MRSA): Nondet    -  Coagulase negative Staphylococcus: Nondet    -  Enterococcus species: Nondet    -  Vancomycin resistant Enterococcus sp.: Nondet    -  Escherichia coli: Nondet    -  Klebsiella oxytoca: Nondet    -  Klebsiella pneumoniae: Nondet    -  Serratia marcescens: Nondet    -  Haemophilus influenzae: Nondet    -  Listeria monocytogenes: Nondet    -  Neisseria meningitidis: Nondet    -  Pseudomonas aeruginosa: Nondet    -  Acinetobacter baumanii: Nondet    -  Enterobacter cloacae complex: Nondet    -  Streptococcus sp. (Not Grp A, B or S pneumoniae): Nondet    -  Streptococcus agalactiae (Group B): Nondet    -  Streptococcus pyogenes (Group A): Nondet    -  Streptococcus pneumoniae: Nondet    -  Candida albicans: Nondet    -  Candida glabrata: Nondet    -  Candida krusei: Nondet    -  Candida parapsilosis: Nondet    -  Candida tropicalis: Nondet    -  Ceftriaxone: S <=1 Enterobacter, Citrobacter, and Serratia may develop resistance during prolonged therapy    -  Ciprofloxacin: S <=1    -  Ertapenem: S <=1    -  Gentamicin: S <=4    -  Levofloxacin: S <=2    -  Meropenem: S <=1    -  Piperacillin/Tazobactam: S <=16    -  Tobramycin: S <=4    -  Trimethoprim/Sulfamethoxazole: S <=2/38    Specimen Source: .Blood None    Organism: Blood Culture PCR    Organism: Proteus mirabilis    Culture Results:   Growth in aerobic and anaerobic bottles: Proteus mirabilis  "Due to technical problems, Proteus sp. will Not be reported as part of  the BCID panel until further notice"  ***Blood Panel PCR results on this specimen are available  approximately 3 hours after the Gram stain result.***  Gram stain, PCR, and/or culture results may not always  correspond due to difference in methodologies.  ************************************************************  This PCR assay was performed using [employer].  The following targets are tested for: Enterococcus,  vancomycin resistant enterococci, Listeria monocytogenes,  coagulase negative staphylococci, S. aureus,  methicillin resistant S. aureus, Streptococcus agalactiae  (Group B), S. pneumoniae, S. pyogenes (Group A),  Acinetobacter baumannii, Enterobacter cloacae, E. coli,  Klebsiella oxytoca, K. pneumoniae, Proteus sp.,  Serratia marcescens, Haemophilus influenzae,  Neisseria meningitidis, Pseudomonas aeruginosa, Candida  albicans, C. glabrata, C krusei, C parapsilosis,  C. tropicalis and the KPC resistance gene.    Organism Identification: Blood Culture PCR  Proteus mirabilis    Method Type: PCR    Method Type: BENITO    < from: Transthoracic Echocardiogram (19 @ 09:58) >   The left ventricle is normal in size, wall motion and contractility.   Mild concentric left ventricular hypertrophy is present.   Estimated left ventricular ejection fraction is 60-65 %.   Normal appearing right ventricle structure and function.   Normal aortic valve structure and function.   Normal appearing mitral valve structure and function.   Trace mitral regurgitation is present.    < end of copied text >    < from: MR Thoracic Spine w/wo IV Cont (19 @ 15:29) >  CERVICAL SPINE:  No evidence for osteomyelitis/discitis.    C3-C4: Mild retrolisthesis, osteophyte/disc complex and   uncovertebral/facet arthrosis resulting in mild impression upon the   ventral cervical cord with associated mild abnormal cord signal,   suggestive of myelomalacia. Findings also result in severe bilateral   neural foraminal stenosis.    Well-circumscribed cystic lesion at the left C6 pedicle/left C5-C6 neural   foramen, slightly extending into the left C6-C7 neural foramen, causing   chronic bony scalloping of the C5-C6 vertebral bodies and and widening of   the left C5-C6 neural foramen. The cyst demonstrates a couple of thin   enhancing septa. Findings are suggestive of a cystic schwannoma,  perineural (Tarlov) cyst, or synovial cyst vs primary benign bone lesion.   Findings do not result in spinal canal stenosis. Findings result in mild   left C5-C6 and C6-C7 neural foraminal stenosis.    THORACIC SPINE:  T1-T2 discitis/osteomyelitis with abnormal enhancement/signal at the disc   space, without significant endplate bony erosion or destructive bony   lesion. Associated ventral epidural 0.8 x 2.2 cm (TR by CC) abscess at   the level of T1-T2, resulting in effacement of the ventral CSF space and   slightly contacting the ventral thoracic cord without corona compression   or abnormal cord signal/enhancement. Mild paravertebral edema/phlegmon at   the levels of C7-T1.    Small bilateral pleural effusions are again noted.    LUMBAR SPINE:  Lumbar surgical fusion hardware resulting in significant susceptibility   artifact resulting in nondiagnostic imaging of the lumbar spine. No   paravertebral/paraspinal soft tissue abscess. If clinical concern persist   a lumbar spine CT with contrast can be performed for further   characterization.    Results discussed with  (orthopedic resident), by radiologist   Dr. Romero at 4:40 PM on 2019.        < end of copied text >  < from: CT Chest w/ IV Cont (19 @ 22:42) >  IMPRESSION:          1.   Chest:   mild BILATERAL pleural effusions with underlying   infiltrates. Atelectasis seen within the lingula.       2.   Abdomen and pelvis: Haziness noted in the LEFT renal hilum may   reflect mild pyelitis. No ureteral dilatation is noted . Mild stool   retention.  6 mm calculus in lower pole of LEFT kidney.      < end of copied text >  < from: US Duplex Venous Lower Ext Complete, Bilateral (19 @ 16:35) >  IMPRESSION:     No evidence of bilateral lower extremity deep venous thrombosis.      < end of copied text >      MEDICATIONS  (STANDING):  cefTRIAXone Injectable. 2000 milliGRAM(s) IV Push every 24 hours  cholecalciferol 1000 Unit(s) Oral daily  cyclobenzaprine 5 milliGRAM(s) Oral three times a day  docusate sodium 100 milliGRAM(s) Oral three times a day  lidocaine   Patch 1 Patch Transdermal every 24 hours  polyethylene glycol 3350 17 Gram(s) Oral daily  senna 2 Tablet(s) Oral at bedtime  vancomycin  IVPB 1000 milliGRAM(s) IV Intermittent every 12 hours    MEDICATIONS  (PRN):  acetaminophen   Tablet .. 650 milliGRAM(s) Oral every 6 hours PRN Temp greater or equal to 38C (100.4F)  HYDROmorphone   Tablet 2 milliGRAM(s) Oral every 4 hours PRN Severe Pain (7 - 10)  HYDROmorphone  Injectable 0.5 milliGRAM(s) IV Push every 6 hours PRN for breakthrought pain  oxyCODONE    5 mG/acetaminophen 325 mG 1 Tablet(s) Oral every 4 hours PRN Moderate Pain (4 - 6)

## 2019-03-02 NOTE — PROGRESS NOTE ADULT - ASSESSMENT
A/P: T1-2 discitis - stable  d/w Dr. Epperson:  1. PT/mobilization  2. May decrease neuro checks to Q4H  3. okay to transfer to floor but must maintain the Q4H neuro checks

## 2019-03-02 NOTE — PROGRESS NOTE ADULT - ASSESSMENT
73F with T1-2 epidural abscess, no neurodeficits at this time  IV abx  Analgesia  DVT ppx  Incentive spirometry  WBAT  P/T  Neuro Checks  Discharge planning

## 2019-03-03 LAB
ANION GAP SERPL CALC-SCNC: 10 MMOL/L — SIGNIFICANT CHANGE UP (ref 5–17)
BUN SERPL-MCNC: 18 MG/DL — SIGNIFICANT CHANGE UP (ref 7–23)
CALCIUM SERPL-MCNC: 8.4 MG/DL — LOW (ref 8.5–10.1)
CHLORIDE SERPL-SCNC: 101 MMOL/L — SIGNIFICANT CHANGE UP (ref 96–108)
CO2 SERPL-SCNC: 28 MMOL/L — SIGNIFICANT CHANGE UP (ref 22–31)
CREAT SERPL-MCNC: 0.74 MG/DL — SIGNIFICANT CHANGE UP (ref 0.5–1.3)
CRP SERPL-MCNC: 9.04 MG/DL — HIGH (ref 0–0.4)
ERYTHROCYTE [SEDIMENTATION RATE] IN BLOOD: 52 MM/HR — HIGH (ref 0–20)
GLUCOSE SERPL-MCNC: 109 MG/DL — HIGH (ref 70–99)
HCT VFR BLD CALC: 34.3 % — LOW (ref 34.5–45)
HGB BLD-MCNC: 11.5 G/DL — SIGNIFICANT CHANGE UP (ref 11.5–15.5)
MCHC RBC-ENTMCNC: 30.8 PG — SIGNIFICANT CHANGE UP (ref 27–34)
MCHC RBC-ENTMCNC: 33.5 GM/DL — SIGNIFICANT CHANGE UP (ref 32–36)
MCV RBC AUTO: 92 FL — SIGNIFICANT CHANGE UP (ref 80–100)
NRBC # BLD: 0 /100 WBCS — SIGNIFICANT CHANGE UP (ref 0–0)
PLATELET # BLD AUTO: 372 K/UL — SIGNIFICANT CHANGE UP (ref 150–400)
POTASSIUM SERPL-MCNC: 4 MMOL/L — SIGNIFICANT CHANGE UP (ref 3.5–5.3)
POTASSIUM SERPL-SCNC: 4 MMOL/L — SIGNIFICANT CHANGE UP (ref 3.5–5.3)
RBC # BLD: 3.73 M/UL — LOW (ref 3.8–5.2)
RBC # FLD: 15.1 % — HIGH (ref 10.3–14.5)
SODIUM SERPL-SCNC: 139 MMOL/L — SIGNIFICANT CHANGE UP (ref 135–145)
WBC # BLD: 7.13 K/UL — SIGNIFICANT CHANGE UP (ref 3.8–10.5)
WBC # FLD AUTO: 7.13 K/UL — SIGNIFICANT CHANGE UP (ref 3.8–10.5)

## 2019-03-03 RX ADMIN — CEFTRIAXONE 2000 MILLIGRAM(S): 500 INJECTION, POWDER, FOR SOLUTION INTRAMUSCULAR; INTRAVENOUS at 10:40

## 2019-03-03 RX ADMIN — LIDOCAINE 1 PATCH: 4 CREAM TOPICAL at 22:45

## 2019-03-03 RX ADMIN — OXYCODONE AND ACETAMINOPHEN 1 TABLET(S): 5; 325 TABLET ORAL at 22:45

## 2019-03-03 RX ADMIN — LIDOCAINE 1 PATCH: 4 CREAM TOPICAL at 18:22

## 2019-03-03 RX ADMIN — OXYCODONE AND ACETAMINOPHEN 1 TABLET(S): 5; 325 TABLET ORAL at 16:45

## 2019-03-03 RX ADMIN — Medication 250 MILLIGRAM(S): at 17:21

## 2019-03-03 RX ADMIN — CYCLOBENZAPRINE HYDROCHLORIDE 5 MILLIGRAM(S): 10 TABLET, FILM COATED ORAL at 21:36

## 2019-03-03 RX ADMIN — Medication 100 MILLIGRAM(S): at 21:36

## 2019-03-03 RX ADMIN — CYCLOBENZAPRINE HYDROCHLORIDE 5 MILLIGRAM(S): 10 TABLET, FILM COATED ORAL at 05:37

## 2019-03-03 RX ADMIN — Medication 1000 UNIT(S): at 11:15

## 2019-03-03 RX ADMIN — SENNA PLUS 2 TABLET(S): 8.6 TABLET ORAL at 21:36

## 2019-03-03 RX ADMIN — OXYCODONE AND ACETAMINOPHEN 1 TABLET(S): 5; 325 TABLET ORAL at 16:17

## 2019-03-03 RX ADMIN — LIDOCAINE 1 PATCH: 4 CREAM TOPICAL at 11:15

## 2019-03-03 RX ADMIN — CYCLOBENZAPRINE HYDROCHLORIDE 5 MILLIGRAM(S): 10 TABLET, FILM COATED ORAL at 14:06

## 2019-03-03 RX ADMIN — Medication 100 MILLIGRAM(S): at 14:06

## 2019-03-03 RX ADMIN — HYDROMORPHONE HYDROCHLORIDE 2 MILLIGRAM(S): 2 INJECTION INTRAMUSCULAR; INTRAVENOUS; SUBCUTANEOUS at 08:30

## 2019-03-03 RX ADMIN — Medication 250 MILLIGRAM(S): at 05:37

## 2019-03-03 RX ADMIN — Medication 100 MILLIGRAM(S): at 05:37

## 2019-03-03 RX ADMIN — HYDROMORPHONE HYDROCHLORIDE 2 MILLIGRAM(S): 2 INJECTION INTRAMUSCULAR; INTRAVENOUS; SUBCUTANEOUS at 08:13

## 2019-03-03 RX ADMIN — OXYCODONE AND ACETAMINOPHEN 1 TABLET(S): 5; 325 TABLET ORAL at 21:36

## 2019-03-03 NOTE — PROGRESS NOTE ADULT - SUBJECTIVE AND OBJECTIVE BOX
· Subjective and Objective: 	  History of Present Illness: 	  The patient is a 74 y/o female with PMHx of DJD, s/p L spine fusion, R TKR, L arm Erb's palsy who was seen in  ER on 19 for L flank pain, upper back pain, chills, dysuria, nausea, and was diagnosed with L UVJ 0.8 cm stone, UTI, was given IV AB and discharged home on PO antibiotics. Her BC from 19 grew gram negative rods in the anaerobic bottle and she was called back to the ER. The patient states her symptoms are improved today.     Family Hx.: Mother had MI at age 74,  father  of dementia at age 95.     - report thoracic spine pain  - still with intractable upper back pain, no BM for 7days, pasing gases, no nausea   still with upper back pain, pending MRI spine, patient was seen at 10am   3/1 - pt seen and examined, denies cp, palpitations, + back pain, denies numbness , weakness, tolerating clear liquid diet, + low grade fever  3/2 - pt seen and examined, back pain persist, ambulated with PT, tolerates po diet, afebrile  3/3 - pt seem and examined, back pain better, afebrile, denies abdominal pain, + BM, no numbness     ROS - all other systems negative except mention above    T(C): 36.7 (19 @ 14:00), Max: 37 (19 @ 22:00)  T(F): 98.1 (19 @ 14:00), Max: 98.6 (19 @ 22:00)  HR: 81 (19 @ 17:00) (64 - 92)  BP: 98/49 (19 @ 17:00) (81/60 - 117/47)  RR: 17 (19 @ 17:00) (12 - 26)  SpO2: 93% (19 @ 17:00) (91% - 98%)  Wt(kg): --  PHYSICAL EXAM:    Constitutional: NAD, awake and alert, well-developed  HEENT: PERR, EOMI, Normal Hearing, MMM  Neck: Soft and supple, No LAD, No JVD  Respiratory: Breath sounds are clear bilaterally, No wheezing, rales or rhonchi  Cardiovascular: S1 and S2, regular rate and rhythm, no Murmurs, gallops or rubs  Gastrointestinal: Bowel Sounds present, soft, nontender, nondistended, no guarding, no rebound  Extremities: No peripheral edema  Vascular: 2+ peripheral pulses  Neurological: A/O x 3, no focal deficits   Musculoskeletal: 5/5 strength b/l upper and lower extremities  Skin: No rashes  rectal : deferred, not indicated      139  |  101  |  18  ----------------------------<  109<H>  4.0   |  28  |  0.74    Ca    8.4<L>      03 Mar 2019 06:21                        11.5   7.13  )-----------( 372      ( 03 Mar 2019 06:21 )             34.3         139  |  102  |  10  ----------------------------<  113<H>  3.6   |  28  |  0.52    Ca    8.3<L>      02 Mar 2019 06:32                         11.7   6.46  )-----------( 332      ( 02 Mar 2019 06:32 )             33.3           137  |  100  |  12  ----------------------------<  120<H>  3.8   |  30  |  0.65    Ca    8.0<L>      01 Mar 2019 06:58    TPro  5.7<L>  /  Alb  1.9<L>  /  TBili  0.5  /  DBili  0.2  /  AST  36  /  ALT  40  /  AlkPhos  172<H>                              11.0   7.35  )-----------( 313      ( 01 Mar 2019 06:58 )             32.2       LIVER FUNCTIONS - ( 2019 07:15 )  Alb: 1.9 g/dL / Pro: 5.7 gm/dL / ALK PHOS: 172 U/L / ALT: 40 U/L / AST: 36 U/L / GGT: x             PT/INR - ( 2019 20:27 )   PT: 14.1 sec;   INR: 1.26 ratio         PTT - ( 2019 20:27 )  PTT:33.6 sec  Culture - Blood (19 @ 00:53)    Specimen Source: .Blood None    Culture Results:   No growth to date.    Culture - Blood (19 @ 12:47)    Gram Stain:   Growth in anaerobic bottle: Gram Negative Rods  Growth in aerobic bottle: Gram Negative Rods    -  Amikacin: S <=16    -  Ampicillin: S <=8 These ampicillin results predict results for amoxicillin    -  Ampicillin/Sulbactam: S <=8/4    -  Aztreonam: S <=4    -  Cefazolin: S <=8    -  Cefepime: S <=4    -  Cefoxitin: S <=8    -  Multidrug (KPC pos) resistant organism: Nondet    -  Staphylococcus aureus: Nondet    -  Methicillin resistant Staphylococcus aureus (MRSA): Nondet    -  Coagulase negative Staphylococcus: Nondet    -  Enterococcus species: Nondet    -  Vancomycin resistant Enterococcus sp.: Nondet    -  Escherichia coli: Nondet    -  Klebsiella oxytoca: Nondet    -  Klebsiella pneumoniae: Nondet    -  Serratia marcescens: Nondet    -  Haemophilus influenzae: Nondet    -  Listeria monocytogenes: Nondet    -  Neisseria meningitidis: Nondet    -  Pseudomonas aeruginosa: Nondet    -  Acinetobacter baumanii: Nondet    -  Enterobacter cloacae complex: Nondet    -  Streptococcus sp. (Not Grp A, B or S pneumoniae): Nondet    -  Streptococcus agalactiae (Group B): Nondet    -  Streptococcus pyogenes (Group A): Nondet    -  Streptococcus pneumoniae: Nondet    -  Candida albicans: Nondet    -  Candida glabrata: Nondet    -  Candida krusei: Nondet    -  Candida parapsilosis: Nondet    -  Candida tropicalis: Nondet    -  Ceftriaxone: S <=1 Enterobacter, Citrobacter, and Serratia may develop resistance during prolonged therapy    -  Ciprofloxacin: S <=1    -  Ertapenem: S <=1    -  Gentamicin: S <=4    -  Levofloxacin: S <=2    -  Meropenem: S <=1    -  Piperacillin/Tazobactam: S <=16    -  Tobramycin: S <=4    -  Trimethoprim/Sulfamethoxazole: S <=2/38    Specimen Source: .Blood None    Organism: Blood Culture PCR    Organism: Proteus mirabilis    Culture Results:   Growth in aerobic and anaerobic bottles: Proteus mirabilis  "Due to technical problems, Proteus sp. will Not be reported as part of  the BCID panel until further notice"  ***Blood Panel PCR results on this specimen are available  approximately 3 hours after the Gram stain result.***  Gram stain, PCR, and/or culture results may not always  correspond due to difference in methodologies.  ************************************************************  This PCR assay was performed using [employer].  The following targets are tested for: Enterococcus,  vancomycin resistant enterococci, Listeria monocytogenes,  coagulase negative staphylococci, S. aureus,  methicillin resistant S. aureus, Streptococcus agalactiae  (Group B), S. pneumoniae, S. pyogenes (Group A),  Acinetobacter baumannii, Enterobacter cloacae, E. coli,  Klebsiella oxytoca, K. pneumoniae, Proteus sp.,  Serratia marcescens, Haemophilus influenzae,  Neisseria meningitidis, Pseudomonas aeruginosa, Candida  albicans, C. glabrata, C krusei, C parapsilosis,  C. tropicalis and the KPC resistance gene.    Organism Identification: Blood Culture PCR  Proteus mirabilis    Method Type: PCR    Method Type: BENITO    < from: Transthoracic Echocardiogram (19 @ 09:58) >   The left ventricle is normal in size, wall motion and contractility.   Mild concentric left ventricular hypertrophy is present.   Estimated left ventricular ejection fraction is 60-65 %.   Normal appearing right ventricle structure and function.   Normal aortic valve structure and function.   Normal appearing mitral valve structure and function.   Trace mitral regurgitation is present.    < end of copied text >    < from: MR Thoracic Spine w/wo IV Cont (19 @ 15:29) >  CERVICAL SPINE:  No evidence for osteomyelitis/discitis.    C3-C4: Mild retrolisthesis, osteophyte/disc complex and   uncovertebral/facet arthrosis resulting in mild impression upon the   ventral cervical cord with associated mild abnormal cord signal,   suggestive of myelomalacia. Findings also result in severe bilateral   neural foraminal stenosis.    Well-circumscribed cystic lesion at the left C6 pedicle/left C5-C6 neural   foramen, slightly extending into the left C6-C7 neural foramen, causing   chronic bony scalloping of the C5-C6 vertebral bodies and and widening of   the left C5-C6 neural foramen. The cyst demonstrates a couple of thin   enhancing septa. Findings are suggestive of a cystic schwannoma,  perineural (Tarlov) cyst, or synovial cyst vs primary benign bone lesion.   Findings do not result in spinal canal stenosis. Findings result in mild   left C5-C6 and C6-C7 neural foraminal stenosis.    THORACIC SPINE:  T1-T2 discitis/osteomyelitis with abnormal enhancement/signal at the disc   space, without significant endplate bony erosion or destructive bony   lesion. Associated ventral epidural 0.8 x 2.2 cm (TR by CC) abscess at   the level of T1-T2, resulting in effacement of the ventral CSF space and   slightly contacting the ventral thoracic cord without corona compression   or abnormal cord signal/enhancement. Mild paravertebral edema/phlegmon at   the levels of C7-T1.    Small bilateral pleural effusions are again noted.    LUMBAR SPINE:  Lumbar surgical fusion hardware resulting in significant susceptibility   artifact resulting in nondiagnostic imaging of the lumbar spine. No   paravertebral/paraspinal soft tissue abscess. If clinical concern persist   a lumbar spine CT with contrast can be performed for further   characterization.    Results discussed with  (orthopedic resident), by radiologist   Dr. Romero at 4:40 PM on 2019.        < end of copied text >  < from: CT Chest w/ IV Cont (19 @ 22:42) >  IMPRESSION:          1.   Chest:   mild BILATERAL pleural effusions with underlying   infiltrates. Atelectasis seen within the lingula.       2.   Abdomen and pelvis: Haziness noted in the LEFT renal hilum may   reflect mild pyelitis. No ureteral dilatation is noted . Mild stool   retention.  6 mm calculus in lower pole of LEFT kidney.      < end of copied text >  < from: US Duplex Venous Lower Ext Complete, Bilateral (19 @ 16:35) >  IMPRESSION:     No evidence of bilateral lower extremity deep venous thrombosis.      < end of copied text >      MEDICATIONS  (STANDING):  cefTRIAXone Injectable. 2000 milliGRAM(s) IV Push every 24 hours  cholecalciferol 1000 Unit(s) Oral daily  cyclobenzaprine 5 milliGRAM(s) Oral three times a day  docusate sodium 100 milliGRAM(s) Oral three times a day  lidocaine   Patch 1 Patch Transdermal every 24 hours  polyethylene glycol 3350 17 Gram(s) Oral daily  senna 2 Tablet(s) Oral at bedtime  vancomycin  IVPB 1000 milliGRAM(s) IV Intermittent every 12 hours    MEDICATIONS  (PRN):  acetaminophen   Tablet .. 650 milliGRAM(s) Oral every 6 hours PRN Temp greater or equal to 38C (100.4F)  HYDROmorphone   Tablet 2 milliGRAM(s) Oral every 4 hours PRN Severe Pain (7 - 10)  HYDROmorphone  Injectable 0.5 milliGRAM(s) IV Push every 6 hours PRN for breakthrought pain  oxyCODONE    5 mG/acetaminophen 325 mG 1 Tablet(s) Oral every 4 hours PRN Moderate Pain (4 - 6)

## 2019-03-03 NOTE — PROGRESS NOTE ADULT - ASSESSMENT
· Assessment		     74 y/o female with PMHx of DJD, s/p L spine fusion, R TKR,  who was seen in  ER on 2/23/19 for L flank pain, uper back pain, chills, dysuria, nausea, and was diagnosed with L UVJ 0.8 cm stone, UTI, was given IV AB and discharged home on PO antibiotics. Her BC from 2/23/19 grew gram negative rods in the anaerobic bottle and she was called back to the ER. The patient states her symptoms are improved today.     * Proteus  Bacteremia, sepsis POA due to UTI, Pyelonephritis, Nephrolithiasis  * T1-T2 diskitis  with epidural abscess/phlegmon  * Bilateral pleural effusions, atelectasis    - ICU for neuro checks q1 h --> q4h as per neurosurgery   - CT  Chest and abd/pelvis- showed residual mild pyelitis(no hydronephrosis) ,no indication for  in patient stone extraction or nephrostomy as per dr pradhan  -I discussed with him as no suspicion for pyonephrosis and no hydronephrosis on repeat  CT  - MRI spine  noted   - 2 d echo  - EF wnl, no vegetations  - ortho spine consult  d/w Dr. Ward following the patient - conservative management at this time  - clear liquid diet , if no surgical interventions, can advance the diet  - continue  IV Ceftriaxone, IV vanco   for now per ID  - s/p  IVF   - follow up urology as outpatient  - d/c vit c megadose  - incentive spirometry   - repeat cultures   - PT , ambulation  - pain management , IV --> PO opioids with laxatives    * REYNA , POA from prerenal azotemia   which has now resolved with IVF, will stop IV fluids    * Hypoalbuminemia  - add supplements   - restart diet as soon as cleared from surgical prospective     *  DVT prophylaxis  chemical prophylaxis on heparin sc    Dispo - ? needs PICC line as per ID vs PO abx
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
## 2019-03-03 NOTE — PROGRESS NOTE ADULT - SUBJECTIVE AND OBJECTIVE BOX
Pt sitting comfortably  No new LE issues  AF, VSS    On ceftriaxopne and Vanco for the bacteremia and T1-2 diskitis    5/5 strength in the legs L1-S1 distribution  sensation intact    A/P  T1-2 diskitis  Cont frequent neuro checks  Pain controlled  Will need long term antibiotics plan per ID team (PICC line, IV meds and duration  all question answered

## 2019-03-03 NOTE — PROGRESS NOTE ADULT - SUBJECTIVE AND OBJECTIVE BOX
Pt S/E at bedside resting comfortably, no acute events overnight, pain improving, afebrile overnight    ICU Vital Signs Last 24 Hrs  T(C): 36.5 (03 Mar 2019 06:00), Max: 37.1 (02 Mar 2019 16:00)  T(F): 97.7 (03 Mar 2019 06:00), Max: 98.7 (02 Mar 2019 16:00)  HR: 75 (03 Mar 2019 06:00) (64 - 90)  BP: 91/68 (03 Mar 2019 06:00) (89/55 - 125/93)  BP(mean): 72 (03 Mar 2019 06:00) (43 - 101)  RR: 18 (03 Mar 2019 06:00) (12 - 26)  SpO2: 95% (03 Mar 2019 06:00) (91% - 98%)    Gen: NAD, AAOx3    Spine:  no focal deficits  baseline motors in UE and LE present, no focal weakness  SILT L3-S1  SILT C5-T1  +DP/PT Pulses  +Radial Pulse  Compartments soft  No calf TTP B/L    73F with T1-2 Discitis with ventral epidural collection without cord compression  -pain control  -WBAT  -Regualr diet  -Abx per ID  -FU ID re long term antibiotics  -Plan for continue ABX tx at this time

## 2019-03-04 LAB
CULTURE RESULTS: SIGNIFICANT CHANGE UP
CULTURE RESULTS: SIGNIFICANT CHANGE UP
SPECIMEN SOURCE: SIGNIFICANT CHANGE UP
SPECIMEN SOURCE: SIGNIFICANT CHANGE UP

## 2019-03-04 RX ADMIN — OXYCODONE AND ACETAMINOPHEN 1 TABLET(S): 5; 325 TABLET ORAL at 06:51

## 2019-03-04 RX ADMIN — CYCLOBENZAPRINE HYDROCHLORIDE 5 MILLIGRAM(S): 10 TABLET, FILM COATED ORAL at 06:51

## 2019-03-04 RX ADMIN — OXYCODONE AND ACETAMINOPHEN 1 TABLET(S): 5; 325 TABLET ORAL at 07:45

## 2019-03-04 RX ADMIN — Medication 100 MILLIGRAM(S): at 14:36

## 2019-03-04 RX ADMIN — Medication 250 MILLIGRAM(S): at 17:28

## 2019-03-04 RX ADMIN — OXYCODONE AND ACETAMINOPHEN 1 TABLET(S): 5; 325 TABLET ORAL at 16:37

## 2019-03-04 RX ADMIN — Medication 250 MILLIGRAM(S): at 06:51

## 2019-03-04 RX ADMIN — LIDOCAINE 1 PATCH: 4 CREAM TOPICAL at 11:34

## 2019-03-04 RX ADMIN — Medication 1000 UNIT(S): at 11:34

## 2019-03-04 RX ADMIN — CEFTRIAXONE 2000 MILLIGRAM(S): 500 INJECTION, POWDER, FOR SOLUTION INTRAMUSCULAR; INTRAVENOUS at 10:18

## 2019-03-04 RX ADMIN — CYCLOBENZAPRINE HYDROCHLORIDE 5 MILLIGRAM(S): 10 TABLET, FILM COATED ORAL at 21:50

## 2019-03-04 RX ADMIN — LIDOCAINE 1 PATCH: 4 CREAM TOPICAL at 21:52

## 2019-03-04 RX ADMIN — OXYCODONE AND ACETAMINOPHEN 1 TABLET(S): 5; 325 TABLET ORAL at 17:07

## 2019-03-04 RX ADMIN — Medication 100 MILLIGRAM(S): at 06:51

## 2019-03-04 RX ADMIN — OXYCODONE AND ACETAMINOPHEN 1 TABLET(S): 5; 325 TABLET ORAL at 21:52

## 2019-03-04 RX ADMIN — OXYCODONE AND ACETAMINOPHEN 1 TABLET(S): 5; 325 TABLET ORAL at 22:50

## 2019-03-04 RX ADMIN — Medication 100 MILLIGRAM(S): at 21:50

## 2019-03-04 RX ADMIN — CYCLOBENZAPRINE HYDROCHLORIDE 5 MILLIGRAM(S): 10 TABLET, FILM COATED ORAL at 14:36

## 2019-03-04 RX ADMIN — POLYETHYLENE GLYCOL 3350 17 GRAM(S): 17 POWDER, FOR SOLUTION ORAL at 11:36

## 2019-03-04 NOTE — PROGRESS NOTE ADULT - ASSESSMENT
74 y/o female with PMHx of DJD, s/p L spine fusion, R TKR, L arm Erb's palsy who was seen in  ER on 2/23/19 for L flank pain, upper back pain, chills, dysuria, nausea, and was diagnoestd with L UVJ 0.8 cm stone, UTI, was given IV AB and discharged home on PO antibiotics. Her BC from 2/23/19 grew gram negative rods in the anaerobic bottle and she was called back to the ER. Eval by urology, blood cx/urine cx growing GNRs was given IV rocephin.     1. T1-T2 diskitis. epidural abscess ? phlegmon.  resolved proteus sepsis d/t pyelo-cystitis. nephrolithiasis  - MRI spine reviewed, appreciate spine surgery eval, pt clinically improving, back pain resolving   - on vancomycin [id number], trough wnl #4  - on rocephin [id number] #9  - continue with IV abx coverage  - blood cx-urine cx 2/23 proteus S cephalosporins repeat cx 2/24 (-)  - CT chest/abd/pelvis reviewed  - urology eval appreciated   - further stone tx in future  - will require long term IV abx coverage with PICC line and 6-8 until 4/7/19 with weekly cbc, cmp, esrp crp, vancomycin troughs  - if any clinical worsening/neurological compromise will require surgical intervention  - monitor temps  - tolerating abx well so far; no side effects noted  - reason for abx use and side effects reviewed with patient  - fu cbc

## 2019-03-04 NOTE — PROGRESS NOTE ADULT - ASSESSMENT
A/P: T1-2 discitis - stable  1. Continue PT/mobilization  2. Await PICC line A/P: T1-2 discitis - stable  d/w Dr. Epperson:  1. Continue PT/mobilization  2. Await PICC line  3. Cleared from spine standpoint to decrease neuro checks to Q shift and to transfer to medical floor

## 2019-03-04 NOTE — PROGRESS NOTE ADULT - SUBJECTIVE AND OBJECTIVE BOX
will nee dPt S/E at bedside resting comfortably, no acute events overnight, pain and symptoms improving, denies fevers or chills    Vital Signs Last 24 Hrs  T(C): 36.8 (03-04-19 @ 06:00), Max: 36.9 (03-03-19 @ 10:00)  T(F): 98.3 (03-04-19 @ 06:00), Max: 98.5 (03-03-19 @ 10:00)  HR: 71 (03-04-19 @ 06:00) (68 - 92)  BP: 116/42 (03-04-19 @ 06:00) (84/72 - 117/79)  BP(mean): 59 (03-04-19 @ 06:00) (51 - 93)  RR: 16 (03-04-19 @ 06:00) (13 - 24)  SpO2: 94% (03-04-19 @ 06:00) (80% - 98%)                          11.5   7.13  )-----------( 372      ( 03 Mar 2019 06:21 )             34.3   03-03    139  |  101  |  18  ----------------------------<  109<H>  4.0   |  28  |  0.74    Ca    8.4<L>      03 Mar 2019 06:21        Gen: NAD, AAOx3    Spine:  no focal deficits  baseline motors in UE and LE present, no focal weakness  motor 5/5  SILT L3-S1  SILT C5-T1  +DP/PT Pulses  +Radial Pulse  Compartments soft  No calf TTP B/L    73F with T1-2 Discitis with ventral epidural collection without cord compression    -pt progressing well  -pain control  -WBAT  -Regualr diet  -will need picc line  - IV abx, ID recs appreciated  - likely stepdown from ICU to floor  - will discuss with attending and advise if plan changes will nee dPt S/E at bedside resting comfortably, no acute events overnight, pain and symptoms improving, denies fevers or chills    Vital Signs Last 24 Hrs  T(C): 36.8 (03-04-19 @ 06:00), Max: 36.9 (03-03-19 @ 10:00)  T(F): 98.3 (03-04-19 @ 06:00), Max: 98.5 (03-03-19 @ 10:00)  HR: 71 (03-04-19 @ 06:00) (68 - 92)  BP: 116/42 (03-04-19 @ 06:00) (84/72 - 117/79)  BP(mean): 59 (03-04-19 @ 06:00) (51 - 93)  RR: 16 (03-04-19 @ 06:00) (13 - 24)  SpO2: 94% (03-04-19 @ 06:00) (80% - 98%)                          11.5   7.13  )-----------( 372      ( 03 Mar 2019 06:21 )             34.3   03-03    139  |  101  |  18  ----------------------------<  109<H>  4.0   |  28  |  0.74    Ca    8.4<L>      03 Mar 2019 06:21        Gen: NAD, AAOx3    Spine:  no focal deficits  baseline motors in UE and LE present, no focal weakness  motor 5/5  SILT L3-S1  SILT C5-T1  +DP/PT Pulses  +Radial Pulse  Compartments soft  No calf TTP B/L    73F with T1-2 Discitis with ventral epidural collection without cord compression    -pt progressing well  -pain control  -WBAT  -Regualr diet  -will need picc line  - IV abx, ID recs appreciated  - likely stepdown from ICU to floor  - trend esr/crp, ESR 53>52 and CRP 12>9, improving, will repeat in 2-3 days  - will discuss with attending and advise if plan changes

## 2019-03-04 NOTE — PROGRESS NOTE ADULT - SUBJECTIVE AND OBJECTIVE BOX
[facility]                                                           Orthopedic Spine Progress Note    SUBJECTIVE: Patient seen and examined, VSS, afebrile, minimal pain, ambulating better w/PT, awaiting PICC line    Current Pain Management:  [ ] PCA   [x] Po Analgesics [ ] IM /IV Anagesics     Vital Signs Last 24 Hrs  T(C): 36.9 (04 Mar 2019 10:00), Max: 36.9 (03 Mar 2019 18:00)  T(F): 98.4 (04 Mar 2019 10:00), Max: 98.5 (03 Mar 2019 18:00)  HR: 75 (04 Mar 2019 12:00) (68 - 89)  BP: 104/54 (04 Mar 2019 12:00) (84/72 - 126/84)  BP(mean): 62 (04 Mar 2019 12:00) (52 - 93)  RR: 21 (04 Mar 2019 12:00) (14 - 24)  SpO2: 97% (04 Mar 2019 12:00) (80% - 98%)  I&O's Detail    03 Mar 2019 07:01  -  04 Mar 2019 07:00  --------------------------------------------------------  IN:    IV PiggyBack: 500 mL    Oral Fluid: 1260 mL  Total IN: 1760 mL    OUT:  Total OUT: 0 mL    Total NET: 1760 mL          OBJECTIVE:       Cervical ROM: wnl  Lumbar ROM: not tested  Neurological: A/O x 3               Sensation: [x] intact to light touch  [ ] decreased:          Motor exam: [x]          [x] Upper extremity    Delt      Bicp       Tri      Wrist ext  Wrst Flex       Digit Ext Digit Flex                               R         4/5       4/5        4/5       5/5            5/5         5/5       5/5          5/5                               L          5/5       5/5        5/5       5/5            5/5         5/5       5/5          5/5         [x] Lower ext.     Hip Flx  Hip Ext   Hip Abd  Hip Add Quad   Hamstrg   TA       EHL      GS                              R        5/5        5/5        5/5             5/5        5/5        5/5        5/5     5/5      5/5                              L         5/5        5/5        5/5             5/5        5/5        5/5        5/5     5/5      5/5                                                        [x] Vascular: intact           Tension Signs: none          Long Tract Findings: none       LABS:                        11.5   7.13  )-----------( 372      ( 03 Mar 2019 06:21 )             34.3     03-03    139  |  101  |  18  ----------------------------<  109<H>  4.0   |  28  |  0.74    Ca    8.4<L>      03 Mar 2019 06:21

## 2019-03-04 NOTE — PROGRESS NOTE ADULT - SUBJECTIVE AND OBJECTIVE BOX
SUBJECTIVE     Patient seen in the A.M and feeling better with the cough with no fever spikes and still has some back pain   mild leg edema     PAST MEDICAL & SURGICAL HISTORY:  Erbs muscular dystrophy: left arm  Thyroid cyst  Osteoarthritis  H/O spinal fusion: 1997 lumbar    OBJECTIVE   Vital Signs Last 24 Hrs  T(C): 36.9 (04 Mar 2019 10:00), Max: 36.9 (03 Mar 2019 18:00)  T(F): 98.4 (04 Mar 2019 10:00), Max: 98.5 (03 Mar 2019 18:00)  HR: 75 (04 Mar 2019 12:00) (68 - 89)  BP: 104/54 (04 Mar 2019 12:00) (84/72 - 126/84)  BP(mean): 62 (04 Mar 2019 12:00) (52 - 93)  RR: 21 (04 Mar 2019 12:00) (14 - 24)  SpO2: 97% (04 Mar 2019 12:00) (80% - 98%)    Review of systems   as dictated in the history of present illness with the review of other systems non contributory     PHYSICAL EXAM:  Constitutional: , awake and alert, not in distress not on the 02   HEENT: Normo cephalic atraumatic  Neck: Soft and supple, No J.V.D   Respiratory: vesicular breathing , No wheezing, rales or rhonchi and mildly decreased breath sounds in the bases   Cardiovascular: S1 and S2, regular rate .   Gastrointestinal:  soft, nontender,   Extremities: mild edema of the feet with no calf tenderness   Neurological: No new  focal deficits.      MEDICATIONS  (STANDING):  cefTRIAXone Injectable. 2000 milliGRAM(s) IV Push every 24 hours  cholecalciferol 1000 Unit(s) Oral daily  cyclobenzaprine 5 milliGRAM(s) Oral three times a day  docusate sodium 100 milliGRAM(s) Oral three times a day  lidocaine   Patch 1 Patch Transdermal every 24 hours  polyethylene glycol 3350 17 Gram(s) Oral daily  senna 2 Tablet(s) Oral at bedtime  vancomycin  IVPB 1000 milliGRAM(s) IV Intermittent every 12 hours                            11.5   7.13  )-----------( 372      ( 03 Mar 2019 06:21 )             34.3     03-03    139  |  101  |  18  ----------------------------<  109<H>  4.0   |  28  |  0.74    Ca    8.4<L>      03 Mar 2019 06:21

## 2019-03-04 NOTE — PROGRESS NOTE ADULT - ASSESSMENT
· Assessment		     74 y/o female with PMHx of DJD, s/p L spine fusion, R TKR,  who was seen in  ER on 2/23/19 for L flank pain, uper back pain, chills, dysuria, nausea, and was diagnosed with L UVJ 0.8 cm stone, UTI, was given IV AB and discharged home on PO antibiotics. Her BC from 2/23/19 grew gram negative rods in the anaerobic bottle and she was called back to the ER. The patient states her symptoms are improved today.     * Proteus  Bacteremia, sepsis POA due to UTI, Pyelonephritis, Nephrolithiasis  * T1-T2 diskitis  with epidural abscess/phlegmon  * Bilateral pleural effusions, atelectasis    - ICU for neuro checks q1 h --> q4h as per neurosurgery   - CT  Chest and abd/pelvis- showed residual mild pyelitis(no hydronephrosis) ,no indication for  in patient stone extraction or nephrostomy as per dr pradhan  -I discussed with him as no suspicion for pyonephrosis and no hydronephrosis on repeat  CT  - MRI spine  noted   - 2 d echo  - EF wnl, no vegetations  - ortho spine consult  d/w Dr. Ward following the patient - conservative management at this time  - clear liquid diet , if no surgical interventions, can advance the diet  - continue  IV Ceftriaxone, IV vanco   for now per ID  - s/p  IVF   - follow up urology as outpatient  - d/c vit c megadose  - incentive spirometry   - repeat cultures   - PT , ambulation  - pain management , IV --> PO opioids with laxatives    * REYNA , POA from prerenal azotemia   which has now resolved with IVF, will stop IV fluids    * Hypoalbuminemia  - add supplements   - restart diet as soon as cleared from surgical prospective     * Constipation   - colace, senna, Miralax    *  DVT prophylaxis  chemical prophylaxis on heparin sc    Dispo -  plan for PICC line,  and IV abx

## 2019-03-04 NOTE — PROGRESS NOTE ADULT - SUBJECTIVE AND OBJECTIVE BOX
Date of service: 03-04-19 @ 12:11    pt seen and examined  had MRI spine 2/28 showing T1-T2 diskitis/OM/epidural abscess? phlegmon  temps down  feels much better  less back pain    ROS: no fever or chills; denies dizziness, no HA, no SOB or cough, no abdominal pain, no diarrhea or constipation; no dysuria, no urinary frequency, no legs pain, no rashes    MEDICATIONS  (STANDING):  cefTRIAXone Injectable. 2000 milliGRAM(s) IV Push every 24 hours  cholecalciferol 1000 Unit(s) Oral daily  cyclobenzaprine 5 milliGRAM(s) Oral three times a day  docusate sodium 100 milliGRAM(s) Oral three times a day  lidocaine   Patch 1 Patch Transdermal every 24 hours  polyethylene glycol 3350 17 Gram(s) Oral daily  senna 2 Tablet(s) Oral at bedtime  vancomycin  IVPB 1000 milliGRAM(s) IV Intermittent every 12 hours      Vital Signs Last 24 Hrs  T(C): 36.9 (04 Mar 2019 10:00), Max: 36.9 (03 Mar 2019 18:00)  T(F): 98.4 (04 Mar 2019 10:00), Max: 98.5 (03 Mar 2019 18:00)  HR: 75 (04 Mar 2019 12:00) (68 - 89)  BP: 104/54 (04 Mar 2019 12:00) (84/72 - 126/84)  BP(mean): 62 (04 Mar 2019 12:00) (52 - 93)  RR: 21 (04 Mar 2019 12:00) (14 - 24)  SpO2: 97% (04 Mar 2019 12:00) (80% - 98%)      PE:  Constitutional: frail looking  HEENT: NC/AT, EOMI, PERRLA, conjunctivae clear; ears and nose atraumatic; pharynx benign  Neck: supple; thyroid not palpable  Back: no tenderness  Respiratory: respiratory effort normal; clear to auscultation  Cardiovascular: S1S2 regular, no murmurs  Abdomen: soft, not tender, not distended, positive BS; liver and spleen WNL  Genitourinary: no suprapubic tenderness L CVA tenderness  Lymphatic: no LN palpable  Musculoskeletal: T1-T2 spinal tenderness  Extremities: no pedal edema  Neurological/ Psychiatric: moving all extremities  Skin: no rashes; no palpable lesions    Labs: all available labs reviewed                        11.5   7.13  )-----------( 372      ( 03 Mar 2019 06:21 )             34.3     03-03    139  |  101  |  18  ----------------------------<  109<H>  4.0   |  28  |  0.74    Ca    8.4<L>      03 Mar 2019 06:21      Vancomycin Level, Trough: 16.2 ug/mL (03-02 @ 17:18)            Culture - Urine (02.24.19 @ 22:00)    Specimen Source: .Urine Clean Catch (Midstream)    Culture Results:   No growth    Culture - Blood (02.24.19 @ 11:39)    Specimen Source: .Blood None    Culture Results:   No growth to date.    Culture - Blood (02.23.19 @ 12:47)    Gram Stain:   Growth in anaerobic bottle: Gram Negative Rods  Growth in aerobic bottle: Gram Negative Rods    -  Amikacin: S <=16    -  Ampicillin: S <=8 These ampicillin results predict results for amoxicillin    -  Ampicillin/Sulbactam: S <=8/4    -  Aztreonam: S <=4    -  Cefazolin: S <=8    -  Multidrug (KPC pos) resistant organism: Nondet    -  Staphylococcus aureus: Nondet    -  Methicillin resistant Staphylococcus aureus (MRSA): Nondet    -  Coagulase negative Staphylococcus: Nondet    -  Enterococcus species: Nondet    -  Vancomycin resistant Enterococcus sp.: Nondet    -  Escherichia coli: Nondet    -  Klebsiella oxytoca: Nondet    -  Klebsiella pneumoniae: Nondet    -  Serratia marcescens: Nondet    -  Haemophilus influenzae: Nondet    -  Listeria monocytogenes: Nondet    -  Neisseria meningitidis: Nondet    -  Pseudomonas aeruginosa: Nondet    -  Acinetobacter baumanii: Nondet    -  Enterobacter cloacae complex: Nondet    -  Streptococcus sp. (Not Grp A, B or S pneumoniae): Nondet    -  Streptococcus agalactiae (Group B): Nondet    -  Streptococcus pyogenes (Group A): Nondet    -  Streptococcus pneumoniae: Nondet    -  Candida albicans: Nondet    -  Candida glabrata: Nondet    -  Candida krusei: Nondet    -  Candida parapsilosis: Nondet    -  Candida tropicalis: Nondet    -  Cefepime: S <=4    -  Cefoxitin: S <=8    -  Ceftriaxone: S <=1 Enterobacter, Citrobacter, and Serratia may develop resistance during prolonged therapy    -  Ciprofloxacin: S <=1    -  Ertapenem: S <=1    -  Gentamicin: S <=4    -  Levofloxacin: S <=2    -  Meropenem: S <=1    -  Piperacillin/Tazobactam: S <=16    -  Tobramycin: S <=4    -  Trimethoprim/Sulfamethoxazole: S <=2/38    Specimen Source: .Blood None    Organism: Blood Culture PCR    Organism: Proteus mirabilis    Culture Results:   Growth in aerobic and anaerobic bottles: Proteus mirabilis  "Due to technical problems, Proteus sp. will Not be reported as part of  the BCID panel until further notice"  ***Blood Panel PCR results on this specimen are available  approximately 3 hours after the Gram stain result.***  Gram stain, PCR, and/or culture results may not always  correspond due to difference in methodologies.  ************************************************************  This PCR assay was performed using [employer].  The following targets are tested for: Enterococcus,  vancomycin resistant enterococci, Listeria monocytogenes,  coagulase negative staphylococci, S. aureus,  methicillin resistant S. aureus, Streptococcus agalactiae  (Group B), S. pneumoniae, S. pyogenes (Group A),  Acinetobacter baumannii, Enterobacter cloacae, E. coli,  Klebsiella oxytoca, K. pneumoniae, Proteus sp.,  Serratia marcescens, Haemophilus influenzae,  Neisseria meningitidis, Pseudomonas aeruginosa, Candida  albicans, C. glabrata, C krusei, C parapsilosis,  C. tropicalis and the KPC resistance gene.    Organism Identification: Blood Culture PCR  Proteus mirabilis    Method Type: PCR    Method Type: BENITO      Radiology: all available radiological tests reviewed    < from: CT Chest w/ IV Cont (02.27.19 @ 22:42) >    EXAM:  CT ABDOMEN AND PELVIS OC IC                          EXAM:  CT CHEST IC                            PROCEDURE DATE:  02/27/2019          INTERPRETATION:      Three examinations were performed on this patient:   1. CT scan of the chest with intravenous contrast  2. CT scan of the abdomen with intravenous contrast  3. CT scan of the pelvis with intravenous contrast        CLINICAL INFORMATION (all 3 exams):      Cough sepsis abdominal pain        TECHNIQUE:  Contiguous axial 3 mm sections were obtained through the   chest, abdomen and pelvis using single helical acquisition.  Images were   acquired during the rapid bolus administration of 95 cc of Omnipaque 350/   5 cc discarded.  Imaging post processing software was employed to   generate reformatted images in 3 mm sagittal and coronal imaging planes.     No Oral contrast was administered.   This scan was performed using   automatic exposure control (radiation dose reduction software) to obtain   a diagnostic image quality scan with patient dose as low as reasonably   achievable.         FINDINGS:   No previous examinations are available for review.    The thyroid gland appears intact.    The lungs demonstrate mild BILATERAL pleural effusions with underlying   infiltrates. Atelectasis seen within the lingula. The trachea and major   bronchi are patent.    No enlarged axillary, hilar or mediastinal lymph nodes are found.   The   heart size is normal. The chest wall and thoracic spine are unremarkable.    The liver demonstrates homogeneous attenuation without focal lesion or   abnormal enhancement.  Hepatic size and contours are maintained. Hepatic   and portal veins are patent and not displaced.  No intrahepatic or common   ductal dilatation is recognized.  The gallbladder is intact without   calcified calculi or wall thickening.  The pancreas is intact without   ductal dilatation or focal lesion.  The spleen is normal in size.    The adrenal glands are intact.  The kidneys demonstrate symmetric   nephrograms. Haziness noted in the LEFT renal hilum may reflect mild   pyelitis. No ureteral dilatation is noted. No suspicious renal mass is   found. 6 mm calculus is seen in the lower pole of the LEFT kidney.  No   hydronephrosis or perinephric infiltration is found.The bladder appears   unremarkable.    Small calcified fibroid in uterus    No enlarged lymph nodes are found.  No ascites is present.   The osseous   structures show lumbar fusion at L1-L4.          The bowel shows mild stool retention.  No obstruction, perforation or   abscess is recognized.           IMPRESSION:          1.   Chest:   mild BILATERAL pleural effusions with underlying   infiltrates. Atelectasis seen within the lingula.       2.   Abdomen and pelvis: Haziness noted in the LEFT renal hilum may   reflect mild pyelitis. No ureteral dilatation is noted . Mild stool   retention.  6 mm calculus in lower pole of LEFT kidney.      < from: Xray Thoracic Spine 2 View (02.26.19 @ 09:29) >    EXAM:  XR T SPINE 2 VIEWS                            PROCEDURE DATE:  02/26/2019          INTERPRETATION:  XR T SPINE 2 VIEWS    CLINICAL INDICATION: x-ray of thoracic spine s/p fall    VIEWS/TECHNIQUE: AP and lateral views of the thoracic spine were obtained.    COMPARISON:  Chest radiograph dated 10/3/2018.      FINDINGS:      There is thoracic process.    There is a chronic moderate anterior T8 vertebral body wedge deformity,   unchanged. Remaining thoracic vertebral body heights are maintained.   Thoracic vertebral body alignment is preserved.    There is mild multilevel thoracic intervertebral disc height loss and   small endplate osteophytosis.    There is redemonstration of partially imaged lumbar surgical fusion   hardware.     IMPRESSION:    No acute fracture of the thoracic spine.    Unchanged moderate anterior T8 vertebral body wedge deformity.    Partially imaged lumbar surgical fusion hardware.      < end of copied text >    EXAM:  CT ABDOMEN AND PELVIS                              INTERPRETATION:  Exam Type:  CT ABDOMEN AND PELVIS   Date and Time: 2/23/2019 2:23 PM  Indication: Abdominal pain and hematuria  Compared to: Ultrasound abdomen 2/23/2019  Technique: CT of the ABDOMEN and PELVIS:  No intravenous contrast was   administered at physician request. This limits sensitivity for certain   processes. Oral contrast was not administered.    COMMENTS:    LOWER LUNGS AND PLEURA: Trace to small left pleural effusion with minimal   left basilar atelectasis. Coronary vascular calcification.    LIVER: Hepatomegaly. Surface contour nodularity suggesting cirrhosis. No   focal hepatic masses.  BILE DUCTS: normal caliber.  GALLBLADDER:     no wall thickening. Gallstones are not excluded.  PANCREAS: within normal limits.  SPLEEN: within normal limits.  ADRENALS: within normal limits.    KIDNEYS, URETERS AND BLADDER: Mild left hydroureteronephrosis secondary   to a 0.8 cm calculus at the left ureterovesicular junction. Minimal left   perinephric stranding and trace left perinephric fluid. Nonobstructive   left intrarenal calculi. No right hydronephrosis. Right ureter appears   unremarkable. Bladder is otherwise within normal limits.    PELVIS: Fundal uterine calcifications. No adnexal masses. No pelvic   adenopathy or pelvic free fluid.       BOWEL: The unopacified bowel is of normal course and caliber without   evidence of obstruction or bowel wall thickening. Periampullaryduodenal   diverticula..  PERITONEUM: no ascites or free air, no fluid collection.  RETROPERITONEUM: within normal limits.      VESSELS: atherosclerotic changes.      ABDOMINAL WALL: within normal limits.  BONES: Degenerative changes. Prior pedicle fusion.     IMPRESSION:     Mild left hydroureteronephrosis secondary to a 0.8 cm calculus at the   left ureterovesicular junction.        Advanced directives addressed: full resuscitation

## 2019-03-04 NOTE — PROGRESS NOTE ADULT - SUBJECTIVE AND OBJECTIVE BOX
· Subjective and Objective: 	  History of Present Illness: 	  The patient is a 72 y/o female with PMHx of DJD, s/p L spine fusion, R TKR, L arm Erb's palsy who was seen in  ER on 19 for L flank pain, upper back pain, chills, dysuria, nausea, and was diagnosed with L UVJ 0.8 cm stone, UTI, was given IV AB and discharged home on PO antibiotics. Her BC from 19 grew gram negative rods in the anaerobic bottle and she was called back to the ER. The patient states her symptoms are improved today.     Family Hx.: Mother had MI at age 74,  father  of dementia at age 95.     - report thoracic spine pain  - still with intractable upper back pain, no BM for 7days, pasing gases, no nausea   still with upper back pain, pending MRI spine, patient was seen at 10am   3/ - pt seen and examined, denies cp, palpitations, + back pain, denies numbness , weakness, tolerating clear liquid diet, + low grade fever  3/2 - pt seen and examined, back pain persist, ambulated with PT, tolerates po diet, afebrile  3/3 - pt seem and examined, back pain better, afebrile, denies abdominal pain, + BM, no numbness   3/4 - pt seen and examined , back pain improved, afebrile, tolerating PO diet , + constipation for 2 days    ROS - all other systems negative except mention above    T(C): 36.8 (19 @ 16:10), Max: 36.9 (19 @ 10:00)  T(F): 98.2 (19 @ 16:10), Max: 98.5 (19 @ 14:00)  HR: 79 (19 @ 16:10) (68 - 90)  BP: 104/49 (19 @ 16:10) (89/52 - 126/84)  RR: 18 (19 @ 16:10) (14 - 23)  SpO2: 96% (19 @ 16:10) (80% - 98%)  Wt(kg): --  PHYSICAL EXAM:    Constitutional: NAD, awake and alert, well-developed  HEENT: PERR, EOMI, Normal Hearing, MMM  Neck: Soft and supple, No LAD, No JVD  Respiratory: Breath sounds are clear bilaterally, No wheezing, rales or rhonchi  Cardiovascular: S1 and S2, regular rate and rhythm, no Murmurs, gallops or rubs  Gastrointestinal: Bowel Sounds present, soft, nontender, nondistended, no guarding, no rebound  Extremities: No peripheral edema  Vascular: 2+ peripheral pulses  Neurological: A/O x 3, no focal deficits   Musculoskeletal: 5/5 strength b/l upper and lower extremities  Skin: No rashes  rectal : deferred, not indicated          139  |  101  |  18  ----------------------------<  109<H>  4.0   |  28  |  0.74    Ca    8.4<L>      03 Mar 2019 06:21                        11.5   7.13  )-----------( 372      ( 03 Mar 2019 06:21 )             34.3         139  |  102  |  10  ----------------------------<  113<H>  3.6   |  28  |  0.52    Ca    8.3<L>      02 Mar 2019 06:32                         11.7   6.46  )-----------( 332      ( 02 Mar 2019 06:32 )             33.3           137  |  100  |  12  ----------------------------<  120<H>  3.8   |  30  |  0.65    Ca    8.0<L>      01 Mar 2019 06:58    TPro  5.7<L>  /  Alb  1.9<L>  /  TBili  0.5  /  DBili  0.2  /  AST  36  /  ALT  40  /  AlkPhos  172<H>                              11.0   7.35  )-----------( 313      ( 01 Mar 2019 06:58 )             32.2       LIVER FUNCTIONS - ( 2019 07:15 )  Alb: 1.9 g/dL / Pro: 5.7 gm/dL / ALK PHOS: 172 U/L / ALT: 40 U/L / AST: 36 U/L / GGT: x             PT/INR - ( 2019 20:27 )   PT: 14.1 sec;   INR: 1.26 ratio         PTT - ( 2019 20:27 )  PTT:33.6 sec  Culture - Blood (19 @ 00:53)    Specimen Source: .Blood None    Culture Results:   No growth to date.    Culture - Blood (19 @ 12:47)    Gram Stain:   Growth in anaerobic bottle: Gram Negative Rods  Growth in aerobic bottle: Gram Negative Rods    -  Amikacin: S <=16    -  Ampicillin: S <=8 These ampicillin results predict results for amoxicillin    -  Ampicillin/Sulbactam: S <=8/4    -  Aztreonam: S <=4    -  Cefazolin: S <=8    -  Cefepime: S <=4    -  Cefoxitin: S <=8    -  Multidrug (KPC pos) resistant organism: Nondet    -  Staphylococcus aureus: Nondet    -  Methicillin resistant Staphylococcus aureus (MRSA): Nondet    -  Coagulase negative Staphylococcus: Nondet    -  Enterococcus species: Nondet    -  Vancomycin resistant Enterococcus sp.: Nondet    -  Escherichia coli: Nondet    -  Klebsiella oxytoca: Nondet    -  Klebsiella pneumoniae: Nondet    -  Serratia marcescens: Nondet    -  Haemophilus influenzae: Nondet    -  Listeria monocytogenes: Nondet    -  Neisseria meningitidis: Nondet    -  Pseudomonas aeruginosa: Nondet    -  Acinetobacter baumanii: Nondet    -  Enterobacter cloacae complex: Nondet    -  Streptococcus sp. (Not Grp A, B or S pneumoniae): Nondet    -  Streptococcus agalactiae (Group B): Nondet    -  Streptococcus pyogenes (Group A): Nondet    -  Streptococcus pneumoniae: Nondet    -  Candida albicans: Nondet    -  Candida glabrata: Nondet    -  Candida krusei: Nondet    -  Candida parapsilosis: Nondet    -  Candida tropicalis: Nondet    -  Ceftriaxone: S <=1 Enterobacter, Citrobacter, and Serratia may develop resistance during prolonged therapy    -  Ciprofloxacin: S <=1    -  Ertapenem: S <=1    -  Gentamicin: S <=4    -  Levofloxacin: S <=2    -  Meropenem: S <=1    -  Piperacillin/Tazobactam: S <=16    -  Tobramycin: S <=4    -  Trimethoprim/Sulfamethoxazole: S <=2/38    Specimen Source: .Blood None    Organism: Blood Culture PCR    Organism: Proteus mirabilis    Culture Results:   Growth in aerobic and anaerobic bottles: Proteus mirabilis  "Due to technical problems, Proteus sp. will Not be reported as part of  the BCID panel until further notice"  ***Blood Panel PCR results on this specimen are available  approximately 3 hours after the Gram stain result.***  Gram stain, PCR, and/or culture results may not always  correspond due to difference in methodologies.  ************************************************************  This PCR assay was performed using [email or web address].  The following targets are tested for: Enterococcus,  vancomycin resistant enterococci, Listeria monocytogenes,  coagulase negative staphylococci, S. aureus,  methicillin resistant S. aureus, Streptococcus agalactiae  (Group B), S. pneumoniae, S. pyogenes (Group A),  Acinetobacter baumannii, Enterobacter cloacae, E. coli,  Klebsiella oxytoca, K. pneumoniae, Proteus sp.,  Serratia marcescens, Haemophilus influenzae,  Neisseria meningitidis, Pseudomonas aeruginosa, Candida  albicans, C. glabrata, C krusei, C parapsilosis,  C. tropicalis and the KPC resistance gene.    Organism Identification: Blood Culture PCR  Proteus mirabilis    Method Type: PCR    Method Type: BENITO    < from: Transthoracic Echocardiogram (19 @ 09:58) >   The left ventricle is normal in size, wall motion and contractility.   Mild concentric left ventricular hypertrophy is present.   Estimated left ventricular ejection fraction is 60-65 %.   Normal appearing right ventricle structure and function.   Normal aortic valve structure and function.   Normal appearing mitral valve structure and function.   Trace mitral regurgitation is present.    < end of copied text >    < from: MR Thoracic Spine w/wo IV Cont (19 @ 15:29) >  CERVICAL SPINE:  No evidence for osteomyelitis/discitis.    C3-C4: Mild retrolisthesis, osteophyte/disc complex and   uncovertebral/facet arthrosis resulting in mild impression upon the   ventral cervical cord with associated mild abnormal cord signal,   suggestive of myelomalacia. Findings also result in severe bilateral   neural foraminal stenosis.    Well-circumscribed cystic lesion at the left C6 pedicle/left C5-C6 neural   foramen, slightly extending into the left C6-C7 neural foramen, causing   chronic bony scalloping of the C5-C6 vertebral bodies and and widening of   the left C5-C6 neural foramen. The cyst demonstrates a couple of thin   enhancing septa. Findings are suggestive of a cystic schwannoma,  perineural (Tarlov) cyst, or synovial cyst vs primary benign bone lesion.   Findings do not result in spinal canal stenosis. Findings result in mild   left C5-C6 and C6-C7 neural foraminal stenosis.    THORACIC SPINE:  T1-T2 discitis/osteomyelitis with abnormal enhancement/signal at the disc   space, without significant endplate bony erosion or destructive bony   lesion. Associated ventral epidural 0.8 x 2.2 cm (TR by CC) abscess at   the level of T1-T2, resulting in effacement of the ventral CSF space and   slightly contacting the ventral thoracic cord without corona compression   or abnormal cord signal/enhancement. Mild paravertebral edema/phlegmon at   the levels of C7-T1.    Small bilateral pleural effusions are again noted.    LUMBAR SPINE:  Lumbar surgical fusion hardware resulting in significant susceptibility   artifact resulting in nondiagnostic imaging of the lumbar spine. No   paravertebral/paraspinal soft tissue abscess. If clinical concern persist   a lumbar spine CT with contrast can be performed for further   characterization.    Results discussed with  (orthopedic resident), by radiologist   Dr. Romero at 4:40 PM on 2019.        < end of copied text >  < from: CT Chest w/ IV Cont (19 @ 22:42) >  IMPRESSION:          1.   Chest:   mild BILATERAL pleural effusions with underlying   infiltrates. Atelectasis seen within the lingula.       2.   Abdomen and pelvis: Haziness noted in the LEFT renal hilum may   reflect mild pyelitis. No ureteral dilatation is noted . Mild stool   retention.  6 mm calculus in lower pole of LEFT kidney.      < end of copied text >  < from: US Duplex Venous Lower Ext Complete, Bilateral (19 @ 16:35) >  IMPRESSION:     No evidence of bilateral lower extremity deep venous thrombosis.      < end of copied text >  Culture - Blood (19 @ 00:53)    Specimen Source: .Blood None    Culture Results:   No growth to date.        MEDICATIONS  (STANDING):  cefTRIAXone Injectable. 2000 milliGRAM(s) IV Push every 24 hours  cholecalciferol 1000 Unit(s) Oral daily  cyclobenzaprine 5 milliGRAM(s) Oral three times a day  docusate sodium 100 milliGRAM(s) Oral three times a day  lidocaine   Patch 1 Patch Transdermal every 24 hours  polyethylene glycol 3350 17 Gram(s) Oral daily  senna 2 Tablet(s) Oral at bedtime  vancomycin  IVPB 1000 milliGRAM(s) IV Intermittent every 12 hours    MEDICATIONS  (PRN):  acetaminophen   Tablet .. 650 milliGRAM(s) Oral every 6 hours PRN Temp greater or equal to 38C (100.4F)  HYDROmorphone   Tablet 2 milliGRAM(s) Oral every 4 hours PRN Severe Pain (7 - 10)  HYDROmorphone  Injectable 0.5 milliGRAM(s) IV Push every 6 hours PRN for breakthrought pain  oxyCODONE    5 mG/acetaminophen 325 mG 1 Tablet(s) Oral every 4 hours PRN Moderate Pain (4 - 6)

## 2019-03-04 NOTE — PROGRESS NOTE ADULT - ASSESSMENT
- bilateral lower lobe patchy infiltrates with atelectasis noted in the ct scan of the chest -   - renal stone with urosepsis treated still has stone   - recurrent fevers likely related with epidural abscess   - significant arthritis of spine with the epidural abscess seen by the spine and moved to the ICU for the close observation      PLAN     - continue with the broad spectrum therapy for the spine abscess and spine surgery follow up   - iv antibiotics will also cover for the pneumonia   - effusions are small with no active intervention for now   - symptomatic relief of the cough   - would repeat cxr for the follow up of the effusion

## 2019-03-05 PROCEDURE — 71045 X-RAY EXAM CHEST 1 VIEW: CPT | Mod: 26

## 2019-03-05 RX ORDER — LIDOCAINE 4 G/100G
1 CREAM TOPICAL ONCE
Qty: 0 | Refills: 0 | Status: COMPLETED | OUTPATIENT
Start: 2019-03-05 | End: 2019-03-05

## 2019-03-05 RX ORDER — OXYCODONE HYDROCHLORIDE 5 MG/1
5 TABLET ORAL EVERY 4 HOURS
Qty: 0 | Refills: 0 | Status: DISCONTINUED | OUTPATIENT
Start: 2019-03-05 | End: 2019-03-07

## 2019-03-05 RX ORDER — FUROSEMIDE 40 MG
20 TABLET ORAL ONCE
Qty: 0 | Refills: 0 | Status: COMPLETED | OUTPATIENT
Start: 2019-03-05 | End: 2019-03-05

## 2019-03-05 RX ORDER — OXYCODONE HYDROCHLORIDE 5 MG/1
10 TABLET ORAL EVERY 4 HOURS
Qty: 0 | Refills: 0 | Status: DISCONTINUED | OUTPATIENT
Start: 2019-03-05 | End: 2019-03-07

## 2019-03-05 RX ORDER — ACETAMINOPHEN 500 MG
650 TABLET ORAL EVERY 8 HOURS
Qty: 0 | Refills: 0 | Status: DISCONTINUED | OUTPATIENT
Start: 2019-03-05 | End: 2019-03-07

## 2019-03-05 RX ADMIN — Medication 650 MILLIGRAM(S): at 21:44

## 2019-03-05 RX ADMIN — OXYCODONE HYDROCHLORIDE 10 MILLIGRAM(S): 5 TABLET ORAL at 12:17

## 2019-03-05 RX ADMIN — OXYCODONE HYDROCHLORIDE 5 MILLIGRAM(S): 5 TABLET ORAL at 22:06

## 2019-03-05 RX ADMIN — SENNA PLUS 2 TABLET(S): 8.6 TABLET ORAL at 21:44

## 2019-03-05 RX ADMIN — CEFTRIAXONE 2000 MILLIGRAM(S): 500 INJECTION, POWDER, FOR SOLUTION INTRAMUSCULAR; INTRAVENOUS at 08:39

## 2019-03-05 RX ADMIN — Medication 20 MILLIGRAM(S): at 17:11

## 2019-03-05 RX ADMIN — Medication 100 MILLIGRAM(S): at 21:44

## 2019-03-05 RX ADMIN — Medication 1000 UNIT(S): at 11:17

## 2019-03-05 RX ADMIN — CYCLOBENZAPRINE HYDROCHLORIDE 5 MILLIGRAM(S): 10 TABLET, FILM COATED ORAL at 21:44

## 2019-03-05 RX ADMIN — Medication 650 MILLIGRAM(S): at 12:42

## 2019-03-05 RX ADMIN — LIDOCAINE 1 PATCH: 4 CREAM TOPICAL at 22:08

## 2019-03-05 RX ADMIN — OXYCODONE HYDROCHLORIDE 10 MILLIGRAM(S): 5 TABLET ORAL at 11:17

## 2019-03-05 RX ADMIN — Medication 250 MILLIGRAM(S): at 17:11

## 2019-03-05 RX ADMIN — LIDOCAINE 1 PATCH: 4 CREAM TOPICAL at 11:17

## 2019-03-05 RX ADMIN — OXYCODONE HYDROCHLORIDE 5 MILLIGRAM(S): 5 TABLET ORAL at 22:35

## 2019-03-05 RX ADMIN — CYCLOBENZAPRINE HYDROCHLORIDE 5 MILLIGRAM(S): 10 TABLET, FILM COATED ORAL at 13:36

## 2019-03-05 RX ADMIN — LIDOCAINE 1 PATCH: 4 CREAM TOPICAL at 00:12

## 2019-03-05 RX ADMIN — Medication 650 MILLIGRAM(S): at 13:30

## 2019-03-05 RX ADMIN — Medication 100 MILLIGRAM(S): at 05:57

## 2019-03-05 RX ADMIN — Medication 650 MILLIGRAM(S): at 23:35

## 2019-03-05 RX ADMIN — Medication 250 MILLIGRAM(S): at 05:57

## 2019-03-05 RX ADMIN — CYCLOBENZAPRINE HYDROCHLORIDE 5 MILLIGRAM(S): 10 TABLET, FILM COATED ORAL at 05:57

## 2019-03-05 RX ADMIN — LIDOCAINE 1 PATCH: 4 CREAM TOPICAL at 23:35

## 2019-03-05 RX ADMIN — Medication 100 MILLIGRAM(S): at 13:37

## 2019-03-05 RX ADMIN — OXYCODONE AND ACETAMINOPHEN 1 TABLET(S): 5; 325 TABLET ORAL at 05:59

## 2019-03-05 RX ADMIN — POLYETHYLENE GLYCOL 3350 17 GRAM(S): 17 POWDER, FOR SOLUTION ORAL at 11:17

## 2019-03-05 NOTE — PROGRESS NOTE ADULT - ASSESSMENT
A/P: T1-2 discitis - stable  1. Await PICC and IV antibiotic arrangements as per ID  2. repeat ESR/CRP ordered for 3/6 as per Ortho resident

## 2019-03-05 NOTE — CHART NOTE - NSCHARTNOTEFT_GEN_A_CORE
Provider called for R rib pain by pt's nurse.    Pt is a 73F w/ PMHx of DJD s/p L spine fusion, admitted for bacteremia with sepsis now with sudden onset RUQ pain. Pt reports it can get 10/10, sharp, and radiates up to her right shoulder. Pain worsened by deep breaths and relieved by lying on her right side. Pt denies fever, chills, dyspnea, palpitations.     Vitals  T 99.5, HR 84, /54, RR 20, O2 sat 93% on RA  Gen: female lying in bed in NAD  CV: nl s1/s2, no M/R/G  Pulm: nl respiratory effort, CTAB, no wheezes/crackles/rhonchi  Abd: nl bowel sounds, RUQ tenderness with positive Boyd's sign    a/p: 73F w/ RUQ pain with stable vitals  - RUQ ultrasound  - lidocaine patch for pain     pt seen and plan discussed with Dr. Kruger, PGY3 Provider called for R rib pain by pt's nurse.    Pt is a 73F w/ PMHx of DJD s/p L spine fusion, admitted for bacteremia with sepsis now with sudden onset RUQ pain. Pt reports it can get 10/10, sharp, and radiates up to her right shoulder. Pain worsened by deep breaths and relieved by lying on her right side. Pt denies fever, chills, dyspnea, palpitations.     Vitals  T 99.5, HR 84, /54, RR 20, O2 sat 93% on RA  Gen: female lying in bed in NAD  CV: nl s1/s2, no M/R/G  Pulm: nl respiratory effort, CTAB, no wheezes/crackles/rhonchi  Abd: nl bowel sounds, RUQ tenderness with positive Boyd's sign    a/p: 73F w/ RUQ pain with stable vitals   - clinical picture suggestive of gall bladder origin for pain  - RUQ ultrasound  - lidocaine patch for pain     pt seen and plan discussed with Dr. Kruger, PGY3

## 2019-03-05 NOTE — PROGRESS NOTE ADULT - SUBJECTIVE AND OBJECTIVE BOX
SUBJECTIVE     patient seen in the A.[facility] and offers no new respiratory symptoms   says her cough is better and cxr noted shows persistent small bilateral effusion with the atelectasis with the mild vascular congestion   no fever spikes and her back pain is better     PAST MEDICAL & SURGICAL HISTORY:  Erbs muscular dystrophy: left arm  Thyroid cyst  Osteoarthritis  H/O spinal fusion: 1997 lumbar    OBJECTIVE   Vital Signs Last 24 Hrs  T(C): 37.2 (05 Mar 2019 12:50), Max: 37.3 (04 Mar 2019 20:31)  T(F): 99 (05 Mar 2019 12:50), Max: 99.1 (04 Mar 2019 20:31)  HR: 80 (05 Mar 2019 12:50) (72 - 91)  BP: 110/40 (05 Mar 2019 12:50) (104/49 - 117/53)  BP(mean): --  RR: 16 (05 Mar 2019 12:50) (16 - 18)  SpO2: 95% (05 Mar 2019 12:50) (93% - 96%)    Review of systems   as dictated in the history of present illness with the review of other systems non contributory     PHYSICAL EXAM:  Constitutional: , awake and alert, not in distress.  HEENT: Normo cephalic atraumatic  Neck: Soft and supple, No J.V.D   Respiratory: vesicular breathing has decreased breath sounds in the bases   Cardiovascular: S1 and S2, regular rate .   Gastrointestinal:  soft, nontender,   Extremities has mild edema of the legs   Neurological: No new  focal deficits.    MEDICATIONS  (STANDING):  acetaminophen   Tablet .. 650 milliGRAM(s) Oral every 8 hours  cefTRIAXone Injectable. 2000 milliGRAM(s) IV Push every 24 hours  cholecalciferol 1000 Unit(s) Oral daily  cyclobenzaprine 5 milliGRAM(s) Oral three times a day  docusate sodium 100 milliGRAM(s) Oral three times a day  lidocaine   Patch 1 Patch Transdermal every 24 hours  polyethylene glycol 3350 17 Gram(s) Oral daily  senna 2 Tablet(s) Oral at bedtime  vancomycin  IVPB 1000 milliGRAM(s) IV Intermittent every 12 hours                < from: Xray Chest 1 View- PORTABLE-Routine (03.05.19 @ 09:18) >  XAM:  XR CHEST PORTABLE ROUTINE 1V                            PROCEDURE DATE:  03/05/2019          INTERPRETATION:  Clinical information: Pleural effusions. Cough    Portable AP chest radiograph from 0830 hours:    COMPARISON:  CT chest February 27, 2019    FINDINGS:  There is blunting of both costophrenic sulci suggestive of   small pleural effusions. There is bibasilar subsegmental atelectasis.   There is cephalization of the pulmonary vasculature suggestive of   pulmonary vascular congestion.      < end of copied text >

## 2019-03-05 NOTE — PROGRESS NOTE ADULT - ASSESSMENT
- persistent small effusion with the atelectasis with the mild vascular congestion   - renal stone with urosepsis treated still has stone   - recurrent fevers likely related with epidural abscess which are on declining trend with the iv antibiotics   - significant arthritis of spine with the epidural abscess seen by the spine     PLAN     - continue with the broad spectrum therapy for the spine abscess and spine surgery follow up   - iv antibiotics will also cover for the pneumonia   - effusions are persistent with the atelectasis and would give one dose of the lasix    - follow up [facility] 7

## 2019-03-05 NOTE — PROGRESS NOTE ADULT - SUBJECTIVE AND OBJECTIVE BOX
Pt S/E at bedside resting comfortably, no acute events overnight, pain and symptoms improving, denies fevers or chills. reports she is feeling better each day able to do more and more with decreased pain.      Vital Signs Last 24 Hrs  T(C): 36.9 (03-05-19 @ 04:10), Max: 37.3 (03-04-19 @ 20:31)  T(F): 98.4 (03-05-19 @ 04:10), Max: 99.1 (03-04-19 @ 20:31)  HR: 72 (03-05-19 @ 04:10) (72 - 91)  BP: 117/53 (03-05-19 @ 04:10) (89/52 - 126/84)  BP(mean): 56 (03-04-19 @ 15:00) (55 - 93)  RR: 18 (03-05-19 @ 04:10) (15 - 23)  SpO2: 93% (03-05-19 @ 04:10) (93% - 98%)        Gen: NAD, AAOx3    Spine:  mild ttp over axial thoracic spine  baseline motors in UE and LE present, no focal weakness  motor 5/5  SILT L3-S1  SILT C5-T1  +DP/PT Pulses  +Radial Pulse  Compartments soft  No calf TTP B/L    73F with T1-2 Discitis with ventral epidural collection without cord compression improving clinically    -pt progressing well  -pain control  -WBAT  -Regualr diet  -will need picc line and long term iv abx  - c/w IV abx, ID recs and comgmt appreciated  - trend esr/crp, ESR 53>52 and CRP 12>9, improving, will repeat (ordered for 3/6, AM)  - will discuss with attending and advise if plan changes

## 2019-03-05 NOTE — PROGRESS NOTE ADULT - ASSESSMENT
· Assessment		     72 y/o female with PMHx of DJD, s/p L spine fusion, R TKR,  who was seen in  ER on 2/23/19 for L flank pain, uper back pain, chills, dysuria, nausea, and was diagnosed with L UVJ 0.8 cm stone, UTI, was given IV AB and discharged home on PO antibiotics. Her BC from 2/23/19 grew gram negative rods in the anaerobic bottle and she was called back to the ER. The patient states her symptoms are improved today.     * Proteus  Bacteremia, sepsis POA due to UTI, Pyelonephritis, Nephrolithiasis  * T1-T2 diskitis  with epidural abscess/phlegmon  * Bilateral pleural effusions, atelectasis    - ICU for neuro checks q1 h --> q4h --> q6h as per neurosurgery   - CT  Chest and abd/pelvis- showed residual mild pyelitis(no hydronephrosis) ,no indication for  in patient stone extraction or nephrostomy as per dr pradhan  -I discussed with him as no suspicion for pyonephrosis and no hydronephrosis on repeat  CT  - MRI spine  noted   - 2 d echo  - EF wnl, no vegetations  - ortho spine consult  d/w Dr. Ward following the patient - conservative management at this time  - clear liquid diet , if no surgical interventions, can advance the diet  - continue  IV Ceftriaxone, IV vanco   for now per ID  - s/p  IVF   - follow up urology as outpatient  - d/c vit c megadose  - incentive spirometry   - repeat cultures   - PT , ambulation  - pain management , IV --> PO opioids with laxatives  - 3/5 - pain uncontrolled, increase oxycodone 10 q4h , monitor     * REYNA , POA from prerenal azotemia   which has now resolved with IVF, will stop IV fluids    * Hypoalbuminemia  - add supplements   - restart diet as soon as cleared from surgical prospective     * Constipation   - colace, senna, Miralax    *  DVT prophylaxis  chemical prophylaxis on heparin sc    Dispo -  plan for PICC line,  and IV abx

## 2019-03-05 NOTE — PROGRESS NOTE ADULT - SUBJECTIVE AND OBJECTIVE BOX
[facility]                                                           Orthopedic Spine Progress Note      SUBJECTIVE: Patient seen and examined, some increased pain today but otherwise stable, neuro/motor remain at baseline    Current Pain Management:  [ ] PCA   [x] Po Analgesics [ ] IM /IV Anagesics     Vital Signs Last 24 Hrs  T(C): 36.9 (05 Mar 2019 04:10), Max: 37.3 (04 Mar 2019 20:31)  T(F): 98.4 (05 Mar 2019 04:10), Max: 99.1 (04 Mar 2019 20:31)  HR: 72 (05 Mar 2019 04:10) (72 - 91)  BP: 117/53 (05 Mar 2019 04:10) (89/52 - 117/53)  BP(mean): 56 (04 Mar 2019 15:00) (55 - 71)  RR: 18 (05 Mar 2019 04:10) (18 - 23)  SpO2: 93% (05 Mar 2019 04:10) (93% - 97%)  I&O's Detail    04 Mar 2019 07:01  -  05 Mar 2019 07:00  --------------------------------------------------------  IN:    Oral Fluid: 860 mL  Total IN: 860 mL    OUT:  Total OUT: 0 mL    Total NET: 860 mL          OBJECTIVE:      Cervical ROM: wnl  Lumbar ROM: not tested  Neurological: A/O x 3               Sensation: [x] intact to light touch  [ ] decreased:          Motor exam: [x]          [x] Upper extremity    Delt      Bicp       Tri      Wrist ext  Wrst Flex       Digit Ext Digit Flex                               R         5/5       5/5        5/5       5/5          5/5            5/5       5/5          5/5                               L          4/5       4/5        4/5       5/5          5/5            5/5       5/5          5/5         [x] Lower ext.     Hip Flx  Quad   Hamstrg   TA       EHL      GS                          R        5/5      5/5        5/5       5/5       5/5      5/5                                  L         5/5      5/5        5/5       5/5       5/5      5/5                                                                [x] Vascular: intact           Tension Signs: none          Long Tract Findings: none                                          LABS:

## 2019-03-05 NOTE — PROGRESS NOTE ADULT - SUBJECTIVE AND OBJECTIVE BOX
· Subjective and Objective: 	  History of Present Illness: 	  The patient is a 74 y/o female with PMHx of DJD, s/p L spine fusion, R TKR, L arm Erb's palsy who was seen in  ER on 19 for L flank pain, upper back pain, chills, dysuria, nausea, and was diagnosed with L UVJ 0.8 cm stone, UTI, was given IV AB and discharged home on PO antibiotics. Her BC from 19 grew gram negative rods in the anaerobic bottle and she was called back to the ER. The patient states her symptoms are improved today.     Family Hx.: Mother had MI at age 74,  father  of dementia at age 95.     - report thoracic spine pain  - still with intractable upper back pain, no BM for 7days, pasing gases, no nausea   still with upper back pain, pending MRI spine, patient was seen at 10am   3/ - pt seen and examined, denies cp, palpitations, + back pain, denies numbness , weakness, tolerating clear liquid diet, + low grade fever  3/2 - pt seen and examined, back pain persist, ambulated with PT, tolerates po diet, afebrile  3/3 - pt seem and examined, back pain better, afebrile, denies abdominal pain, + BM, no numbness   3/4 - pt seen and examined , back pain improved, afebrile, tolerating PO diet , + constipation for 2 days  3/5 - pt seen and examined, back pain is not better, denies chest pain, no BM   ROS - all other systems negative except mention above    T(C): 37.2 (19 @ 12:50), Max: 37.3 (19 @ 20:31)  T(F): 99 (19 @ 12:50), Max: 99.1 (19 @ 20:31)  HR: 80 (19 @ 12:50) (72 - 91)  BP: 110/40 (19 @ 12:50) (107/46 - 117/53)  RR: 16 (19 @ 12:50) (16 - 18)  SpO2: 95% (19 @ 12:50) (93% - 95%)  Wt(kg): --  PHYSICAL EXAM:    Constitutional: NAD, awake and alert, well-developed  HEENT: PERR, EOMI, Normal Hearing, MMM  Neck: Soft and supple, No LAD, No JVD  Respiratory: Breath sounds are clear bilaterally, No wheezing, rales or rhonchi  Cardiovascular: S1 and S2, regular rate and rhythm, no Murmurs, gallops or rubs  Gastrointestinal: Bowel Sounds present, soft, nontender, nondistended, no guarding, no rebound  Extremities: No peripheral edema  Vascular: 2+ peripheral pulses  Neurological: A/O x 3, no focal deficits   Musculoskeletal: 5/5 strength b/l upper and lower extremities  Skin: No rashes  rectal : deferred, not indicated          139  |  101  |  18  ----------------------------<  109<H>  4.0   |  28  |  0.74    Ca    8.4<L>      03 Mar 2019 06:21                        11.5   7.13  )-----------( 372      ( 03 Mar 2019 06:21 )             34.3         139  |  102  |  10  ----------------------------<  113<H>  3.6   |  28  |  0.52    Ca    8.3<L>      02 Mar 2019 06:32                         11.7   6.46  )-----------( 332      ( 02 Mar 2019 06:32 )             33.3           137  |  100  |  12  ----------------------------<  120<H>  3.8   |  30  |  0.65    Ca    8.0<L>      01 Mar 2019 06:58    TPro  5.7<L>  /  Alb  1.9<L>  /  TBili  0.5  /  DBili  0.2  /  AST  36  /  ALT  40  /  AlkPhos  172<H>                              11.0   7.35  )-----------( 313      ( 01 Mar 2019 06:58 )             32.2       LIVER FUNCTIONS - ( 2019 07:15 )  Alb: 1.9 g/dL / Pro: 5.7 gm/dL / ALK PHOS: 172 U/L / ALT: 40 U/L / AST: 36 U/L / GGT: x             PT/INR - ( 2019 20:27 )   PT: 14.1 sec;   INR: 1.26 ratio         PTT - ( 2019 20:27 )  PTT:33.6 sec  Culture - Blood (19 @ 00:53)    Specimen Source: .Blood None    Culture Results:   No growth to date.    Culture - Blood (19 @ 12:47)    Gram Stain:   Growth in anaerobic bottle: Gram Negative Rods  Growth in aerobic bottle: Gram Negative Rods    -  Amikacin: S <=16    -  Ampicillin: S <=8 These ampicillin results predict results for amoxicillin    -  Ampicillin/Sulbactam: S <=8/4    -  Aztreonam: S <=4    -  Cefazolin: S <=8    -  Cefepime: S <=4    -  Cefoxitin: S <=8    -  Multidrug (KPC pos) resistant organism: Nondet    -  Staphylococcus aureus: Nondet    -  Methicillin resistant Staphylococcus aureus (MRSA): Nondet    -  Coagulase negative Staphylococcus: Nondet    -  Enterococcus species: Nondet    -  Vancomycin resistant Enterococcus sp.: Nondet    -  Escherichia coli: Nondet    -  Klebsiella oxytoca: Nondet    -  Klebsiella pneumoniae: Nondet    -  Serratia marcescens: Nondet    -  Haemophilus influenzae: Nondet    -  Listeria monocytogenes: Nondet    -  Neisseria meningitidis: Nondet    -  Pseudomonas aeruginosa: Nondet    -  Acinetobacter baumanii: Nondet    -  Enterobacter cloacae complex: Nondet    -  Streptococcus sp. (Not Grp A, B or S pneumoniae): Nondet    -  Streptococcus agalactiae (Group B): Nondet    -  Streptococcus pyogenes (Group A): Nondet    -  Streptococcus pneumoniae: Nondet    -  Candida albicans: Nondet    -  Candida glabrata: Nondet    -  Candida krusei: Nondet    -  Candida parapsilosis: Nondet    -  Candida tropicalis: Nondet    -  Ceftriaxone: S <=1 Enterobacter, Citrobacter, and Serratia may develop resistance during prolonged therapy    -  Ciprofloxacin: S <=1    -  Ertapenem: S <=1    -  Gentamicin: S <=4    -  Levofloxacin: S <=2    -  Meropenem: S <=1    -  Piperacillin/Tazobactam: S <=16    -  Tobramycin: S <=4    -  Trimethoprim/Sulfamethoxazole: S <=/38    Specimen Source: .Blood None    Organism: Blood Culture PCR    Organism: Proteus mirabilis    Culture Results:   Growth in aerobic and anaerobic bottles: Proteus mirabilis  "Due to technical problems, Proteus sp. will Not be reported as part of  the BCID panel until further notice"  ***Blood Panel PCR results on this specimen are available  approximately 3 hours after the Gram stain result.***  Gram stain, PCR, and/or culture results may not always  correspond due to difference in methodologies.  ************************************************************  This PCR assay was performed using [employer].  The following targets are tested for: Enterococcus,  vancomycin resistant enterococci, Listeria monocytogenes,  coagulase negative staphylococci, S. aureus,  methicillin resistant S. aureus, Streptococcus agalactiae  (Group B), S. pneumoniae, S. pyogenes (Group A),  Acinetobacter baumannii, Enterobacter cloacae, E. coli,  Klebsiella oxytoca, K. pneumoniae, Proteus sp.,  Serratia marcescens, Haemophilus influenzae,  Neisseria meningitidis, Pseudomonas aeruginosa, Candida  albicans, C. glabrata, C krusei, C parapsilosis,  C. tropicalis and the KPC resistance gene.    Organism Identification: Blood Culture PCR  Proteus mirabilis    Method Type: PCR    Method Type: BENITO    < from: Transthoracic Echocardiogram (19 @ 09:58) >   The left ventricle is normal in size, wall motion and contractility.   Mild concentric left ventricular hypertrophy is present.   Estimated left ventricular ejection fraction is 60-65 %.   Normal appearing right ventricle structure and function.   Normal aortic valve structure and function.   Normal appearing mitral valve structure and function.   Trace mitral regurgitation is present.    < end of copied text >    < from: MR Thoracic Spine w/wo IV Cont (19 @ 15:29) >  CERVICAL SPINE:  No evidence for osteomyelitis/discitis.    C3-C4: Mild retrolisthesis, osteophyte/disc complex and   uncovertebral/facet arthrosis resulting in mild impression upon the   ventral cervical cord with associated mild abnormal cord signal,   suggestive of myelomalacia. Findings also result in severe bilateral   neural foraminal stenosis.    Well-circumscribed cystic lesion at the left C6 pedicle/left C5-C6 neural   foramen, slightly extending into the left C6-C7 neural foramen, causing   chronic bony scalloping of the C5-C6 vertebral bodies and and widening of   the left C5-C6 neural foramen. The cyst demonstrates a couple of thin   enhancing septa. Findings are suggestive of a cystic schwannoma,  perineural (Tarlov) cyst, or synovial cyst vs primary benign bone lesion.   Findings do not result in spinal canal stenosis. Findings result in mild   left C5-C6 and C6-C7 neural foraminal stenosis.    THORACIC SPINE:  T1-T2 discitis/osteomyelitis with abnormal enhancement/signal at the disc   space, without significant endplate bony erosion or destructive bony   lesion. Associated ventral epidural 0.8 x 2.2 cm (TR by CC) abscess at   the level of T1-T2, resulting in effacement of the ventral CSF space and   slightly contacting the ventral thoracic cord without corona compression   or abnormal cord signal/enhancement. Mild paravertebral edema/phlegmon at   the levels of C7-T1.    Small bilateral pleural effusions are again noted.    LUMBAR SPINE:  Lumbar surgical fusion hardware resulting in significant susceptibility   artifact resulting in nondiagnostic imaging of the lumbar spine. No   paravertebral/paraspinal soft tissue abscess. If clinical concern persist   a lumbar spine CT with contrast can be performed for further   characterization.    Results discussed with  (orthopedic resident), by radiologist   Dr. Romero at 4:40 PM on 2019.        < end of copied text >  < from: CT Chest w/ IV Cont (19 @ 22:42) >  IMPRESSION:          1.   Chest:   mild BILATERAL pleural effusions with underlying   infiltrates. Atelectasis seen within the lingula.       2.   Abdomen and pelvis: Haziness noted in the LEFT renal hilum may   reflect mild pyelitis. No ureteral dilatation is noted . Mild stool   retention.  6 mm calculus in lower pole of LEFT kidney.      < end of copied text >  < from: US Duplex Venous Lower Ext Complete, Bilateral (19 @ 16:35) >  IMPRESSION:     No evidence of bilateral lower extremity deep venous thrombosis.      < end of copied text >  Culture - Blood (19 @ 00:53)    Specimen Source: .Blood None    Culture Results:   No growth to date.        MEDICATIONS  (STANDING):  cefTRIAXone Injectable. 2000 milliGRAM(s) IV Push every 24 hours  cholecalciferol 1000 Unit(s) Oral daily  cyclobenzaprine 5 milliGRAM(s) Oral three times a day  docusate sodium 100 milliGRAM(s) Oral three times a day  lidocaine   Patch 1 Patch Transdermal every 24 hours  polyethylene glycol 3350 17 Gram(s) Oral daily  senna 2 Tablet(s) Oral at bedtime  vancomycin  IVPB 1000 milliGRAM(s) IV Intermittent every 12 hours    MEDICATIONS  (PRN):  acetaminophen   Tablet .. 650 milliGRAM(s) Oral every 6 hours PRN Temp greater or equal to 38C (100.4F)  HYDROmorphone   Tablet 2 milliGRAM(s) Oral every 4 hours PRN Severe Pain (7 - 10)  HYDROmorphone  Injectable 0.5 milliGRAM(s) IV Push every 6 hours PRN for breakthrought pain  oxyCODONE    5 mG/acetaminophen 325 mG 1 Tablet(s) Oral every 4 hours PRN Moderate Pain (4 - 6)

## 2019-03-06 DIAGNOSIS — I26.99 OTHER PULMONARY EMBOLISM WITHOUT ACUTE COR PULMONALE: ICD-10-CM

## 2019-03-06 LAB
ANION GAP SERPL CALC-SCNC: 8 MMOL/L — SIGNIFICANT CHANGE UP (ref 5–17)
BASOPHILS # BLD AUTO: 0.12 K/UL — SIGNIFICANT CHANGE UP (ref 0–0.2)
BASOPHILS NFR BLD AUTO: 1.3 % — SIGNIFICANT CHANGE UP (ref 0–2)
BUN SERPL-MCNC: 21 MG/DL — SIGNIFICANT CHANGE UP (ref 7–23)
CALCIUM SERPL-MCNC: 9.1 MG/DL — SIGNIFICANT CHANGE UP (ref 8.5–10.1)
CHLORIDE SERPL-SCNC: 99 MMOL/L — SIGNIFICANT CHANGE UP (ref 96–108)
CO2 SERPL-SCNC: 31 MMOL/L — SIGNIFICANT CHANGE UP (ref 22–31)
CREAT SERPL-MCNC: 0.88 MG/DL — SIGNIFICANT CHANGE UP (ref 0.5–1.3)
CRP SERPL-MCNC: 9.38 MG/DL — HIGH (ref 0–0.4)
EOSINOPHIL # BLD AUTO: 0.09 K/UL — SIGNIFICANT CHANGE UP (ref 0–0.5)
EOSINOPHIL NFR BLD AUTO: 1 % — SIGNIFICANT CHANGE UP (ref 0–6)
ERYTHROCYTE [SEDIMENTATION RATE] IN BLOOD: 82 MM/HR — HIGH (ref 0–20)
GLUCOSE SERPL-MCNC: 93 MG/DL — SIGNIFICANT CHANGE UP (ref 70–99)
HCT VFR BLD CALC: 33.4 % — LOW (ref 34.5–45)
HGB BLD-MCNC: 11 G/DL — LOW (ref 11.5–15.5)
IMM GRANULOCYTES NFR BLD AUTO: 1.8 % — HIGH (ref 0–1.5)
LYMPHOCYTES # BLD AUTO: 1.86 K/UL — SIGNIFICANT CHANGE UP (ref 1–3.3)
LYMPHOCYTES # BLD AUTO: 19.7 % — SIGNIFICANT CHANGE UP (ref 13–44)
MCHC RBC-ENTMCNC: 30.6 PG — SIGNIFICANT CHANGE UP (ref 27–34)
MCHC RBC-ENTMCNC: 32.9 GM/DL — SIGNIFICANT CHANGE UP (ref 32–36)
MCV RBC AUTO: 93 FL — SIGNIFICANT CHANGE UP (ref 80–100)
MONOCYTES # BLD AUTO: 1.08 K/UL — HIGH (ref 0–0.9)
MONOCYTES NFR BLD AUTO: 11.5 % — SIGNIFICANT CHANGE UP (ref 2–14)
NEUTROPHILS # BLD AUTO: 6.11 K/UL — SIGNIFICANT CHANGE UP (ref 1.8–7.4)
NEUTROPHILS NFR BLD AUTO: 64.7 % — SIGNIFICANT CHANGE UP (ref 43–77)
NRBC # BLD: 0 /100 WBCS — SIGNIFICANT CHANGE UP (ref 0–0)
PLATELET # BLD AUTO: 460 K/UL — HIGH (ref 150–400)
POTASSIUM SERPL-MCNC: 4.8 MMOL/L — SIGNIFICANT CHANGE UP (ref 3.5–5.3)
POTASSIUM SERPL-SCNC: 4.8 MMOL/L — SIGNIFICANT CHANGE UP (ref 3.5–5.3)
RBC # BLD: 3.59 M/UL — LOW (ref 3.8–5.2)
RBC # FLD: 14.4 % — SIGNIFICANT CHANGE UP (ref 10.3–14.5)
SODIUM SERPL-SCNC: 138 MMOL/L — SIGNIFICANT CHANGE UP (ref 135–145)
WBC # BLD: 9.43 K/UL — SIGNIFICANT CHANGE UP (ref 3.8–10.5)
WBC # FLD AUTO: 9.43 K/UL — SIGNIFICANT CHANGE UP (ref 3.8–10.5)

## 2019-03-06 PROCEDURE — 71275 CT ANGIOGRAPHY CHEST: CPT | Mod: 26

## 2019-03-06 PROCEDURE — 76700 US EXAM ABDOM COMPLETE: CPT | Mod: 26

## 2019-03-06 PROCEDURE — 93970 EXTREMITY STUDY: CPT | Mod: 26

## 2019-03-06 RX ORDER — HEPARIN SODIUM 5000 [USP'U]/ML
5000 INJECTION INTRAVENOUS; SUBCUTANEOUS EVERY 8 HOURS
Qty: 0 | Refills: 0 | Status: DISCONTINUED | OUTPATIENT
Start: 2019-03-06 | End: 2019-03-06

## 2019-03-06 RX ORDER — APIXABAN 2.5 MG/1
10 TABLET, FILM COATED ORAL EVERY 12 HOURS
Qty: 0 | Refills: 0 | Status: DISCONTINUED | OUTPATIENT
Start: 2019-03-06 | End: 2019-03-07

## 2019-03-06 RX ADMIN — Medication 650 MILLIGRAM(S): at 13:02

## 2019-03-06 RX ADMIN — OXYCODONE HYDROCHLORIDE 5 MILLIGRAM(S): 5 TABLET ORAL at 12:07

## 2019-03-06 RX ADMIN — LIDOCAINE 1 PATCH: 4 CREAM TOPICAL at 01:04

## 2019-03-06 RX ADMIN — CYCLOBENZAPRINE HYDROCHLORIDE 5 MILLIGRAM(S): 10 TABLET, FILM COATED ORAL at 06:11

## 2019-03-06 RX ADMIN — LIDOCAINE 1 PATCH: 4 CREAM TOPICAL at 22:12

## 2019-03-06 RX ADMIN — Medication 650 MILLIGRAM(S): at 06:11

## 2019-03-06 RX ADMIN — APIXABAN 10 MILLIGRAM(S): 2.5 TABLET, FILM COATED ORAL at 22:09

## 2019-03-06 RX ADMIN — Medication 650 MILLIGRAM(S): at 06:44

## 2019-03-06 RX ADMIN — CYCLOBENZAPRINE HYDROCHLORIDE 5 MILLIGRAM(S): 10 TABLET, FILM COATED ORAL at 22:09

## 2019-03-06 RX ADMIN — Medication 100 MILLIGRAM(S): at 06:11

## 2019-03-06 RX ADMIN — LIDOCAINE 1 PATCH: 4 CREAM TOPICAL at 21:21

## 2019-03-06 RX ADMIN — CYCLOBENZAPRINE HYDROCHLORIDE 5 MILLIGRAM(S): 10 TABLET, FILM COATED ORAL at 13:03

## 2019-03-06 RX ADMIN — Medication 1000 UNIT(S): at 11:04

## 2019-03-06 RX ADMIN — POLYETHYLENE GLYCOL 3350 17 GRAM(S): 17 POWDER, FOR SOLUTION ORAL at 11:04

## 2019-03-06 RX ADMIN — Medication 100 MILLIGRAM(S): at 22:09

## 2019-03-06 RX ADMIN — Medication 250 MILLIGRAM(S): at 17:49

## 2019-03-06 RX ADMIN — Medication 650 MILLIGRAM(S): at 22:09

## 2019-03-06 RX ADMIN — LIDOCAINE 1 PATCH: 4 CREAM TOPICAL at 11:04

## 2019-03-06 RX ADMIN — Medication 100 MILLIGRAM(S): at 13:03

## 2019-03-06 RX ADMIN — Medication 650 MILLIGRAM(S): at 23:47

## 2019-03-06 RX ADMIN — LIDOCAINE 1 PATCH: 4 CREAM TOPICAL at 06:45

## 2019-03-06 RX ADMIN — OXYCODONE HYDROCHLORIDE 5 MILLIGRAM(S): 5 TABLET ORAL at 11:15

## 2019-03-06 RX ADMIN — Medication 250 MILLIGRAM(S): at 06:12

## 2019-03-06 RX ADMIN — HEPARIN SODIUM 5000 UNIT(S): 5000 INJECTION INTRAVENOUS; SUBCUTANEOUS at 08:18

## 2019-03-06 RX ADMIN — APIXABAN 10 MILLIGRAM(S): 2.5 TABLET, FILM COATED ORAL at 11:38

## 2019-03-06 RX ADMIN — SENNA PLUS 2 TABLET(S): 8.6 TABLET ORAL at 22:09

## 2019-03-06 RX ADMIN — CEFTRIAXONE 2000 MILLIGRAM(S): 500 INJECTION, POWDER, FOR SOLUTION INTRAMUSCULAR; INTRAVENOUS at 10:17

## 2019-03-06 NOTE — CONSULT NOTE ADULT - CONSULT REQUESTED DATE/TIME
06-Mar-2019 22:25
24-Feb-2019 17:13
26-Feb-2019 17:16
27-Feb-2019 22:59
28-Feb-2019 20:59
25-Feb-2019 11:49

## 2019-03-06 NOTE — PROGRESS NOTE ADULT - ASSESSMENT
74 y/o female with PMHx of DJD, s/p L spine fusion, R TKR, L arm Erb's palsy who was seen in  ER on 2/23/19 for L flank pain, upper back pain, chills, dysuria, nausea, and was diagnoestd with L UVJ 0.8 cm stone, UTI, was given IV AB and discharged home on PO antibiotics. Her BC from 2/23/19 grew gram negative rods in the anaerobic bottle and she was called back to the ER. Eval by urology, blood cx/urine cx growing GNRs was given IV rocephin.     1. T1-T2 diskitis. epidural abscess ? phlegmon. multiple PE. resolved proteus sepsis d/t pyelo-cystitis. nephrolithiasis  - MRI spine reviewed, appreciate spine surgery eval, back pain resolving   - course c/b PE, started on ac  - on vancomycin [id number], trough wnl #6  - on rocephin [id number] #11  - continue with IV abx coverage  - blood cx-urine cx 2/23 proteus S cephalosporins repeat cx 2/24 (-)  - CT chest/abd/pelvis reviewed  - urology eval appreciated   - further stone tx in future  - will require long term IV abx coverage with PICC line and 6-8 wks until 4/7/19 with weekly cbc, cmp, esrp crp, vancomycin troughs  - if any clinical worsening/neurological compromise will require surgical intervention  - tolerating abx well so far; no side effects noted  - reason for abx use and side effects reviewed with patient  - fu cbc

## 2019-03-06 NOTE — CONSULT NOTE ADULT - PROBLEM SELECTOR RECOMMENDATION 9
provoked   in the setting of immobilization due to urosepsis and spinal discitis  was not on prophylactic heparin or lovenox due to spine issues and possible need for surgery.  no past or family history of thrombosis  no malignancy on chest or abdominal CT  in this setting, a hypercoagulable workup is not indicated particularly in the acute setting. standard AC duration as per pulmonary.  FU in 2 months.

## 2019-03-06 NOTE — PROGRESS NOTE ADULT - SUBJECTIVE AND OBJECTIVE BOX
· Subjective and Objective: 	  History of Present Illness: 	  The patient is a 72 y/o female with PMHx of DJD, s/p L spine fusion, R TKR, L arm Erb's palsy who was seen in  ER on 19 for L flank pain, upper back pain, chills, dysuria, nausea, and was diagnosed with L UVJ 0.8 cm stone, UTI, was given IV AB and discharged home on PO antibiotics. Her BC from 19 grew gram negative rods in the anaerobic bottle and she was called back to the ER. The patient states her symptoms are improved today.     Family Hx.: Mother had MI at age 74,  father  of dementia at age 95.     - report thoracic spine pain  - still with intractable upper back pain, no BM for 7days, pasing gases, no nausea   still with upper back pain, pending MRI spine, patient was seen at 10am   3/ - pt seen and examined, denies cp, palpitations, + back pain, denies numbness , weakness, tolerating clear liquid diet, + low grade fever  3/2 - pt seen and examined, back pain persist, ambulated with PT, tolerates po diet, afebrile  3/3 - pt seem and examined, back pain better, afebrile, denies abdominal pain, + BM, no numbness   3/4 - pt seen and examined , back pain improved, afebrile, tolerating PO diet , + constipation for 2 days  3/5 - pt seen and examined, back pain is not better, denies chest pain, no BM   3/6 - pt seen and examined, back pain controlled, events noted overnight, + right sided pleuritic chest pain on and off overnight, dry minimal cough, CTA showed bilateral PE, POC discussed with pt   ROS - all other systems negative except mention above    T(C): 36.9 (19 @ 12:12), Max: 37.5 (19 @ 20:01)  T(F): 98.4 (19 @ 12:12), Max: 99.5 (19 @ 20:01)  HR: 81 (19 @ 12:12) (75 - 84)  BP: 118/56 (03-06-19 @ 12:12) (109/41 - 128/52)  RR: 16 (19 @ 12:12) (16 - 20)  SpO2: 94% (19 @ 12:12) (93% - 100%)  Wt(kg): --    PHYSICAL EXAM:    Constitutional: NAD, awake and alert, well-developed  HEENT: PERR, EOMI, Normal Hearing, MMM  Neck: Soft and supple, No LAD, No JVD  Respiratory: Breath sounds are clear bilaterally, No wheezing, rales or rhonchi  Cardiovascular: S1 and S2, regular rate and rhythm, no Murmurs, gallops or rubs  Gastrointestinal: Bowel Sounds present, soft, nontender, nondistended, no guarding, no rebound  Extremities: No peripheral edema  Vascular: 2+ peripheral pulses  Neurological: A/O x 3, no focal deficits   Musculoskeletal: 5/5 strength b/l upper and lower extremities  Skin: No rashes  rectal : deferred, not indicated        138  |  99  |  21  ----------------------------<  93  4.8   |  31  |  0.88    Ca    9.1      06 Mar 2019 05:52                        11.0   9.43  )-----------( 460      ( 06 Mar 2019 05:52 )             33.4     03-    139  |  101  |  18  ----------------------------<  109<H>  4.0   |  28  |  0.74    Ca    8.4<L>      03 Mar 2019 06:21                        11.5   7.13  )-----------( 372      ( 03 Mar 2019 06:21 )             34.3     03-    139  |  102  |  10  ----------------------------<  113<H>  3.6   |  28  |  0.52    Ca    8.3<L>      02 Mar 2019 06:32                         11.7   6.46  )-----------( 332      ( 02 Mar 2019 06:32 )             33.3       03-    137  |  100  |  12  ----------------------------<  120<H>  3.8   |  30  |  0.65    Ca    8.0<L>      01 Mar 2019 06:58    TPro  5.7<L>  /  Alb  1.9<L>  /  TBili  0.5  /  DBili  0.2  /  AST  36  /  ALT  40  /  AlkPhos  172<H>                              11.0   7.35  )-----------( 313      ( 01 Mar 2019 06:58 )             32.2       LIVER FUNCTIONS - ( 2019 07:15 )  Alb: 1.9 g/dL / Pro: 5.7 gm/dL / ALK PHOS: 172 U/L / ALT: 40 U/L / AST: 36 U/L / GGT: x             PT/INR - ( 2019 20:27 )   PT: 14.1 sec;   INR: 1.26 ratio         PTT - ( 2019 20:27 )  PTT:33.6 sec  Culture - Blood (19 @ 00:53)    Specimen Source: .Blood None    Culture Results:   No growth to date.    Culture - Blood (19 @ 12:47)    Gram Stain:   Growth in anaerobic bottle: Gram Negative Rods  Growth in aerobic bottle: Gram Negative Rods    -  Amikacin: S <=16    -  Ampicillin: S <=8 These ampicillin results predict results for amoxicillin    -  Ampicillin/Sulbactam: S <=8/4    -  Aztreonam: S <=4    -  Cefazolin: S <=8    -  Cefepime: S <=4    -  Cefoxitin: S <=8    -  Multidrug (KPC pos) resistant organism: Nondet    -  Staphylococcus aureus: Nondet    -  Methicillin resistant Staphylococcus aureus (MRSA): Nondet    -  Coagulase negative Staphylococcus: Nondet    -  Enterococcus species: Nondet    -  Vancomycin resistant Enterococcus sp.: Nondet    -  Escherichia coli: Nondet    -  Klebsiella oxytoca: Nondet    -  Klebsiella pneumoniae: Nondet    -  Serratia marcescens: Nondet    -  Haemophilus influenzae: Nondet    -  Listeria monocytogenes: Nondet    -  Neisseria meningitidis: Nondet    -  Pseudomonas aeruginosa: Nondet    -  Acinetobacter baumanii: Nondet    -  Enterobacter cloacae complex: Nondet    -  Streptococcus sp. (Not Grp A, B or S pneumoniae): Nondet    -  Streptococcus agalactiae (Group B): Nondet    -  Streptococcus pyogenes (Group A): Nondet    -  Streptococcus pneumoniae: Nondet    -  Candida albicans: Nondet    -  Candida glabrata: Nondet    -  Candida krusei: Nondet    -  Candida parapsilosis: Nondet    -  Candida tropicalis: Nondet    -  Ceftriaxone: S <=1 Enterobacter, Citrobacter, and Serratia may develop resistance during prolonged therapy    -  Ciprofloxacin: S <=1    -  Ertapenem: S <=1    -  Gentamicin: S <=4    -  Levofloxacin: S <=2    -  Meropenem: S <=1    -  Piperacillin/Tazobactam: S <=16    -  Tobramycin: S <=4    -  Trimethoprim/Sulfamethoxazole: S <=/38    Specimen Source: .Blood None    Organism: Blood Culture PCR    Organism: Proteus mirabilis    Culture Results:   Growth in aerobic and anaerobic bottles: Proteus mirabilis  "Due to technical problems, Proteus sp. will Not be reported as part of  the BCID panel until further notice"  ***Blood Panel PCR results on this specimen are available  approximately 3 hours after the Gram stain result.***  Gram stain, PCR, and/or culture results may not always  correspond due to difference in methodologies.  ************************************************************  This PCR assay was performed using [employer].  The following targets are tested for: Enterococcus,  vancomycin resistant enterococci, Listeria monocytogenes,  coagulase negative staphylococci, S. aureus,  methicillin resistant S. aureus, Streptococcus agalactiae  (Group B), S. pneumoniae, S. pyogenes (Group A),  Acinetobacter baumannii, Enterobacter cloacae, E. coli,  Klebsiella oxytoca, K. pneumoniae, Proteus sp.,  Serratia marcescens, Haemophilus influenzae,  Neisseria meningitidis, Pseudomonas aeruginosa, Candida  albicans, C. glabrata, C krusei, C parapsilosis,  C. tropicalis and the KPC resistance gene.    Organism Identification: Blood Culture PCR  Proteus mirabilis    Method Type: PCR    Method Type: BENITO  < from: US Duplex Venous Lower Ext Complete, Bilateral (19 @ 15:38) >    IMPRESSION:     No evidence of bilateral lower extremity deep venous thrombosis.      < end of copied text >  < from: CT Angio Chest PE Protocol w/ IV Cont (19 @ 09:21) >    Multiple bilateral acute segmental pulmonary emboli in the right upper,   right lower, and left upper lobes.    Pulmonary edema and trace bilateral pleural effusions.    < end of copied text >    < from: Transthoracic Echocardiogram (19 @ 09:58) >   The left ventricle is normal in size, wall motion and contractility.   Mild concentric left ventricular hypertrophy is present.   Estimated left ventricular ejection fraction is 60-65 %.   Normal appearing right ventricle structure and function.   Normal aortic valve structure and function.   Normal appearing mitral valve structure and function.   Trace mitral regurgitation is present.    < end of copied text >    < from: MR Thoracic Spine w/wo IV Cont (19 @ 15:29) >  CERVICAL SPINE:  No evidence for osteomyelitis/discitis.    C3-C4: Mild retrolisthesis, osteophyte/disc complex and   uncovertebral/facet arthrosis resulting in mild impression upon the   ventral cervical cord with associated mild abnormal cord signal,   suggestive of myelomalacia. Findings also result in severe bilateral   neural foraminal stenosis.    Well-circumscribed cystic lesion at the left C6 pedicle/left C5-C6 neural   foramen, slightly extending into the left C6-C7 neural foramen, causing   chronic bony scalloping of the C5-C6 vertebral bodies and and widening of   the left C5-C6 neural foramen. The cyst demonstrates a couple of thin   enhancing septa. Findings are suggestive of a cystic schwannoma,  perineural (Tarlov) cyst, or synovial cyst vs primary benign bone lesion.   Findings do not result in spinal canal stenosis. Findings result in mild   left C5-C6 and C6-C7 neural foraminal stenosis.    THORACIC SPINE:  T1-T2 discitis/osteomyelitis with abnormal enhancement/signal at the disc   space, without significant endplate bony erosion or destructive bony   lesion. Associated ventral epidural 0.8 x 2.2 cm (TR by CC) abscess at   the level of T1-T2, resulting in effacement of the ventral CSF space and   slightly contacting the ventral thoracic cord without corona compression   or abnormal cord signal/enhancement. Mild paravertebral edema/phlegmon at   the levels of C7-T1.    Small bilateral pleural effusions are again noted.    LUMBAR SPINE:  Lumbar surgical fusion hardware resulting in significant susceptibility   artifact resulting in nondiagnostic imaging of the lumbar spine. No   paravertebral/paraspinal soft tissue abscess. If clinical concern persist   a lumbar spine CT with contrast can be performed for further   characterization.    Results discussed with  (orthopedic resident), by radiologist   Dr. Romero at 4:40 PM on 2019.        < end of copied text >  < from: CT Chest w/ IV Cont (19 @ 22:42) >  IMPRESSION:          1.   Chest:   mild BILATERAL pleural effusions with underlying   infiltrates. Atelectasis seen within the lingula.       2.   Abdomen and pelvis: Haziness noted in the LEFT renal hilum may   reflect mild pyelitis. No ureteral dilatation is noted . Mild stool   retention.  6 mm calculus in lower pole of LEFT kidney.      < end of copied text >  < from: US Duplex Venous Lower Ext Complete, Bilateral (19 @ 16:35) >  IMPRESSION:     No evidence of bilateral lower extremity deep venous thrombosis.      < end of copied text >  Culture - Blood (19 @ 00:53)    Specimen Source: .Blood None    Culture Results:   No growth to date.        MEDICATIONS  (STANDING):  cefTRIAXone Injectable. 2000 milliGRAM(s) IV Push every 24 hours  cholecalciferol 1000 Unit(s) Oral daily  cyclobenzaprine 5 milliGRAM(s) Oral three times a day  docusate sodium 100 milliGRAM(s) Oral three times a day  lidocaine   Patch 1 Patch Transdermal every 24 hours  polyethylene glycol 3350 17 Gram(s) Oral daily  senna 2 Tablet(s) Oral at bedtime  vancomycin  IVPB 1000 milliGRAM(s) IV Intermittent every 12 hours    MEDICATIONS  (PRN):  acetaminophen   Tablet .. 650 milliGRAM(s) Oral every 6 hours PRN Temp greater or equal to 38C (100.4F)  HYDROmorphone   Tablet 2 milliGRAM(s) Oral every 4 hours PRN Severe Pain (7 - 10)  HYDROmorphone  Injectable 0.5 milliGRAM(s) IV Push every 6 hours PRN for breakthrought pain  oxyCODONE    5 mG/acetaminophen 325 mG 1 Tablet(s) Oral every 4 hours PRN Moderate Pain (4 - 6)

## 2019-03-06 NOTE — PROGRESS NOTE ADULT - SUBJECTIVE AND OBJECTIVE BOX
Events of today noted    Fevers and pain in upper back trending down    CRP trending down    PE noted on CT of chest.    OK to anticoagulate as there is no imminent plan for operative intervention for her spine    Appreciate ID input on abx management    Will continue to follow along clinically and with serial ESR and CRP weekly    Hold in F/U MRI of spine as this may look worse before it looks better  Follow clinical course as well as ESR/CRP for treatment response    ELIAZAR Epperson MD

## 2019-03-06 NOTE — PROGRESS NOTE ADULT - SUBJECTIVE AND OBJECTIVE BOX
SUBJECTIVE     Patient seen in the A.m and last night events noted she woke up with the right lower pleuritic chest pain with sob while taking deep breaths   today pain better on the right side and has paint in the left side now   back pain is better   received lasix with the mild pulmonary vascular congestion   stat ct was ordered with the risk of PE as she is not getting sq heparin due to the issues with the spine   ct shows segmental emboli multiple would be started on the eloquis     PAST MEDICAL & SURGICAL HISTORY:  Erbs muscular dystrophy: left arm  Thyroid cyst  Osteoarthritis  H/O spinal fusion: 1997 lumbar    OBJECTIVE   Vital Signs Last 24 Hrs  T(C): 36.7 (06 Mar 2019 05:08), Max: 37.5 (05 Mar 2019 20:01)  T(F): 98.1 (06 Mar 2019 05:08), Max: 99.5 (05 Mar 2019 20:01)  HR: 75 (06 Mar 2019 05:08) (75 - 84)  BP: 117/51 (06 Mar 2019 05:08) (109/41 - 128/52)  BP(mean): --  RR: 18 (06 Mar 2019 05:08) (16 - 20)  SpO2: 100% (06 Mar 2019 05:08) (93% - 100%)    Review of systems   as dictated in the history of present illness with the review of other systems non contributory     PHYSICAL EXAM:  Constitutional: , awake and alert, not in distress.  HEENT: Normo cephalic atraumatic  Neck: Soft and supple, No J.V.D   Respiratory: vesicular breathing with the decreased breath sounds in the bases   Cardiovascular: S1 and S2, regular rate .   Gastrointestinal:  soft, nontender,   Extremities: mild edema of the legs   Neurological: No new  focal deficits.    MEDICATIONS  (STANDING):  acetaminophen   Tablet .. 650 milliGRAM(s) Oral every 8 hours  cefTRIAXone Injectable. 2000 milliGRAM(s) IV Push every 24 hours  cholecalciferol 1000 Unit(s) Oral daily  cyclobenzaprine 5 milliGRAM(s) Oral three times a day  docusate sodium 100 milliGRAM(s) Oral three times a day  lidocaine   Patch 1 Patch Transdermal every 24 hours  polyethylene glycol 3350 17 Gram(s) Oral daily  senna 2 Tablet(s) Oral at bedtime  vancomycin  IVPB 1000 milliGRAM(s) IV Intermittent every 12 hours                            11.0   9.43  )-----------( 460      ( 06 Mar 2019 05:52 )             33.4     03-06    138  |  99  |  21  ----------------------------<  93  4.8   |  31  |  0.88    Ca    9.1      06 Mar 2019 05:52           < from: CT Angio Chest PE Protocol w/ IV Cont (03.06.19 @ 09:21) >  ECHNIQUE: Contrast enhanced CT pulmonary angiogram was performed.   Multiplanar CT and HRCT images were reviewed. Maximum intensity   projection (MIP) images are reconstructed as per CT angiography protocol.   Images were acquired during the administration of 90 cc Omnipaque 350 IV   contrast. This study was performed using automatic exposure control   (radiation dose reduction software) to obtain a diagnostic image quality   scan with patient dose as low as reasonably achievable.    COMPARISON: Chest x-ray one day prior, chest CT 2/27/2019    PULMONARY ARTERIES: Multiple bilateral acute segmental pulmonary emboli   in the right upper, right lower, and left upper lobes.    LUNGS, AIRWAYS: The central airways are patent. Mild pulmonary edema.   Bibasilar streaky atelectasis.    PLEURA: Trace bilateral effusions.    VESSELS: Normal caliber aorta.    HEART: Normal heart size. No pericardial effusion.    MEDIASTINUM, ESTER, AXILLAE: No adenopathy.    UPPER ABDOMEN: Limited visualization is unremarkable.    BONES AND CHEST WALL: No acute bony abnormality.    IMPRESSION:     Multiple bilateral acute segmental pulmonary emboli in the right upper,   right lower, and left upper lobes.    Pulmonary edema and trace bilateral pleural effusions.    Critical value:  I discussed the findings of this report with Dr. Hall at 9:40 AM on 3/6/2019.  Critical value policy of the hospital   was followed.  Read back and confirmation of receipt of this   communication was performed.  This verbal communication supplements the   text report of this document.    < end of copied text >

## 2019-03-06 NOTE — PROGRESS NOTE ADULT - SUBJECTIVE AND OBJECTIVE BOX
Pt S/E at bedside resting comfortably. reported right abdomen pain yesterday, Abd US prelim read showed Mild left renal pelviectasis. her symptoms has essentially resolved. no other acute events overnight, back pain and symptoms improving, denies fevers or chills.      Vital Signs Last 24 Hrs  T(C): 36.7 (03-06-19 @ 05:08), Max: 37.5 (03-05-19 @ 20:01)  T(F): 98.1 (03-06-19 @ 05:08), Max: 99.5 (03-05-19 @ 20:01)  HR: 75 (03-06-19 @ 05:08) (75 - 84)  BP: 117/51 (03-06-19 @ 05:08) (109/41 - 128/52)  BP(mean): --  RR: 18 (03-06-19 @ 05:08) (16 - 20)  SpO2: 100% (03-06-19 @ 05:08) (93% - 100%)                          11.0   9.43  )-----------( 460      ( 06 Mar 2019 05:52 )             33.4         Gen: NAD, AAOx3, sitting at edge of bed    Spine:  minimal ttp over axial thoracic spine  no ttp anywhere else  baseline motors in UE and LE present, no focal weakness  motor 5/5  SILT L3-S1  SILT C5-T1  +DP/PT Pulses  +Radial Pulse  Compartments soft  No calf TTP B/L    73F with T1-2 Discitis with ventral epidural collection without cord compression improving clinically    -pt progressing well  -pain control  -WBAT  - PT/OT  -Regular diet  -will need picc line and long term iv abx, med/ID to order  - c/w IV abx, ID recs and comgmt appreciated  - continue to trend esr/crp, ESR 53>52 and CRP 12>9  - will fu repeat esr/crp today (specimen received)  - will discuss with attending and advise if plan changes

## 2019-03-06 NOTE — CONSULT NOTE ADULT - SUBJECTIVE AND OBJECTIVE BOX
Patient is a 73y old  Female who presents with a chief complaint of Gram negative bacteremia, (24 Feb 2019 17:13)    HPI:  74 y/o female with PMHx of DJD, s/p L spine fusion, R TKR, L arm Erb's palsy who was seen in  ER on 2/23/19 for L flank pain, upper back pain, chills, dysuria, nausea, and was diagnoestd with L UVJ 0.8 cm stone, UTI, was given IV AB and discharged home on PO antibiotics. Her BC from 2/23/19 grew gram negative rods in the anaerobic bottle and she was called back to the ER. Eval by urology, blood cx/urine cx growing GNRs was given IV rocephin.       PMH: as above  PSH: as above  Meds: per reconciliation sheet, noted below  MEDICATIONS  (STANDING):  cefTRIAXone Injectable. 2000 milliGRAM(s) IV Push every 24 hours  cholecalciferol 1000 Unit(s) Oral daily  heparin  Injectable 5000 Unit(s) SubCutaneous every 12 hours  sodium chloride 0.9%. 1000 milliLiter(s) (125 mL/Hr) IV Continuous <Continuous>      Allergies    No Known Allergies    Intolerances      Social: no smoking, no alcohol, no illegal drugs; no recent travel, no exposure to TB  FAMILY HISTORY:     no history of premature cardiovascular disease in first degree relatives    ROS:  no HA, no dizziness, no sore throat, no blurry vision, no CP, no palpitations, no SOB, no cough, no abdominal pain, no diarrhea, no dysuria, no leg pain, no claudication, no rash, no joint aches, no rectal pain or bleeding, no night sweats  All other systems reviewed and are negative    Vital Signs Last 24 Hrs  T(C): 36.7 (25 Feb 2019 11:43), Max: 37.4 (24 Feb 2019 16:11)  T(F): 98.1 (25 Feb 2019 11:43), Max: 99.3 (24 Feb 2019 16:11)  HR: 91 (25 Feb 2019 11:43) (85 - 91)  BP: 123/53 (25 Feb 2019 11:43) (97/68 - 130/24)  BP(mean): --  RR: 18 (25 Feb 2019 11:43) (18 - 18)  SpO2: 92% (25 Feb 2019 11:43) (92% - 100%)  Daily         PE:  Constitutional: frail looking  HEENT: NC/AT, EOMI, PERRLA, conjunctivae clear; ears and nose atraumatic; pharynx benign  Neck: supple; thyroid not palpable  Back: no tenderness  Respiratory: respiratory effort normal; clear to auscultation  Cardiovascular: S1S2 regular, no murmurs  Abdomen: soft, not tender, not distended, positive BS; liver and spleen WNL  Genitourinary: no suprapubic tenderness L CVA tenderness  Lymphatic: no LN palpable  Musculoskeletal: no muscle tenderness, no joint swelling or tenderness  Extremities: no pedal edema  Neurological/ Psychiatric: moving all extremities  Skin: no rashes; no palpable lesions    Labs: all available labs reviewed                        11.4 12.63 )-----------( 130      ( 25 Feb 2019 06:41 )             33.4     02-25    139  |  107  |  26<H>  ----------------------------<  118<H>  3.4<L>   |  23  |  0.82    Ca    8.3<L>      25 Feb 2019 06:41    TPro  6.6  /  Alb  2.6<L>  /  TBili  0.4  /  DBili  x   /  AST  22  /  ALT  25  /  AlkPhos  121<H>  02-24     LIVER FUNCTIONS - ( 24 Feb 2019 11:48 )  Alb: 2.6 g/dL / Pro: 6.6 gm/dL / ALK PHOS: 121 U/L / ALT: 25 U/L / AST: 22 U/L / GGT: x           Urinalysis Basic - ( 24 Feb 2019 22:00 )  Culture - Blood (02.23.19 @ 12:47)    -  Multidrug (KPC pos) resistant organism: Nondet    -  Staphylococcus aureus: Nondet    -  Methicillin resistant Staphylococcus aureus (MRSA): Nondet    -  Coagulase negative Staphylococcus: Nondet    -  Enterococcus species: Nondet    -  Vancomycin resistant Enterococcus sp.: Nondet    -  Escherichia coli: Nondet    -  Klebsiella oxytoca: Nondet    -  Klebsiella pneumoniae: Nondet    -  Serratia marcescens: Nondet    -  Haemophilus influenzae: Nondet    -  Listeria monocytogenes: Nondet    -  Neisseria meningitidis: Nondet    -  Pseudomonas aeruginosa: Nondet    -  Acinetobacter baumanii: Nondet    -  Enterobacter cloacae complex: Nondet    -  Streptococcus sp. (Not Grp A, B or S pneumoniae): Nondet    -  Streptococcus agalactiae (Group B): Nondet    -  Streptococcus pyogenes (Group A): Nondet    -  Streptococcus pneumoniae: Nondet    -  Candida albicans: Nondet    -  Candida glabrata: Nondet    -  Candida krusei: Nondet    -  Candida parapsilosis: Nondet    -  Candida tropicalis: Nondet    Gram Stain:   Growth in anaerobic bottle: Gram Negative Rods  Growth in aerobic bottle: Gram Negative Rods    Specimen Source: .Blood None    Organism: Blood Culture PCR    Culture Results:   Growth in aerobic and anaerobic bottles: Gram Negative Rods  Identification and susceptibility to follow.  "Due to technical problems, Proteus sp. will Not be reported as part of  the BCID panel until further notice"  ***Blood Panel PCR results on this specimen are available  approximately 3 hours after the Gram stain result.***  Gram stain, PCR, and/or culture results may not always  correspond due to difference in methodologies.  ************************************************************  This PCR assay was performed using [employer].  The following targets are tested for: Enterococcus,  vancomycin resistant enterococci, Listeria monocytogenes,  coagulase negative staphylococci, S. aureus,  methicillin resistant S. aureus, Streptococcus agalactiae  (Group B), S. pneumoniae, S. pyogenes (Group A),  Acinetobacter baumannii, Enterobacter cloacae, E. coli,  Klebsiella oxytoca, K. pneumoniae, Proteus sp.,  Serratia marcescens, Haemophilus influenzae,  Neisseria meningitidis, Pseudomonas aeruginosa, Candida  albicans, C. glabrata, C krusei, C parapsilosis,  C. tropicalis and the KPC resistance gene.    Organism Identification: Blood Culture PCR    Method Type: PCR      Culture - Urine (02.23.19 @ 12:47)    Specimen Source: .Urine None    Culture Results:   >100,000 CFU/ml Gram Negative Rods        Radiology: all available radiological tests reviewed    EXAM:  CT ABDOMEN AND PELVIS                            PROCEDURE DATE:  02/23/2019          INTERPRETATION:  Exam Type:  CT ABDOMEN AND PELVIS   Date and Time: 2/23/2019 2:23 PM  Indication: Abdominal pain and hematuria  Compared to: Ultrasound abdomen 2/23/2019  Technique: CT of the ABDOMEN and PELVIS:  No intravenous contrast was   administered at physician request. This limits sensitivity for certain   processes. Oral contrast was not administered.    COMMENTS:    LOWER LUNGS AND PLEURA: Trace to small left pleural effusion with minimal   left basilar atelectasis. Coronary vascular calcification.    LIVER: Hepatomegaly. Surface contour nodularity suggesting cirrhosis. No   focal hepatic masses.  BILE DUCTS: normal caliber.  GALLBLADDER:     no wall thickening. Gallstones are not excluded.  PANCREAS: within normal limits.  SPLEEN: within normal limits.  ADRENALS: within normal limits.    KIDNEYS, URETERS AND BLADDER: Mild left hydroureteronephrosis secondary   to a 0.8 cm calculus at the left ureterovesicular junction. Minimal left   perinephric stranding and trace left perinephric fluid. Nonobstructive   left intrarenal calculi. No right hydronephrosis. Right ureter appears   unremarkable. Bladder is otherwise within normal limits.    PELVIS: Fundal uterine calcifications. No adnexal masses. No pelvic   adenopathy or pelvic free fluid.       BOWEL: The unopacified bowel is of normal course and caliber without   evidence of obstruction or bowel wall thickening. Periampullaryduodenal   diverticula..  PERITONEUM: no ascites or free air, no fluid collection.  RETROPERITONEUM: within normal limits.      VESSELS: atherosclerotic changes.      ABDOMINAL WALL: within normal limits.  BONES: Degenerative changes. Prior pedicle fusion.     IMPRESSION:     Mild left hydroureteronephrosis secondary to a 0.8 cm calculus at the   left ureterovesicular junction.        Advanced directives addressed: full resuscitation
CHIEF COMPLAINT:Distal left stone and UTI    HISTORY OF PRESENT ILLNESS:Pt with myalgias for several days has 8mm distal left stone with minimal hydro.  Urine culture +, WBC and creatinine stable    PAST MEDICAL & SURGICAL HISTORY:  Erbs muscular dystrophy: left arm  Thyroid cyst  Osteoarthritis  H/O spinal fusion: 1997 lumbar      REVIEW OF SYSTEMS:    CONSTITUTIONAL: No weakness,myalgias  EYES/ENT: No visual changes;  No vertigo or throat pain   NECK: No pain or stiffness  RESPIRATORY: No cough, wheezing, hemoptysis; No shortness of breath  CARDIOVASCULAR: No chest pain or palpitations  GASTROINTESTINAL: No abdominal or epigastric pain. No nausea, vomiting, or hematemesis; No diarrhea or constipation. No melena or hematochezia.  GENITOURINARY: No dysuria, frequency or hematuria  NEUROLOGICAL: No numbness or weakness  SKIN: No itching, burning, rashes, or lesions   All other review of systems is negative unless indicated above.    MEDICATIONS  (STANDING):  cholecalciferol 1000 Unit(s) Oral daily  heparin  Injectable 5000 Unit(s) SubCutaneous every 12 hours  sodium chloride 0.9%. 1000 milliLiter(s) (125 mL/Hr) IV Continuous <Continuous>    MEDICATIONS  (PRN):  oxyCODONE    5 mG/acetaminophen 325 mG 1 Tablet(s) Oral every 4 hours PRN Moderate Pain (4 - 6)      Allergies    No Known Allergies    Intolerances        SOCIAL HISTORY:Retired    FAMILY HISTORY:Non contrib      Vital Signs Last 24 Hrs  T(C): 37.4 (2019 16:11), Max: 37.4 (2019 16:11)  T(F): 99.3 (2019 16:11), Max: 99.3 (2019 16:11)  HR: 90 (2019 16:11) (83 - 91)  BP: 124/59 (2019 16:11) (95/45 - 124/59)  BP(mean): --  RR: 18 (2019 16:11) (18 - 18)  SpO2: 100% (2019 16:11) (99% - 100%)    PHYSICAL EXAM:    Constitutional: NAD, well-developed  HEENT: JORGE, EOMI, Normal Hearing, MMM  Neck: No LAD, No JVD  Back: Normal spine flexure, No CVA tenderness  Respiratory: CTAB   Cardiovascular: S1 and S2, RRR, no M/G/R  Abd:soft  Extremities: No peripheral edema  Vascular: 2+ peripheral pulses  Neurological: A/O x 3, no focal deficits  Psychiatric: Normal mood, normal affect  Musculoskeletal: 5/5 strength b/l upper and lower extremities  Skin: No rashes    LABS:                        12.5   11.33 )-----------( 128      ( 2019 11:48 )             36.0     02-    137  |  102  |  49<H>  ----------------------------<  100<H>  3.5   |  23  |  1.33<H>    Ca    8.6      2019 11:48    TPro  6.6  /  Alb  2.6<L>  /  TBili  0.4  /  DBili  x   /  AST  22  /  ALT  25  /  AlkPhos  121<H>  02-24    PT/INR - ( 2019 11:48 )   PT: 11.4 sec;   INR: 1.03 ratio         PTT - ( 2019 11:48 )  PTT:26.2 sec  Urinalysis Basic - ( 2019 12:47 )    Color: Yellow / Appearance: Clear / S.010 / pH: x  Gluc: x / Ketone: Negative  / Bili: Negative / Urobili: Negative mg/dL   Blood: x / Protein: 100 mg/dL / Nitrite: Negative   Leuk Esterase: Moderate / RBC: 10 to 12 /HPF / WBC >50   Sq Epi: x / Non Sq Epi: x / Bacteria: x      Urine Culture:     RADIOLOGY & ADDITIONAL STUDIES:
HPI  73 admitted with UTI and Proteus bacteremia. Patient states she was admitted for complaints of diffuse flank pain and abdominal pain that "feel like gas pain". She notes 1 week of sudden onset thoracic back pain that is worst while lying down. Per EMR, she has continued to have fevers despite taking antibiotics for the UTI. She reports a fall "a while ago" and states that she sees Dr Epperson occasionally in the office for following her spinal alignment. Patient denies numbness, paresthesias, headstrike, loss of consciousness, change in bowel/bladder continence, saddle anesthesia, or any other signs/symptoms at this time. Patient is a community ambulator, occasionally requiring assistive devices (worse recently).    Allergies: NKDA  PMH/PSH: R TKA (Lexx 10/18), L1-4 PSF (S '97)>revision for HNP by same surgeon in '98, Erb's limb girdle muscular dystrophy  Social: Denies tobacco use  FH: Noncontributory  Imaging: Xray cervical and thoracic spine - thoracic kyphosis with compression deformity of T8    ROS: Negative for all systems except as noted above in HPI    Physical exam  VS: see EMR  Gen: NAD  Spine/extremities:  Skin intact and lumbar incision well healed. No erythema/ecchymosis/warmth/fluctuance. Diffuse TTP thoracic spine region over bony prominences and paraspinal region without other TTP bony prominences axial spine. SILT C5-T1, L3-S1. Negative Babinski. Negative ankle clonus. Negative Haney. 2/4 patellar/achilles DTR. +Dorsalis pedis, capillary refill brisk. Compartments soft and nontender. Intact rectal tone (performed with nurse chaperone present).  RUE: C5- 5/5  C6- 5/5  C7- 5/5  C8- 5/5  T1- 5/5  LUE: C5- 4/5  C6- 4/5  C7- 4/5  C8- 4/5  T1- 4/5    RLE: L2- 4/5  L3- 4/5  L4- 5/5  L5- 5/5  S1- 5/5  LLE: L2- 4/5  L3- 4/5  L4- 4/5  L5- 5/5  S1- 5/5  Secondary Survey: No TTP bony prominences appendicular skeleton with full painless AROM at baseline per patient. SILT. Capillary refill brisk. Negative BL log roll.
Patient is a 73y old  Female who presents with a chief complaint of back pain and shortness of breath.      HPI:  The patient is a 74 y/o female with PMHx of DJD, s/p L spine fusion, R TKR, L arm Erb's palsy who was seen in  ER on 2/23/19   she was eventually diagnosed with gram negative sepsis from urinary source and subsequent discitis  she has been bedbound mostly in the hospital.   initially on sq heparin prophylaxis which was stopped due to concerns for discitis and possible need for surgery.    she eventually developed sharp chest pain and was found to have bilaterla PE.  currently on anticoagulaion with eliquis , cleared by [facility]    no past history or family history of DVT or PE    feeing improved.    she states that she is current with her mammograms and colonoscopy .    PAST MEDICAL & SURGICAL HISTORY:  Erbs muscular dystrophy: left arm  Thyroid cyst  Osteoarthritis  H/O spinal fusion: 1997 lumbar      REVIEW OF SYSTEMS    General:	The patient feels well.  no unexpected weight loss. Appetite good. no night sweats.  Skin: no Rash.	  ENT: no sore throat or oral pain. no difficulty with swallowing  Respiratory: +chest pain, +shortness of breath, no hemoptysis, no cough, no chest pain.  Cardiovascular : No chest pain. no palpitation.  Gastrointestinal:  No anorexia. no pain, no blood in stool.   Genitourinary: No hematuria , no dysuria	  Musculoskeletal: No myalgia, no arthralgia, + back pain, no joint swelling  Neurological: no headaches, no dizzyness, no weakness, no double vision. 	  Psychiatric: no change in mood. 	 	  Endocrine:	no burning in urine or frequency  Allergic/Immunologic:	 no fever.     Allergies    No Known Allergies    Intolerances    FAMILY HISTORY:      Home Medications:  Fish Oil oral capsule: 1 tab po daily (24 Feb 2019 13:37)  Glucosamine Chondroitin oral capsule: 1 cap(s) orally once a day (24 Feb 2019 13:37)  Multiple Vitamins oral tablet: 1 tab(s) orally once a day (24 Feb 2019 13:37)  Vitamin D3 2000 intl units oral capsule: 1 cap(s) orally once a day (24 Feb 2019 13:37)      MEDICATIONS  (STANDING):  acetaminophen   Tablet .. 650 milliGRAM(s) Oral every 8 hours  apixaban 10 milliGRAM(s) Oral every 12 hours  cefTRIAXone Injectable. 2000 milliGRAM(s) IV Push every 24 hours  cholecalciferol 1000 Unit(s) Oral daily  cyclobenzaprine 5 milliGRAM(s) Oral three times a day  docusate sodium 100 milliGRAM(s) Oral three times a day  lidocaine   Patch 1 Patch Transdermal every 24 hours  polyethylene glycol 3350 17 Gram(s) Oral daily  senna 2 Tablet(s) Oral at bedtime  vancomycin  IVPB 1000 milliGRAM(s) IV Intermittent every 12 hours    MEDICATIONS  (PRN):  acetaminophen   Tablet .. 650 milliGRAM(s) Oral every 6 hours PRN Temp greater or equal to 38C (100.4F)  HYDROmorphone  Injectable 0.5 milliGRAM(s) IV Push every 6 hours PRN for breakthrought pain  oxyCODONE    IR 5 milliGRAM(s) Oral every 4 hours PRN Moderate Pain (4 - 6)  oxyCODONE    IR 10 milliGRAM(s) Oral every 4 hours PRN Severe Pain (7 - 10)      PHYSICAL EXAM:  Vital Signs Last 24 Hrs  T(C): 36.8 (06 Mar 2019 21:55), Max: 36.9 (06 Mar 2019 00:56)  T(F): 98.3 (06 Mar 2019 21:55), Max: 98.4 (06 Mar 2019 00:56)  HR: 82 (06 Mar 2019 21:55) (75 - 83)  BP: 130/54 (06 Mar 2019 21:55) (117/51 - 130/54)  BP(mean): --  RR: 18 (06 Mar 2019 21:55) (16 - 18)  SpO2: 94% (06 Mar 2019 21:55) (94% - 100%)      Gen: well developed, well nourished, comfortable  HEENT: normocephalic/atraumatic, no conjunctival pallor, no scleral icterus, no oral thrush/mucosal bleeding/mucositis  Neck: supple, no masses, no JVD  Breasts: deferred  Back: nontender  Cardiovascular: heart sounds Normal S1S2, no murmurs/rubs/gallops  Respiratory: air entry normal and equal on both sides. no wheeze, no ronchi,   Gastrointestinal: BS+, soft, NT/ND, no masses, no splenomegaly, no hepatomegaly,   no evidence for ascites  Genitourinary: deferred  Rectal: deferred  Extremities: no clubbing/cyanosis, minimal right LE edema, no calf tenderness  Neurological:  Alert oriented X3 cranial nerves normal. no focal deficits  Skin: no rash on visible skin  Lymph Nodes:  no cervical/supraclavicular LAD, no axillary/groin LAD  Musculoskeletal:  full ROM  Psychiatric:  mood appears normal. not obviously anxious or depressed.        LABS:                        11.0   9.43  )-----------( 460      ( 06 Mar 2019 05:52 )             33.4     06 Mar 2019 05:52    138    |  99     |  21     ----------------------------<  93     4.8     |  31     |  0.88     Ca    9.1        06 Mar 2019 05:52            Culture - Blood (collected 02-28-19 @ 00:53)  Source: .Blood None  Final Report (03-05-19 @ 10:00):    No growth at 5 days.    Culture - Blood (collected 02-28-19 @ 00:48)  Source: .Blood None  Final Report (03-05-19 @ 10:00):    No growth at 5 days.        RADIOLOGY & ADDITIONAL STUDIES:  IMPRESSION:     Multiple bilateral acute segmental pulmonary emboli in the right upper,   right lower, and left upper lobes.    Pulmonary edema and trace bilateral pleural effusions.    Duplex  LE negative for DVT
Pulmonary Consult    History of present illness at the time admission      The patient is a 72 y/o female with PMHx of DJD, s/p L spine fusion, R TKR, L arm Erb's palsy thyroid nodules  who was seen in  ER on 19 for L flank pain, uper back pain, chills ,dysuria, nausea, and was diagnosed  with L UVJ 0.8 cm stone, UTI, was given IV AB and discharged home on PO antibiotics. Her BC from 19 grew gram negative rods in the anaerobic bottle and she was called back to the ER. The patient states her symptoms are improved today.   since in the she is admitted patient has been treated for the urosepsis with the proteus mirabilis bacteremia   today pulmonary consult was requested with the pleural effusion and persistent mid back upper pain which is constant and gets worse with the movement and worse with the deep inspiration as well as with the mild cough   no sob or wheeze or current fever spikes   she noted to have leg swelling   she has no history of previous asthma or copd or PE   she does have significant DJD of the with the history of spine fusion . No clear rib fractures documented .  she has renal stone with the mild hydronephrosis       PAST MEDICAL & SURGICAL HISTORY:  Erbs muscular dystrophy: left arm  Thyroid cyst  Osteoarthritis  H/O spinal fusion:  lumbar        FAMILY HISTORY:  Family history of heart disease and other wise non contributory     SOCIAL HISTORY:    No smoking no drug or alcohol abuse .     Allergies    No Known Allergies          Medications   cefTRIAXone Injectable. 2000 milliGRAM(s) IV Push every 24 hours  cholecalciferol 1000 Unit(s) Oral daily  docusate sodium 100 milliGRAM(s) Oral three times a day  heparin  Injectable 5000 Unit(s) SubCutaneous every 12 hours  HYDROmorphone  Injectable 0.2 milliGRAM(s) IV Push every 4 hours PRN  lidocaine   Patch 1 Patch Transdermal every 24 hours  oxyCODONE    5 mG/acetaminophen 325 mG 1 Tablet(s) Oral every 4 hours PRN  polyethylene glycol 3350 17 Gram(s) Oral daily  senna 2 Tablet(s) Oral at bedtime  sodium chloride 0.9%. 1000 milliLiter(s) IV Continuous <Continuous>        REVIEW OF SYSTEMS:  Constitutional: No fevers or chills or weight loss.   Eyes: No itching or discharge from the eyes  ENT:  No post nasal drip. No epistaxis. No throat pain. No sore throat. No difficulty swallowing.   CV: No chest pain. No palpitations. No lightheadedness or dizziness.   Resp: No dyspnea  No wheezing.  No stridor No sputum production. has mild cough with the back discomfort in the upper part of the mid thoracic spine   GI: No nausea. No vomiting. No diarrhea or abdominal pain   MSK: has back pain   Integumentary: No skin lesions. No pedal edema.  Neurological: No gross motor weakness. No sensory changes.      OBJECTIVE:  Vital Signs Last 24 Hrs  T(C): 37.4 (2019 11:27), Max: 37.4 (2019 21:04)  T(F): 99.3 (2019 11:27), Max: 99.3 (2019 21:04)  HR: 98 (2019 15:53) (82 - 98)  BP: 147/65 (2019 15:53) (136/60 - 151/68)  BP(mean): --  RR: 18 (2019 11:27) (18 - 19)  SpO2: 96% (2019 11:27) (95% - 96%)      PHYSICAL EXAM:  General: Awake, alert, oriented X 3.   HEENT: Atraumatic, normocephalic.   Neck: No JVD no lymphadenopathy   Respiratory: normal vesicular breathing with decreased breath sounds in the bases   Cardiovascular: S1 S2 normal. No murmurs, rubs or gallops.   Abdomen: Soft, non-tender, non-distended. No organomegaly.  Extremities: Warm to touch. 2 plus edema of the legs   Skin: No rashes or skin lesions  Neurological: Motor and sensory examination equal and normal in all four extremities.  Psychiatry: Appropriate mood and affect.    HOSPITAL MEDICATIONS:  MEDICATIONS  (STANDING):  cefTRIAXone Injectable. 2000 milliGRAM(s) IV Push every 24 hours  cholecalciferol 1000 Unit(s) Oral daily  docusate sodium 100 milliGRAM(s) Oral three times a day  heparin  Injectable 5000 Unit(s) SubCutaneous every 12 hours  lidocaine   Patch 1 Patch Transdermal every 24 hours  polyethylene glycol 3350 17 Gram(s) Oral daily  senna 2 Tablet(s) Oral at bedtime  sodium chloride 0.9%. 1000 milliLiter(s) (60 mL/Hr) IV Continuous <Continuous>    MEDICATIONS  (PRN):  HYDROmorphone  Injectable 0.2 milliGRAM(s) IV Push every 4 hours PRN Severe Pain (7 - 10)  oxyCODONE    5 mG/acetaminophen 325 mG 1 Tablet(s) Oral every 4 hours PRN Moderate Pain (4 - 6)      LABS:                        10.6   9.86  )-----------( 149      ( 2019 07:03 )             30.3     02-    141  |  108  |  15  ----------------------------<  110<H>  3.4<L>   |  25  |  0.59    Ca    7.8<L>      2019 07:03        Urinalysis Basic - ( 2019 22:00 )    Color: Yellow / Appearance: Clear / S.010 / pH: x  Gluc: x / Ketone: Trace  / Bili: Negative / Urobili: Negative mg/dL   Blood: x / Protein: 100 mg/dL / Nitrite: Negative   Leuk Esterase: Moderate / RBC: 6-10 /HPF / WBC 11-25   Sq Epi: x / Non Sq Epi: Few / Bacteria: Moderate      < from: US Duplex Venous Lower Ext Complete, Bilateral (19 @ 16:35) >  TERPRETATION:  CLINICAL INFORMATION: Pleura chest pain    COMPARISON: None available.    TECHNIQUE: Duplex sonography of the BILATERAL LOWER extremities with   color and spectral Doppler, with and without compression.      FINDINGS:    There is normal compressibility of the bilateral common femoral, femoral   and popliteal veins. No calf vein thrombosis is detected.    Doppler examination shows normal spontaneous and phasic flow.    IMPRESSION:     No evidence of bilateral lower extremity deep venous thrombosis.      < from: Xray Chest 1 View- PORTABLE-Routine (19 @ 09:30) >  XAM:  XR CHEST PORTABLE ROUTINE 1V                            PROCEDURE DATE:  2019          INTERPRETATION:  CLINICAL INDICATION:  r/o pna, pt with pleuritic back   pain    TECHNIQUE: Single view AP chest radiograph was performed.    COMPARISON:  Chest radiograph dated 10/3/2018.    FINDINGS:    There are small bilateral pleural effusions with associated bibasilar   passive atelectasis. There is redemonstration of chronic mild-moderate   biapical pleural/parenchymal scarring. No pneumothorax or vascular   congestion.    The cardiac silhouette size is within normal limits.    Redemonstration of partially imaged lumbar surgical fusion hardware.    IMPRESSION:    Small bilateral pleural effusions and associated bibasilar passive   atelectasis.
The patient is a 74 y/o female with PMHx of DJD, s/p L spine fusion, R TKR, L arm Erb's palsy who was seen in  ER on 19 for L flank pain, uper back pain, chills, dysuria, nausea, and was diagnosed with L UVJ 0.8 cm stone, UTI, was given IV AB and discharged home on PO antibiotics. Her BC from 19 grew gram negative rods in the anaerobic bottle and she was called back to the ER. The patient states her symptoms are improved today.     Family Hx.: Mother had MI at age 74,  father  of dementia at age 95.   - report thoracic spine pain  - still with intractable upper back pain, no BM for 7days, pasing gases, no nausea   still with upper back pain pending.  T1-2 epidural collection with discitis.  No reported neuro deficits.  Case discussed with Spine with request for q 1 hour neuro monitoring.    ROS- negative other than above.    Vital Signs Last 24 Hrs  T(C): 37.6 (2019 20:22), Max: 39 (2019 23:10)  T(F): 99.7 (2019 20:22), Max: 102.2 (2019 23:10)  HR: 94 (2019 20:22) (75 - 115)  BP: 124/49 (2019 20:22) (103/38 - 150/75)  BP(mean): --  RR: 17 (2019 20:22) (15 - 18)  SpO2: 92% (2019 20:22) (91% - 97%)    PE  NAD  Neck- supple, non tender  Back- mid scapular discomfort controlled.  Cardiopulmonary- stable  Abd- non tender  Ext- GIL  Neuro- non  focal.                                11.5   6.87  )-----------( 292      ( 2019 20:27 )             32.0           135  |  101  |  16  ----------------------------<  100<H>  3.7   |  28  |  0.67    Ca    8.0<L>      2019 20:27    TPro  5.7<L>  /  Alb  1.9<L>  /  TBili  0.5  /  DBili  0.2  /  AST  36  /  ALT  40  /  AlkPhos  172<H>      LIVER FUNCTIONS - ( 2019 07:15 )  Alb: 1.9 g/dL / Pro: 5.7 gm/dL / ALK PHOS: 172 U/L / ALT: 40 U/L / AST: 36 U/L / GGT: x             PT/INR - ( 2019 20:27 )   PT: 14.1 sec;   INR: 1.26 ratio         PTT - ( 2019 20:27 )  PTT:33.6 sec      < from: MR Thoracic Spine w/wo IV Cont (19 @ 15:29) >  IMPRESSION:      CERVICAL SPINE:  No evidence for osteomyelitis/discitis.    C3-C4: Mild retrolisthesis, osteophyte/disc complex and   uncovertebral/facet arthrosis resulting in mild impression upon the   ventral cervical cord with associated mild abnormal cord signal,   suggestive of myelomalacia. Findings also result in severe bilateral   neural foraminal stenosis.    Well-circumscribed cystic lesion at the left C6 pedicle/left C5-C6 neural   foramen, slightly extending into the left C6-C7 neural foramen, causing   chronic bony scalloping of the C5-C6 vertebral bodies and and widening of   the left C5-C6 neural foramen. The cyst demonstrates a couple of thin   enhancing septa. Findings are suggestive of a cystic schwannoma,  perineural (Tarlov) cyst, or synovial cyst vs primary benign bone lesion.   Findings do not result in spinal canal stenosis. Findings result in mild   left C5-C6 and C6-C7 neural foraminal stenosis.    THORACIC SPINE:  T1-T2 discitis/osteomyelitis with abnormal enhancement/signal at the disc   space, without significant endplate bony erosion or destructive bony   lesion. Associated ventral epidural 0.8 x 2.2 cm (TR by CC) abscess at   the level of T1-T2, resulting in effacement of the ventral CSF space and   slightly contacting the ventral thoracic cord without corona compression   or abnormal cord signal/enhancement. Mild paravertebral edema/phlegmon at   the levels of C7-T1.    < end of copied text >

## 2019-03-06 NOTE — PROVIDER CONTACT NOTE (CHANGE IN STATUS NOTIFICATION) - SITUATION
Patient complaining of sudden lower right rib sharp pain, 9/10 radiating to the shoulder. /54, HR 84, O2 93% RA, started on oxygen, T99.5

## 2019-03-06 NOTE — PROGRESS NOTE ADULT - SUBJECTIVE AND OBJECTIVE BOX
Date of service: 03-06-19 @ 12:17    pt seen and examined  had MRI spine 2/28 showing T1-T2 diskitis/OM/epidural abscess? phlegmon  noted with R pleuritic cp, CTA showing multiple pulmonary emboli starting AC  less back pain, temps down    ROS: no fever or chills; denies dizziness, no HA, no abdominal pain, no diarrhea or constipation; no dysuria, no urinary frequency, no legs pain, no rashes    MEDICATIONS  (STANDING):  acetaminophen   Tablet .. 650 milliGRAM(s) Oral every 8 hours  apixaban 10 milliGRAM(s) Oral every 12 hours  cefTRIAXone Injectable. 2000 milliGRAM(s) IV Push every 24 hours  cholecalciferol 1000 Unit(s) Oral daily  cyclobenzaprine 5 milliGRAM(s) Oral three times a day  docusate sodium 100 milliGRAM(s) Oral three times a day  lidocaine   Patch 1 Patch Transdermal every 24 hours  polyethylene glycol 3350 17 Gram(s) Oral daily  senna 2 Tablet(s) Oral at bedtime  vancomycin  IVPB 1000 milliGRAM(s) IV Intermittent every 12 hours      Vital Signs Last 24 Hrs  T(C): 36.9 (06 Mar 2019 12:12), Max: 37.5 (05 Mar 2019 20:01)  T(F): 98.4 (06 Mar 2019 12:12), Max: 99.5 (05 Mar 2019 20:01)  HR: 81 (06 Mar 2019 12:12) (75 - 84)  BP: 118/56 (06 Mar 2019 12:12) (109/41 - 128/52)  BP(mean): --  RR: 16 (06 Mar 2019 12:12) (16 - 20)  SpO2: 94% (06 Mar 2019 12:12) (93% - 100%)    PE:  Constitutional: frail looking  HEENT: NC/AT, EOMI, PERRLA, conjunctivae clear; ears and nose atraumatic; pharynx benign  Neck: supple; thyroid not palpable  Back: no tenderness  Respiratory: respiratory effort normal; clear to auscultation  Cardiovascular: S1S2 regular, no murmurs  Abdomen: soft, not tender, not distended, positive BS; liver and spleen WNL  Genitourinary: no suprapubic tenderness L CVA tenderness  Lymphatic: no LN palpable  Musculoskeletal: T1-T2 spinal tenderness  Extremities: no pedal edema  Neurological/ Psychiatric: moving all extremities  Skin: no rashes; no palpable lesions    Labs: all available labs reviewed                        11.0   9.43  )-----------( 460      ( 06 Mar 2019 05:52 )             33.4     03-06    138  |  99  |  21  ----------------------------<  93  4.8   |  31  |  0.88    Ca    9.1      06 Mar 2019 05:52            Vancomycin Level, Trough: 16.2 ug/mL (03-02 @ 17:18)          Culture - Urine (02.24.19 @ 22:00)    Specimen Source: .Urine Clean Catch (Midstream)    Culture Results:   No growth    Culture - Blood (02.24.19 @ 11:39)    Specimen Source: .Blood None    Culture Results:   No growth to date.    Culture - Blood (02.23.19 @ 12:47)    Gram Stain:   Growth in anaerobic bottle: Gram Negative Rods  Growth in aerobic bottle: Gram Negative Rods    -  Amikacin: S <=16    -  Ampicillin: S <=8 These ampicillin results predict results for amoxicillin    -  Ampicillin/Sulbactam: S <=8/4    -  Aztreonam: S <=4    -  Cefazolin: S <=8    -  Multidrug (KPC pos) resistant organism: Nondet    -  Staphylococcus aureus: Nondet    -  Methicillin resistant Staphylococcus aureus (MRSA): Nondet    -  Coagulase negative Staphylococcus: Nondet    -  Enterococcus species: Nondet    -  Vancomycin resistant Enterococcus sp.: Nondet    -  Escherichia coli: Nondet    -  Klebsiella oxytoca: Nondet    -  Klebsiella pneumoniae: Nondet    -  Serratia marcescens: Nondet    -  Haemophilus influenzae: Nondet    -  Listeria monocytogenes: Nondet    -  Neisseria meningitidis: Nondet    -  Pseudomonas aeruginosa: Nondet    -  Acinetobacter baumanii: Nondet    -  Enterobacter cloacae complex: Nondet    -  Streptococcus sp. (Not Grp A, B or S pneumoniae): Nondet    -  Streptococcus agalactiae (Group B): Nondet    -  Streptococcus pyogenes (Group A): Nondet    -  Streptococcus pneumoniae: Nondet    -  Candida albicans: Nondet    -  Candida glabrata: Nondet    -  Candida krusei: Nondet    -  Candida parapsilosis: Nondet    -  Candida tropicalis: Nondet    -  Cefepime: S <=4    -  Cefoxitin: S <=8    -  Ceftriaxone: S <=1 Enterobacter, Citrobacter, and Serratia may develop resistance during prolonged therapy    -  Ciprofloxacin: S <=1    -  Ertapenem: S <=1    -  Gentamicin: S <=4    -  Levofloxacin: S <=2    -  Meropenem: S <=1    -  Piperacillin/Tazobactam: S <=16    -  Tobramycin: S <=4    -  Trimethoprim/Sulfamethoxazole: S <=2/38    Specimen Source: .Blood None    Organism: Blood Culture PCR    Organism: Proteus mirabilis    Culture Results:   Growth in aerobic and anaerobic bottles: Proteus mirabilis  "Due to technical problems, Proteus sp. will Not be reported as part of  the BCID panel until further notice"  ***Blood Panel PCR results on this specimen are available  approximately 3 hours after the Gram stain result.***  Gram stain, PCR, and/or culture results may not always  correspond due to difference in methodologies.  ************************************************************  This PCR assay was performed using [employer].  The following targets are tested for: Enterococcus,  vancomycin resistant enterococci, Listeria monocytogenes,  coagulase negative staphylococci, S. aureus,  methicillin resistant S. aureus, Streptococcus agalactiae  (Group B), S. pneumoniae, S. pyogenes (Group A),  Acinetobacter baumannii, Enterobacter cloacae, E. coli,  Klebsiella oxytoca, K. pneumoniae, Proteus sp.,  Serratia marcescens, Haemophilus influenzae,  Neisseria meningitidis, Pseudomonas aeruginosa, Candida  albicans, C. glabrata, C krusei, C parapsilosis,  C. tropicalis and the KPC resistance gene.    Organism Identification: Blood Culture PCR  Proteus mirabilis    Method Type: PCR    Method Type: BENITO      Radiology: all available radiological tests reviewed    < from: CT Chest w/ IV Cont (02.27.19 @ 22:42) >    EXAM:  CT ABDOMEN AND PELVIS OC IC                          EXAM:  CT CHEST IC                            PROCEDURE DATE:  02/27/2019          INTERPRETATION:      Three examinations were performed on this patient:   1. CT scan of the chest with intravenous contrast  2. CT scan of the abdomen with intravenous contrast  3. CT scan of the pelvis with intravenous contrast        CLINICAL INFORMATION (all 3 exams):      Cough sepsis abdominal pain        TECHNIQUE:  Contiguous axial 3 mm sections were obtained through the   chest, abdomen and pelvis using single helical acquisition.  Images were   acquired during the rapid bolus administration of 95 cc of Omnipaque 350/   5 cc discarded.  Imaging post processing software was employed to   generate reformatted images in 3 mm sagittal and coronal imaging planes.     No Oral contrast was administered.   This scan was performed using   automatic exposure control (radiation dose reduction software) to obtain   a diagnostic image quality scan with patient dose as low as reasonably   achievable.         FINDINGS:   No previous examinations are available for review.    The thyroid gland appears intact.    The lungs demonstrate mild BILATERAL pleural effusions with underlying   infiltrates. Atelectasis seen within the lingula. The trachea and major   bronchi are patent.    No enlarged axillary, hilar or mediastinal lymph nodes are found.   The   heart size is normal. The chest wall and thoracic spine are unremarkable.    The liver demonstrates homogeneous attenuation without focal lesion or   abnormal enhancement.  Hepatic size and contours are maintained. Hepatic   and portal veins are patent and not displaced.  No intrahepatic or common   ductal dilatation is recognized.  The gallbladder is intact without   calcified calculi or wall thickening.  The pancreas is intact without   ductal dilatation or focal lesion.  The spleen is normal in size.    The adrenal glands are intact.  The kidneys demonstrate symmetric   nephrograms. Haziness noted in the LEFT renal hilum may reflect mild   pyelitis. No ureteral dilatation is noted. No suspicious renal mass is   found. 6 mm calculus is seen in the lower pole of the LEFT kidney.  No   hydronephrosis or perinephric infiltration is found.The bladder appears   unremarkable.    Small calcified fibroid in uterus    No enlarged lymph nodes are found.  No ascites is present.   The osseous   structures show lumbar fusion at L1-L4.          The bowel shows mild stool retention.  No obstruction, perforation or   abscess is recognized.           IMPRESSION:          1.   Chest:   mild BILATERAL pleural effusions with underlying   infiltrates. Atelectasis seen within the lingula.       2.   Abdomen and pelvis: Haziness noted in the LEFT renal hilum may   reflect mild pyelitis. No ureteral dilatation is noted . Mild stool   retention.  6 mm calculus in lower pole of LEFT kidney.      < from: Xray Thoracic Spine 2 View (02.26.19 @ 09:29) >    EXAM:  XR T SPINE 2 VIEWS                            PROCEDURE DATE:  02/26/2019          INTERPRETATION:  XR T SPINE 2 VIEWS    CLINICAL INDICATION: x-ray of thoracic spine s/p fall    VIEWS/TECHNIQUE: AP and lateral views of the thoracic spine were obtained.    COMPARISON:  Chest radiograph dated 10/3/2018.      FINDINGS:      There is thoracic process.    There is a chronic moderate anterior T8 vertebral body wedge deformity,   unchanged. Remaining thoracic vertebral body heights are maintained.   Thoracic vertebral body alignment is preserved.    There is mild multilevel thoracic intervertebral disc height loss and   small endplate osteophytosis.    There is redemonstration of partially imaged lumbar surgical fusion   hardware.     IMPRESSION:    No acute fracture of the thoracic spine.    Unchanged moderate anterior T8 vertebral body wedge deformity.    Partially imaged lumbar surgical fusion hardware.      < end of copied text >    EXAM:  CT ABDOMEN AND PELVIS                              INTERPRETATION:  Exam Type:  CT ABDOMEN AND PELVIS   Date and Time: 2/23/2019 2:23 PM  Indication: Abdominal pain and hematuria  Compared to: Ultrasound abdomen 2/23/2019  Technique: CT of the ABDOMEN and PELVIS:  No intravenous contrast was   administered at physician request. This limits sensitivity for certain   processes. Oral contrast was not administered.    COMMENTS:    LOWER LUNGS AND PLEURA: Trace to small left pleural effusion with minimal   left basilar atelectasis. Coronary vascular calcification.    LIVER: Hepatomegaly. Surface contour nodularity suggesting cirrhosis. No   focal hepatic masses.  BILE DUCTS: normal caliber.  GALLBLADDER:     no wall thickening. Gallstones are not excluded.  PANCREAS: within normal limits.  SPLEEN: within normal limits.  ADRENALS: within normal limits.    KIDNEYS, URETERS AND BLADDER: Mild left hydroureteronephrosis secondary   to a 0.8 cm calculus at the left ureterovesicular junction. Minimal left   perinephric stranding and trace left perinephric fluid. Nonobstructive   left intrarenal calculi. No right hydronephrosis. Right ureter appears   unremarkable. Bladder is otherwise within normal limits.    PELVIS: Fundal uterine calcifications. No adnexal masses. No pelvic   adenopathy or pelvic free fluid.       BOWEL: The unopacified bowel is of normal course and caliber without   evidence of obstruction or bowel wall thickening. Periampullaryduodenal   diverticula..  PERITONEUM: no ascites or free air, no fluid collection.  RETROPERITONEUM: within normal limits.      VESSELS: atherosclerotic changes.      ABDOMINAL WALL: within normal limits.  BONES: Degenerative changes. Prior pedicle fusion.     IMPRESSION:     Mild left hydroureteronephrosis secondary to a 0.8 cm calculus at the   left ureterovesicular junction.        Advanced directives addressed: full resuscitation

## 2019-03-06 NOTE — PROGRESS NOTE ADULT - SUBJECTIVE AND OBJECTIVE BOX
Patient seen and examined.   She reports one incident of right flank pain last night which was sharp in nature and self limited. This has not recurred.   She has relief of low back pain with pain medication.   She is awaiting placement of the PICC     PE: NAD, in good spirits  Moving LE well with good power. No focal deficits.     A/P:  Awaiting PICC  Continue treatment as per medical/ID team.    Will need prolonged treatment with IV abx. Patient seen and examined.   She reports one incident of right flank pain last night which was sharp in nature and self limited. This has not recurred.   She has relief of low back pain with pain medication.   She is awaiting placement of the PICC     PE: NAD, in good spirits  Moving LE well with good power.     A/P:  Awaiting PICC  Continue treatment as per medical/ID team.    Will need prolonged treatment with IV abx. Patient seen and examined earlier this am  She reports one incident of right flank pain last night which was sharp in nature and self limited. This has not recurred.   She has relief of low back pain with pain medication.   She is awaiting placement of the PICC     PE: NAD, in good spirits  Moving LE well with good power.     A/P:  Awaiting PICC  Continue treatment as per medical/ID team.    Will need prolonged treatment with IV abx. Patient seen and examined earlier this am  She reports one incident of right flank pain last night which was sharp in nature and self limited. This has not recurred.   She has relief of low back pain with pain medication.   She is awaiting placement of the PICC     PE: NAD, in good spirits  Moving LE well with good power.     A/P:  Awaiting PICC  Continue treatment as per medical/ID team.    Will need prolonged treatment with IV abx.     Note: addendum.  Pulmonary consult completed and patient with Multiple bilateral acute segmental pulmonary emboli in the right upper,   right lower, and left upper lobes on CT   Will be started on Eliquis.

## 2019-03-06 NOTE — CONSULT NOTE ADULT - REASON FOR ADMISSION
Gram negative bacteremia,
Gram negative bacteremia, Proteus

## 2019-03-06 NOTE — PROGRESS NOTE ADULT - ASSESSMENT
- multiple segmental emboli likely risk factor is immobility   - rule out dvt with the leg edema   - epidural abscess   - small effusions with the third spacing and pulmonary vascular congestin   - renal stone with the mild left hydronephosis     PLAN     - suggested start of full anticoagulation with the eloquis and emboli are small and not hemodynamically significant   - venous duplex to the legs to rule out dvt   - analgesics for the pain   - duration of anticoagulation for the 3 to 6 months or untill the fully ambulatory and could walk fully   - continue antibiotics for the epidural abscess   - patient condition was discussed with Dr Otto

## 2019-03-06 NOTE — CONSULT NOTE ADULT - CONSULT REASON
DVT and PE
Distal left stone, and UTI
Thoracic epidural abscess.
pleural effusion with the upper mid back pain
thoracic pain
GNR sepsis/bacteremia, UTI- pyelo

## 2019-03-06 NOTE — PROGRESS NOTE ADULT - ASSESSMENT
· Assessment		     74 y/o female with PMHx of DJD, s/p L spine fusion, R TKR,  who was seen in  ER on 2/23/19 for L flank pain, uper back pain, chills, dysuria, nausea, and was diagnosed with L UVJ 0.8 cm stone, UTI, was given IV AB and discharged home on PO antibiotics. Her BC from 2/23/19 grew gram negative rods in the anaerobic bottle and she was called back to the ER. The patient states her symptoms are improved today.     * Proteus  Bacteremia, sepsis POA due to UTI, Pyelonephritis, Nephrolithiasis  * T1-T2 diskitis  with epidural abscess/phlegmon  * Bilateral pleural effusions, atelectasis    - ICU for neuro checks q1 h --> q4h --> q6h as per neurosurgery   - CT  Chest and abd/pelvis- showed residual mild pyelitis(no hydronephrosis) ,no indication for  in patient stone extraction or nephrostomy as per dr pradhan  -I discussed with him as no suspicion for pyonephrosis and no hydronephrosis on repeat  CT  - MRI spine  noted   - 2 d echo  - EF wnl, no vegetations  - ortho spine consult  d/w Dr. Ward following the patient - conservative management at this time  - clear liquid diet , if no surgical interventions, can advance the diet  - continue  IV Ceftriaxone, IV vanco   for now per ID  - on rocephin [id number] #11  - will require long term IV abx coverage with PICC line and 6-8 wks until 4/7/19 with weekly cbc, cmp, esrp crp, vancomycin troughs  - s/p  IVF   - follow up urology as outpatient  - d/c vit c megadose  - incentive spirometry   - repeat cultures   - PT , ambulation  - pain management , IV --> PO opioids with laxatives  - 3/5 - pain uncontrolled, increase oxycodone 10 q4h , monitor     * New Bilateral PE   - start Eliquis high dose  - hematology -oncology consult  - doppler LE - negative  - monitor inpatient     * REYNA , POA from prerenal azotemia   which has now resolved with IVF, will stop IV fluids      * Hypoalbuminemia  - add supplements   - restart diet as soon as cleared from surgical prospective     * Constipation   - colace, senna, Miralax    *  DVT prophylaxis  chemical prophylaxis on heparin sc    Dispo -  plan for PICC line,  and IV abx until 4/7/19

## 2019-03-07 ENCOUNTER — TRANSCRIPTION ENCOUNTER (OUTPATIENT)
Age: 74
End: 2019-03-07

## 2019-03-07 VITALS
TEMPERATURE: 98 F | DIASTOLIC BLOOD PRESSURE: 44 MMHG | SYSTOLIC BLOOD PRESSURE: 107 MMHG | HEART RATE: 96 BPM | RESPIRATION RATE: 16 BRPM | OXYGEN SATURATION: 97 %

## 2019-03-07 LAB
ANION GAP SERPL CALC-SCNC: 6 MMOL/L — SIGNIFICANT CHANGE UP (ref 5–17)
BASOPHILS # BLD AUTO: 0.12 K/UL — SIGNIFICANT CHANGE UP (ref 0–0.2)
BASOPHILS NFR BLD AUTO: 1.6 % — SIGNIFICANT CHANGE UP (ref 0–2)
BUN SERPL-MCNC: 17 MG/DL — SIGNIFICANT CHANGE UP (ref 7–23)
CALCIUM SERPL-MCNC: 8.8 MG/DL — SIGNIFICANT CHANGE UP (ref 8.5–10.1)
CHLORIDE SERPL-SCNC: 102 MMOL/L — SIGNIFICANT CHANGE UP (ref 96–108)
CO2 SERPL-SCNC: 30 MMOL/L — SIGNIFICANT CHANGE UP (ref 22–31)
CREAT SERPL-MCNC: 0.84 MG/DL — SIGNIFICANT CHANGE UP (ref 0.5–1.3)
CRP SERPL-MCNC: 9.69 MG/DL — HIGH (ref 0–0.4)
EOSINOPHIL # BLD AUTO: 0.1 K/UL — SIGNIFICANT CHANGE UP (ref 0–0.5)
EOSINOPHIL NFR BLD AUTO: 1.4 % — SIGNIFICANT CHANGE UP (ref 0–6)
ERYTHROCYTE [SEDIMENTATION RATE] IN BLOOD: 90 MM/HR — HIGH (ref 0–20)
GLUCOSE SERPL-MCNC: 94 MG/DL — SIGNIFICANT CHANGE UP (ref 70–99)
HCT VFR BLD CALC: 31.8 % — LOW (ref 34.5–45)
HGB BLD-MCNC: 10.6 G/DL — LOW (ref 11.5–15.5)
IMM GRANULOCYTES NFR BLD AUTO: 1.6 % — HIGH (ref 0–1.5)
LYMPHOCYTES # BLD AUTO: 1.7 K/UL — SIGNIFICANT CHANGE UP (ref 1–3.3)
LYMPHOCYTES # BLD AUTO: 23.2 % — SIGNIFICANT CHANGE UP (ref 13–44)
MCHC RBC-ENTMCNC: 30.5 PG — SIGNIFICANT CHANGE UP (ref 27–34)
MCHC RBC-ENTMCNC: 33.3 GM/DL — SIGNIFICANT CHANGE UP (ref 32–36)
MCV RBC AUTO: 91.4 FL — SIGNIFICANT CHANGE UP (ref 80–100)
MONOCYTES # BLD AUTO: 0.81 K/UL — SIGNIFICANT CHANGE UP (ref 0–0.9)
MONOCYTES NFR BLD AUTO: 11.1 % — SIGNIFICANT CHANGE UP (ref 2–14)
NEUTROPHILS # BLD AUTO: 4.47 K/UL — SIGNIFICANT CHANGE UP (ref 1.8–7.4)
NEUTROPHILS NFR BLD AUTO: 61.1 % — SIGNIFICANT CHANGE UP (ref 43–77)
NRBC # BLD: 0 /100 WBCS — SIGNIFICANT CHANGE UP (ref 0–0)
PLATELET # BLD AUTO: 475 K/UL — HIGH (ref 150–400)
POTASSIUM SERPL-MCNC: 4.4 MMOL/L — SIGNIFICANT CHANGE UP (ref 3.5–5.3)
POTASSIUM SERPL-SCNC: 4.4 MMOL/L — SIGNIFICANT CHANGE UP (ref 3.5–5.3)
RBC # BLD: 3.48 M/UL — LOW (ref 3.8–5.2)
RBC # FLD: 13.9 % — SIGNIFICANT CHANGE UP (ref 10.3–14.5)
SODIUM SERPL-SCNC: 138 MMOL/L — SIGNIFICANT CHANGE UP (ref 135–145)
WBC # BLD: 7.32 K/UL — SIGNIFICANT CHANGE UP (ref 3.8–10.5)
WBC # FLD AUTO: 7.32 K/UL — SIGNIFICANT CHANGE UP (ref 3.8–10.5)

## 2019-03-07 PROCEDURE — 71045 X-RAY EXAM CHEST 1 VIEW: CPT | Mod: 26

## 2019-03-07 RX ORDER — CEFTRIAXONE 500 MG/1
2 INJECTION, POWDER, FOR SOLUTION INTRAMUSCULAR; INTRAVENOUS
Qty: 0 | Refills: 0 | DISCHARGE
Start: 2019-03-07

## 2019-03-07 RX ORDER — POLYETHYLENE GLYCOL 3350 17 G/17G
17 POWDER, FOR SOLUTION ORAL
Qty: 0 | Refills: 0 | DISCHARGE
Start: 2019-03-07

## 2019-03-07 RX ORDER — CYCLOBENZAPRINE HYDROCHLORIDE 10 MG/1
1 TABLET, FILM COATED ORAL
Qty: 90 | Refills: 0
Start: 2019-03-07 | End: 2019-04-05

## 2019-03-07 RX ORDER — OXYCODONE HYDROCHLORIDE 5 MG/1
1 TABLET ORAL
Qty: 20 | Refills: 0
Start: 2019-03-07 | End: 2019-03-11

## 2019-03-07 RX ORDER — VANCOMYCIN HCL 1 G
1 VIAL (EA) INTRAVENOUS
Qty: 0 | Refills: 0 | DISCHARGE
Start: 2019-03-07

## 2019-03-07 RX ORDER — SENNA PLUS 8.6 MG/1
2 TABLET ORAL
Qty: 0 | Refills: 0 | DISCHARGE
Start: 2019-03-07

## 2019-03-07 RX ORDER — ACETAMINOPHEN 500 MG
2 TABLET ORAL
Qty: 60 | Refills: 0
Start: 2019-03-07 | End: 2019-03-16

## 2019-03-07 RX ORDER — APIXABAN 2.5 MG/1
2 TABLET, FILM COATED ORAL
Qty: 120 | Refills: 0
Start: 2019-03-07 | End: 2019-04-05

## 2019-03-07 RX ORDER — LIDOCAINE 4 G/100G
1 CREAM TOPICAL
Qty: 0 | Refills: 0 | DISCHARGE
Start: 2019-03-07

## 2019-03-07 RX ORDER — DOCUSATE SODIUM 100 MG
1 CAPSULE ORAL
Qty: 0 | Refills: 0 | DISCHARGE
Start: 2019-03-07

## 2019-03-07 RX ADMIN — Medication 650 MILLIGRAM(S): at 15:25

## 2019-03-07 RX ADMIN — APIXABAN 10 MILLIGRAM(S): 2.5 TABLET, FILM COATED ORAL at 05:48

## 2019-03-07 RX ADMIN — POLYETHYLENE GLYCOL 3350 17 GRAM(S): 17 POWDER, FOR SOLUTION ORAL at 15:24

## 2019-03-07 RX ADMIN — Medication 1000 UNIT(S): at 15:23

## 2019-03-07 RX ADMIN — LIDOCAINE 1 PATCH: 4 CREAM TOPICAL at 15:22

## 2019-03-07 RX ADMIN — Medication 250 MILLIGRAM(S): at 16:58

## 2019-03-07 RX ADMIN — OXYCODONE HYDROCHLORIDE 5 MILLIGRAM(S): 5 TABLET ORAL at 11:20

## 2019-03-07 RX ADMIN — CEFTRIAXONE 2000 MILLIGRAM(S): 500 INJECTION, POWDER, FOR SOLUTION INTRAMUSCULAR; INTRAVENOUS at 07:49

## 2019-03-07 RX ADMIN — Medication 100 MILLIGRAM(S): at 05:48

## 2019-03-07 RX ADMIN — Medication 250 MILLIGRAM(S): at 05:48

## 2019-03-07 RX ADMIN — Medication 100 MILLIGRAM(S): at 15:25

## 2019-03-07 RX ADMIN — CYCLOBENZAPRINE HYDROCHLORIDE 5 MILLIGRAM(S): 10 TABLET, FILM COATED ORAL at 05:48

## 2019-03-07 RX ADMIN — OXYCODONE HYDROCHLORIDE 5 MILLIGRAM(S): 5 TABLET ORAL at 12:00

## 2019-03-07 RX ADMIN — APIXABAN 10 MILLIGRAM(S): 2.5 TABLET, FILM COATED ORAL at 16:58

## 2019-03-07 RX ADMIN — Medication 650 MILLIGRAM(S): at 05:48

## 2019-03-07 RX ADMIN — CYCLOBENZAPRINE HYDROCHLORIDE 5 MILLIGRAM(S): 10 TABLET, FILM COATED ORAL at 15:25

## 2019-03-07 NOTE — DISCHARGE NOTE ADULT - HOME CARE AGENCY
[facility] -[phone]/[facility] [phone] [facility] 3/8/19

## 2019-03-07 NOTE — PROGRESS NOTE ADULT - SUBJECTIVE AND OBJECTIVE BOX
Date of service: 03-07-19 @ 12:21    pt seen and examined  had MRI spine 2/28 showing T1-T2 diskitis/OM/epidural abscess? phlegmon  hospital course c/b R pleuritic cp, CTA showing multiple pulmonary emboli started on ac  less back pain, temps down  feeling better    ROS: no fever or chills; denies dizziness, no HA, no abdominal pain, no diarrhea or constipation; no dysuria, no urinary frequency, no legs pain, no rashes      MEDICATIONS  (STANDING):  acetaminophen   Tablet .. 650 milliGRAM(s) Oral every 8 hours  apixaban 10 milliGRAM(s) Oral every 12 hours  cefTRIAXone Injectable. 2000 milliGRAM(s) IV Push every 24 hours  cholecalciferol 1000 Unit(s) Oral daily  cyclobenzaprine 5 milliGRAM(s) Oral three times a day  docusate sodium 100 milliGRAM(s) Oral three times a day  lidocaine   Patch 1 Patch Transdermal every 24 hours  polyethylene glycol 3350 17 Gram(s) Oral daily  senna 2 Tablet(s) Oral at bedtime  vancomycin  IVPB 1000 milliGRAM(s) IV Intermittent every 12 hours      Vital Signs Last 24 Hrs  T(C): 36.6 (07 Mar 2019 11:32), Max: 37 (07 Mar 2019 04:09)  T(F): 97.8 (07 Mar 2019 11:32), Max: 98.6 (07 Mar 2019 04:09)  HR: 96 (07 Mar 2019 11:32) (72 - 96)  BP: 107/44 (07 Mar 2019 11:32) (106/34 - 130/54)  BP(mean): --  RR: 16 (07 Mar 2019 11:32) (16 - 18)  SpO2: 97% (07 Mar 2019 11:32) (94% - 97%)    PE:  Constitutional: frail looking  HEENT: NC/AT, EOMI, PERRLA, conjunctivae clear; ears and nose atraumatic; pharynx benign  Neck: supple; thyroid not palpable  Back: no tenderness  Respiratory: respiratory effort normal; clear to auscultation  Cardiovascular: S1S2 regular, no murmurs  Abdomen: soft, not tender, not distended, positive BS; liver and spleen WNL  Genitourinary: no suprapubic tenderness L CVA tenderness  Lymphatic: no LN palpable  Musculoskeletal: T1-T2 spinal tenderness  Extremities: no pedal edema  Neurological/ Psychiatric: moving all extremities  Skin: no rashes; no palpable lesions    Labs: all available labs reviewed                                   10.6   7.32  )-----------( 475      ( 07 Mar 2019 05:23 )             31.8     03-07    138  |  102  |  17  ----------------------------<  94  4.4   |  30  |  0.84    Ca    8.8      07 Mar 2019 05:23        Vancomycin Level, Trough: 16.2 ug/mL (03-02 @ 17:18)          Culture - Urine (02.24.19 @ 22:00)    Specimen Source: .Urine Clean Catch (Midstream)    Culture Results:   No growth    Culture - Blood (02.24.19 @ 11:39)    Specimen Source: .Blood None    Culture Results:   No growth to date.    Culture - Blood (02.23.19 @ 12:47)    Gram Stain:   Growth in anaerobic bottle: Gram Negative Rods  Growth in aerobic bottle: Gram Negative Rods    -  Amikacin: S <=16    -  Ampicillin: S <=8 These ampicillin results predict results for amoxicillin    -  Ampicillin/Sulbactam: S <=8/4    -  Aztreonam: S <=4    -  Cefazolin: S <=8    -  Multidrug (KPC pos) resistant organism: Nondet    -  Staphylococcus aureus: Nondet    -  Methicillin resistant Staphylococcus aureus (MRSA): Nondet    -  Coagulase negative Staphylococcus: Nondet    -  Enterococcus species: Nondet    -  Vancomycin resistant Enterococcus sp.: Nondet    -  Escherichia coli: Nondet    -  Klebsiella oxytoca: Nondet    -  Klebsiella pneumoniae: Nondet    -  Serratia marcescens: Nondet    -  Haemophilus influenzae: Nondet    -  Listeria monocytogenes: Nondet    -  Neisseria meningitidis: Nondet    -  Pseudomonas aeruginosa: Nondet    -  Acinetobacter baumanii: Nondet    -  Enterobacter cloacae complex: Nondet    -  Streptococcus sp. (Not Grp A, B or S pneumoniae): Nondet    -  Streptococcus agalactiae (Group B): Nondet    -  Streptococcus pyogenes (Group A): Nondet    -  Streptococcus pneumoniae: Nondet    -  Candida albicans: Nondet    -  Candida glabrata: Nondet    -  Candida krusei: Nondet    -  Candida parapsilosis: Nondet    -  Candida tropicalis: Nondet    -  Cefepime: S <=4    -  Cefoxitin: S <=8    -  Ceftriaxone: S <=1 Enterobacter, Citrobacter, and Serratia may develop resistance during prolonged therapy    -  Ciprofloxacin: S <=1    -  Ertapenem: S <=1    -  Gentamicin: S <=4    -  Levofloxacin: S <=2    -  Meropenem: S <=1    -  Piperacillin/Tazobactam: S <=16    -  Tobramycin: S <=4    -  Trimethoprim/Sulfamethoxazole: S <=2/38    Specimen Source: .Blood None    Organism: Blood Culture PCR    Organism: Proteus mirabilis    Culture Results:   Growth in aerobic and anaerobic bottles: Proteus mirabilis  "Due to technical problems, Proteus sp. will Not be reported as part of  the BCID panel until further notice"  ***Blood Panel PCR results on this specimen are available  approximately 3 hours after the Gram stain result.***  Gram stain, PCR, and/or culture results may not always  correspond due to difference in methodologies.  ************************************************************  This PCR assay was performed using [employer].  The following targets are tested for: Enterococcus,  vancomycin resistant enterococci, Listeria monocytogenes,  coagulase negative staphylococci, S. aureus,  methicillin resistant S. aureus, Streptococcus agalactiae  (Group B), S. pneumoniae, S. pyogenes (Group A),  Acinetobacter baumannii, Enterobacter cloacae, E. coli,  Klebsiella oxytoca, K. pneumoniae, Proteus sp.,  Serratia marcescens, Haemophilus influenzae,  Neisseria meningitidis, Pseudomonas aeruginosa, Candida  albicans, C. glabrata, C krusei, C parapsilosis,  C. tropicalis and the KPC resistance gene.    Organism Identification: Blood Culture PCR  Proteus mirabilis    Method Type: PCR    Method Type: BENITO      Radiology: all available radiological tests reviewed    < from: CT Chest w/ IV Cont (02.27.19 @ 22:42) >    EXAM:  CT ABDOMEN AND PELVIS OC IC                          EXAM:  CT CHEST IC                            PROCEDURE DATE:  02/27/2019          INTERPRETATION:      Three examinations were performed on this patient:   1. CT scan of the chest with intravenous contrast  2. CT scan of the abdomen with intravenous contrast  3. CT scan of the pelvis with intravenous contrast        CLINICAL INFORMATION (all 3 exams):      Cough sepsis abdominal pain        TECHNIQUE:  Contiguous axial 3 mm sections were obtained through the   chest, abdomen and pelvis using single helical acquisition.  Images were   acquired during the rapid bolus administration of 95 cc of Omnipaque 350/   5 cc discarded.  Imaging post processing software was employed to   generate reformatted images in 3 mm sagittal and coronal imaging planes.     No Oral contrast was administered.   This scan was performed using   automatic exposure control (radiation dose reduction software) to obtain   a diagnostic image quality scan with patient dose as low as reasonably   achievable.         FINDINGS:   No previous examinations are available for review.    The thyroid gland appears intact.    The lungs demonstrate mild BILATERAL pleural effusions with underlying   infiltrates. Atelectasis seen within the lingula. The trachea and major   bronchi are patent.    No enlarged axillary, hilar or mediastinal lymph nodes are found.   The   heart size is normal. The chest wall and thoracic spine are unremarkable.    The liver demonstrates homogeneous attenuation without focal lesion or   abnormal enhancement.  Hepatic size and contours are maintained. Hepatic   and portal veins are patent and not displaced.  No intrahepatic or common   ductal dilatation is recognized.  The gallbladder is intact without   calcified calculi or wall thickening.  The pancreas is intact without   ductal dilatation or focal lesion.  The spleen is normal in size.    The adrenal glands are intact.  The kidneys demonstrate symmetric   nephrograms. Haziness noted in the LEFT renal hilum may reflect mild   pyelitis. No ureteral dilatation is noted. No suspicious renal mass is   found. 6 mm calculus is seen in the lower pole of the LEFT kidney.  No   hydronephrosis or perinephric infiltration is found.The bladder appears   unremarkable.    Small calcified fibroid in uterus    No enlarged lymph nodes are found.  No ascites is present.   The osseous   structures show lumbar fusion at L1-L4.          The bowel shows mild stool retention.  No obstruction, perforation or   abscess is recognized.           IMPRESSION:          1.   Chest:   mild BILATERAL pleural effusions with underlying   infiltrates. Atelectasis seen within the lingula.       2.   Abdomen and pelvis: Haziness noted in the LEFT renal hilum may   reflect mild pyelitis. No ureteral dilatation is noted . Mild stool   retention.  6 mm calculus in lower pole of LEFT kidney.      < from: Xray Thoracic Spine 2 View (02.26.19 @ 09:29) >    EXAM:  XR T SPINE 2 VIEWS                            PROCEDURE DATE:  02/26/2019          INTERPRETATION:  XR T SPINE 2 VIEWS    CLINICAL INDICATION: x-ray of thoracic spine s/p fall    VIEWS/TECHNIQUE: AP and lateral views of the thoracic spine were obtained.    COMPARISON:  Chest radiograph dated 10/3/2018.      FINDINGS:      There is thoracic process.    There is a chronic moderate anterior T8 vertebral body wedge deformity,   unchanged. Remaining thoracic vertebral body heights are maintained.   Thoracic vertebral body alignment is preserved.    There is mild multilevel thoracic intervertebral disc height loss and   small endplate osteophytosis.    There is redemonstration of partially imaged lumbar surgical fusion   hardware.     IMPRESSION:    No acute fracture of the thoracic spine.    Unchanged moderate anterior T8 vertebral body wedge deformity.    Partially imaged lumbar surgical fusion hardware.      < end of copied text >    EXAM:  CT ABDOMEN AND PELVIS                              INTERPRETATION:  Exam Type:  CT ABDOMEN AND PELVIS   Date and Time: 2/23/2019 2:23 PM  Indication: Abdominal pain and hematuria  Compared to: Ultrasound abdomen 2/23/2019  Technique: CT of the ABDOMEN and PELVIS:  No intravenous contrast was   administered at physician request. This limits sensitivity for certain   processes. Oral contrast was not administered.    COMMENTS:    LOWER LUNGS AND PLEURA: Trace to small left pleural effusion with minimal   left basilar atelectasis. Coronary vascular calcification.    LIVER: Hepatomegaly. Surface contour nodularity suggesting cirrhosis. No   focal hepatic masses.  BILE DUCTS: normal caliber.  GALLBLADDER:     no wall thickening. Gallstones are not excluded.  PANCREAS: within normal limits.  SPLEEN: within normal limits.  ADRENALS: within normal limits.    KIDNEYS, URETERS AND BLADDER: Mild left hydroureteronephrosis secondary   to a 0.8 cm calculus at the left ureterovesicular junction. Minimal left   perinephric stranding and trace left perinephric fluid. Nonobstructive   left intrarenal calculi. No right hydronephrosis. Right ureter appears   unremarkable. Bladder is otherwise within normal limits.    PELVIS: Fundal uterine calcifications. No adnexal masses. No pelvic   adenopathy or pelvic free fluid.       BOWEL: The unopacified bowel is of normal course and caliber without   evidence of obstruction or bowel wall thickening. Periampullaryduodenal   diverticula..  PERITONEUM: no ascites or free air, no fluid collection.  RETROPERITONEUM: within normal limits.      VESSELS: atherosclerotic changes.      ABDOMINAL WALL: within normal limits.  BONES: Degenerative changes. Prior pedicle fusion.     IMPRESSION:     Mild left hydroureteronephrosis secondary to a 0.8 cm calculus at the   left ureterovesicular junction.        Advanced directives addressed: full resuscitation

## 2019-03-07 NOTE — DISCHARGE NOTE ADULT - PLAN OF CARE
prevent recurrence long term abx as per ID until 4/7/19  via PICC line, follow up with PCP within 1 week IV antibiotics, follow up with orthospine surgeon within 1 week  return to ED if weakness, numbness or other concerns take Eliquis, monitor for signs of bleeding, check cbc in 1 week

## 2019-03-07 NOTE — DISCHARGE NOTE ADULT - CARE PROVIDER_API CALL
Diana Byrd)  Internal Medicine  [address] [place] [address]  Phone: [phone]  Fax: [phone]  Follow Up Time:     Maria G Epperson)  Orthopaedic Surgery  [facility], [address] [place] [address]  Phone: [phone]  Fax: [phone]  Follow Up Time:     Bernice Chavez)  Hematology; Internal Medicine; Medical Oncology  [address] [place] [address]  Phone: [phone]  Fax: [phone]  Follow Up Time:     Linh Esqueda)  Critical Care Medicine; Internal Medicine; Pulmonary Disease; Sleep Medicine  [address] [place] [address]  Phone: [phone]  Fax: [phone]  Follow Up Time:

## 2019-03-07 NOTE — PROGRESS NOTE ADULT - REASON FOR ADMISSION
Gram negative bacteremia,
Discitis
Gram negative bacteremia,
T1-2 discitis
T1-2 discitis
Gram negative bacteremia,

## 2019-03-07 NOTE — PROGRESS NOTE ADULT - SUBJECTIVE AND OBJECTIVE BOX
Pt S/E at bedside resting comfortably. CT PA yesterday demonstrated BL PE and put on AC. currently denies cp/sob, fevers or chills.    Vital Signs Last 24 Hrs  T(C): 37 (03-07-19 @ 04:09), Max: 37 (03-07-19 @ 04:09)  T(F): 98.6 (03-07-19 @ 04:09), Max: 98.6 (03-07-19 @ 04:09)  HR: 72 (03-07-19 @ 04:09) (72 - 82)  BP: 106/34 (03-07-19 @ 04:09) (106/34 - 130/54)  BP(mean): --  RR: 17 (03-07-19 @ 04:09) (16 - 18)  SpO2: 94% (03-07-19 @ 04:09) (94% - 94%)                          10.6   7.32  )-----------( 475      ( 07 Mar 2019 05:23 )             31.8     03-07    138  |  102  |  17  ----------------------------<  94  4.4   |  30  |  0.84    Ca    8.8      07 Mar 2019 05:23        Gen: NAD, AAOx3    Spine:  minimal ttp over axial thoracic spine  no ttp anywhere else  baseline motors in UE and LE present, no focal weakness  motor 5/5  SILT L3-S1  SILT C5-T1  +DP/PT Pulses  Compartments soft  No calf TTP B/L Pt S/E at bedside resting comfortably. CT PA yesterday demonstrated BL PE and put on AC. currently denies cp/sob, fevers or chills. reports more back pain this AM.    Vital Signs Last 24 Hrs  T(C): 37 (03-07-19 @ 04:09), Max: 37 (03-07-19 @ 04:09)  T(F): 98.6 (03-07-19 @ 04:09), Max: 98.6 (03-07-19 @ 04:09)  HR: 72 (03-07-19 @ 04:09) (72 - 82)  BP: 106/34 (03-07-19 @ 04:09) (106/34 - 130/54)  BP(mean): --  RR: 17 (03-07-19 @ 04:09) (16 - 18)  SpO2: 94% (03-07-19 @ 04:09) (94% - 94%)                          10.6   7.32  )-----------( 475      ( 07 Mar 2019 05:23 )             31.8     03-07    138  |  102  |  17  ----------------------------<  94  4.4   |  30  |  0.84    Ca    8.8      07 Mar 2019 05:23        Gen: NAD, AAOx3    Spine:  mild ttp over axial thoracic spine  no ttp anywhere else  baseline motors in UE and LE present, no focal weakness  motor 5/5  SILT L3-S1  SILT C5-T1  +DP/PT Pulses  Compartments soft  No calf TTP B/L

## 2019-03-07 NOTE — DISCHARGE NOTE ADULT - CARE PROVIDERS DIRECT ADDRESSES
[email or web address]

## 2019-03-07 NOTE — DISCHARGE NOTE ADULT - OTHER SIGNIFICANT FINDINGS
< from: US Duplex Venous Lower Ext Complete, Bilateral (03.06.19 @ 15:38) >  FINDINGS:  There is normal compressibility of the bilateral common femoral, femoral   and popliteal veins. No calf vein thrombosis is detected.  Doppler examination shows normal spontaneous and phasic flow.  IMPRESSION:   No evidence of bilateral lower extremity deep venous thrombosis.  < end of copied text >  < from: CT Angio Chest PE Protocol w/ IV Cont (03.06.19 @ 09:21) >  PULMONARY ARTERIES: Multiple bilateral acute segmental pulmonary emboli   in the right upper, right lower, and left upper lobes.  LUNGS, AIRWAYS: The central airways are patent. Mild pulmonary edema.   Bibasilar streaky atelectasis.  PLEURA: Trace bilateral effusions.  VESSELS: Normal caliber aorta.  HEART: Normal heart size. No pericardial effusion.  MEDIASTINUM, ESTER, AXILLAE: No adenopathy.  UPPER ABDOMEN: Limited visualization is unremarkable.  BONES AND CHEST WALL: No acute bony abnormality.  IMPRESSION:   Multiple bilateral acute segmental pulmonary emboli in the right upper,   right lower, and left upper lobes.  Pulmonary edema and trace bilateral pleural effusions.  Critical value:  I discussed the findings of this report with Dr. Hall at 9:40 AM on 3/6/2019.  Critical value policy of the hospital   was followed.  Read back and confirmation of receipt of this   communication was performed.  This verbal communication supplements the   text report of this document.      < end of copied text >  < from: US Abdomen Complete (03.06.19 @ 02:37) >  IMPRESSION:   No gallstones or biliary dilatation.    Mild left renal pelviectasis, stable from recent CT.    < end of copied text >  < from: MR Thoracic Spine w/wo IV Cont (02.28.19 @ 15:29) >  THORACIC SPINE:  T1-T2 discitis/osteomyelitis with abnormal enhancement/signal at the disc   space, without significant endplate bony erosion or destructive bony   lesion. Associated ventral epidural 0.8 x 2.2 cm (TR by CC) abscess at   the level of T1-T2, resulting in effacement of the ventral CSF space and   slightly contacting the ventral thoracic cord without corona compression   or abnormal cord signal/enhancement. Mild paravertebral edema/phlegmon at   the levels of C7-T1.    Small bilateral pleural effusions are again noted.    LUMBAR SPINE:  Lumbar surgical fusion hardware resulting in significant susceptibility   artifact resulting in nondiagnostic imaging of the lumbar spine. No   paravertebral/paraspinal soft tissue abscess. If clinical concern persist   a lumbar spine CT with contrast can be performed for further   characterization.    < end of copied text >  Complete Blood Count + Automated Diff in AM (03.07.19 @ 05:23)    WBC Count: 7.32 K/uL    RBC Count: 3.48 M/uL    Hemoglobin: 10.6 g/dL    Hematocrit: 31.8 %    Mean Cell Volume: 91.4 fl    Mean Cell Hemoglobin: 30.5 pg    Mean Cell Hemoglobin Conc: 33.3 gm/dL    Red Cell Distrib Width: 13.9 %    Platelet Count - Automated: 475 K/uL    Auto Neutrophil #: 4.47 K/uL    Auto Lymphocyte #: 1.70 K/uL    Auto Monocyte #: 0.81 K/uL    Auto Eosinophil #: 0.10 K/uL    Auto Basophil #: 0.12 K/uL    Auto Neutrophil %: 61.1: Differential percentages must be correlated with absolute numbers for  clinical significance. %    Auto Lymphocyte %: 23.2 %    Auto Monocyte %: 11.1 %    Auto Eosinophil %: 1.4 %    Auto Basophil %: 1.6 %    Auto Immature Granulocyte %: 1.6 %    Nucleated RBC: 0 /100 WBCs    Basic Metabolic Panel in AM (03.07.19 @ 05:23)    Sodium, Serum: 138 mmol/L    Potassium, Serum: 4.4 mmol/L    Chloride, Serum: 102 mmol/L    Carbon Dioxide, Serum: 30 mmol/L    Anion Gap, Serum: 6 mmol/L    Blood Urea Nitrogen, Serum: 17 mg/dL    Creatinine, Serum: 0.84 mg/dL    Glucose, Serum: 94 mg/dL    Calcium, Total Serum: 8.8 mg/dL

## 2019-03-07 NOTE — DISCHARGE NOTE ADULT - PATIENT PORTAL LINK FT
You can access the [facility] Patient Portal, offered by [facility], by registering with the following website: [email or web address]

## 2019-03-07 NOTE — DISCHARGE NOTE ADULT - MEDICATION SUMMARY - MEDICATIONS TO STOP TAKING
I will STOP taking the medications listed below when I get home from the hospital:    Fish Oil oral capsule  -- 1 tab po daily    cefdinir 300 mg oral capsule  -- 1 cap(s) by mouth 2 times a day     **DID NOT TAKE**  -- Finish all this medication unless otherwise directed by prescriber.    oxycodone-acetaminophen 5 mg-325 mg oral tablet  -- 1 tab(s) by mouth every 6 hours MDD:4  -- Caution federal law prohibits the transfer of this drug to any person other  than the person for whom it was prescribed.  May cause drowsiness.  Alcohol may intensify this effect.  Use care when operating dangerous machinery.  This prescription cannot be refilled.  This product contains acetaminophen.  Do not use  with any other product containing acetaminophen to prevent possible liver damage.  Using more of this medication than prescribed may cause serious breathing problems.

## 2019-03-07 NOTE — PROGRESS NOTE ADULT - ASSESSMENT
73F with T1-2 Discitis with ventral epidural collection without cord compression improving clinically    -new diagnosis of PE, currently on AC, med and h/o co mgmt appreciated  encourage ambulation and SCDs  -as for discitis pt is progressing well  -pain control  -WBAT  - PT/OT  -Regular diet  -will need picc line and long term iv abx, med/ID to order  - c/w IV abx, ID recs and comgmt appreciated  - continue to trend esr/crp weekly, will repeat in 3-5 days  - will discuss with attending and advise if plan changes 73F with T1-2 Discitis with ventral epidural collection without cord compression improving clinically    -new diagnosis of PE, currently on AC, med and h/o co mgmt appreciated  encourage ambulation and SCDs  -as for discitis pt is progressing well  -pain control  -WBAT  - PT/OT  -Regular diet  -will need picc line and long term iv abx, will discuss with medicine team today  - c/w IV abx, ID recs and comgmt appreciated  - continue to trend esr/crp weekly, will repeat in 3-5 days  - currently no plan for surgical intervention, continue to monitor clincially  - will discuss with attending and advise if plan changes

## 2019-03-07 NOTE — PROGRESS NOTE ADULT - PROVIDER SPECIALTY LIST ADULT
Hospitalist
Infectious Disease
Orthopedics
Pulmonology
Urology
Infectious Disease
Pulmonology
Hospitalist

## 2019-03-07 NOTE — PROGRESS NOTE ADULT - SUBJECTIVE AND OBJECTIVE BOX
SUBJECTIVE     Patient seen in the A.M and offers no new symptoms of chest pain or sob and planning for the picc line placement     PAST MEDICAL & SURGICAL HISTORY:  Erbs muscular dystrophy: left arm  Thyroid cyst  Osteoarthritis  H/O spinal fusion: 1997 lumbar    OBJECTIVE   Vital Signs Last 24 Hrs  T(C): 36.6 (07 Mar 2019 11:32), Max: 37 (07 Mar 2019 04:09)  T(F): 97.8 (07 Mar 2019 11:32), Max: 98.6 (07 Mar 2019 04:09)  HR: 96 (07 Mar 2019 11:32) (72 - 96)  BP: 107/44 (07 Mar 2019 11:32) (106/34 - 130/54)  BP(mean): --  RR: 16 (07 Mar 2019 11:32) (16 - 18)  SpO2: 97% (07 Mar 2019 11:32) (94% - 97%)    Review of systems   as dictated in the history of present illness with the review of other systems non contributory     PHYSICAL EXAM:  Constitutional: , awake and alert, not in distress.  HEENT: Normo cephalic atraumatic  Neck: Soft and supple, No J.V.D   Respiratory: vesicular breathing has mildly  decreased breath sounds in the base   Cardiovascular: S1 and S2, regular rate .   Gastrointestinal:  soft, nontender,   Extremities:  only minimal edema .  Neurological: No new  focal deficits.    MEDICATIONS  (STANDING):  acetaminophen   Tablet .. 650 milliGRAM(s) Oral every 8 hours  apixaban 10 milliGRAM(s) Oral every 12 hours  cefTRIAXone Injectable. 2000 milliGRAM(s) IV Push every 24 hours  cholecalciferol 1000 Unit(s) Oral daily  cyclobenzaprine 5 milliGRAM(s) Oral three times a day  docusate sodium 100 milliGRAM(s) Oral three times a day  lidocaine   Patch 1 Patch Transdermal every 24 hours  polyethylene glycol 3350 17 Gram(s) Oral daily  senna 2 Tablet(s) Oral at bedtime  vancomycin  IVPB 1000 milliGRAM(s) IV Intermittent every 12 hours                            10.6   7.32  )-----------( 475      ( 07 Mar 2019 05:23 )             31.8     03-07    138  |  102  |  17  ----------------------------<  94  4.4   |  30  |  0.84    Ca    8.8      07 Mar 2019 05:23

## 2019-03-07 NOTE — PHARMACOTHERAPY INTERVENTION NOTE - COMMENTS
Counseled patient about eliquis and discharged medications
Completed med history. Home medications reviewed with patient and confirmed with Dr Lock  Patient also takes calcium citrate 2 tab QD but could not recall strength

## 2019-03-07 NOTE — PROGRESS NOTE ADULT - ASSESSMENT
- multiple segmental emboli likely risk factor is immobility   - venous duplex is negative for the dvt   - epidural abscess   - small effusions with the third spacing and pulmonary vascular congestin   - renal stone with the mild left hydronephosis     PLAN     - continue with the eloquis for the full anticoagualtion   - analgesics for the pain   - duration of anticoagulation for the 3 to 6 months or untill the fully ambulatory and could walk fully   - continue antibiotics for the epidural abscess for the recommended duration as per the I.D

## 2019-03-07 NOTE — DISCHARGE NOTE ADULT - CARE PLAN
Principal Discharge DX:	Discitis of thoracic region  Goal:	prevent recurrence  Assessment and plan of treatment:	long term abx as per ID until 4/7/19  via PICC line, follow up with PCP within 1 week  Secondary Diagnosis:	Bacteremia  Assessment and plan of treatment:	IV antibiotics, follow up with orthospine surgeon within 1 week  return to ED if weakness, numbness or other concerns  Secondary Diagnosis:	Pulmonary embolism  Assessment and plan of treatment:	take Eliquis, monitor for signs of bleeding, check cbc in 1 week

## 2019-03-07 NOTE — DISCHARGE NOTE ADULT - PROVIDER TOKENS
PROVIDER:[TOKEN:[[id number]]],PROVIDER:[TOKEN:[[id number]]],PROVIDER:[TOKEN:[[id number]]],PROVIDER:[TOKEN:[[id number]]]

## 2019-03-07 NOTE — DISCHARGE NOTE ADULT - HOSPITAL COURSE
History of Present Illness: 	  The patient is a 72 y/o female with PMHx of DJD, s/p L spine fusion, R TKR, L arm Erb's palsy who was seen in  ER on 19 for L flank pain, upper back pain, chills, dysuria, nausea, and was diagnosed with L UVJ 0.8 cm stone, UTI, was given IV AB and discharged home on PO antibiotics. Her BC from 19 grew gram negative rods in the anaerobic bottle and she was called back to the ER. The patient states her symptoms are improved today.     Family Hx.: Mother had MI at age 74,  father  of dementia at age 95.     - report thoracic spine pain  - still with intractable upper back pain, no BM for 7days, pasing gases, no nausea   still with upper back pain, pending MRI spine, patient was seen at 10am   3 - pt seen and examined, denies cp, palpitations, + back pain, denies numbness , weakness, tolerating clear liquid diet, + low grade fever  3/ - pt seen and examined, back pain persist, ambulated with PT, tolerates po diet, afebrile  3/3 - pt seem and examined, back pain better, afebrile, denies abdominal pain, + BM, no numbness   3/ - pt seen and examined , back pain improved, afebrile, tolerating PO diet , + constipation for 2 days  3/5 - pt seen and examined, back pain is not better, denies chest pain, no BM   3/6 - pt seen and examined, back pain controlled, events noted overnight, + right sided pleuritic chest pain on and off overnight, dry minimal cough, CTA showed bilateral PE, POC discussed with pt   3/7 - pt seen and examined, back pain controlled with tylenol, afebrile, small nose bleed overnight  ROS - all other systems negative except mention above  T(C): 36.6 (19 @ 11:32), Max: 37 (19 @ 04:09)  T(F): 97.8 (19 @ 11:32), Max: 98.6 (19 @ 04:09)  HR: 96 (19 @ 11:32) (72 - 96)  BP: 107/44 (19 @ 11:32) (106/34 - 130/54)  RR: 16 (19 @ 11:32) (16 - 18)  SpO2: 97% (19 @ 11:32) (94% - 97%)  Wt(kg): --  Constitutional: NAD, awake and alert, well-developed  HEENT: PERR, EOMI, Normal Hearing, MMM  Neck: Soft and supple, No LAD, No JVD  Respiratory: Breath sounds are clear bilaterally, No wheezing, rales or rhonchi  Cardiovascular: S1 and S2, regular rate and rhythm, no Murmurs, gallops or rubs  Gastrointestinal: Bowel Sounds present, soft, nontender, nondistended, no guarding, no rebound  Extremities: No peripheral edema  Vascular: 2+ peripheral pulses  Neurological: A/O x 3, no focal deficits   Musculoskeletal: 5/5 strength b/l upper and lower extremities  Skin: No rashes  · Assessment		     72 y/o female with PMHx of DJD, s/p L spine fusion, R TKR,  who was seen in  ER on 19 for L flank pain, uper back pain, chills, dysuria, nausea, and was diagnosed with L UVJ 0.8 cm stone, UTI, was given IV AB and discharged home on PO antibiotics. Her BC from 19 grew gram negative rods in the anaerobic bottle and she was called back to the ER. The patient states her symptoms are improved today.   * Proteus  Bacteremia, sepsis POA due to UTI, Pyelonephritis, Nephrolithiasis  * T1-T2 diskitis  with epidural abscess/phlegmon  * Bilateral pleural effusions, atelectasis    - ICU for neuro checks q1 h --> q4h --> q6h as per neurosurgery   - CT  Chest and abd/pelvis- showed residual mild pyelitis(no hydronephrosis) ,no indication for  in patient stone extraction or nephrostomy as per dr pradhan  -I discussed with him as no suspicion for pyonephrosis and no hydronephrosis on repeat  CT  - MRI spine  noted , - 2 d echo  - EF wnl, no vegetations  - ortho spine consult  d/w Dr. Ward following the patient - conservative management at this time  - clear liquid diet , if no surgical interventions, can advance the diet  - continue  IV Ceftriaxone, IV vanco   for now per ID  - on rocephin [id number] #11  - will require long term IV abx coverage with PICC line and 6-8 wks until 19 with weekly cbc, cmp, esrp crp, vancomycin troughs  - s/p  IVF , - follow up urology as outpatient  - d/c vit c megadose  - incentive spirometry   - repeat cultures , - PT , ambulation  - pain management , IV --> PO opioids with laxatives  - 3/5 - pain uncontrolled, increase oxycodone 10 q4h , monitor   * New Bilateral PE   - start Eliquis high dose for 6 days than low dose   - hematology -oncology consult  - doppler LE - negative  - o/p oncology follow up within 1-2 weeks  * REYNA , POA from prerenal azotemia   which has now resolved with IVF, will stop IV fluids  * Hypoalbuminemia  - add supplements   - restart diet as soon as cleared from surgical prospective   * Constipation   - colace, senna, Miralax  *  DVT prophylaxis  chemical prophylaxis on heparin sc  3/7 -  PICC line,  IV abx until 19   Disposition - medically optimized to be discharged home with home care close follow up with PCP, ortho, oncologist  within 1-2 weeks  complete antibiotics  return to ED if fever, abdominal pain, nausea, vomiting, chest pain, dyspnea  Discharge plan discussed with patient, RN  Patient advised to follow up with PCP within 3-7 days  time spend 40 min  Discharge note faxed to PCP with my contact information to call me back

## 2019-03-07 NOTE — ADVANCED PRACTICE NURSE CONSULT - ASSESSMENT
Rec.d order to place PICC line for long-term IV abx. Reviewed chart, labwork, explained all risks and benefits and obtained consent for PICC placement. Pt. A&Ox3 and verified pt.’s identification, name, , and correct procedure.  Inserted Navilyst PICC 4FS w/PASV technology via ultrasound guidance to ­right brachial, using MST, number of insertion attempts: 1, cut to 47 cm. length, brisk blood return, flushes well w/20 ml. NS. Site prepped with CHG, Draped in sterile fashion using strict aseptic technique maintained throughout procedure, performed hand hygiene, all team members maintained full barrier precautions, 1% lidocaine used subdermally, Minimal blood loss. Site covered with sterile dressing and dated. No complications. Endcap placed.  Pt. tolerated well.  All sharps and guidewires accounted for. CXR confirms placement, no pneumothorax. Report given to district nurse. Lot #[id number].

## 2019-03-07 NOTE — DISCHARGE NOTE ADULT - NSFTFSERV1RD_GEN_ALL_CORE
rehabilitation services/medication teaching and assessment/observation and assessment/central venous access care/teaching and training

## 2019-03-07 NOTE — DISCHARGE NOTE ADULT - MEDICATION SUMMARY - MEDICATIONS TO TAKE
I will START or STAY ON the medications listed below when I get home from the hospital:    acetaminophen 325 mg oral tablet  -- 2 tab(s) by mouth every 8 hours  -- Indication: For Discitis of thoracic region    oxyCODONE 5 mg oral tablet  -- 1 tab(s) by mouth every 6 hours, As Needed -Moderate Pain (4 - 6) MDD:20  -- Indication: For Discitis of thoracic region    apixaban 5 mg oral tablet  -- 2 tab(s) by mouth every 12 hours for 6 days and than 1 tab twice a day   -- Indication: For Pulmonary embolism    cefTRIAXone 2 g intravenous injection  -- 2 gram(s) intravenous every 24 hours  -- Indication: For Discitis of thoracic region    lidocaine 5% topical film  -- Apply on skin to affected area once a day 4% patch OTC  -- Indication: For Discitis of thoracic region    vancomycin 1 g intravenous injection  -- 1 gram(s) intravenous every 12 hours  -- Indication: For Discitis of thoracic region    docusate sodium 100 mg oral capsule  -- 1 cap(s) by mouth 3 times a day  -- Indication: For constipation OTC    polyethylene glycol 3350 oral powder for reconstitution  -- 17 gram(s) by mouth once a day, As Needed  -- Indication: For constipation OTC    senna oral tablet  -- 2 tab(s) by mouth once a day (at bedtime)  -- Indication: For constipation OTC    cyclobenzaprine 5 mg oral tablet  -- 1 tab(s) by mouth 3 times a day  -- Indication: For for muscle spasm    Glucosamine Chondroitin oral capsule  -- 1 cap(s) by mouth once a day  -- Indication: For home med    Multiple Vitamins oral tablet  -- 1 tab(s) by mouth once a day  -- Indication: For home med    Vitamin D3 2000 intl units oral capsule  -- 1 cap(s) by mouth once a day  -- Indication: For home med

## 2019-03-07 NOTE — PROGRESS NOTE ADULT - ASSESSMENT
74 y/o female with PMHx of DJD, s/p L spine fusion, R TKR, L arm Erb's palsy who was seen in  ER on 2/23/19 for L flank pain, upper back pain, chills, dysuria, nausea, and was diagnoestd with L UVJ 0.8 cm stone, UTI, was given IV AB and discharged home on PO antibiotics. Her BC from 2/23/19 grew gram negative rods in the anaerobic bottle and she was called back to the ER. Eval by urology, blood cx/urine cx growing GNRs was given IV rocephin.     1. T1-T2 diskitis. epidural abscess ? phlegmon. multiple PE. resolved proteus sepsis d/t pyelo-cystitis. nephrolithiasis  - MRI spine reviewed, appreciate spine surgery eval, back pain resolving   - course c/b PE, on anticoagulation  - on vancomycin 5ool16y, trough wnl #7  - on rocephin [id number] #12  - continue with IV abx coverage  - blood cx-urine cx 2/23 proteus S cephalosporins repeat cx 2/24 (-)  - CT chest/abd/pelvis reviewed  - urology eval appreciated   - further stone tx in future  - will require long term IV abx coverage with PICC line and 6-8 wks until 4/7/19 with weekly cbc, cmp, esrp crp, vancomycin troughs  - if any clinical worsening/neurological compromise will require surgical intervention  - tolerating abx well so far; no side effects noted  - reason for abx use and side effects reviewed with patient  - fu cbc

## 2019-03-11 DIAGNOSIS — N39.0 URINARY TRACT INFECTION, SITE NOT SPECIFIED: ICD-10-CM

## 2019-03-11 DIAGNOSIS — R78.81 BACTEREMIA: ICD-10-CM

## 2019-03-11 DIAGNOSIS — L02.212 CUTANEOUS ABSCESS OF BACK [ANY PART, EXCEPT BUTTOCK]: ICD-10-CM

## 2019-03-11 DIAGNOSIS — A41.9 SEPSIS, UNSPECIFIED ORGANISM: ICD-10-CM

## 2019-03-11 DIAGNOSIS — N17.9 ACUTE KIDNEY FAILURE, UNSPECIFIED: ICD-10-CM

## 2019-03-11 DIAGNOSIS — N20.0 CALCULUS OF KIDNEY: ICD-10-CM

## 2019-03-11 DIAGNOSIS — Z79.899 OTHER LONG TERM (CURRENT) DRUG THERAPY: ICD-10-CM

## 2019-03-11 DIAGNOSIS — M46.44 DISCITIS, UNSPECIFIED, THORACIC REGION: ICD-10-CM

## 2019-03-11 DIAGNOSIS — E88.09 OTHER DISORDERS OF PLASMA-PROTEIN METABOLISM, NOT ELSEWHERE CLASSIFIED: ICD-10-CM

## 2019-03-11 DIAGNOSIS — B96.4 PROTEUS (MIRABILIS) (MORGANII) AS THE CAUSE OF DISEASES CLASSIFIED ELSEWHERE: ICD-10-CM

## 2019-03-11 DIAGNOSIS — K59.00 CONSTIPATION, UNSPECIFIED: ICD-10-CM

## 2019-03-11 DIAGNOSIS — N12 TUBULO-INTERSTITIAL NEPHRITIS, NOT SPECIFIED AS ACUTE OR CHRONIC: ICD-10-CM

## 2019-03-11 DIAGNOSIS — I26.99 OTHER PULMONARY EMBOLISM WITHOUT ACUTE COR PULMONALE: ICD-10-CM

## 2019-03-11 DIAGNOSIS — J98.11 ATELECTASIS: ICD-10-CM

## 2019-03-11 DIAGNOSIS — J90 PLEURAL EFFUSION, NOT ELSEWHERE CLASSIFIED: ICD-10-CM

## 2019-03-28 NOTE — CHART NOTE - NSCHARTNOTEFT_GEN_A_CORE
CDI query response    Sepsis ruled in present on admission   Suspected REYNA secondary to prerenal azotemia

## 2019-04-19 NOTE — PROVIDER CONTACT NOTE (OTHER) - REASON
Hypothyroid: has been stable for some time, will check labs and continue medication Hx of osteo and Discitis: will recheck labs but per ID notes appears to have completed appropriate course of abx, pain improved Leukopenia with mild neutropenia: return to Dr Delora Lanes, rechekc labs Vit D Def: recheck labs, consider adding supplementation if necessary Hyperkalemia: lab recheck Due for dm and lipid screening this year Due for PSA screening Supportive care for venous stasis/stasis derm for now MD nix

## 2019-10-12 ENCOUNTER — EMERGENCY (EMERGENCY)
Facility: HOSPITAL | Age: 74
LOS: 0 days | Discharge: HOME | End: 2019-10-12
Attending: EMERGENCY MEDICINE
Payer: MEDICARE

## 2019-10-12 VITALS
TEMPERATURE: 98 F | SYSTOLIC BLOOD PRESSURE: 143 MMHG | DIASTOLIC BLOOD PRESSURE: 64 MMHG | HEART RATE: 65 BPM | RESPIRATION RATE: 17 BRPM | OXYGEN SATURATION: 96 %

## 2019-10-12 VITALS — WEIGHT: 164.91 LBS | HEIGHT: 65 IN

## 2019-10-12 DIAGNOSIS — Z98.1 ARTHRODESIS STATUS: Chronic | ICD-10-CM

## 2019-10-12 DIAGNOSIS — N39.0 URINARY TRACT INFECTION, SITE NOT SPECIFIED: ICD-10-CM

## 2019-10-12 DIAGNOSIS — G71.00 MUSCULAR DYSTROPHY, UNSPECIFIED: ICD-10-CM

## 2019-10-12 DIAGNOSIS — R30.0 DYSURIA: ICD-10-CM

## 2019-10-12 LAB
APPEARANCE UR: CLEAR — SIGNIFICANT CHANGE UP
BILIRUB UR-MCNC: NEGATIVE — SIGNIFICANT CHANGE UP
COLOR SPEC: YELLOW — SIGNIFICANT CHANGE UP
DIFF PNL FLD: ABNORMAL
GLUCOSE UR QL: NEGATIVE MG/DL — SIGNIFICANT CHANGE UP
KETONES UR-MCNC: ABNORMAL
LEUKOCYTE ESTERASE UR-ACNC: NEGATIVE — SIGNIFICANT CHANGE UP
NITRITE UR-MCNC: NEGATIVE — SIGNIFICANT CHANGE UP
PH UR: 5 — SIGNIFICANT CHANGE UP (ref 5–8)
PROT UR-MCNC: 15 MG/DL
SP GR SPEC: 1.02 — SIGNIFICANT CHANGE UP (ref 1.01–1.02)
UROBILINOGEN FLD QL: NEGATIVE MG/DL — SIGNIFICANT CHANGE UP

## 2019-10-12 PROCEDURE — 87086 URINE CULTURE/COLONY COUNT: CPT

## 2019-10-12 PROCEDURE — 99283 EMERGENCY DEPT VISIT LOW MDM: CPT

## 2019-10-12 PROCEDURE — 81001 URINALYSIS AUTO W/SCOPE: CPT

## 2019-10-12 RX ORDER — CEPHALEXIN 500 MG
500 CAPSULE ORAL ONCE
Refills: 0 | Status: COMPLETED | OUTPATIENT
Start: 2019-10-12 | End: 2019-10-12

## 2019-10-12 RX ORDER — CEPHALEXIN 500 MG
1 CAPSULE ORAL
Qty: 14 | Refills: 0
Start: 2019-10-12 | End: 2019-10-18

## 2019-10-12 RX ADMIN — Medication 500 MILLIGRAM(S): at 19:55

## 2019-10-12 NOTE — ED STATDOCS - OBJECTIVE STATEMENT
73 y/o female with a PMHx of ERBS muscular dystrophy of left arm, thyroid cyst, osteoarthritis,  presents to ED c/o urinary sx since this afternoon. Pt has a hx of UTI's that required hospitalization in the past. Urinary sx include frequent urination, dysuria. Pt has had similar sx in the past and was dx with a UTI. Denies n/v/d and flank pain. PCP: Dr. Byrd.

## 2019-10-12 NOTE — ED STATDOCS - ATTENDING CONTRIBUTION TO CARE
I, My Tanner MD,  performed the initial face to face bedside interview with this patient regarding history of present illness, review of symptoms and relevant past medical, social and family history.  I completed an independent physical examination.  I was the initial provider who evaluated this patient. I have signed out the follow up of any pending tests (i.e. labs, radiological studies) to the ACP.  I have communicated the patient’s plan of care and disposition with the ACP.  The history, relevant review of systems, past medical and surgical history, medical decision making, and physical examination was documented by the scribe in my presence and I attest to the accuracy of the documentation.

## 2019-10-12 NOTE — ED STATDOCS - PROGRESS NOTE DETAILS
Patient initially seen and evaluated with ED attending at intake.  Reporting UTI symptoms, hx of needing admission for UTI.  +blood in urine.  Will treat with keflex.  Reviewed return precautions -González Rios PA-C

## 2019-10-12 NOTE — ED ADULT NURSE NOTE - NSIMPLEMENTINTERV_GEN_ALL_ED
Implemented All Universal Safety Interventions:  [place] to call system. Call bell, personal items and telephone within reach. Instruct patient to call for assistance. Room bathroom lighting operational. Non-slip footwear when patient is off stretcher. Physically safe environment: no spills, clutter or unnecessary equipment. Stretcher in lowest position, wheels locked, appropriate side rails in place.

## 2019-10-12 NOTE — ED STATDOCS - PATIENT PORTAL LINK FT
You can access the FollowMyHealth Patient Portal offered by [facility] by registering at the following website: [email or web address]. By joining [employer]’s FollowMyHealth portal, you will also be able to view your health information using other applications (apps) compatible with our system.

## 2019-10-12 NOTE — ED ADULT TRIAGE NOTE - CHIEF COMPLAINT QUOTE
Patient presents complaining of urinary symptoms since this afternoon. states she has had UTIs in the past that she has been hospitalized for.

## 2019-10-13 ENCOUNTER — TRANSCRIPTION ENCOUNTER (OUTPATIENT)
Age: 74
End: 2019-10-13

## 2019-10-14 LAB
CULTURE RESULTS: SIGNIFICANT CHANGE UP
SPECIMEN SOURCE: SIGNIFICANT CHANGE UP

## 2019-10-15 NOTE — ED POST DISCHARGE NOTE - RESULT SUMMARY
Urine Cx with > 3 bacteria, likely contaminated.  Pt reports still taking keflex.  Symptoms have resolved.  Has F/U appt with PMD on Mon.  Will repeat Urine Cx then. Discussed possibilty of Kidney stone with blood and Ca crystals on UA.  SUSHILA gutiérrez PA-C

## 2019-10-30 NOTE — PROGRESS NOTE ADULT - SUBJECTIVE AND OBJECTIVE BOX
· Subjective and Objective: 	  History of Present Illness: 	  The patient is a 74 y/o female with PMHx of DJD, s/p L spine fusion, R TKR, L arm Erb's palsy who was seen in  ER on 19 for L flank pain, upper back pain, chills, dysuria, nausea, and was diagnosed with L UVJ 0.8 cm stone, UTI, was given IV AB and discharged home on PO antibiotics. Her BC from 19 grew gram negative rods in the anaerobic bottle and she was called back to the ER. The patient states her symptoms are improved today.     Family Hx.: Mother had MI at age 74,  father  of dementia at age 95.     - report thoracic spine pain  - still with intractable upper back pain, no BM for 7days, pasing gases, no nausea   still with upper back pain, pending MRI spine, patient was seen at 10am     3/1 - pt seen and examined, denies cp, palpitations, + back pain, denies numbness , weakness, tolerating clear liquid diet, + low grade fever  ROS - all other systems negative except mention above    T(C): 37.6 (19 @ 17:00), Max: 38.3 (19 @ 21:30)  T(F): 99.6 (19 @ 17:00), Max: 100.9 (19 @ 21:30)  HR: 76 (19 @ 17:00) (71 - 94)  BP: 125/61 (19 @ 17:00) (112/42 - 141/56)  RR: 19 (19 @ 17:00) (16 - 32)  SpO2: 98% (19 @ 17:00) (88% - 99%)  Wt(kg): --      PHYSICAL EXAM:    Constitutional: NAD, awake and alert, well-developed  HEENT: PERR, EOMI, Normal Hearing, MMM  Neck: Soft and supple, No LAD, No JVD  Respiratory: Breath sounds are clear bilaterally, No wheezing, rales or rhonchi  Cardiovascular: S1 and S2, regular rate and rhythm, no Murmurs, gallops or rubs  Gastrointestinal: Bowel Sounds present, soft, nontender, nondistended, no guarding, no rebound  Extremities: No peripheral edema  Vascular: 2+ peripheral pulses  Neurological: A/O x 3, no focal deficits   Musculoskeletal: 5/5 strength b/l upper and lower extremities  Skin: No rashes  rectal : deferred, not indicated        137  |  100  |  12  ----------------------------<  120<H>  3.8   |  30  |  0.65    Ca    8.0<L>      01 Mar 2019 06:58    TPro  5.7<L>  /  Alb  1.9<L>  /  TBili  0.5  /  DBili  0.2  /  AST  36  /  ALT  40  /  AlkPhos  172<H>                              11.0   7.35  )-----------( 313      ( 01 Mar 2019 06:58 )             32.2       LIVER FUNCTIONS - ( 2019 07:15 )  Alb: 1.9 g/dL / Pro: 5.7 gm/dL / ALK PHOS: 172 U/L / ALT: 40 U/L / AST: 36 U/L / GGT: x             PT/INR - ( 2019 20:27 )   PT: 14.1 sec;   INR: 1.26 ratio         PTT - ( 2019 20:27 )  PTT:33.6 sec  Culture - Blood (19 @ 00:53)    Specimen Source: .Blood None    Culture Results:   No growth to date.    Culture - Blood (19 @ 12:47)    Gram Stain:   Growth in anaerobic bottle: Gram Negative Rods  Growth in aerobic bottle: Gram Negative Rods    -  Amikacin: S <=16    -  Ampicillin: S <=8 These ampicillin results predict results for amoxicillin    -  Ampicillin/Sulbactam: S <=8/4    -  Aztreonam: S <=4    -  Cefazolin: S <=8    -  Cefepime: S <=4    -  Cefoxitin: S <=8    -  Multidrug (KPC pos) resistant organism: Nondet    -  Staphylococcus aureus: Nondet    -  Methicillin resistant Staphylococcus aureus (MRSA): Nondet    -  Coagulase negative Staphylococcus: Nondet    -  Enterococcus species: Nondet    -  Vancomycin resistant Enterococcus sp.: Nondet    -  Escherichia coli: Nondet    -  Klebsiella oxytoca: Nondet    -  Klebsiella pneumoniae: Nondet    -  Serratia marcescens: Nondet    -  Haemophilus influenzae: Nondet    -  Listeria monocytogenes: Nondet    -  Neisseria meningitidis: Nondet    -  Pseudomonas aeruginosa: Nondet    -  Acinetobacter baumanii: Nondet    -  Enterobacter cloacae complex: Nondet    -  Streptococcus sp. (Not Grp A, B or S pneumoniae): Nondet    -  Streptococcus agalactiae (Group B): Nondet    -  Streptococcus pyogenes (Group A): Nondet    -  Streptococcus pneumoniae: Nondet    -  Candida albicans: Nondet    -  Candida glabrata: Nondet    -  Candida krusei: Nondet    -  Candida parapsilosis: Nondet    -  Candida tropicalis: Nondet    -  Ceftriaxone: S <=1 Enterobacter, Citrobacter, and Serratia may develop resistance during prolonged therapy    -  Ciprofloxacin: S <=1    -  Ertapenem: S <=1    -  Gentamicin: S <=4    -  Levofloxacin: S <=2    -  Meropenem: S <=1    -  Piperacillin/Tazobactam: S <=16    -  Tobramycin: S <=4    -  Trimethoprim/Sulfamethoxazole: S <=2/38    Specimen Source: .Blood None    Organism: Blood Culture PCR    Organism: Proteus mirabilis    Culture Results:   Growth in aerobic and anaerobic bottles: Proteus mirabilis  "Due to technical problems, Proteus sp. will Not be reported as part of  the BCID panel until further notice"  ***Blood Panel PCR results on this specimen are available  approximately 3 hours after the Gram stain result.***  Gram stain, PCR, and/or culture results may not always  correspond due to difference in methodologies.  ************************************************************  This PCR assay was performed using [employer].  The following targets are tested for: Enterococcus,  vancomycin resistant enterococci, Listeria monocytogenes,  coagulase negative staphylococci, S. aureus,  methicillin resistant S. aureus, Streptococcus agalactiae  (Group B), S. pneumoniae, S. pyogenes (Group A),  Acinetobacter baumannii, Enterobacter cloacae, E. coli,  Klebsiella oxytoca, K. pneumoniae, Proteus sp.,  Serratia marcescens, Haemophilus influenzae,  Neisseria meningitidis, Pseudomonas aeruginosa, Candida  albicans, C. glabrata, C krusei, C parapsilosis,  C. tropicalis and the KPC resistance gene.    Organism Identification: Blood Culture PCR  Proteus mirabilis    Method Type: PCR    Method Type: BENITO    < from: Transthoracic Echocardiogram (19 @ 09:58) >   The left ventricle is normal in size, wall motion and contractility.   Mild concentric left ventricular hypertrophy is present.   Estimated left ventricular ejection fraction is 60-65 %.   Normal appearing right ventricle structure and function.   Normal aortic valve structure and function.   Normal appearing mitral valve structure and function.   Trace mitral regurgitation is present.    < end of copied text >    < from: MR Thoracic Spine w/wo IV Cont (19 @ 15:29) >  CERVICAL SPINE:  No evidence for osteomyelitis/discitis.    C3-C4: Mild retrolisthesis, osteophyte/disc complex and   uncovertebral/facet arthrosis resulting in mild impression upon the   ventral cervical cord with associated mild abnormal cord signal,   suggestive of myelomalacia. Findings also result in severe bilateral   neural foraminal stenosis.    Well-circumscribed cystic lesion at the left C6 pedicle/left C5-C6 neural   foramen, slightly extending into the left C6-C7 neural foramen, causing   chronic bony scalloping of the C5-C6 vertebral bodies and and widening of   the left C5-C6 neural foramen. The cyst demonstrates a couple of thin   enhancing septa. Findings are suggestive of a cystic schwannoma,  perineural (Tarlov) cyst, or synovial cyst vs primary benign bone lesion.   Findings do not result in spinal canal stenosis. Findings result in mild   left C5-C6 and C6-C7 neural foraminal stenosis.    THORACIC SPINE:  T1-T2 discitis/osteomyelitis with abnormal enhancement/signal at the disc   space, without significant endplate bony erosion or destructive bony   lesion. Associated ventral epidural 0.8 x 2.2 cm (TR by CC) abscess at   the level of T1-T2, resulting in effacement of the ventral CSF space and   slightly contacting the ventral thoracic cord without corona compression   or abnormal cord signal/enhancement. Mild paravertebral edema/phlegmon at   the levels of C7-T1.    Small bilateral pleural effusions are again noted.    LUMBAR SPINE:  Lumbar surgical fusion hardware resulting in significant susceptibility   artifact resulting in nondiagnostic imaging of the lumbar spine. No   paravertebral/paraspinal soft tissue abscess. If clinical concern persist   a lumbar spine CT with contrast can be performed for further   characterization.    Results discussed with  (orthopedic resident), by radiologist   Dr. Romero at 4:40 PM on 2019.        < end of copied text >  < from: CT Chest w/ IV Cont (19 @ 22:42) >  IMPRESSION:          1.   Chest:   mild BILATERAL pleural effusions with underlying   infiltrates. Atelectasis seen within the lingula.       2.   Abdomen and pelvis: Haziness noted in the LEFT renal hilum may   reflect mild pyelitis. No ureteral dilatation is noted . Mild stool   retention.  6 mm calculus in lower pole of LEFT kidney.      < end of copied text >  < from: US Duplex Venous Lower Ext Complete, Bilateral (19 @ 16:35) >  IMPRESSION:     No evidence of bilateral lower extremity deep venous thrombosis.      < end of copied text >      MEDICATIONS  (STANDING):  cefTRIAXone Injectable. 2000 milliGRAM(s) IV Push every 24 hours  cholecalciferol 1000 Unit(s) Oral daily  cyclobenzaprine 5 milliGRAM(s) Oral three times a day  docusate sodium 100 milliGRAM(s) Oral three times a day  lactated ringers. 1000 milliLiter(s) (75 mL/Hr) IV Continuous <Continuous>  lidocaine   Patch 1 Patch Transdermal every 24 hours  polyethylene glycol 3350 17 Gram(s) Oral daily  senna 2 Tablet(s) Oral at bedtime  vancomycin  IVPB 1000 milliGRAM(s) IV Intermittent every 12 hours    MEDICATIONS  (PRN):  acetaminophen   Tablet .. 650 milliGRAM(s) Oral every 6 hours PRN Temp greater or equal to 38C (100.4F)  HYDROmorphone  Injectable 0.5 milliGRAM(s) IV Push every 6 hours PRN Severe Pain (7 - 10)  oxyCODONE    5 mG/acetaminophen 325 mG 1 Tablet(s) Oral every 4 hours PRN Moderate Pain (4 - 6) 1

## 2020-01-02 NOTE — PROGRESS NOTE ADULT - ASSESSMENT
· Assessment		     72 y/o female with PMHx of DJD, s/p L spine fusion, R TKR,  who was seen in  ER on 2/23/19 for L flank pain, uper back pain, chills, dysuria, nausea, and was diagnosed with L UVJ 0.8 cm stone, UTI, was given IV AB and discharged home on PO antibiotics. Her BC from 2/23/19 grew gram negative rods in the anaerobic bottle and she was called back to the ER. The patient states her symptoms are improved today.     * Proteus  Bacteremia, sepsis POA due to UTI, Pyelonephritis, Nephrolithiasis  * T1-T2 diskitis  with epidural abscess/phlegmon  * Bilateral pleural effusions, atelectasis    - ICU for neuro checks q1 h   - CT  Chest and abd/pelvis- showed residual mild pyelitis(no hydronephrosis) ,no indication for  in patient stone extraction or nephrostomy as per dr pradhan  -I discussed with him as no suspicion for pyonephrosis and no hydronephrosis on repeat  CT  - MRI spine  noted   - 2 d echo  - EF wnl, no vegetations  - ortho spine consult  d/w Dr. Ward following the patient - conservative management at this time  - clear liquid diet , if no surgical interventions, can advance the diet  - continue  IV Ceftriaxone, IV vanco   for now per ID  - s/p  IVF   - follow up urology as outpatient  - d/c vit c megadose  - incentive spirometry   - repeat cultures     * REYNA , POA from prerenal azotemia   which has now resolved with IVF, will stop IV fluids    * Hypoalbuminemia  - add supplements   - restart diet as soon as cleared from surgical prospective     *  DVT prophylaxis  chemical prophylaxis on heparin sc, needs to be held if spine surgery intervention planned · Assessment		     74 y/o female with PMHx of DJD, s/p L spine fusion, R TKR,  who was seen in  ER on 2/23/19 for L flank pain, uper back pain, chills, dysuria, nausea, and was diagnosed with L UVJ 0.8 cm stone, UTI, was given IV AB and discharged home on PO antibiotics. Her BC from 2/23/19 grew gram negative rods in the anaerobic bottle and she was called back to the ER. The patient states her symptoms are improved today.     * Proteus  Bacteremia, sepsis POA due to UTI, Pyelonephritis, Nephrolithiasis  * T1-T2 diskitis  with epidural abscess/phlegmon  * Bilateral pleural effusions, atelectasis    - ICU for neuro checks q1 h --> q4h as per neurosurgery   - CT  Chest and abd/pelvis- showed residual mild pyelitis(no hydronephrosis) ,no indication for  in patient stone extraction or nephrostomy as per dr pradhan  -I discussed with him as no suspicion for pyonephrosis and no hydronephrosis on repeat  CT  - MRI spine  noted   - 2 d echo  - EF wnl, no vegetations  - ortho spine consult  d/w Dr. Ward following the patient - conservative management at this time  - clear liquid diet , if no surgical interventions, can advance the diet  - continue  IV Ceftriaxone, IV vanco   for now per ID  - s/p  IVF   - follow up urology as outpatient  - d/c vit c megadose  - incentive spirometry   - repeat cultures   - PT , ambulation  - pain management , IV --> PO opioids with laxatives    * REYNA , POA from prerenal azotemia   which has now resolved with IVF, will stop IV fluids    * Hypoalbuminemia  - add supplements   - restart diet as soon as cleared from surgical prospective     *  DVT prophylaxis  chemical prophylaxis on heparin sc, needs to be held if spine surgery intervention planned DISPLAY PLAN FREE TEXT

## 2020-02-18 NOTE — CONSULT NOTE ADULT - CONSULT REASON
NAUSEA/PAIN
DVT/PE prophylaxis, risk stratification and Anticoagulation Management
post op medical management

## 2020-03-13 ENCOUNTER — OUTPATIENT (OUTPATIENT)
Dept: OUTPATIENT SERVICES | Facility: HOSPITAL | Age: 75
LOS: 1 days | End: 2020-03-13
Payer: MEDICARE

## 2020-03-13 VITALS
HEIGHT: 64 IN | TEMPERATURE: 98 F | HEART RATE: 73 BPM | SYSTOLIC BLOOD PRESSURE: 148 MMHG | RESPIRATION RATE: 16 BRPM | WEIGHT: 175.05 LBS | DIASTOLIC BLOOD PRESSURE: 73 MMHG

## 2020-03-13 DIAGNOSIS — Z98.1 ARTHRODESIS STATUS: Chronic | ICD-10-CM

## 2020-03-13 DIAGNOSIS — Z98.890 OTHER SPECIFIED POSTPROCEDURAL STATES: Chronic | ICD-10-CM

## 2020-03-13 DIAGNOSIS — Z96.651 PRESENCE OF RIGHT ARTIFICIAL KNEE JOINT: Chronic | ICD-10-CM

## 2020-03-13 DIAGNOSIS — Z98.1 ARTHRODESIS STATUS: ICD-10-CM

## 2020-03-13 DIAGNOSIS — M54.5 LOW BACK PAIN: ICD-10-CM

## 2020-03-13 DIAGNOSIS — Z01.818 ENCOUNTER FOR OTHER PREPROCEDURAL EXAMINATION: ICD-10-CM

## 2020-03-13 DIAGNOSIS — I26.99 OTHER PULMONARY EMBOLISM WITHOUT ACUTE COR PULMONALE: ICD-10-CM

## 2020-03-13 DIAGNOSIS — M40.30 FLATBACK SYNDROME, SITE UNSPECIFIED: ICD-10-CM

## 2020-03-13 LAB
ALBUMIN SERPL ELPH-MCNC: 3.3 G/DL — SIGNIFICANT CHANGE UP (ref 3.3–5)
ALP SERPL-CCNC: 85 U/L — SIGNIFICANT CHANGE UP (ref 40–120)
ALT FLD-CCNC: 29 U/L — SIGNIFICANT CHANGE UP (ref 12–78)
ANION GAP SERPL CALC-SCNC: 4 MMOL/L — LOW (ref 5–17)
APPEARANCE UR: CLEAR — SIGNIFICANT CHANGE UP
APTT BLD: 30.5 SEC — SIGNIFICANT CHANGE UP (ref 27.5–36.3)
AST SERPL-CCNC: 27 U/L — SIGNIFICANT CHANGE UP (ref 15–37)
BASOPHILS # BLD AUTO: 0.08 K/UL — SIGNIFICANT CHANGE UP (ref 0–0.2)
BASOPHILS NFR BLD AUTO: 1.2 % — SIGNIFICANT CHANGE UP (ref 0–2)
BILIRUB SERPL-MCNC: 0.4 MG/DL — SIGNIFICANT CHANGE UP (ref 0.2–1.2)
BILIRUB UR-MCNC: NEGATIVE — SIGNIFICANT CHANGE UP
BUN SERPL-MCNC: 27 MG/DL — HIGH (ref 7–23)
CALCIUM SERPL-MCNC: 9 MG/DL — SIGNIFICANT CHANGE UP (ref 8.5–10.1)
CHLORIDE SERPL-SCNC: 111 MMOL/L — HIGH (ref 96–108)
CO2 SERPL-SCNC: 26 MMOL/L — SIGNIFICANT CHANGE UP (ref 22–31)
COLOR SPEC: YELLOW — SIGNIFICANT CHANGE UP
CREAT SERPL-MCNC: 0.84 MG/DL — SIGNIFICANT CHANGE UP (ref 0.5–1.3)
DIFF PNL FLD: ABNORMAL
EOSINOPHIL # BLD AUTO: 0.08 K/UL — SIGNIFICANT CHANGE UP (ref 0–0.5)
EOSINOPHIL NFR BLD AUTO: 1.2 % — SIGNIFICANT CHANGE UP (ref 0–6)
GLUCOSE SERPL-MCNC: 88 MG/DL — SIGNIFICANT CHANGE UP (ref 70–99)
GLUCOSE UR QL: NEGATIVE MG/DL — SIGNIFICANT CHANGE UP
HBA1C BLD-MCNC: 5.1 % — SIGNIFICANT CHANGE UP (ref 4–5.6)
HCT VFR BLD CALC: 38.6 % — SIGNIFICANT CHANGE UP (ref 34.5–45)
HGB BLD-MCNC: 13.2 G/DL — SIGNIFICANT CHANGE UP (ref 11.5–15.5)
IMM GRANULOCYTES NFR BLD AUTO: 0.3 % — SIGNIFICANT CHANGE UP (ref 0–1.5)
INR BLD: 1.03 RATIO — SIGNIFICANT CHANGE UP (ref 0.88–1.16)
KETONES UR-MCNC: ABNORMAL
LEUKOCYTE ESTERASE UR-ACNC: ABNORMAL
LYMPHOCYTES # BLD AUTO: 2.15 K/UL — SIGNIFICANT CHANGE UP (ref 1–3.3)
LYMPHOCYTES # BLD AUTO: 32.7 % — SIGNIFICANT CHANGE UP (ref 13–44)
MCHC RBC-ENTMCNC: 32.1 PG — SIGNIFICANT CHANGE UP (ref 27–34)
MCHC RBC-ENTMCNC: 34.2 GM/DL — SIGNIFICANT CHANGE UP (ref 32–36)
MCV RBC AUTO: 93.9 FL — SIGNIFICANT CHANGE UP (ref 80–100)
MONOCYTES # BLD AUTO: 0.59 K/UL — SIGNIFICANT CHANGE UP (ref 0–0.9)
MONOCYTES NFR BLD AUTO: 9 % — SIGNIFICANT CHANGE UP (ref 2–14)
MRSA PCR RESULT.: SIGNIFICANT CHANGE UP
NEUTROPHILS # BLD AUTO: 3.66 K/UL — SIGNIFICANT CHANGE UP (ref 1.8–7.4)
NEUTROPHILS NFR BLD AUTO: 55.6 % — SIGNIFICANT CHANGE UP (ref 43–77)
NITRITE UR-MCNC: NEGATIVE — SIGNIFICANT CHANGE UP
PH UR: 6 — SIGNIFICANT CHANGE UP (ref 5–8)
PLATELET # BLD AUTO: 221 K/UL — SIGNIFICANT CHANGE UP (ref 150–400)
POTASSIUM SERPL-MCNC: 3.9 MMOL/L — SIGNIFICANT CHANGE UP (ref 3.5–5.3)
POTASSIUM SERPL-SCNC: 3.9 MMOL/L — SIGNIFICANT CHANGE UP (ref 3.5–5.3)
PROT SERPL-MCNC: 6.9 GM/DL — SIGNIFICANT CHANGE UP (ref 6–8.3)
PROT UR-MCNC: 30 MG/DL
PROTHROM AB SERPL-ACNC: 11.4 SEC — SIGNIFICANT CHANGE UP (ref 10–12.9)
RBC # BLD: 4.11 M/UL — SIGNIFICANT CHANGE UP (ref 3.8–5.2)
RBC # FLD: 13 % — SIGNIFICANT CHANGE UP (ref 10.3–14.5)
S AUREUS DNA NOSE QL NAA+PROBE: SIGNIFICANT CHANGE UP
SODIUM SERPL-SCNC: 141 MMOL/L — SIGNIFICANT CHANGE UP (ref 135–145)
SP GR SPEC: 1.02 — SIGNIFICANT CHANGE UP (ref 1.01–1.02)
UROBILINOGEN FLD QL: NEGATIVE MG/DL — SIGNIFICANT CHANGE UP
WBC # BLD: 6.58 K/UL — SIGNIFICANT CHANGE UP (ref 3.8–10.5)
WBC # FLD AUTO: 6.58 K/UL — SIGNIFICANT CHANGE UP (ref 3.8–10.5)

## 2020-03-13 PROCEDURE — 87086 URINE CULTURE/COLONY COUNT: CPT

## 2020-03-13 PROCEDURE — 80053 COMPREHEN METABOLIC PANEL: CPT

## 2020-03-13 PROCEDURE — 71046 X-RAY EXAM CHEST 2 VIEWS: CPT | Mod: 26

## 2020-03-13 PROCEDURE — 87641 MR-STAPH DNA AMP PROBE: CPT

## 2020-03-13 PROCEDURE — 93005 ELECTROCARDIOGRAM TRACING: CPT

## 2020-03-13 PROCEDURE — 71046 X-RAY EXAM CHEST 2 VIEWS: CPT

## 2020-03-13 PROCEDURE — 86900 BLOOD TYPING SEROLOGIC ABO: CPT

## 2020-03-13 PROCEDURE — 81001 URINALYSIS AUTO W/SCOPE: CPT

## 2020-03-13 PROCEDURE — 86850 RBC ANTIBODY SCREEN: CPT

## 2020-03-13 PROCEDURE — G0463: CPT | Mod: 25

## 2020-03-13 PROCEDURE — 85025 COMPLETE CBC W/AUTO DIFF WBC: CPT

## 2020-03-13 PROCEDURE — 83036 HEMOGLOBIN GLYCOSYLATED A1C: CPT

## 2020-03-13 PROCEDURE — 85610 PROTHROMBIN TIME: CPT

## 2020-03-13 PROCEDURE — 36415 COLL VENOUS BLD VENIPUNCTURE: CPT

## 2020-03-13 PROCEDURE — 86901 BLOOD TYPING SEROLOGIC RH(D): CPT

## 2020-03-13 PROCEDURE — 85730 THROMBOPLASTIN TIME PARTIAL: CPT

## 2020-03-13 PROCEDURE — 87640 STAPH A DNA AMP PROBE: CPT

## 2020-03-13 PROCEDURE — 93010 ELECTROCARDIOGRAM REPORT: CPT

## 2020-03-13 NOTE — H&P PST ADULT - PAIN RATING AT REST
Patient advised to contact her insurance or our managed care department at [phone] to confirm benefits. A referral was placed with cpt for  with bilateral salpingectomy in the event a referral is required.
Pt wants to make sure that bilateral salpingectomy at time of her CS on Monday is a covered benefit and no authorization needs to occur.  Please review
1

## 2020-03-13 NOTE — H&P PST ADULT - NSICDXPROBLEM_GEN_ALL_CORE_FT
PROBLEM DIAGNOSES  Problem: Lower back pain  Assessment and Plan: Pre op and 3 day of Hibiclens instructions reviewed and given.   Patient verbalized understanding,teach back method done and form signed by patient.  Avoid NSAIDs and OTC supplements.  medical clearance requested by surgeon 3/18/20

## 2020-03-13 NOTE — H&P PST ADULT - NSICDXPASTSURGICALHX_GEN_ALL_CORE_FT
PAST SURGICAL HISTORY:  H/O ovarian cystectomy     H/O spinal fusion 1997 lumbar    H/O total knee replacement, right

## 2020-03-13 NOTE — H&P PST ADULT - ASSESSMENT
75 y/o female presents to [facility] for scheduled for l5-s1 anterior osteotomy discectomy and fusion with interbody spacers and anterior instrumentation on 3/24/20.

## 2020-03-13 NOTE — H&P PST ADULT - HISTORY OF PRESENT ILLNESS
75 y/o female presents to [facility] for scheduled for l5-s1 anterior osteotomy discectomy and fusion with interbody spacers and anterior instrumentation on 3/24/20. Patient reports pain to lower back unable to stand straight which has progressively worsen over the last few years.

## 2020-03-13 NOTE — H&P PST ADULT - NSICDXPASTMEDICALHX_GEN_ALL_CORE_FT
PAST MEDICAL HISTORY:  Erbs muscular dystrophy left arm    History of discitis     Lower back pain     Osteoarthritis     Pulmonary emboli completed 6 months of eliquis    Thyroid cyst

## 2020-03-14 DIAGNOSIS — Z98.1 ARTHRODESIS STATUS: ICD-10-CM

## 2020-03-14 DIAGNOSIS — M40.30 FLATBACK SYNDROME, SITE UNSPECIFIED: ICD-10-CM

## 2020-03-14 DIAGNOSIS — Z01.818 ENCOUNTER FOR OTHER PREPROCEDURAL EXAMINATION: ICD-10-CM

## 2020-03-14 LAB
CULTURE RESULTS: SIGNIFICANT CHANGE UP
SPECIMEN SOURCE: SIGNIFICANT CHANGE UP

## 2020-05-21 NOTE — H&P PST ADULT - TEACHING/LEARNING LEARNING PREFERENCES
Subjective








This is a pleasant 78 years old female with past medical history of diabetes 

mellitus, hypertension.  She was transferred from [facility] chest 

pain.  Patient presents with central chest pain, of daily duration of the left 

side radiating to the neck about 4/10 in severity, associated with little 

dyspnea, nausea and coughing.  No vomiting.  No change in urine or bowel habits


EKG changes showing ST elevation in inferior leads.  Patient has been evaluated 

by cardiologist and underwent emergent cardiac angiogram, and found to have 80% 

blockage of the right coronary artery, patient underwent difficult PCI with 3 

stent placed in the right coronary artery, during the procedure patient became 

transiently hypotensive and needed dopamine for short period.  Postprocedure 

patient stabilized and transferred to the ICU for further monitoring and 

treatment


Currently vitals are stable.  Labs showed leukocytosis of 11.2 K, creatinine is 

elevated 1.4, glucose high at 215-303


Currently patient is on normal saline 75 mL/h, glipizide 5 mg twice a day, 

aspirin and Brilinta, metoprolol 12.5 mg





05/17/2020


Patient remains in the ICU, his heart rate dropped to 20s overnight with low 

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 

cardiologist placed temporal pacemaker, he is awake and oriented in the ICU.  No

chest pain or dyspnea.  Nephrologist following the case closely, losartan.  And 

medial drain was admitted, currently blood pressure 127/53 with heart rate is 

50s, WBC 14.4 K, creatinine 1.3 and stable, glucose controlled less than 200 


possible patient will need permanent pacemaker and down the root





05/18/2020


Patient remains to the ICU for close monitoring of his heart rate monitoring.  

He is fully awake and oriented, no chest pain or dyspnea.  He feels generally 

weak.


Vital signs stable, heart rate is 49-52, blood pressure 117/58, temporal 

pacemaker is in place.  Labs reviewed showed creatinine 1.5, WBC 14 K, glucose 

166-230.  Hemoglobin A1c is 8.9, he wasn't glyburide 5 mg twice a day, metformin

500 mg was added, he going to increase the dose to 850 twice a day


Cardiology team R following the patient closely and he might need permanent 

pacemaker depending on his progress and stability


We will check TSH





05/19/2020


Patient lying in bed, no symptoms reported other than generalized weakness.  No 

chest pain.  No dyspnea.  No dizziness


Cardiologist team appointment for permanent pacemaker placement tomorrow


Losartan was started.  Patient is on aspirin and Brillinta, 


Hemoglobin A1c 8.9, patient is on glyburide, metformin is on hold.  Place the 

patient on Levemir 5 units at bedtime





05/20/2020


Patient lives in bed sleepy after the procedure today, his status post permanent

pacemaker today.  Vitas looks stable.  Patient could not provide information now


Heart rate 60, rest of vitals are stable.


Sugar slightly elevated above 200, he is already on Levemir 5 units will 

increase it to 7 units and we'll keep monitoring.


Labs a stable.


He remains on aspirin and Brilinta.





05/21/2020


Patient remains in the ICU. Patient is status post permanent pacemaker 

yesterday.  Today is postprocedure day #1.  Pacemaker interrogation was 

uneventful as per staff at bedside.Heart rate is 60-66, breathing rate 22, blood

pressure 129/45


Patient himself has no chest pain or dyspnea, he complains from generalized 

weakness.  TSH 4.1.  WBCs back to normal 9.3K.  Hemoglobin is 11.8.  BMP is unre

markable.  Sugar control.  Chest x-ray showing developing left lower lobe 

infiltrate


Patient currently on normal sinus 50 mL/h and clindamycin per cardiology for 

postprocedure care which is stopped nowOn discharge, the patient has been 

prescribed


We going to continue the patient on clindamycin.  Consent for sputum culture





Review of systems


CONSTITUTIONAL: No fever, no malaise, no fatigue. 


HEENT: No recent visual problems or hearing problems. Denied any sore throat. 


NEUROLOGICAL: No headaches, no weakness, no numbness. 


HEMATOLOGICAL: Denies any bleeding or petechiae. 


GENITOURINARY: Denies any burning micturition, frequency, or urgency. 


MUSCULOSKELETAL/RHEUMATOLOGICAL: Denies any joint pain, swelling, or any muscle 

pain. 


ENDOCRINE: Denies any polyuria or polydipsia.


                                        


                               Active Medications











Generic Name Dose Route Start Last Admin





  Trade Name Freq  PRN Reason Stop Dose Admin


 


Acetaminophen  650 mg  05/20/20 09:11 





  Tylenol Tab  PO  





  Q6HR PRN  





  Mild Pain  


 


Hydrocodone Bitart/Acetaminophen  1 each  05/20/20 09:11 





  [place] 5-325  PO  





  Q4HR PRN  





  Pain  


 


Aspirin  81 mg  05/16/20 09:00  05/21/20 09:31





  Aspirin  PO   81 mg





  DAILY VICENTE   Administration


 


Atorvastatin Calcium  80 mg  05/16/20 09:00  05/21/20 09:31





  Lipitor  PO   80 mg





  DAILY VICENTE   Administration


 


Calcium Carbonate/Glycine  500 mg  05/19/20 23:58  05/20/20 00:27





  Tums  PO   500 mg





  QID PRN   Administration





  Heartburn  


 


Famotidine  20 mg  05/20/20 21:00  05/21/20 09:31





  Pepcid  PO   20 mg





  BID VICENTE   Administration


 


Glipizide  5 mg  05/15/20 17:30  05/21/20 07:13





  Glucotrol  PO   5 mg





  AC-BID VICENTE   Administration


 


Sodium Chloride  1,000 mls @ 50 mls/hr  05/20/20 06:45  05/21/20 02:27





  Saline 0.9%  IV   50 mls/hr





  .Q20H VICENTE   Administration


 


Insulin Aspart  0 unit  05/15/20 21:00  05/21/20 07:13





  Novolog  SQ   2 unit





  ACHS VICENTE   Administration





  Protocol  


 


Insulin Detemir  10 unit  05/20/20 21:00  05/20/20 20:29





  Levemir  SQ   10 unit





  HS VICENTE   Administration


 


Losartan Potassium  50 mg  05/16/20 09:00  05/21/20 09:31





  Cozaar  PO   50 mg





  DAILY VICENTE   Administration


 


Midodrine  10 mg  05/17/20 07:30  05/21/20 07:10





  Proamatine  PO   Not Given





  AC-BID VICENTE  


 


Sodium Chloride  10 ml  05/20/20 21:00  05/21/20 09:31





  Saline Flush  IV   10 ml





  Q12HR VICENTE   Administration


 


Ticagrelor  90 mg  05/16/20 09:00  05/21/20 09:31





  Brilinta  PO   90 mg





  BID VICENTE   Administration














Objective





- Vital Signs


Vital signs: 


                                   Vital Signs











Temp  98 F   05/21/20 04:00


 


Pulse  60   05/21/20 08:00


 


Resp  22   05/21/20 08:00


 


BP  129/45   05/21/20 08:00


 


Pulse Ox  92 L  05/21/20 09:59








                                 Intake & Output











 05/20/20 05/21/20 05/21/20





 18:59 06:59 18:59


 


Intake Total 290 80 370


 


Output Total 555 725 250


 


Balance -265 -645 120


 


Weight  141.6 kg 


 


Intake:   


 


   80 70


 


    0.9 60 30 20


 


    Clindamycin 900 mg In 50 50 50





    Dextrose 5% in Water 50   





    ml @ 50 mls/hr IVPB Q6H   





    VICENTE Rx#:[id number]   


 


    Sodium Chloride 0.9% 1, 20  





    000 ml @ 50 mls/hr IV .   





    Q20H VICENTE Rx#:[id number]   


 


    TVP 10 mL/hour .9 10  


 


  Oral   300


 


Output:   


 


  Urine 555 725 250


 


Other:   


 


  Voiding Method Indwelling Catheter Indwelling Catheter Indwelling Catheter














- Exam





GENERAL: The patient is alert and oriented x3, not in any acute distress. Well 

developed, well nourished. 


HEENT: Pupils are round and equally reacting to light. EOMI. No scleral icterus.

No conjunctival pallor. Normocephalic, atraumatic. No pharyngeal erythema. No 

thyromegaly. 


CARDIOVASCULAR: S1 and S2 present. No murmurs, rubs, or gallops. 


PULMONARY: Chest is clear to auscultation, no wheezing or crackles. 


ABDOMEN: Soft, nontender, nondistended, normoactive bowel sounds. No palpable 

organomegaly. 


MUSCULOSKELETAL: No joint swelling or deformity. 


EXTREMITIES: No cyanosis, clubbing, or pedal edema. 


NEUROLOGICAL: Gross neurological examination did not reveal any focal deficits. 


SKIN: No rashes. no petechiae.





- Labs


CBC & Chem 7: 


                                 05/20/20 04:49





                                 05/21/20 04:39


Labs: 


                  Abnormal Lab Results - Last 24 Hours (Table)











  05/20/20 05/20/20 05/20/20 Range/Units





  11:47 16:33 20:24 


 


Chloride     ()  mmol/L


 


BUN     (9-20)  mg/dL


 


Glucose     (74-99)  mg/dL


 


POC Glucose (mg/dL)  219 H  226 H  248 H  (75-99)  mg/dL














  05/21/20 05/21/20 Range/Units





  04:39 06:51 


 


Chloride  111 H   ()  mmol/L


 


BUN  30 H   (9-20)  mg/dL


 


Glucose  159 H   (74-99)  mg/dL


 


POC Glucose (mg/dL)   171 H  (75-99)  mg/dL














Assessment and Plan


Assessment: 











Acute inferior STEMI, status post emergent cardiac angiogram with PCI of the RCA


Ischemic cardiomyopathy with ejection fraction 45-50%


Sick sinus syndrome status post permanent pacemaker placement


Mucous lung pneumonia


Acute kidney injury versus chronic kidney disease, resolved


Diabetes mellitus, with hyperglycemia


Hypertension























Plan: 





This is a pleasant 78 years old female who presents with acute STEMI.  Status 

post cardiac cath and stent placement.  Cardiology are following the case 

closely.  Continue with dual antiplatelet therapy.  


Nepnhrology consult cardiology are following The case.Continue with insulin.  

Pacemaker per cardiology team.  Continue with clindamycin.  Sputum culture se

nsitivity.  Swallow evaluation


Labs and medication were reviewed..  Continue same treatment.  Continue with 

symptomatic treatment.  Resume home medication.  Monitor lytes and vitals.  DVT 

and GI prophylaxis.  Further recommendations of the clinical course of the 

patient


DVT prophylaxis: Dual antiplatelet therapy


GI Prophylaxis: Pepcid verbal instruction/written material

## 2020-08-05 ENCOUNTER — OUTPATIENT (OUTPATIENT)
Dept: OUTPATIENT SERVICES | Facility: HOSPITAL | Age: 75
LOS: 1 days | End: 2020-08-05
Payer: MEDICARE

## 2020-08-05 VITALS
OXYGEN SATURATION: 98 % | HEIGHT: 67 IN | WEIGHT: 179.9 LBS | DIASTOLIC BLOOD PRESSURE: 58 MMHG | HEART RATE: 69 BPM | SYSTOLIC BLOOD PRESSURE: 137 MMHG | TEMPERATURE: 98 F | RESPIRATION RATE: 16 BRPM

## 2020-08-05 DIAGNOSIS — Z98.1 ARTHRODESIS STATUS: Chronic | ICD-10-CM

## 2020-08-05 DIAGNOSIS — Z98.890 OTHER SPECIFIED POSTPROCEDURAL STATES: Chronic | ICD-10-CM

## 2020-08-05 DIAGNOSIS — Z90.89 ACQUIRED ABSENCE OF OTHER ORGANS: Chronic | ICD-10-CM

## 2020-08-05 DIAGNOSIS — Z98.51 TUBAL LIGATION STATUS: Chronic | ICD-10-CM

## 2020-08-05 DIAGNOSIS — Z96.651 PRESENCE OF RIGHT ARTIFICIAL KNEE JOINT: Chronic | ICD-10-CM

## 2020-08-05 DIAGNOSIS — Z29.9 ENCOUNTER FOR PROPHYLACTIC MEASURES, UNSPECIFIED: ICD-10-CM

## 2020-08-05 DIAGNOSIS — Z98.1 ARTHRODESIS STATUS: ICD-10-CM

## 2020-08-05 DIAGNOSIS — Z01.818 ENCOUNTER FOR OTHER PREPROCEDURAL EXAMINATION: ICD-10-CM

## 2020-08-05 PROBLEM — Z87.39 PERSONAL HISTORY OF OTHER DISEASES OF THE MUSCULOSKELETAL SYSTEM AND CONNECTIVE TISSUE: Chronic | Status: ACTIVE | Noted: 2020-03-13

## 2020-08-05 PROBLEM — M54.5 LOW BACK PAIN: Chronic | Status: ACTIVE | Noted: 2020-03-13

## 2020-08-05 LAB
A1C WITH ESTIMATED AVERAGE GLUCOSE RESULT: 5.3 % — SIGNIFICANT CHANGE UP (ref 4–5.6)
ALBUMIN SERPL ELPH-MCNC: 3.9 G/DL — SIGNIFICANT CHANGE UP (ref 3.3–5)
ALP SERPL-CCNC: 90 U/L — SIGNIFICANT CHANGE UP (ref 40–120)
ALT FLD-CCNC: 32 U/L — SIGNIFICANT CHANGE UP (ref 12–78)
ANION GAP SERPL CALC-SCNC: 7 MMOL/L — SIGNIFICANT CHANGE UP (ref 5–17)
APPEARANCE UR: CLEAR — SIGNIFICANT CHANGE UP
APTT BLD: 32.1 SEC — SIGNIFICANT CHANGE UP (ref 27.5–35.5)
AST SERPL-CCNC: 28 U/L — SIGNIFICANT CHANGE UP (ref 15–37)
BASOPHILS # BLD AUTO: 0.08 K/UL — SIGNIFICANT CHANGE UP (ref 0–0.2)
BASOPHILS NFR BLD AUTO: 1.4 % — SIGNIFICANT CHANGE UP (ref 0–2)
BILIRUB SERPL-MCNC: 0.5 MG/DL — SIGNIFICANT CHANGE UP (ref 0.2–1.2)
BILIRUB UR-MCNC: NEGATIVE — SIGNIFICANT CHANGE UP
BUN SERPL-MCNC: 24 MG/DL — HIGH (ref 7–23)
CALCIUM SERPL-MCNC: 9 MG/DL — SIGNIFICANT CHANGE UP (ref 8.5–10.1)
CHLORIDE SERPL-SCNC: 111 MMOL/L — HIGH (ref 96–108)
CO2 SERPL-SCNC: 25 MMOL/L — SIGNIFICANT CHANGE UP (ref 22–31)
COLOR SPEC: YELLOW — SIGNIFICANT CHANGE UP
CREAT SERPL-MCNC: 0.95 MG/DL — SIGNIFICANT CHANGE UP (ref 0.5–1.3)
DIFF PNL FLD: ABNORMAL
EOSINOPHIL # BLD AUTO: 0.11 K/UL — SIGNIFICANT CHANGE UP (ref 0–0.5)
EOSINOPHIL NFR BLD AUTO: 2 % — SIGNIFICANT CHANGE UP (ref 0–6)
ESTIMATED AVERAGE GLUCOSE: 105 MG/DL — SIGNIFICANT CHANGE UP (ref 68–114)
GLUCOSE SERPL-MCNC: 86 MG/DL — SIGNIFICANT CHANGE UP (ref 70–99)
GLUCOSE UR QL: NEGATIVE MG/DL — SIGNIFICANT CHANGE UP
HCT VFR BLD CALC: 41.9 % — SIGNIFICANT CHANGE UP (ref 34.5–45)
HGB BLD-MCNC: 14.1 G/DL — SIGNIFICANT CHANGE UP (ref 11.5–15.5)
IMM GRANULOCYTES NFR BLD AUTO: 0.4 % — SIGNIFICANT CHANGE UP (ref 0–1.5)
INR BLD: 0.97 RATIO — SIGNIFICANT CHANGE UP (ref 0.88–1.16)
KETONES UR-MCNC: NEGATIVE — SIGNIFICANT CHANGE UP
LEUKOCYTE ESTERASE UR-ACNC: ABNORMAL
LYMPHOCYTES # BLD AUTO: 2.37 K/UL — SIGNIFICANT CHANGE UP (ref 1–3.3)
LYMPHOCYTES # BLD AUTO: 42.2 % — SIGNIFICANT CHANGE UP (ref 13–44)
MCHC RBC-ENTMCNC: 31.6 PG — SIGNIFICANT CHANGE UP (ref 27–34)
MCHC RBC-ENTMCNC: 33.7 GM/DL — SIGNIFICANT CHANGE UP (ref 32–36)
MCV RBC AUTO: 93.9 FL — SIGNIFICANT CHANGE UP (ref 80–100)
MONOCYTES # BLD AUTO: 0.59 K/UL — SIGNIFICANT CHANGE UP (ref 0–0.9)
MONOCYTES NFR BLD AUTO: 10.5 % — SIGNIFICANT CHANGE UP (ref 2–14)
MRSA PCR RESULT.: SIGNIFICANT CHANGE UP
NEUTROPHILS # BLD AUTO: 2.44 K/UL — SIGNIFICANT CHANGE UP (ref 1.8–7.4)
NEUTROPHILS NFR BLD AUTO: 43.5 % — SIGNIFICANT CHANGE UP (ref 43–77)
NITRITE UR-MCNC: POSITIVE
PH UR: 5 — SIGNIFICANT CHANGE UP (ref 5–8)
PLATELET # BLD AUTO: 224 K/UL — SIGNIFICANT CHANGE UP (ref 150–400)
POTASSIUM SERPL-MCNC: 4 MMOL/L — SIGNIFICANT CHANGE UP (ref 3.5–5.3)
POTASSIUM SERPL-SCNC: 4 MMOL/L — SIGNIFICANT CHANGE UP (ref 3.5–5.3)
PROT SERPL-MCNC: 7.4 GM/DL — SIGNIFICANT CHANGE UP (ref 6–8.3)
PROT UR-MCNC: 15 MG/DL
PROTHROM AB SERPL-ACNC: 11.3 SEC — SIGNIFICANT CHANGE UP (ref 10.6–13.6)
RBC # BLD: 4.46 M/UL — SIGNIFICANT CHANGE UP (ref 3.8–5.2)
RBC # FLD: 13.1 % — SIGNIFICANT CHANGE UP (ref 10.3–14.5)
S AUREUS DNA NOSE QL NAA+PROBE: SIGNIFICANT CHANGE UP
SODIUM SERPL-SCNC: 143 MMOL/L — SIGNIFICANT CHANGE UP (ref 135–145)
SP GR SPEC: 1.02 — SIGNIFICANT CHANGE UP (ref 1.01–1.02)
UROBILINOGEN FLD QL: NEGATIVE MG/DL — SIGNIFICANT CHANGE UP
WBC # BLD: 5.61 K/UL — SIGNIFICANT CHANGE UP (ref 3.8–10.5)
WBC # FLD AUTO: 5.61 K/UL — SIGNIFICANT CHANGE UP (ref 3.8–10.5)

## 2020-08-05 PROCEDURE — 86900 BLOOD TYPING SEROLOGIC ABO: CPT

## 2020-08-05 PROCEDURE — 87641 MR-STAPH DNA AMP PROBE: CPT

## 2020-08-05 PROCEDURE — 85730 THROMBOPLASTIN TIME PARTIAL: CPT

## 2020-08-05 PROCEDURE — 93005 ELECTROCARDIOGRAM TRACING: CPT

## 2020-08-05 PROCEDURE — 93010 ELECTROCARDIOGRAM REPORT: CPT

## 2020-08-05 PROCEDURE — 81001 URINALYSIS AUTO W/SCOPE: CPT

## 2020-08-05 PROCEDURE — 87086 URINE CULTURE/COLONY COUNT: CPT

## 2020-08-05 PROCEDURE — G0463: CPT | Mod: 25

## 2020-08-05 PROCEDURE — 85610 PROTHROMBIN TIME: CPT

## 2020-08-05 PROCEDURE — 36415 COLL VENOUS BLD VENIPUNCTURE: CPT

## 2020-08-05 PROCEDURE — 83036 HEMOGLOBIN GLYCOSYLATED A1C: CPT

## 2020-08-05 PROCEDURE — 85025 COMPLETE CBC W/AUTO DIFF WBC: CPT

## 2020-08-05 PROCEDURE — 86901 BLOOD TYPING SEROLOGIC RH(D): CPT

## 2020-08-05 PROCEDURE — 86850 RBC ANTIBODY SCREEN: CPT

## 2020-08-05 PROCEDURE — 80053 COMPREHEN METABOLIC PANEL: CPT

## 2020-08-05 PROCEDURE — 87640 STAPH A DNA AMP PROBE: CPT

## 2020-08-05 RX ORDER — GLUCOSAMINE/CHONDROITIN/C/MANG 500-400 MG
3 CAPSULE ORAL
Qty: 0 | Refills: 0 | DISCHARGE

## 2020-08-05 RX ORDER — CHOLECALCIFEROL (VITAMIN D3) 125 MCG
1 CAPSULE ORAL
Qty: 0 | Refills: 0 | DISCHARGE

## 2020-08-05 NOTE — H&P PST ADULT - ASSESSMENT
75 year old female he presents to PST for planned Stage 1 anterior osteotomy discectomy and fusion Stage II removal of posterior instrumentation L1-4    Plan:  1. PST instructions given ; NPO status instructions to be given by ASU   2. Medical Optimization  with Dr Diana Byrd  3. Pt instructed to take routine evening medications unless indicated   4. Stop NSAIDS ( Aspirin Alev Motrin Mobic Diclofenac), herbal supplements , MVI , Vitamin fish oil 7 days prior to surgery  unless   directed by surgeon or cardiologist;   5. Labs EKG CXR as per surgeon request   6. EZ wash instructions given & mupirocin instructions given  7.  Pt denies covid symptoms shortness of breath fever cough   8. Pt instructed to self quarantine   9. Covid Testing scheduled Pt notified and aware        CAPRINI SCORE [CLOT]    AGE RELATED RISK FACTORS                                                       MOBILITY RELATED FACTORS  [ ] Age 41-60 years                                            (1 Point)                  [ ] Bed rest                                                        (1 Point)  [ ] Age: 61-74 years                                           (2 Points)                 [ ] Plaster cast                                                   (2 Points)  [x ] Age= 75 years                                              (3 Points)                 [ ] Bed bound for more than 72 hours                 (2 Points)    DISEASE RELATED RISK FACTORS                                               GENDER SPECIFIC FACTORS  [ ] Edema in the lower extremities                       (1 Point)                  [ ] Pregnancy                                                     (1 Point)  [ ] Varicose veins                                               (1 Point)                  [ ] Post-partum < 6 weeks                                   (1 Point)             [x ] BMI > 25 Kg/m2                                            (1 Point)                  [ ] Hormonal therapy  or oral contraception          (1 Point)                 [ ] Sepsis (in the previous month)                        (1 Point)                  [ ] History of pregnancy complications                 (1 point)  [ ] Pneumonia or serious lung disease                                               [ ] Unexplained or recurrent                     (1 Point)           (in the previous month)                               (1 Point)  [ ] Abnormal pulmonary function test                     (1 Point)                 SURGERY RELATED RISK FACTORS  [ ] Acute myocardial infarction                              (1 Point)                 [ ]  Section                                             (1 Point)  [ ] Congestive heart failure (in the previous month)  (1 Point)               [ ] Minor surgery                                                  (1 Point)   [ ] Inflammatory bowel disease                             (1 Point)                 [ ] Arthroscopic surgery                                        (2 Points)  [ ] Central venous access                                      (2 Points)                [x ] General surgery lasting more than 45 minutes   (2 Points)       [ ] Stroke (in the previous month)                          (5 Points)               [ ] Elective arthroplasty                                         (5 Points)            ( ) past and present malignancy                             ( 2 points)                                                                                                                                    HEMATOLOGY RELATED FACTORS                                                 TRAUMA RELATED RISK FACTORS  [ ] Prior episodes of VTE                                     (3 Points)                 [ ] Fracture of the hip, pelvis, or leg                       (5 Points)  [ ] Positive family history for VTE                         (3 Points)                 [ ] Acute spinal cord injury (in the previous month)  (5 Points)  [ ] Prothrombin 18163 A                                     (3 Points)                 [ ] Paralysis  (less than 1 month)                             (5 Points)  [ ] Factor V Leiden                                             (3 Points)                  [ ] Multiple Trauma within 1 month                        (5 Points)  [ ] Lupus anticoagulants                                     (3 Points)                                                           [ ] Anticardiolipin antibodies                               (3 Points)                                                       [ ] High homocysteine in the blood                      (3 Points)                                             [ ] Other congenital or acquired thrombophilia      (3 Points)                                                [ ] Heparin induced thrombocytopenia                  (3 Points)                                          Total Score [      6    ]

## 2020-08-05 NOTE — H&P PST ADULT - HEIGHT IN FEET
Discussed patient with Dr Jamin Miller during care coordination rounds  Patient is cleared for dc to snf and confirmed with Josefina- admissions that [facility] has bed available  Spoke to [facility] and received auth # [id number] for 7 days level 1 snf  Review due 8/16 to Brock at [phone]  Received Lowery Krabbe [id number] for [facility] transport with [facility]  Scheduled [facility] for 1400 today  Completed CMN and faxed it with facesheet to [facility]  Informed RN, MD and SNF  Met with patient and called son, Catalina Hernandez  5

## 2020-08-05 NOTE — H&P PST ADULT - HISTORY OF PRESENT ILLNESS
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

## 2020-08-05 NOTE — H&P PST ADULT - NSICDXPASTMEDICALHX_GEN_ALL_CORE_FT
PAST MEDICAL HISTORY:  Cataract     Erbs muscular dystrophy left arm    History of discitis     Lower back pain     Osteoarthritis     Pulmonary emboli 2/2019 completed 6 months of eliquis; saw hematology no clotting disorder    Thyroid cyst PAST MEDICAL HISTORY:  Cataract     Erbs muscular dystrophy left arm    Flat-back syndrome     History of discitis     Lower back pain     Lumbar spondylosis     Osteoarthritis     Pulmonary emboli 2/2019 completed 6 months of eliquis; saw hematology no clotting disorder    Thyroid cyst

## 2020-08-05 NOTE — H&P PST ADULT - NSICDXPASTSURGICALHX_GEN_ALL_CORE_FT
PAST SURGICAL HISTORY:  H/O spinal fusion 1997 lumbar    H/O total knee replacement, right 10/2018    H/O tubal ligation     History of tonsillectomy childhood

## 2020-08-06 ENCOUNTER — TRANSCRIPTION ENCOUNTER (OUTPATIENT)
Age: 75
End: 2020-08-06

## 2020-08-06 DIAGNOSIS — Z01.818 ENCOUNTER FOR OTHER PREPROCEDURAL EXAMINATION: ICD-10-CM

## 2020-08-06 DIAGNOSIS — Z98.1 ARTHRODESIS STATUS: ICD-10-CM

## 2020-08-06 LAB
CULTURE RESULTS: SIGNIFICANT CHANGE UP
SPECIMEN SOURCE: SIGNIFICANT CHANGE UP

## 2020-08-08 ENCOUNTER — OUTPATIENT (OUTPATIENT)
Dept: OUTPATIENT SERVICES | Facility: HOSPITAL | Age: 75
LOS: 1 days | End: 2020-08-08

## 2020-08-08 DIAGNOSIS — Z98.1 ARTHRODESIS STATUS: Chronic | ICD-10-CM

## 2020-08-08 DIAGNOSIS — Z96.651 PRESENCE OF RIGHT ARTIFICIAL KNEE JOINT: Chronic | ICD-10-CM

## 2020-08-08 DIAGNOSIS — Z98.51 TUBAL LIGATION STATUS: Chronic | ICD-10-CM

## 2020-08-08 DIAGNOSIS — Z90.89 ACQUIRED ABSENCE OF OTHER ORGANS: Chronic | ICD-10-CM

## 2020-08-08 DIAGNOSIS — Z11.59 ENCOUNTER FOR SCREENING FOR OTHER VIRAL DISEASES: ICD-10-CM

## 2020-08-08 LAB — SARS-COV-2 RNA SPEC QL NAA+PROBE: SIGNIFICANT CHANGE UP

## 2020-08-09 DIAGNOSIS — Z11.59 ENCOUNTER FOR SCREENING FOR OTHER VIRAL DISEASES: ICD-10-CM

## 2020-08-11 ENCOUNTER — INPATIENT (INPATIENT)
Facility: HOSPITAL | Age: 75
LOS: 9 days | Discharge: NURSING, REHAB OR LONG-TERM CARE | DRG: 453 | End: 2020-08-21
Attending: ORTHOPAEDIC SURGERY | Admitting: ORTHOPAEDIC SURGERY
Payer: MEDICARE

## 2020-08-11 ENCOUNTER — RESULT REVIEW (OUTPATIENT)
Age: 75
End: 2020-08-11

## 2020-08-11 VITALS
WEIGHT: 179.9 LBS | RESPIRATION RATE: 16 BRPM | SYSTOLIC BLOOD PRESSURE: 133 MMHG | HEIGHT: 67 IN | TEMPERATURE: 98 F | OXYGEN SATURATION: 98 % | DIASTOLIC BLOOD PRESSURE: 75 MMHG | HEART RATE: 76 BPM

## 2020-08-11 DIAGNOSIS — Z98.1 ARTHRODESIS STATUS: ICD-10-CM

## 2020-08-11 DIAGNOSIS — Z98.1 ARTHRODESIS STATUS: Chronic | ICD-10-CM

## 2020-08-11 DIAGNOSIS — Z98.51 TUBAL LIGATION STATUS: Chronic | ICD-10-CM

## 2020-08-11 DIAGNOSIS — Z96.651 PRESENCE OF RIGHT ARTIFICIAL KNEE JOINT: Chronic | ICD-10-CM

## 2020-08-11 DIAGNOSIS — Z90.89 ACQUIRED ABSENCE OF OTHER ORGANS: Chronic | ICD-10-CM

## 2020-08-11 DIAGNOSIS — M40.30 FLATBACK SYNDROME, SITE UNSPECIFIED: ICD-10-CM

## 2020-08-11 LAB
ANION GAP SERPL CALC-SCNC: 7 MMOL/L — SIGNIFICANT CHANGE UP (ref 5–17)
BASOPHILS # BLD AUTO: 0.05 K/UL — SIGNIFICANT CHANGE UP (ref 0–0.2)
BASOPHILS NFR BLD AUTO: 0.5 % — SIGNIFICANT CHANGE UP (ref 0–2)
BUN SERPL-MCNC: 24 MG/DL — HIGH (ref 7–23)
CALCIUM SERPL-MCNC: 8.7 MG/DL — SIGNIFICANT CHANGE UP (ref 8.5–10.1)
CHLORIDE SERPL-SCNC: 109 MMOL/L — HIGH (ref 96–108)
CO2 SERPL-SCNC: 25 MMOL/L — SIGNIFICANT CHANGE UP (ref 22–31)
CREAT SERPL-MCNC: 0.9 MG/DL — SIGNIFICANT CHANGE UP (ref 0.5–1.3)
EOSINOPHIL # BLD AUTO: 0.01 K/UL — SIGNIFICANT CHANGE UP (ref 0–0.5)
EOSINOPHIL NFR BLD AUTO: 0.1 % — SIGNIFICANT CHANGE UP (ref 0–6)
GLUCOSE SERPL-MCNC: 163 MG/DL — HIGH (ref 70–99)
HCT VFR BLD CALC: 36.2 % — SIGNIFICANT CHANGE UP (ref 34.5–45)
HGB BLD-MCNC: 12.2 G/DL — SIGNIFICANT CHANGE UP (ref 11.5–15.5)
IMM GRANULOCYTES NFR BLD AUTO: 0.5 % — SIGNIFICANT CHANGE UP (ref 0–1.5)
LYMPHOCYTES # BLD AUTO: 1.48 K/UL — SIGNIFICANT CHANGE UP (ref 1–3.3)
LYMPHOCYTES # BLD AUTO: 13.5 % — SIGNIFICANT CHANGE UP (ref 13–44)
MCHC RBC-ENTMCNC: 31.7 PG — SIGNIFICANT CHANGE UP (ref 27–34)
MCHC RBC-ENTMCNC: 33.7 GM/DL — SIGNIFICANT CHANGE UP (ref 32–36)
MCV RBC AUTO: 94 FL — SIGNIFICANT CHANGE UP (ref 80–100)
MONOCYTES # BLD AUTO: 0.32 K/UL — SIGNIFICANT CHANGE UP (ref 0–0.9)
MONOCYTES NFR BLD AUTO: 2.9 % — SIGNIFICANT CHANGE UP (ref 2–14)
NEUTROPHILS # BLD AUTO: 9.02 K/UL — HIGH (ref 1.8–7.4)
NEUTROPHILS NFR BLD AUTO: 82.5 % — HIGH (ref 43–77)
PLATELET # BLD AUTO: 198 K/UL — SIGNIFICANT CHANGE UP (ref 150–400)
POTASSIUM SERPL-MCNC: 4 MMOL/L — SIGNIFICANT CHANGE UP (ref 3.5–5.3)
POTASSIUM SERPL-SCNC: 4 MMOL/L — SIGNIFICANT CHANGE UP (ref 3.5–5.3)
RBC # BLD: 3.85 M/UL — SIGNIFICANT CHANGE UP (ref 3.8–5.2)
RBC # FLD: 13 % — SIGNIFICANT CHANGE UP (ref 10.3–14.5)
SODIUM SERPL-SCNC: 141 MMOL/L — SIGNIFICANT CHANGE UP (ref 135–145)
WBC # BLD: 10.94 K/UL — HIGH (ref 3.8–10.5)
WBC # FLD AUTO: 10.94 K/UL — HIGH (ref 3.8–10.5)

## 2020-08-11 PROCEDURE — 86891 AUTOLOGOUS BLOOD OP SALVAGE: CPT

## 2020-08-11 PROCEDURE — 97110 THERAPEUTIC EXERCISES: CPT | Mod: GP

## 2020-08-11 PROCEDURE — 85025 COMPLETE CBC W/AUTO DIFF WBC: CPT

## 2020-08-11 PROCEDURE — 82803 BLOOD GASES ANY COMBINATION: CPT

## 2020-08-11 PROCEDURE — 88304 TISSUE EXAM BY PATHOLOGIST: CPT

## 2020-08-11 PROCEDURE — 85610 PROTHROMBIN TIME: CPT

## 2020-08-11 PROCEDURE — 80048 BASIC METABOLIC PNL TOTAL CA: CPT

## 2020-08-11 PROCEDURE — 72070 X-RAY EXAM THORAC SPINE 2VWS: CPT

## 2020-08-11 PROCEDURE — 97163 PT EVAL HIGH COMPLEX 45 MIN: CPT | Mod: GP

## 2020-08-11 PROCEDURE — C1889: CPT

## 2020-08-11 PROCEDURE — 97116 GAIT TRAINING THERAPY: CPT | Mod: GP

## 2020-08-11 PROCEDURE — 72100 X-RAY EXAM L-S SPINE 2/3 VWS: CPT

## 2020-08-11 PROCEDURE — U0003: CPT

## 2020-08-11 PROCEDURE — 94002 VENT MGMT INPAT INIT DAY: CPT

## 2020-08-11 PROCEDURE — 71045 X-RAY EXAM CHEST 1 VIEW: CPT

## 2020-08-11 PROCEDURE — 85027 COMPLETE CBC AUTOMATED: CPT

## 2020-08-11 PROCEDURE — 36415 COLL VENOUS BLD VENIPUNCTURE: CPT

## 2020-08-11 PROCEDURE — 85014 HEMATOCRIT: CPT

## 2020-08-11 PROCEDURE — 93970 EXTREMITY STUDY: CPT

## 2020-08-11 PROCEDURE — 97530 THERAPEUTIC ACTIVITIES: CPT | Mod: GP

## 2020-08-11 PROCEDURE — 85730 THROMBOPLASTIN TIME PARTIAL: CPT

## 2020-08-11 PROCEDURE — 86923 COMPATIBILITY TEST ELECTRIC: CPT

## 2020-08-11 PROCEDURE — 80053 COMPREHEN METABOLIC PANEL: CPT

## 2020-08-11 PROCEDURE — 36430 TRANSFUSION BLD/BLD COMPNT: CPT

## 2020-08-11 PROCEDURE — C9113: CPT

## 2020-08-11 PROCEDURE — C1713: CPT

## 2020-08-11 PROCEDURE — 85018 HEMOGLOBIN: CPT

## 2020-08-11 PROCEDURE — 88304 TISSUE EXAM BY PATHOLOGIST: CPT | Mod: 26

## 2020-08-11 PROCEDURE — 94003 VENT MGMT INPAT SUBQ DAY: CPT

## 2020-08-11 PROCEDURE — 86850 RBC ANTIBODY SCREEN: CPT

## 2020-08-11 PROCEDURE — 36600 WITHDRAWAL OF ARTERIAL BLOOD: CPT

## 2020-08-11 PROCEDURE — P9016: CPT

## 2020-08-11 PROCEDURE — 86900 BLOOD TYPING SEROLOGIC ABO: CPT

## 2020-08-11 PROCEDURE — 76000 FLUOROSCOPY <1 HR PHYS/QHP: CPT

## 2020-08-11 PROCEDURE — 86901 BLOOD TYPING SEROLOGIC RH(D): CPT

## 2020-08-11 PROCEDURE — C1769: CPT

## 2020-08-11 RX ORDER — ACETAMINOPHEN 500 MG
975 TABLET ORAL EVERY 8 HOURS
Refills: 0 | Status: DISCONTINUED | OUTPATIENT
Start: 2020-08-11 | End: 2020-08-13

## 2020-08-11 RX ORDER — PROCHLORPERAZINE MALEATE 5 MG
10 TABLET ORAL ONCE
Refills: 0 | Status: COMPLETED | OUTPATIENT
Start: 2020-08-11 | End: 2020-08-11

## 2020-08-11 RX ORDER — SENNA PLUS 8.6 MG/1
2 TABLET ORAL AT BEDTIME
Refills: 0 | Status: DISCONTINUED | OUTPATIENT
Start: 2020-08-11 | End: 2020-08-13

## 2020-08-11 RX ORDER — FAMOTIDINE 10 MG/ML
20 INJECTION INTRAVENOUS EVERY 12 HOURS
Refills: 0 | Status: DISCONTINUED | OUTPATIENT
Start: 2020-08-11 | End: 2020-08-13

## 2020-08-11 RX ORDER — MAGNESIUM HYDROXIDE 400 MG/1
30 TABLET, CHEWABLE ORAL EVERY 12 HOURS
Refills: 0 | Status: DISCONTINUED | OUTPATIENT
Start: 2020-08-11 | End: 2020-08-13

## 2020-08-11 RX ORDER — ONDANSETRON 8 MG/1
4 TABLET, FILM COATED ORAL EVERY 6 HOURS
Refills: 0 | Status: DISCONTINUED | OUTPATIENT
Start: 2020-08-11 | End: 2020-08-13

## 2020-08-11 RX ORDER — GABAPENTIN 400 MG/1
300 CAPSULE ORAL AT BEDTIME
Refills: 0 | Status: DISCONTINUED | OUTPATIENT
Start: 2020-08-11 | End: 2020-08-13

## 2020-08-11 RX ORDER — NALOXONE HYDROCHLORIDE 4 MG/.1ML
0.1 SPRAY NASAL
Refills: 0 | Status: DISCONTINUED | OUTPATIENT
Start: 2020-08-11 | End: 2020-08-13

## 2020-08-11 RX ORDER — GABAPENTIN 400 MG/1
300 CAPSULE ORAL ONCE
Refills: 0 | Status: COMPLETED | OUTPATIENT
Start: 2020-08-11 | End: 2020-08-11

## 2020-08-11 RX ORDER — BENZOCAINE AND MENTHOL 5; 1 G/100ML; G/100ML
1 LIQUID ORAL
Refills: 0 | Status: DISCONTINUED | OUTPATIENT
Start: 2020-08-11 | End: 2020-08-13

## 2020-08-11 RX ORDER — OXYCODONE HYDROCHLORIDE 5 MG/1
10 TABLET ORAL ONCE
Refills: 0 | Status: DISCONTINUED | OUTPATIENT
Start: 2020-08-11 | End: 2020-08-11

## 2020-08-11 RX ORDER — NALOXONE HYDROCHLORIDE 4 MG/.1ML
0.1 SPRAY NASAL
Refills: 0 | Status: DISCONTINUED | OUTPATIENT
Start: 2020-08-11 | End: 2020-08-11

## 2020-08-11 RX ORDER — ONDANSETRON 8 MG/1
4 TABLET, FILM COATED ORAL EVERY 6 HOURS
Refills: 0 | Status: DISCONTINUED | OUTPATIENT
Start: 2020-08-11 | End: 2020-08-11

## 2020-08-11 RX ORDER — CELECOXIB 200 MG/1
100 CAPSULE ORAL ONCE
Refills: 0 | Status: COMPLETED | OUTPATIENT
Start: 2020-08-11 | End: 2020-08-11

## 2020-08-11 RX ORDER — HYDROMORPHONE HYDROCHLORIDE 2 MG/ML
30 INJECTION INTRAMUSCULAR; INTRAVENOUS; SUBCUTANEOUS
Refills: 0 | Status: DISCONTINUED | OUTPATIENT
Start: 2020-08-11 | End: 2020-08-11

## 2020-08-11 RX ORDER — FENTANYL CITRATE 50 UG/ML
50 INJECTION INTRAVENOUS
Refills: 0 | Status: DISCONTINUED | OUTPATIENT
Start: 2020-08-11 | End: 2020-08-11

## 2020-08-11 RX ORDER — SODIUM CHLORIDE 9 MG/ML
3 INJECTION INTRAMUSCULAR; INTRAVENOUS; SUBCUTANEOUS EVERY 8 HOURS
Refills: 0 | Status: DISCONTINUED | OUTPATIENT
Start: 2020-08-11 | End: 2020-08-13

## 2020-08-11 RX ORDER — HYDROMORPHONE HYDROCHLORIDE 2 MG/ML
30 INJECTION INTRAMUSCULAR; INTRAVENOUS; SUBCUTANEOUS
Refills: 0 | Status: DISCONTINUED | OUTPATIENT
Start: 2020-08-11 | End: 2020-08-13

## 2020-08-11 RX ORDER — SODIUM CHLORIDE 9 MG/ML
1000 INJECTION, SOLUTION INTRAVENOUS
Refills: 0 | Status: DISCONTINUED | OUTPATIENT
Start: 2020-08-11 | End: 2020-08-12

## 2020-08-11 RX ORDER — LANOLIN ALCOHOL/MO/W.PET/CERES
3 CREAM (GRAM) TOPICAL AT BEDTIME
Refills: 0 | Status: DISCONTINUED | OUTPATIENT
Start: 2020-08-11 | End: 2020-08-13

## 2020-08-11 RX ORDER — ONDANSETRON 8 MG/1
4 TABLET, FILM COATED ORAL ONCE
Refills: 0 | Status: COMPLETED | OUTPATIENT
Start: 2020-08-11 | End: 2020-08-11

## 2020-08-11 RX ORDER — SODIUM CHLORIDE 9 MG/ML
1000 INJECTION, SOLUTION INTRAVENOUS
Refills: 0 | Status: DISCONTINUED | OUTPATIENT
Start: 2020-08-11 | End: 2020-08-11

## 2020-08-11 RX ORDER — ACETAMINOPHEN 500 MG
1000 TABLET ORAL ONCE
Refills: 0 | Status: DISCONTINUED | OUTPATIENT
Start: 2020-08-11 | End: 2020-08-11

## 2020-08-11 RX ORDER — DIPHENHYDRAMINE HCL 50 MG
12.5 CAPSULE ORAL EVERY 4 HOURS
Refills: 0 | Status: DISCONTINUED | OUTPATIENT
Start: 2020-08-11 | End: 2020-08-13

## 2020-08-11 RX ORDER — CEFAZOLIN SODIUM 1 G
2000 VIAL (EA) INJECTION EVERY 8 HOURS
Refills: 0 | Status: COMPLETED | OUTPATIENT
Start: 2020-08-11 | End: 2020-08-12

## 2020-08-11 RX ADMIN — OXYCODONE HYDROCHLORIDE 10 MILLIGRAM(S): 5 TABLET ORAL at 07:27

## 2020-08-11 RX ADMIN — CELECOXIB 100 MILLIGRAM(S): 200 CAPSULE ORAL at 07:26

## 2020-08-11 RX ADMIN — SENNA PLUS 2 TABLET(S): 8.6 TABLET ORAL at 21:39

## 2020-08-11 RX ADMIN — SODIUM CHLORIDE 3 MILLILITER(S): 9 INJECTION INTRAMUSCULAR; INTRAVENOUS; SUBCUTANEOUS at 20:55

## 2020-08-11 RX ADMIN — HYDROMORPHONE HYDROCHLORIDE 30 MILLILITER(S): 2 INJECTION INTRAMUSCULAR; INTRAVENOUS; SUBCUTANEOUS at 13:43

## 2020-08-11 RX ADMIN — Medication 975 MILLIGRAM(S): at 21:36

## 2020-08-11 RX ADMIN — ONDANSETRON 4 MILLIGRAM(S): 8 TABLET, FILM COATED ORAL at 18:33

## 2020-08-11 RX ADMIN — Medication 975 MILLIGRAM(S): at 21:59

## 2020-08-11 RX ADMIN — CELECOXIB 100 MILLIGRAM(S): 200 CAPSULE ORAL at 07:27

## 2020-08-11 RX ADMIN — Medication 1 TABLET(S): at 21:37

## 2020-08-11 RX ADMIN — Medication 100 MILLIGRAM(S): at 18:28

## 2020-08-11 RX ADMIN — GABAPENTIN 300 MILLIGRAM(S): 400 CAPSULE ORAL at 21:39

## 2020-08-11 RX ADMIN — SODIUM CHLORIDE 75 MILLILITER(S): 9 INJECTION, SOLUTION INTRAVENOUS at 20:56

## 2020-08-11 RX ADMIN — Medication 10 MILLIGRAM(S): at 19:04

## 2020-08-11 RX ADMIN — GABAPENTIN 300 MILLIGRAM(S): 400 CAPSULE ORAL at 07:27

## 2020-08-12 LAB
ANION GAP SERPL CALC-SCNC: 4 MMOL/L — LOW (ref 5–17)
BASOPHILS # BLD AUTO: 0.02 K/UL — SIGNIFICANT CHANGE UP (ref 0–0.2)
BASOPHILS NFR BLD AUTO: 0.2 % — SIGNIFICANT CHANGE UP (ref 0–2)
BUN SERPL-MCNC: 19 MG/DL — SIGNIFICANT CHANGE UP (ref 7–23)
CALCIUM SERPL-MCNC: 7.9 MG/DL — LOW (ref 8.5–10.1)
CHLORIDE SERPL-SCNC: 107 MMOL/L — SIGNIFICANT CHANGE UP (ref 96–108)
CO2 SERPL-SCNC: 29 MMOL/L — SIGNIFICANT CHANGE UP (ref 22–31)
CREAT SERPL-MCNC: 0.82 MG/DL — SIGNIFICANT CHANGE UP (ref 0.5–1.3)
EOSINOPHIL # BLD AUTO: 0.01 K/UL — SIGNIFICANT CHANGE UP (ref 0–0.5)
EOSINOPHIL NFR BLD AUTO: 0.1 % — SIGNIFICANT CHANGE UP (ref 0–6)
GLUCOSE SERPL-MCNC: 124 MG/DL — HIGH (ref 70–99)
HCT VFR BLD CALC: 34.2 % — LOW (ref 34.5–45)
HGB BLD-MCNC: 11.5 G/DL — SIGNIFICANT CHANGE UP (ref 11.5–15.5)
IMM GRANULOCYTES NFR BLD AUTO: 0.4 % — SIGNIFICANT CHANGE UP (ref 0–1.5)
LYMPHOCYTES # BLD AUTO: 1.54 K/UL — SIGNIFICANT CHANGE UP (ref 1–3.3)
LYMPHOCYTES # BLD AUTO: 18 % — SIGNIFICANT CHANGE UP (ref 13–44)
MCHC RBC-ENTMCNC: 31.9 PG — SIGNIFICANT CHANGE UP (ref 27–34)
MCHC RBC-ENTMCNC: 33.6 GM/DL — SIGNIFICANT CHANGE UP (ref 32–36)
MCV RBC AUTO: 95 FL — SIGNIFICANT CHANGE UP (ref 80–100)
MONOCYTES # BLD AUTO: 0.83 K/UL — SIGNIFICANT CHANGE UP (ref 0–0.9)
MONOCYTES NFR BLD AUTO: 9.7 % — SIGNIFICANT CHANGE UP (ref 2–14)
NEUTROPHILS # BLD AUTO: 6.13 K/UL — SIGNIFICANT CHANGE UP (ref 1.8–7.4)
NEUTROPHILS NFR BLD AUTO: 71.6 % — SIGNIFICANT CHANGE UP (ref 43–77)
PLATELET # BLD AUTO: 173 K/UL — SIGNIFICANT CHANGE UP (ref 150–400)
POTASSIUM SERPL-MCNC: 4.2 MMOL/L — SIGNIFICANT CHANGE UP (ref 3.5–5.3)
POTASSIUM SERPL-SCNC: 4.2 MMOL/L — SIGNIFICANT CHANGE UP (ref 3.5–5.3)
RBC # BLD: 3.6 M/UL — LOW (ref 3.8–5.2)
RBC # FLD: 13.1 % — SIGNIFICANT CHANGE UP (ref 10.3–14.5)
SODIUM SERPL-SCNC: 140 MMOL/L — SIGNIFICANT CHANGE UP (ref 135–145)
WBC # BLD: 8.56 K/UL — SIGNIFICANT CHANGE UP (ref 3.8–10.5)
WBC # FLD AUTO: 8.56 K/UL — SIGNIFICANT CHANGE UP (ref 3.8–10.5)

## 2020-08-12 RX ORDER — SODIUM CHLORIDE 9 MG/ML
500 INJECTION INTRAMUSCULAR; INTRAVENOUS; SUBCUTANEOUS ONCE
Refills: 0 | Status: COMPLETED | OUTPATIENT
Start: 2020-08-12 | End: 2020-08-12

## 2020-08-12 RX ORDER — SODIUM CHLORIDE 9 MG/ML
1000 INJECTION, SOLUTION INTRAVENOUS
Refills: 0 | Status: DISCONTINUED | OUTPATIENT
Start: 2020-08-12 | End: 2020-08-13

## 2020-08-12 RX ADMIN — SODIUM CHLORIDE 3 MILLILITER(S): 9 INJECTION INTRAMUSCULAR; INTRAVENOUS; SUBCUTANEOUS at 05:13

## 2020-08-12 RX ADMIN — BENZOCAINE AND MENTHOL 1 LOZENGE: 5; 1 LIQUID ORAL at 05:33

## 2020-08-12 RX ADMIN — Medication 975 MILLIGRAM(S): at 15:40

## 2020-08-12 RX ADMIN — SODIUM CHLORIDE 100 MILLILITER(S): 9 INJECTION, SOLUTION INTRAVENOUS at 22:14

## 2020-08-12 RX ADMIN — Medication 975 MILLIGRAM(S): at 22:14

## 2020-08-12 RX ADMIN — Medication 975 MILLIGRAM(S): at 06:23

## 2020-08-12 RX ADMIN — SENNA PLUS 2 TABLET(S): 8.6 TABLET ORAL at 22:14

## 2020-08-12 RX ADMIN — SODIUM CHLORIDE 1000 MILLILITER(S): 9 INJECTION INTRAMUSCULAR; INTRAVENOUS; SUBCUTANEOUS at 08:12

## 2020-08-12 RX ADMIN — Medication 1 TABLET(S): at 07:15

## 2020-08-12 RX ADMIN — SODIUM CHLORIDE 3 MILLILITER(S): 9 INJECTION INTRAMUSCULAR; INTRAVENOUS; SUBCUTANEOUS at 22:39

## 2020-08-12 RX ADMIN — SODIUM CHLORIDE 3 MILLILITER(S): 9 INJECTION INTRAMUSCULAR; INTRAVENOUS; SUBCUTANEOUS at 13:48

## 2020-08-12 RX ADMIN — Medication 1 TABLET(S): at 14:49

## 2020-08-12 RX ADMIN — Medication 975 MILLIGRAM(S): at 14:49

## 2020-08-12 RX ADMIN — SODIUM CHLORIDE 100 MILLILITER(S): 9 INJECTION, SOLUTION INTRAVENOUS at 15:01

## 2020-08-12 RX ADMIN — Medication 100 MILLIGRAM(S): at 05:28

## 2020-08-12 RX ADMIN — Medication 975 MILLIGRAM(S): at 22:45

## 2020-08-12 RX ADMIN — Medication 1 TABLET(S): at 22:14

## 2020-08-12 RX ADMIN — GABAPENTIN 300 MILLIGRAM(S): 400 CAPSULE ORAL at 22:14

## 2020-08-12 RX ADMIN — Medication 975 MILLIGRAM(S): at 05:28

## 2020-08-12 NOTE — PHYSICAL THERAPY INITIAL EVALUATION ADULT - LEVEL OF INDEPENDENCE: SUPINE/SIT, REHAB EVAL
moderate assist (50% patients effort)/BP 66/35 upon sitting; returned to bed supine: 90/30; WILD Caba made aware

## 2020-08-12 NOTE — PROGRESS NOTE ADULT - SUBJECTIVE AND OBJECTIVE BOX
Subjective: POD#1    Objective: L thoracotomy    Heent: N/C, AT PER no scleral icterus, No JVD  Pul: clear  Cor: RRR  Abdomen: soft, normal BS.   Extremities: without edema    08-11    141  |  109<H>  |  24<H>  ----------------------------<  163<H>  4.0   |  25  |  0.90    Ca    8.7      11 Aug 2020 13:33                              12.2   10.94 )-----------( 198      ( 11 Aug 2020 13:33 )             36.2

## 2020-08-12 NOTE — PHYSICAL THERAPY INITIAL EVALUATION ADULT - MODALITIES TREATMENT COMMENTS
pt left in bed supine post Eval; bed alarm on; all above lines/monitors in place; callbell in reach; kelby poorly; pain level 4/10

## 2020-08-12 NOTE — PROGRESS NOTE ADULT - SUBJECTIVE AND OBJECTIVE BOX
Patient seen and examined at bedside. No acute complaints at this time. Pain well controlled. Denies weakness, numbness or tingling. Denies chest pain, shortness of breath, nausea or vomiting.     PE:  Vital Signs Last 24 Hrs  T(C): 36.6 (08-12-20 @ 05:00), Max: 97.6 (08-11-20 @ 18:30)  T(F): 97.9 (08-12-20 @ 05:00), Max: 98.1 (08-11-20 @ 19:30)  HR: 84 (08-12-20 @ 06:00) (60 - 92)  BP: 92/33 (08-12-20 @ 06:00) (92/33 - 118/62)  BP(mean): 47 (08-12-20 @ 06:00) (47 - 69)  RR: 20 (08-12-20 @ 06:00) (10 - 24)  SpO2: 99% (08-12-20 @ 06:00) (99% - 100%)    General: NAD, resting comforatbly in bed  Dressing C/D/I  Drains present  2+ radial pulses  2+ DP Pulses    Motor:                   C5                C6              C7               C8           T1   R             5/5                5/5            5/5              5/5          5/5  L             5/5                5/5            5/5              5/5          5/5                    L2                  L3             L4              L5            S1  R            5/5                5/5             5/5            5/5          5/5  L             5/5                5/5            5/5            5/5          5/5    Sensory:            C5         C6         C7      C8       T1        (0=absent, 1=impaired, 2=normal, NT=not testable)  R         2            2           2        2         2  L          2            2           2        2         2               L2          L3         L4      L5       S1         (0=absent, 1=impaired, 2=normal, NT=not testable)  R         2            2            2        2        2  L          2            2           2        2         2                              11.5   8.56  )-----------( 173      ( 12 Aug 2020 07:04 )             34.2     11 Aug 2020 13:33    141    |  109    |  24     ----------------------------<  163    4.0     |  25     |  0.90     Ca    8.7        11 Aug 2020 13:33          A/P:  75y f s/p Stg 1 of 2 st procedure  -PT/OT -WBAT  -Pain Control  -DVT ppx on hold  -Continue perioperative abx x 24 hours  -FU AM Labs  -Rest, ice, compress and elevate the extremity as we needed  -Incentive Spirometry  -Medical management appreciated  -Plan for OR 8/13 for second stage  -Please document medical clearance

## 2020-08-12 NOTE — CONSULT NOTE ADULT - ASSESSMENT
76 Y/O FEMALE WITH THE ABOVE MED HX S/P ANTERIOR LUMBAR DISCECTOMY AND FUSION 76 Y/O FEMALE WITH THE ABOVE MED HX S/P ANTERIOR LUMBAR DISCECTOMY AND FUSION    *POSTPROCEDURAL STATE - POD# 1  PAIN CONTROL - ON PCA  SLIGHTLY LETHARGIC AND LOW BP, MONITOR PAIN MEDS  PHYSICAL THERAPY  MONITOR DRAIN OUTPUT    *ANEMIA - SECONDARY TO ACUTE BLOOD LOSS  H/H STABLE    *POSTPROCEDURAL HYPOTENSION - WILL GIVE BOLUS NOW  LIKELY FROM ANESTHESIA, PAIN MEDS  MONITOR  KEEP MAP AT 60 OR ABOVE    *DVT PROPHY - VENODYNES ONLY IN THE SETTING OF RECENT SPINE SURGERY AND DRAIN IN PLACE 76 Y/O FEMALE WITH THE ABOVE MED HX S/P ANTERIOR LUMBAR DISCECTOMY AND FUSION    *POSTPROCEDURAL STATE - POD# 1, FOR SECOND STAGE TOMORROW  PAIN CONTROL - ON PCA  SLIGHTLY LETHARGIC AND LOW BP, MONITOR PAIN MEDS  PHYSICAL THERAPY  MONITOR DRAIN OUTPUT  H/H, ELECTROLYTES STABLE    NO MEDICAL CONTRAINDICATIONS TO SECOND STAGE TOMORROW      *ANEMIA - SECONDARY TO ACUTE BLOOD LOSS  H/H STABLE    *POSTPROCEDURAL HYPOTENSION - WILL GIVE BOLUS NOW  LIKELY FROM ANESTHESIA, PAIN MEDS  MONITOR  KEEP MAP AT 60 OR ABOVE    *DVT PROPHY - VENODYNES ONLY IN THE SETTING OF RECENT SPINE SURGERY AND DRAIN IN PLACE

## 2020-08-12 NOTE — CONSULT NOTE ADULT - SUBJECTIVE AND OBJECTIVE BOX
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 975 milliGRAM(s) Oral every 8 hours  calcium carbonate 1250 mG  + Vitamin D (OsCal 500 + D) 1 Tablet(s) Oral three times a day  gabapentin 300 milliGRAM(s) Oral at bedtime  HYDROmorphone PCA (1 mG/mL) 30 milliLiter(s) PCA Continuous PCA Continuous  lactated ringers. 1000 milliLiter(s) (75 mL/Hr) IV Continuous <Continuous>  senna 2 Tablet(s) Oral at bedtime  sodium chloride 0.9% lock flush 3 milliLiter(s) IV Push every 8 hours    MEDICATIONS  (PRN):  benzocaine 15 mG/menthol 3.6 mG (Sugar-Free) Lozenge 1 Lozenge Oral every 3 hours PRN Sore Throat  diphenhydrAMINE   Injectable 12.5 milliGRAM(s) IV Push every 4 hours PRN Itching  famotidine    Tablet 20 milliGRAM(s) Oral every 12 hours PRN Dyspepsia  magnesium hydroxide Suspension 30 milliLiter(s) Oral every 12 hours PRN Constipation  melatonin 3 milliGRAM(s) Oral at bedtime PRN Sleep  naloxone Injectable 0.1 milliGRAM(s) IV Push every 3 minutes PRN For ANY of the following changes in patient status:  A. RR LESS THAN 10 breaths per minute, B. Oxygen saturation LESS THAN 90%, C. Sedation score of 6  ondansetron Injectable 4 milliGRAM(s) IV Push every 6 hours PRN Nausea      Allergies    No Known Allergies    Intolerances          PHYSICAL EXAM:    Vital Signs Last 24 Hrs  T(C): 36.6 (12 Aug 2020 05:00), Max: 97.6 (11 Aug 2020 18:30)  T(F): 97.9 (12 Aug 2020 05:00), Max: 98.1 (11 Aug 2020 19:30)  HR: 84 (12 Aug 2020 06:00) (60 - 92)  BP: 92/33 (12 Aug 2020 06:00) (92/33 - 118/62)  BP(mean): 47 (12 Aug 2020 06:00) (47 - 69)  RR: 20 (12 Aug 2020 06:00) (10 - 24)  SpO2: 99% (12 Aug 2020 06:00) (99% - 100%)    GEN: A and O, NAD, Well dressed, mood stable  HEENT:   pupils equal and reactive, EOMI, no oropharyngeal lesions, erythema, exudates, oral thrush    NECK:   supple, no carotid bruits, no palpable lymph nodes, no thyromegaly    CV:  +S1, +S2, regular, no murmurs or rubs    RESP:   lungs clear to auscultation bilaterally, no wheezing, rales, rhonchi, good air entry bilaterally    GI:  abdomen soft, non-tender, non-distended, normal BS, no bruits, no abdominal masses, no palpable masses    RECTAL:  not examined    :  not examined    MSK:   normal muscle tone, no atrophy, no rigidity, no contractions    EXT:   no clubbing, no cyanosis, no edema, no calf pain, swelling or erythema    VASCULAR:  pulses equal and symmetric in the upper and lower extremities    NEURO:  AAOX3, no focal neurological deficits, follows all commands, able to move extremities spontaneously    SKIN:  no ulcers, lesions or rashes    LABS/IMAGIN.5   8.56  )-----------( 173      ( 12 Aug 2020 07:04 )             34.2     08-11    141  |  109<H>  |  24<H>  ----------------------------<  163<H>  4.0   |  25  |  0.90    Ca    8.7      11 Aug 2020 13:33                                              EKG:     RADIOLOGY STUDIES:      DVT PROPHYLAXIS: HPI:  76 y/o female with      PAST MEDICAL & SURGICAL HISTORY:  Lumbar spondylosis  Flat-back syndrome  Cataract  History of discitis  Pulmonary emboli: 2019 completed 6 months of eliquis; saw hematology no clotting disorder  Lower back pain  Erbs muscular dystrophy: left arm  Thyroid cyst  Osteoarthritis  History of tonsillectomy: childhood  H/O tubal ligation  H/O total knee replacement, 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 975 milliGRAM(s) Oral every 8 hours  calcium carbonate 1250 mG  + Vitamin D (OsCal 500 + D) 1 Tablet(s) Oral three times a day  gabapentin 300 milliGRAM(s) Oral at bedtime  HYDROmorphone PCA (1 mG/mL) 30 milliLiter(s) PCA Continuous PCA Continuous  lactated ringers. 1000 milliLiter(s) (75 mL/Hr) IV Continuous <Continuous>  senna 2 Tablet(s) Oral at bedtime  sodium chloride 0.9% lock flush 3 milliLiter(s) IV Push every 8 hours    MEDICATIONS  (PRN):  benzocaine 15 mG/menthol 3.6 mG (Sugar-Free) Lozenge 1 Lozenge Oral every 3 hours PRN Sore Throat  diphenhydrAMINE   Injectable 12.5 milliGRAM(s) IV Push every 4 hours PRN Itching  famotidine    Tablet 20 milliGRAM(s) Oral every 12 hours PRN Dyspepsia  magnesium hydroxide Suspension 30 milliLiter(s) Oral every 12 hours PRN Constipation  melatonin 3 milliGRAM(s) Oral at bedtime PRN Sleep  naloxone Injectable 0.1 milliGRAM(s) IV Push every 3 minutes PRN For ANY of the following changes in patient status:  A. RR LESS THAN 10 breaths per minute, B. Oxygen saturation LESS THAN 90%, C. Sedation score of 6  ondansetron Injectable 4 milliGRAM(s) IV Push every 6 hours PRN Nausea      Allergies    No Known Allergies    Intolerances          PHYSICAL EXAM:    Vital Signs Last 24 Hrs  T(C): 36.6 (12 Aug 2020 05:00), Max: 97.6 (11 Aug 2020 18:30)  T(F): 97.9 (12 Aug 2020 05:00), Max: 98.1 (11 Aug 2020 19:30)  HR: 84 (12 Aug 2020 06:00) (60 - 92)  BP: 92/33 (12 Aug 2020 06:00) (92/33 - 118/62)  BP(mean): 47 (12 Aug 2020 06:00) (47 - 69)  RR: 20 (12 Aug 2020 06:00) (10 - 24)  SpO2: 99% (12 Aug 2020 06:00) (99% - 100%)    GEN: A and O, NAD, Well dressed, mood stable  HEENT:   pupils equal and reactive, EOMI, no oropharyngeal lesions, erythema, exudates, oral thrush    NECK:   supple, no carotid bruits, no palpable lymph nodes, no thyromegaly    CV:  +S1, +S2, regular, no murmurs or rubs    RESP:   lungs clear to auscultation bilaterally, no wheezing, rales, rhonchi, good air entry bilaterally    GI:  abdomen soft, non-tender, non-distended, normal BS, no bruits, no abdominal masses, no palpable masses    RECTAL:  not examined    :  not examined    MSK:   normal muscle tone, no atrophy, no rigidity, no contractions    EXT:   no clubbing, no cyanosis, no edema, no calf pain, swelling or erythema    VASCULAR:  pulses equal and symmetric in the upper and lower extremities    NEURO:  AAOX3, no focal neurological deficits, follows all commands, able to move extremities spontaneously    SKIN:  no ulcers, lesions or rashes    LABS/IMAGIN.5   8.56  )-----------( 173      ( 12 Aug 2020 07:04 )             34.2     08-11    141  |  109<H>  |  24<H>  ----------------------------<  163<H>  4.0   |  25  |  0.90    Ca    8.7      11 Aug 2020 13:33                                              EKG:     NSR@69BPM HPI:  76 y/o female with oa, pe in the past (hypercoag workup neg), previous spinal fusion s/p anterior lumbar discectomy and fusion  Medicine consult requested for post op medical management  20: no cp, sob, n/v/f/c; feels tired, back pain controlled        PAST MEDICAL & SURGICAL HISTORY:  Lumbar spondylosis  Flat-back syndrome  Cataract  History of discitis  Pulmonary emboli: 2019 completed 6 months of eliquis; saw hematology no clotting disorder  Lower back pain  Erbs muscular dystrophy: left arm  Thyroid cyst  Osteoarthritis  History of tonsillectomy: childhood  H/O tubal ligation  H/O total knee replacement, 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 975 milliGRAM(s) Oral every 8 hours  calcium carbonate 1250 mG  + Vitamin D (OsCal 500 + D) 1 Tablet(s) Oral three times a day  gabapentin 300 milliGRAM(s) Oral at bedtime  HYDROmorphone PCA (1 mG/mL) 30 milliLiter(s) PCA Continuous PCA Continuous  lactated ringers. 1000 milliLiter(s) (75 mL/Hr) IV Continuous <Continuous>  senna 2 Tablet(s) Oral at bedtime  sodium chloride 0.9% lock flush 3 milliLiter(s) IV Push every 8 hours    MEDICATIONS  (PRN):  benzocaine 15 mG/menthol 3.6 mG (Sugar-Free) Lozenge 1 Lozenge Oral every 3 hours PRN Sore Throat  diphenhydrAMINE   Injectable 12.5 milliGRAM(s) IV Push every 4 hours PRN Itching  famotidine    Tablet 20 milliGRAM(s) Oral every 12 hours PRN Dyspepsia  magnesium hydroxide Suspension 30 milliLiter(s) Oral every 12 hours PRN Constipation  melatonin 3 milliGRAM(s) Oral at bedtime PRN Sleep  naloxone Injectable 0.1 milliGRAM(s) IV Push every 3 minutes PRN For ANY of the following changes in patient status:  A. RR LESS THAN 10 breaths per minute, B. Oxygen saturation LESS THAN 90%, C. Sedation score of 6  ondansetron Injectable 4 milliGRAM(s) IV Push every 6 hours PRN Nausea      Allergies    No Known Allergies    Intolerances          PHYSICAL EXAM:    Vital Signs Last 24 Hrs  T(C): 36.6 (12 Aug 2020 05:00), Max: 97.6 (11 Aug 2020 18:30)  T(F): 97.9 (12 Aug 2020 05:00), Max: 98.1 (11 Aug 2020 19:30)  HR: 84 (12 Aug 2020 06:00) (60 - 92)  BP: 92/33 (12 Aug 2020 06:00) (92/33 - 118/62)  BP(mean): 47 (12 Aug 2020 06:00) (47 - 69)  RR: 20 (12 Aug 2020 06:00) (10 - 24)  SpO2: 99% (12 Aug 2020 06:00) (99% - 100%)    GEN: A and O, NAD,  mood stable  HEENT:   NC/AT EOMI, no oropharyngeal lesions, erythema, exudates, oral thrush    NECK:   supple    CV:  +S1, +S2, regular, no murmurs or rubs    RESP:   lungs clear to auscultation bilaterally, no wheezing, rales, rhonchi, good air entry bilaterally    GI:  abdomen soft, non-tender, non-distended, normal B no abdominal masses, no palpable masses  LEFT FLANK DRESSING C/D/I WITH DRAIN  BACK  DRAIN IN PLACE    RECTAL:  not examined    :  POS FLORES    MSK:   normal muscle tone, no atrophy, no rigidity, no contractions    EXT:   no clubbing, no cyanosis, no edema, no calf pain, swelling or erythema    VASCULAR:  pulses equal and symmetric in the upper and lower extremities    NEURO:  AAOX3, no focal neurological deficits, follows all commands, able to move extremities spontaneously    SKIN:  no ulcers, lesions or rashes    LABS/IMAGIN.5   8.56  )-----------( 173      ( 12 Aug 2020 07:04 )             34.2     08-11    141  |  109<H>  |  24<H>  ----------------------------<  163<H>  4.0   |  25  |  0.90    Ca    8.7      11 Aug 2020 13:33                                              EKG:     NSR@69BPM

## 2020-08-13 LAB
ANION GAP SERPL CALC-SCNC: 4 MMOL/L — LOW (ref 5–17)
ANION GAP SERPL CALC-SCNC: 6 MMOL/L — SIGNIFICANT CHANGE UP (ref 5–17)
ANION GAP SERPL CALC-SCNC: 7 MMOL/L — SIGNIFICANT CHANGE UP (ref 5–17)
APTT BLD: 26.1 SEC — LOW (ref 27.5–35.5)
BASE EXCESS BLDA CALC-SCNC: -6.6 MMOL/L — LOW (ref -2–2)
BASOPHILS # BLD AUTO: 0.04 K/UL — SIGNIFICANT CHANGE UP (ref 0–0.2)
BASOPHILS # BLD AUTO: 0.04 K/UL — SIGNIFICANT CHANGE UP (ref 0–0.2)
BASOPHILS NFR BLD AUTO: 0.4 % — SIGNIFICANT CHANGE UP (ref 0–2)
BASOPHILS NFR BLD AUTO: 0.5 % — SIGNIFICANT CHANGE UP (ref 0–2)
BLOOD GAS COMMENTS ARTERIAL: SIGNIFICANT CHANGE UP
BUN SERPL-MCNC: 13 MG/DL — SIGNIFICANT CHANGE UP (ref 7–23)
BUN SERPL-MCNC: 15 MG/DL — SIGNIFICANT CHANGE UP (ref 7–23)
BUN SERPL-MCNC: 16 MG/DL — SIGNIFICANT CHANGE UP (ref 7–23)
CALCIUM SERPL-MCNC: 6.5 MG/DL — CRITICAL LOW (ref 8.5–10.1)
CALCIUM SERPL-MCNC: 6.8 MG/DL — LOW (ref 8.5–10.1)
CALCIUM SERPL-MCNC: 8.4 MG/DL — LOW (ref 8.5–10.1)
CHLORIDE SERPL-SCNC: 107 MMOL/L — SIGNIFICANT CHANGE UP (ref 96–108)
CHLORIDE SERPL-SCNC: 111 MMOL/L — HIGH (ref 96–108)
CHLORIDE SERPL-SCNC: 113 MMOL/L — HIGH (ref 96–108)
CO2 SERPL-SCNC: 22 MMOL/L — SIGNIFICANT CHANGE UP (ref 22–31)
CO2 SERPL-SCNC: 24 MMOL/L — SIGNIFICANT CHANGE UP (ref 22–31)
CO2 SERPL-SCNC: 29 MMOL/L — SIGNIFICANT CHANGE UP (ref 22–31)
CREAT SERPL-MCNC: 0.53 MG/DL — SIGNIFICANT CHANGE UP (ref 0.5–1.3)
CREAT SERPL-MCNC: 0.56 MG/DL — SIGNIFICANT CHANGE UP (ref 0.5–1.3)
CREAT SERPL-MCNC: 0.7 MG/DL — SIGNIFICANT CHANGE UP (ref 0.5–1.3)
EOSINOPHIL # BLD AUTO: 0 K/UL — SIGNIFICANT CHANGE UP (ref 0–0.5)
EOSINOPHIL # BLD AUTO: 0.02 K/UL — SIGNIFICANT CHANGE UP (ref 0–0.5)
EOSINOPHIL NFR BLD AUTO: 0 % — SIGNIFICANT CHANGE UP (ref 0–6)
EOSINOPHIL NFR BLD AUTO: 0.2 % — SIGNIFICANT CHANGE UP (ref 0–6)
GAS PNL BLDA: SIGNIFICANT CHANGE UP
GLUCOSE SERPL-MCNC: 104 MG/DL — HIGH (ref 70–99)
GLUCOSE SERPL-MCNC: 146 MG/DL — HIGH (ref 70–99)
GLUCOSE SERPL-MCNC: 161 MG/DL — HIGH (ref 70–99)
HCO3 BLDA-SCNC: 17 MMOL/L — LOW (ref 21–29)
HCT VFR BLD CALC: 31.7 % — LOW (ref 34.5–45)
HCT VFR BLD CALC: 33.2 % — LOW (ref 34.5–45)
HCT VFR BLD CALC: 33.5 % — LOW (ref 34.5–45)
HGB BLD-MCNC: 10.8 G/DL — LOW (ref 11.5–15.5)
HGB BLD-MCNC: 10.8 G/DL — LOW (ref 11.5–15.5)
HGB BLD-MCNC: 11.4 G/DL — LOW (ref 11.5–15.5)
IMM GRANULOCYTES NFR BLD AUTO: 0.2 % — SIGNIFICANT CHANGE UP (ref 0–1.5)
IMM GRANULOCYTES NFR BLD AUTO: 0.8 % — SIGNIFICANT CHANGE UP (ref 0–1.5)
INR BLD: 1.11 RATIO — SIGNIFICANT CHANGE UP (ref 0.88–1.16)
LYMPHOCYTES # BLD AUTO: 1.42 K/UL — SIGNIFICANT CHANGE UP (ref 1–3.3)
LYMPHOCYTES # BLD AUTO: 1.72 K/UL — SIGNIFICANT CHANGE UP (ref 1–3.3)
LYMPHOCYTES # BLD AUTO: 13 % — SIGNIFICANT CHANGE UP (ref 13–44)
LYMPHOCYTES # BLD AUTO: 20.7 % — SIGNIFICANT CHANGE UP (ref 13–44)
MCHC RBC-ENTMCNC: 31.4 PG — SIGNIFICANT CHANGE UP (ref 27–34)
MCHC RBC-ENTMCNC: 31.8 PG — SIGNIFICANT CHANGE UP (ref 27–34)
MCHC RBC-ENTMCNC: 31.9 PG — SIGNIFICANT CHANGE UP (ref 27–34)
MCHC RBC-ENTMCNC: 32.5 GM/DL — SIGNIFICANT CHANGE UP (ref 32–36)
MCHC RBC-ENTMCNC: 34 GM/DL — SIGNIFICANT CHANGE UP (ref 32–36)
MCHC RBC-ENTMCNC: 34.1 GM/DL — SIGNIFICANT CHANGE UP (ref 32–36)
MCV RBC AUTO: 93.2 FL — SIGNIFICANT CHANGE UP (ref 80–100)
MCV RBC AUTO: 93.8 FL — SIGNIFICANT CHANGE UP (ref 80–100)
MCV RBC AUTO: 96.5 FL — SIGNIFICANT CHANGE UP (ref 80–100)
MONOCYTES # BLD AUTO: 0.81 K/UL — SIGNIFICANT CHANGE UP (ref 0–0.9)
MONOCYTES # BLD AUTO: 0.86 K/UL — SIGNIFICANT CHANGE UP (ref 0–0.9)
MONOCYTES NFR BLD AUTO: 7.9 % — SIGNIFICANT CHANGE UP (ref 2–14)
MONOCYTES NFR BLD AUTO: 9.8 % — SIGNIFICANT CHANGE UP (ref 2–14)
NEUTROPHILS # BLD AUTO: 5.68 K/UL — SIGNIFICANT CHANGE UP (ref 1.8–7.4)
NEUTROPHILS # BLD AUTO: 8.53 K/UL — HIGH (ref 1.8–7.4)
NEUTROPHILS NFR BLD AUTO: 68.6 % — SIGNIFICANT CHANGE UP (ref 43–77)
NEUTROPHILS NFR BLD AUTO: 77.9 % — HIGH (ref 43–77)
PCO2 BLDA: 30 MMHG — LOW (ref 32–46)
PH BLDA: 7.37 — SIGNIFICANT CHANGE UP (ref 7.35–7.45)
PLATELET # BLD AUTO: 143 K/UL — LOW (ref 150–400)
PLATELET # BLD AUTO: 155 K/UL — SIGNIFICANT CHANGE UP (ref 150–400)
PLATELET # BLD AUTO: 158 K/UL — SIGNIFICANT CHANGE UP (ref 150–400)
PO2 BLDA: 197 MMHG — HIGH (ref 74–108)
POTASSIUM SERPL-MCNC: 4 MMOL/L — SIGNIFICANT CHANGE UP (ref 3.5–5.3)
POTASSIUM SERPL-MCNC: 4.1 MMOL/L — SIGNIFICANT CHANGE UP (ref 3.5–5.3)
POTASSIUM SERPL-MCNC: 4.4 MMOL/L — SIGNIFICANT CHANGE UP (ref 3.5–5.3)
POTASSIUM SERPL-SCNC: 4 MMOL/L — SIGNIFICANT CHANGE UP (ref 3.5–5.3)
POTASSIUM SERPL-SCNC: 4.1 MMOL/L — SIGNIFICANT CHANGE UP (ref 3.5–5.3)
POTASSIUM SERPL-SCNC: 4.4 MMOL/L — SIGNIFICANT CHANGE UP (ref 3.5–5.3)
PROTHROM AB SERPL-ACNC: 12.9 SEC — SIGNIFICANT CHANGE UP (ref 10.6–13.6)
RBC # BLD: 3.4 M/UL — LOW (ref 3.8–5.2)
RBC # BLD: 3.44 M/UL — LOW (ref 3.8–5.2)
RBC # BLD: 3.57 M/UL — LOW (ref 3.8–5.2)
RBC # FLD: 13.2 % — SIGNIFICANT CHANGE UP (ref 10.3–14.5)
RBC # FLD: 13.2 % — SIGNIFICANT CHANGE UP (ref 10.3–14.5)
RBC # FLD: 13.3 % — SIGNIFICANT CHANGE UP (ref 10.3–14.5)
SAO2 % BLDA: 99 % — HIGH (ref 92–96)
SODIUM SERPL-SCNC: 140 MMOL/L — SIGNIFICANT CHANGE UP (ref 135–145)
SODIUM SERPL-SCNC: 141 MMOL/L — SIGNIFICANT CHANGE UP (ref 135–145)
SODIUM SERPL-SCNC: 142 MMOL/L — SIGNIFICANT CHANGE UP (ref 135–145)
WBC # BLD: 10.52 K/UL — HIGH (ref 3.8–10.5)
WBC # BLD: 10.94 K/UL — HIGH (ref 3.8–10.5)
WBC # BLD: 8.29 K/UL — SIGNIFICANT CHANGE UP (ref 3.8–10.5)
WBC # FLD AUTO: 10.52 K/UL — HIGH (ref 3.8–10.5)
WBC # FLD AUTO: 10.94 K/UL — HIGH (ref 3.8–10.5)
WBC # FLD AUTO: 8.29 K/UL — SIGNIFICANT CHANGE UP (ref 3.8–10.5)

## 2020-08-13 PROCEDURE — 71045 X-RAY EXAM CHEST 1 VIEW: CPT | Mod: 26

## 2020-08-13 PROCEDURE — 72100 X-RAY EXAM L-S SPINE 2/3 VWS: CPT | Mod: 26

## 2020-08-13 RX ORDER — ACETAMINOPHEN 500 MG
975 TABLET ORAL EVERY 8 HOURS
Refills: 0 | Status: DISCONTINUED | OUTPATIENT
Start: 2020-08-13 | End: 2020-08-21

## 2020-08-13 RX ORDER — FENTANYL CITRATE 50 UG/ML
100 INJECTION INTRAVENOUS
Refills: 0 | Status: DISCONTINUED | OUTPATIENT
Start: 2020-08-13 | End: 2020-08-14

## 2020-08-13 RX ORDER — FENTANYL CITRATE 50 UG/ML
50 INJECTION INTRAVENOUS
Refills: 0 | Status: DISCONTINUED | OUTPATIENT
Start: 2020-08-13 | End: 2020-08-13

## 2020-08-13 RX ORDER — ONDANSETRON 8 MG/1
4 TABLET, FILM COATED ORAL ONCE
Refills: 0 | Status: DISCONTINUED | OUTPATIENT
Start: 2020-08-13 | End: 2020-08-13

## 2020-08-13 RX ORDER — SODIUM CHLORIDE 9 MG/ML
1000 INJECTION, SOLUTION INTRAVENOUS
Refills: 0 | Status: DISCONTINUED | OUTPATIENT
Start: 2020-08-13 | End: 2020-08-15

## 2020-08-13 RX ORDER — PHENYLEPHRINE HYDROCHLORIDE 10 MG/ML
0.49 INJECTION INTRAVENOUS
Qty: 40 | Refills: 0 | Status: DISCONTINUED | OUTPATIENT
Start: 2020-08-13 | End: 2020-08-16

## 2020-08-13 RX ORDER — ONDANSETRON 8 MG/1
4 TABLET, FILM COATED ORAL EVERY 6 HOURS
Refills: 0 | Status: DISCONTINUED | OUTPATIENT
Start: 2020-08-13 | End: 2020-08-14

## 2020-08-13 RX ORDER — SENNA PLUS 8.6 MG/1
2 TABLET ORAL AT BEDTIME
Refills: 0 | Status: DISCONTINUED | OUTPATIENT
Start: 2020-08-13 | End: 2020-08-21

## 2020-08-13 RX ORDER — MEPERIDINE HYDROCHLORIDE 50 MG/ML
12.5 INJECTION INTRAMUSCULAR; INTRAVENOUS; SUBCUTANEOUS
Refills: 0 | Status: DISCONTINUED | OUTPATIENT
Start: 2020-08-13 | End: 2020-08-13

## 2020-08-13 RX ORDER — LANOLIN ALCOHOL/MO/W.PET/CERES
3 CREAM (GRAM) TOPICAL AT BEDTIME
Refills: 0 | Status: DISCONTINUED | OUTPATIENT
Start: 2020-08-13 | End: 2020-08-21

## 2020-08-13 RX ORDER — BENZOCAINE AND MENTHOL 5; 1 G/100ML; G/100ML
1 LIQUID ORAL EVERY 6 HOURS
Refills: 0 | Status: DISCONTINUED | OUTPATIENT
Start: 2020-08-13 | End: 2020-08-21

## 2020-08-13 RX ORDER — HYDROMORPHONE HYDROCHLORIDE 2 MG/ML
30 INJECTION INTRAMUSCULAR; INTRAVENOUS; SUBCUTANEOUS
Refills: 0 | Status: DISCONTINUED | OUTPATIENT
Start: 2020-08-13 | End: 2020-08-14

## 2020-08-13 RX ORDER — CEFAZOLIN SODIUM 1 G
2000 VIAL (EA) INJECTION EVERY 8 HOURS
Refills: 0 | Status: DISCONTINUED | OUTPATIENT
Start: 2020-08-13 | End: 2020-08-20

## 2020-08-13 RX ORDER — HYDROMORPHONE HYDROCHLORIDE 2 MG/ML
0.5 INJECTION INTRAMUSCULAR; INTRAVENOUS; SUBCUTANEOUS
Refills: 0 | Status: DISCONTINUED | OUTPATIENT
Start: 2020-08-13 | End: 2020-08-13

## 2020-08-13 RX ORDER — PANTOPRAZOLE SODIUM 20 MG/1
40 TABLET, DELAYED RELEASE ORAL DAILY
Refills: 0 | Status: DISCONTINUED | OUTPATIENT
Start: 2020-08-13 | End: 2020-08-17

## 2020-08-13 RX ORDER — ONDANSETRON 8 MG/1
4 TABLET, FILM COATED ORAL EVERY 6 HOURS
Refills: 0 | Status: DISCONTINUED | OUTPATIENT
Start: 2020-08-13 | End: 2020-08-21

## 2020-08-13 RX ORDER — MAGNESIUM HYDROXIDE 400 MG/1
30 TABLET, CHEWABLE ORAL EVERY 12 HOURS
Refills: 0 | Status: DISCONTINUED | OUTPATIENT
Start: 2020-08-13 | End: 2020-08-21

## 2020-08-13 RX ORDER — CHLORHEXIDINE GLUCONATE 213 G/1000ML
15 SOLUTION TOPICAL EVERY 12 HOURS
Refills: 0 | Status: DISCONTINUED | OUTPATIENT
Start: 2020-08-13 | End: 2020-08-14

## 2020-08-13 RX ORDER — NALOXONE HYDROCHLORIDE 4 MG/.1ML
0.1 SPRAY NASAL
Refills: 0 | Status: DISCONTINUED | OUTPATIENT
Start: 2020-08-13 | End: 2020-08-21

## 2020-08-13 RX ORDER — FAMOTIDINE 10 MG/ML
20 INJECTION INTRAVENOUS EVERY 12 HOURS
Refills: 0 | Status: DISCONTINUED | OUTPATIENT
Start: 2020-08-13 | End: 2020-08-21

## 2020-08-13 RX ORDER — SODIUM CHLORIDE 9 MG/ML
1000 INJECTION, SOLUTION INTRAVENOUS
Refills: 0 | Status: DISCONTINUED | OUTPATIENT
Start: 2020-08-13 | End: 2020-08-13

## 2020-08-13 RX ORDER — SODIUM CHLORIDE 9 MG/ML
1000 INJECTION INTRAMUSCULAR; INTRAVENOUS; SUBCUTANEOUS ONCE
Refills: 0 | Status: COMPLETED | OUTPATIENT
Start: 2020-08-13 | End: 2020-08-13

## 2020-08-13 RX ORDER — CYCLOBENZAPRINE HYDROCHLORIDE 10 MG/1
10 TABLET, FILM COATED ORAL EVERY 8 HOURS
Refills: 0 | Status: DISCONTINUED | OUTPATIENT
Start: 2020-08-13 | End: 2020-08-21

## 2020-08-13 RX ORDER — HYDROMORPHONE HYDROCHLORIDE 2 MG/ML
0.5 INJECTION INTRAMUSCULAR; INTRAVENOUS; SUBCUTANEOUS
Refills: 0 | Status: DISCONTINUED | OUTPATIENT
Start: 2020-08-13 | End: 2020-08-14

## 2020-08-13 RX ORDER — DIPHENHYDRAMINE HCL 50 MG
12.5 CAPSULE ORAL EVERY 4 HOURS
Refills: 0 | Status: DISCONTINUED | OUTPATIENT
Start: 2020-08-13 | End: 2020-08-21

## 2020-08-13 RX ADMIN — FENTANYL CITRATE 50 MICROGRAM(S): 50 INJECTION INTRAVENOUS at 21:00

## 2020-08-13 RX ADMIN — SODIUM CHLORIDE 100 MILLILITER(S): 9 INJECTION, SOLUTION INTRAVENOUS at 22:18

## 2020-08-13 RX ADMIN — SODIUM CHLORIDE 1000 MILLILITER(S): 9 INJECTION INTRAMUSCULAR; INTRAVENOUS; SUBCUTANEOUS at 20:45

## 2020-08-13 RX ADMIN — FENTANYL CITRATE 100 MICROGRAM(S): 50 INJECTION INTRAVENOUS at 22:58

## 2020-08-13 RX ADMIN — SODIUM CHLORIDE 3 MILLILITER(S): 9 INJECTION INTRAMUSCULAR; INTRAVENOUS; SUBCUTANEOUS at 05:50

## 2020-08-13 RX ADMIN — SODIUM CHLORIDE 75 MILLILITER(S): 9 INJECTION, SOLUTION INTRAVENOUS at 20:15

## 2020-08-13 RX ADMIN — Medication 100 MILLIGRAM(S): at 22:19

## 2020-08-13 RX ADMIN — FENTANYL CITRATE 50 MICROGRAM(S): 50 INJECTION INTRAVENOUS at 20:47

## 2020-08-13 RX ADMIN — FENTANYL CITRATE 100 MICROGRAM(S): 50 INJECTION INTRAVENOUS at 23:30

## 2020-08-13 RX ADMIN — PHENYLEPHRINE HYDROCHLORIDE 15 MICROGRAM(S)/KG/MIN: 10 INJECTION INTRAVENOUS at 20:15

## 2020-08-13 NOTE — CONSULT NOTE ADULT - ASSESSMENT
76 y/o F with a h/o Flat-back syndrome, OA, PE (completed 6 month course of apixaban), obesity, s/p extensive spinal surgery, with post-op shock, metabolic encephalopathy.    - shock state appears secondary to IV sedation + intrathoracic positive pressure from mechanical ventilation  - does not appear to be actively bleeding, repeat H&H stable  - s/p 700cc blood from [employer], 1u PRBC, and 5.5L crystalloid  - start phenylephrine infusion and titrate to maintain a MAP > 65   - maintain off continuous sedation to allow her to wake up, IV fentanyl pushes for comfort  - actively titrating ventilator settings to maintain SpO2 > 92%, check baseline blood gas and CXR now  - would not rush extubation as she has a difficult airway per anesthesiologist   - hold antiplatelet/anticoagulant agents  - prophylactic cefazolin ongoing    Case discussed in detail with eICU physician, Dr. Lagunas. 74 y/o F with a h/o Flat-back syndrome, OA, PE (completed 6 month course of apixaban), obesity, s/p extensive spinal surgery, with post-op shock, metabolic encephalopathy.    - shock state appears secondary to IV sedation + intrathoracic positive pressure from mechanical ventilation  - does not appear to be actively bleeding, repeat H&H stable  - s/p 700cc blood from [employer], 1u PRBC, and 5.5L crystalloid  - start phenylephrine infusion and titrate to maintain a MAP > 65  - bolused 1L LR x2   - maintain off continuous sedation to allow her to wake up, IV fentanyl pushes for comfort  - actively titrating ventilator settings to maintain SpO2 > 92%, check baseline blood gas and CXR now  - would not rush extubation as she has a difficult airway per anesthesiologist   - hold antiplatelet/anticoagulant agents  - prophylactic cefazolin ongoing    Case discussed in detail with eICU physician, Dr. Lagunas.

## 2020-08-13 NOTE — CONSULT NOTE ADULT - SUBJECTIVE AND OBJECTIVE BOX
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 No nausea, vomiting, or hematemesis; No diarrhea or constipation. No melena or hematochezia.  GENITOURINARY: No dysuria, frequency, hematuria, or incontinence  NEUROLOGICAL: No headaches, memory loss, loss of strength, numbness, or tremors  SKIN: No itching, burning, rashes, or lesions   MUSCULOSKELETAL: No joint pain or swelling; No muscle, back, or extremity pain  PSYCHIATRIC: No depression, anxiety, mood swings, or difficulty sleeping      Social History: ***      Medications:    phenylephrine    Infusion 0.49 MICROgram(s)/kG/Min IV Continuous <Continuous>      HYDROmorphone PCA (1 mG/mL) 30 milliLiter(s) PCA Continuous PCA Continuous  HYDROmorphone PCA (1 mG/mL) Rescue Clinician Bolus 0.5 milliGRAM(s) IV Push every 15 minutes PRN  ondansetron Injectable 4 milliGRAM(s) IV Push every 6 hours PRN        pantoprazole  Injectable 40 milliGRAM(s) IV Push daily            chlorhexidine 0.12% Liquid 15 milliLiter(s) Oral Mucosa every 12 hours    naloxone Injectable 0.1 milliGRAM(s) IV Push every 3 minutes PRN      Mode: AC/ CMV (Assist Control/ Continuous Mandatory Ventilation)  RR (machine): 10  TV (machine): 500  FiO2: 50  PEEP: 5  ITime: 1  MAP: 12  PIP: 31      ICU Vital Signs Last 24 Hrs  T(C): 36.1 (13 Aug 2020 20:44), Max: 36.9 (13 Aug 2020 06:00)  T(F): 97 (13 Aug 2020 20:44), Max: 98.5 (13 Aug 2020 06:00)  HR: 84 (13 Aug 2020 20:44) (78 - 93)  BP: 97/56 (13 Aug 2020 20:44) (97/56 - 124/57)  BP(mean): 52 (13 Aug 2020 06:00) (52 - 61)  ABP: 125/60 (13 Aug 2020 20:44) (106/51 - 125/60)  ABP(mean): --  RR: 10 (13 Aug 2020 20:44) (10 - 20)  SpO2: 96% (13 Aug 2020 20:44) (94% - 100%)    Vital Signs Last 24 Hrs  T(C): 36.1 (13 Aug 2020 20:44), Max: 36.9 (13 Aug 2020 06:00)  T(F): 97 (13 Aug 2020 20:44), Max: 98.5 (13 Aug 2020 06:00)  HR: 84 (13 Aug 2020 20:44) (78 - 93)  BP: 97/56 (13 Aug 2020 20:44) (97/56 - 124/57)  BP(mean): 52 (13 Aug 2020 06:00) (52 - 61)  RR: 10 (13 Aug 2020 20:44) (10 - 20)  SpO2: 96% (13 Aug 2020 20:44) (94% - 100%)        I&O's Detail    12 Aug 2020 07:01  -  13 Aug 2020 07:00  --------------------------------------------------------  IN:  Total IN: 0 mL    OUT:    Chest Tube: 240 mL    Indwelling Catheter - Urethral: 625 mL  Total OUT: 865 mL    Total NET: -865 mL      13 Aug 2020 07:01  -  13 Aug 2020 21:18  --------------------------------------------------------  IN:    IV PiggyBack: 1000 mL    Other: 6400 mL    phenylephrine   Infusion: 30 mL  Total IN: 7430 mL    OUT:    Accordian: 90 mL    Accordian: 100 mL    Bulb: 5 mL    Bulb: 20 mL    Indwelling Catheter - Urethral: 25 mL    Other: 445 mL  Total OUT: 685 mL    Total NET: 6745 mL            LABS:                        10.2   x     )-----------( x        ( 13 Aug 2020 17:28 )             30.7     08-13    141  |  111<H>  |  15  ----------------------------<  146<H>  4.4   |  24  |  0.53    Ca    6.8<L>      13 Aug 2020 19:50            CAPILLARY BLOOD GLUCOSE        PT/INR - ( 13 Aug 2020 04:41 )   PT: 12.9 sec;   INR: 1.11 ratio         PTT - ( 13 Aug 2020 04:41 )  PTT:26.1 sec    CULTURES:        Physical Examination:    General: No acute distress.  Alert, oriented, interactive, nonfocal    HEENT: Pupils equal, reactive to light.  Symmetric.    PULM: Clear to auscultation bilaterally, no significant sputum production    CVS: Regular rate and rhythm, no murmurs, rubs, or gallops    ABD: Soft, nondistended, nontender, normoactive bowel sounds, no masses    EXT: No edema, nontender    SKIN: Warm and well perfused, no rashes noted.    NEURO: A&Ox3, strength 5/5 all extremities, cranial nerves grossly intact, no focal deficits      RADIOLOGY: ***        CRITICAL CARE TIME SPENT: ***  Time spent evaluating/treating patient with medical issues that pose a high risk for life threatening deterioration and/or end-organ damage, reviewing data/labs/imaging, discussing case with multidisciplinary team, discussing plan/goals of care with patient/family. Non-inclusive of procedure time. Patient is a 75y old  Female who presents with a chief complaint of back pain (13 Aug 2020 06:31)      BRIEF HOSPITAL COURSE: 76 y/o F with a h/o Flat-back syndrome, OA, PE (completed 6 month course of apixaban), obesity, admitted on 8/11 for 2 staged spinal surgery. Underwent anterior lumbar discectomy and fusion on 8/11 via thoracotomy. Left sided formal chest tube placed as well. No reported complications.    Events last 24 hours: Patient returned to the OR today for hardware removal and further spinal fusions via posterior approach. Procedure was extensive and lasted approximately 12 hours with 1700cc EBL. Received 700cc blood back from [facility] and additional 1u PRBC. 5.5L crystalloid intra-op. Decision made by anesthesiologist to leave patient intubated overnight as she is not waking up after sedation had been stopped and has now become hypotensive requiring IV vasopressor therapy.      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  H/O spinal fusion: 1997 lumbar    Allergies    No Known Allergies    Intolerances      FAMILY HISTORY:  No known problems      Review of Systems:  Unable to obtain secondary to AMS and intubation      Medications:    phenylephrine    Infusion 0.49 MICROgram(s)/kG/Min IV Continuous <Continuous>  HYDROmorphone PCA (1 mG/mL) 30 milliLiter(s) PCA Continuous PCA Continuous  HYDROmorphone PCA (1 mG/mL) Rescue Clinician Bolus 0.5 milliGRAM(s) IV Push every 15 minutes PRN  ondansetron Injectable 4 milliGRAM(s) IV Push every 6 hours PRN  pantoprazole  Injectable 40 milliGRAM(s) IV Push daily  chlorhexidine 0.12% Liquid 15 milliLiter(s) Oral Mucosa every 12 hours  naloxone Injectable 0.1 milliGRAM(s) IV Push every 3 minutes PRN      Mode: AC/ CMV (Assist Control/ Continuous Mandatory Ventilation)  RR (machine): 10  TV (machine): 500  FiO2: 50  PEEP: 5  ITime: 1  MAP: 12  PIP: 31      ICU Vital Signs Last 24 Hrs  T(C): 36.1 (13 Aug 2020 20:44), Max: 36.9 (13 Aug 2020 06:00)  T(F): 97 (13 Aug 2020 20:44), Max: 98.5 (13 Aug 2020 06:00)  HR: 84 (13 Aug 2020 20:44) (78 - 93)  BP: 97/56 (13 Aug 2020 20:44) (97/56 - 124/57)  BP(mean): 52 (13 Aug 2020 06:00) (52 - 61)  ABP: 125/60 (13 Aug 2020 20:44) (106/51 - 125/60)  ABP(mean): --  RR: 10 (13 Aug 2020 20:44) (10 - 20)  SpO2: 96% (13 Aug 2020 20:44) (94% - 100%)    Vital Signs Last 24 Hrs  T(C): 36.1 (13 Aug 2020 20:44), Max: 36.9 (13 Aug 2020 06:00)  T(F): 97 (13 Aug 2020 20:44), Max: 98.5 (13 Aug 2020 06:00)  HR: 84 (13 Aug 2020 20:44) (78 - 93)  BP: 97/56 (13 Aug 2020 20:44) (97/56 - 124/57)  BP(mean): 52 (13 Aug 2020 06:00) (52 - 61)  RR: 10 (13 Aug 2020 20:44) (10 - 20)  SpO2: 96% (13 Aug 2020 20:44) (94% - 100%)        I&O's Detail    12 Aug 2020 07:01  -  13 Aug 2020 07:00  --------------------------------------------------------  IN:  Total IN: 0 mL    OUT:    Chest Tube: 240 mL    Indwelling Catheter - Urethral: 625 mL  Total OUT: 865 mL    Total NET: -865 mL      13 Aug 2020 07:01  -  13 Aug 2020 21:18  --------------------------------------------------------  IN:    IV PiggyBack: 1000 mL    Other: 6400 mL    phenylephrine   Infusion: 30 mL  Total IN: 7430 mL    OUT:    Accordian: 90 mL    Accordian: 100 mL    Bulb: 5 mL    Bulb: 20 mL    Indwelling Catheter - Urethral: 25 mL    Other: 445 mL  Total OUT: 685 mL    Total NET: 6745 mL            LABS:                        10.2   x     )-----------( x        ( 13 Aug 2020 17:28 )             30.7     08-13    141  |  111<H>  |  15  ----------------------------<  146<H>  4.4   |  24  |  0.53    Ca    6.8<L>      13 Aug 2020 19:50            CAPILLARY BLOOD GLUCOSE        PT/INR - ( 13 Aug 2020 04:41 )   PT: 12.9 sec;   INR: 1.11 ratio         PTT - ( 13 Aug 2020 04:41 )  PTT:26.1 sec    CULTURES:        Physical Examination:    General: No acute distress.  obtunded, eyes closed, intubated, obese    HEENT: Pupils equal, reactive to light.  Symmetric.    PULM: Clear to auscultation bilaterally, no significant sputum production, left mid-ax chest tube with serosanguinous drainage    CVS: Regular rate and rhythm, no murmurs, rubs, or gallops    ABD: Soft, nondistended, nontender, normoactive bowel sounds, no masses    EXT: No edema, nontender    SKIN: cold distal extremities, skin tears on b/l hips, small dressing over periumbilical region c/d/i, long dressing from T-spine to S-spine c/d/i, 4 drains with bright red blood draining     NEURO: obtunded, not following commands, withdraws to pain, minimal spontaneous movement at this time      RADIOLOGY:  n/a        CRITICAL CARE TIME SPENT: 51 mins  Time spent evaluating/treating patient with medical issues that pose a high risk for life threatening deterioration and/or end-organ damage, reviewing data/labs/imaging, discussing case with multidisciplinary team, discussing plan/goals of care with patient/family. Non-inclusive of procedure time.

## 2020-08-13 NOTE — BRIEF OPERATIVE NOTE - NSICDXBRIEFPROCEDURE_GEN_ALL_CORE_FT
PROCEDURES:  Fusion, spine, XLIF, 1 level 11-Aug-2020 13:12:47  Jon Samaoya  ALIF, 1 level 11-Aug-2020 13:12:36  Jon Samayoa
PROCEDURES:  Fusion of 7 to 12 spinal segments by posterior approach 13-Aug-2020 20:24:59  Garfield Rodriguez  Fusion, spine, XLIF, 1 level 11-Aug-2020 13:12:47  Jon Samayoa, 1 level 11-Aug-2020 13:12:36  Jon Samayoa

## 2020-08-14 LAB
ANION GAP SERPL CALC-SCNC: 7 MMOL/L — SIGNIFICANT CHANGE UP (ref 5–17)
BASOPHILS # BLD AUTO: 0.03 K/UL — SIGNIFICANT CHANGE UP (ref 0–0.2)
BASOPHILS NFR BLD AUTO: 0.3 % — SIGNIFICANT CHANGE UP (ref 0–2)
BUN SERPL-MCNC: 19 MG/DL — SIGNIFICANT CHANGE UP (ref 7–23)
CALCIUM SERPL-MCNC: 6.6 MG/DL — LOW (ref 8.5–10.1)
CHLORIDE SERPL-SCNC: 113 MMOL/L — HIGH (ref 96–108)
CO2 SERPL-SCNC: 22 MMOL/L — SIGNIFICANT CHANGE UP (ref 22–31)
CREAT SERPL-MCNC: 0.77 MG/DL — SIGNIFICANT CHANGE UP (ref 0.5–1.3)
EOSINOPHIL # BLD AUTO: 0 K/UL — SIGNIFICANT CHANGE UP (ref 0–0.5)
EOSINOPHIL NFR BLD AUTO: 0 % — SIGNIFICANT CHANGE UP (ref 0–6)
GLUCOSE SERPL-MCNC: 141 MG/DL — HIGH (ref 70–99)
HCT VFR BLD CALC: 28.8 % — LOW (ref 34.5–45)
HGB BLD-MCNC: 9.9 G/DL — LOW (ref 11.5–15.5)
IMM GRANULOCYTES NFR BLD AUTO: 0.8 % — SIGNIFICANT CHANGE UP (ref 0–1.5)
LYMPHOCYTES # BLD AUTO: 1.53 K/UL — SIGNIFICANT CHANGE UP (ref 1–3.3)
LYMPHOCYTES # BLD AUTO: 14.2 % — SIGNIFICANT CHANGE UP (ref 13–44)
MCHC RBC-ENTMCNC: 31.9 PG — SIGNIFICANT CHANGE UP (ref 27–34)
MCHC RBC-ENTMCNC: 34.4 GM/DL — SIGNIFICANT CHANGE UP (ref 32–36)
MCV RBC AUTO: 92.9 FL — SIGNIFICANT CHANGE UP (ref 80–100)
MONOCYTES # BLD AUTO: 1.03 K/UL — HIGH (ref 0–0.9)
MONOCYTES NFR BLD AUTO: 9.5 % — SIGNIFICANT CHANGE UP (ref 2–14)
NEUTROPHILS # BLD AUTO: 8.13 K/UL — HIGH (ref 1.8–7.4)
NEUTROPHILS NFR BLD AUTO: 75.2 % — SIGNIFICANT CHANGE UP (ref 43–77)
PLATELET # BLD AUTO: 151 K/UL — SIGNIFICANT CHANGE UP (ref 150–400)
POTASSIUM SERPL-MCNC: 4.1 MMOL/L — SIGNIFICANT CHANGE UP (ref 3.5–5.3)
POTASSIUM SERPL-SCNC: 4.1 MMOL/L — SIGNIFICANT CHANGE UP (ref 3.5–5.3)
RBC # BLD: 3.1 M/UL — LOW (ref 3.8–5.2)
RBC # FLD: 13.3 % — SIGNIFICANT CHANGE UP (ref 10.3–14.5)
SODIUM SERPL-SCNC: 142 MMOL/L — SIGNIFICANT CHANGE UP (ref 135–145)
WBC # BLD: 10.81 K/UL — HIGH (ref 3.8–10.5)
WBC # FLD AUTO: 10.81 K/UL — HIGH (ref 3.8–10.5)

## 2020-08-14 PROCEDURE — 99291 CRITICAL CARE FIRST HOUR: CPT

## 2020-08-14 PROCEDURE — 71045 X-RAY EXAM CHEST 1 VIEW: CPT | Mod: 26

## 2020-08-14 RX ORDER — MORPHINE SULFATE 50 MG/1
4 CAPSULE, EXTENDED RELEASE ORAL EVERY 4 HOURS
Refills: 0 | Status: DISCONTINUED | OUTPATIENT
Start: 2020-08-14 | End: 2020-08-17

## 2020-08-14 RX ORDER — MIDODRINE HYDROCHLORIDE 2.5 MG/1
10 TABLET ORAL EVERY 8 HOURS
Refills: 0 | Status: DISCONTINUED | OUTPATIENT
Start: 2020-08-14 | End: 2020-08-16

## 2020-08-14 RX ORDER — SODIUM CHLORIDE 9 MG/ML
1000 INJECTION, SOLUTION INTRAVENOUS ONCE
Refills: 0 | Status: COMPLETED | OUTPATIENT
Start: 2020-08-14 | End: 2020-08-14

## 2020-08-14 RX ORDER — MORPHINE SULFATE 50 MG/1
4 CAPSULE, EXTENDED RELEASE ORAL EVERY 4 HOURS
Refills: 0 | Status: DISCONTINUED | OUTPATIENT
Start: 2020-08-14 | End: 2020-08-14

## 2020-08-14 RX ORDER — SODIUM CHLORIDE 9 MG/ML
1000 INJECTION INTRAMUSCULAR; INTRAVENOUS; SUBCUTANEOUS ONCE
Refills: 0 | Status: DISCONTINUED | OUTPATIENT
Start: 2020-08-14 | End: 2020-08-14

## 2020-08-14 RX ADMIN — FENTANYL CITRATE 100 MICROGRAM(S): 50 INJECTION INTRAVENOUS at 03:59

## 2020-08-14 RX ADMIN — BENZOCAINE AND MENTHOL 1 LOZENGE: 5; 1 LIQUID ORAL at 14:09

## 2020-08-14 RX ADMIN — FENTANYL CITRATE 100 MICROGRAM(S): 50 INJECTION INTRAVENOUS at 03:13

## 2020-08-14 RX ADMIN — FENTANYL CITRATE 100 MICROGRAM(S): 50 INJECTION INTRAVENOUS at 06:44

## 2020-08-14 RX ADMIN — PANTOPRAZOLE SODIUM 40 MILLIGRAM(S): 20 TABLET, DELAYED RELEASE ORAL at 09:48

## 2020-08-14 RX ADMIN — Medication 100 MILLIGRAM(S): at 22:05

## 2020-08-14 RX ADMIN — Medication 975 MILLIGRAM(S): at 14:02

## 2020-08-14 RX ADMIN — SODIUM CHLORIDE 1000 MILLILITER(S): 9 INJECTION, SOLUTION INTRAVENOUS at 04:27

## 2020-08-14 RX ADMIN — PHENYLEPHRINE HYDROCHLORIDE 15 MICROGRAM(S)/KG/MIN: 10 INJECTION INTRAVENOUS at 18:05

## 2020-08-14 RX ADMIN — SODIUM CHLORIDE 100 MILLILITER(S): 9 INJECTION, SOLUTION INTRAVENOUS at 14:02

## 2020-08-14 RX ADMIN — PHENYLEPHRINE HYDROCHLORIDE 15 MICROGRAM(S)/KG/MIN: 10 INJECTION INTRAVENOUS at 13:00

## 2020-08-14 RX ADMIN — PHENYLEPHRINE HYDROCHLORIDE 15 MICROGRAM(S)/KG/MIN: 10 INJECTION INTRAVENOUS at 08:30

## 2020-08-14 RX ADMIN — Medication 975 MILLIGRAM(S): at 22:05

## 2020-08-14 RX ADMIN — SODIUM CHLORIDE 1000 MILLILITER(S): 9 INJECTION, SOLUTION INTRAVENOUS at 16:55

## 2020-08-14 RX ADMIN — FENTANYL CITRATE 100 MICROGRAM(S): 50 INJECTION INTRAVENOUS at 07:15

## 2020-08-14 RX ADMIN — CYCLOBENZAPRINE HYDROCHLORIDE 10 MILLIGRAM(S): 10 TABLET, FILM COATED ORAL at 22:06

## 2020-08-14 RX ADMIN — PHENYLEPHRINE HYDROCHLORIDE 15 MICROGRAM(S)/KG/MIN: 10 INJECTION INTRAVENOUS at 01:34

## 2020-08-14 RX ADMIN — Medication 100 MILLIGRAM(S): at 14:01

## 2020-08-14 RX ADMIN — CHLORHEXIDINE GLUCONATE 15 MILLILITER(S): 213 SOLUTION TOPICAL at 05:39

## 2020-08-14 RX ADMIN — MORPHINE SULFATE 4 MILLIGRAM(S): 50 CAPSULE, EXTENDED RELEASE ORAL at 10:57

## 2020-08-14 RX ADMIN — Medication 100 MILLIGRAM(S): at 05:30

## 2020-08-14 RX ADMIN — Medication 3 MILLIGRAM(S): at 22:05

## 2020-08-14 RX ADMIN — Medication 975 MILLIGRAM(S): at 23:00

## 2020-08-14 RX ADMIN — Medication 1 TABLET(S): at 22:06

## 2020-08-14 RX ADMIN — CYCLOBENZAPRINE HYDROCHLORIDE 10 MILLIGRAM(S): 10 TABLET, FILM COATED ORAL at 14:02

## 2020-08-14 RX ADMIN — MIDODRINE HYDROCHLORIDE 10 MILLIGRAM(S): 2.5 TABLET ORAL at 23:37

## 2020-08-14 RX ADMIN — Medication 1 TABLET(S): at 14:02

## 2020-08-14 RX ADMIN — Medication 975 MILLIGRAM(S): at 14:17

## 2020-08-14 RX ADMIN — MORPHINE SULFATE 4 MILLIGRAM(S): 50 CAPSULE, EXTENDED RELEASE ORAL at 11:12

## 2020-08-14 RX ADMIN — SENNA PLUS 2 TABLET(S): 8.6 TABLET ORAL at 22:06

## 2020-08-14 NOTE — PROGRESS NOTE ADULT - ASSESSMENT
s/p paraspinous muscle flap closure.   Continue all 4 drains for now.   Strip 2x stefanie drains.   Continue current care as per Dr. Epperson and all consultants.

## 2020-08-14 NOTE — PROGRESS NOTE ADULT - ASSESSMENT
Intubated post second stage surgery. Blood tx in progress. Cont CT to suction. CXR today. Cont ICU care.

## 2020-08-14 NOTE — PROGRESS NOTE ADULT - SUBJECTIVE AND OBJECTIVE BOX
[facility]                                                           Orthopedic Spine Progress Note      POST OPERATIVE DAY #: 1/3  STATUS POST: complex two-stage thoracolumbar surgery                Pre-Op Dx: Flatback syndrome    Post-Op Dx:  Flatback syndrome    SUBJECTIVE: Patient seen and examined this morning at 0930, awake/alert, intubated w/light sedation, able to follow verbal commands, neuro/motor intact, was later extubated    Current Pain Management:  [x] PCA   [ ] Po Analgesics [ ] IM /IV Anagesics     Vital Signs Last 24 Hrs  T(C): 37.7 (14 Aug 2020 12:00), Max: 38 (14 Aug 2020 08:00)  T(F): 99.9 (14 Aug 2020 12:00), Max: 100.4 (14 Aug 2020 08:00)  HR: 96 (14 Aug 2020 13:00) (78 - 118)  BP: 123/45 (14 Aug 2020 13:00) (76/22 - 124/57)  BP(mean): 64 (14 Aug 2020 13:00) (36 - 64)  RR: 22 (14 Aug 2020 13:00) (0 - 24)  SpO2: 94% (14 Aug 2020 13:00) (92% - 100%)  I&O's Detail    13 Aug 2020 07:01  -  14 Aug 2020 07:00  --------------------------------------------------------  IN:    IV PiggyBack: 1100 mL    Lactated Ringers IV Bolus: 1000 mL    lactated ringers.: 623 mL    Other: 6400 mL    phenylephrine   Infusion: 642 mL    Sodium Chloride 0.9% IV Bolus: 1000 mL  Total IN: 84062 mL    OUT:    Accordian: 350 mL    Accordian: 290 mL    Bulb: 70 mL    Bulb: 55 mL    Chest Tube: 70 mL    Indwelling Catheter - Urethral: 250 mL    Other: 445 mL  Total OUT: 1530 mL    Total NET: 9235 mL      14 Aug 2020 07:01  -  14 Aug 2020 14:02  --------------------------------------------------------  IN:    Packed Red Blood Cells: 272 mL  Total IN: 272 mL    OUT:    Accordian: 100 mL    Accordian: 80 mL    Bulb: 5 mL    Bulb: 40 mL    Chest Tube: 60 mL    Indwelling Catheter - Urethral: 120 mL  Total OUT: 405 mL    Total NET: -133 mL          OBJECTIVE:       Wound /Dressing: dressings intact  Drains: 200/250/50/50cc - all drains kept  CT: 70cc, intact  Lumbar ROM: not tested  Neurological: A/O               Sensation: [x] intact to light touch  [ ] decreased:          Motor exam: [x]          [x] Upper extremity    Delt      Bicp       Tri      Wrist ext  Wrst Flex       Digit Ext Digit Flex                               R         5/5       5/5       5/5         5/5          5/           5/5       5/5          5/5                               L          5/5       5/5       5/5         5/5          5            5/5       5/5          5/5         [x] Lower ext.     Hip Flx   Quad   Hamstrg     TA       EHL      GS                         R        5/5        5/5        5/5         5/5      5/5       5/5                                L         5/5        5/5        5/5         5/5      5/5       5/5                                                              [x] Vascular: intact           Tension Signs: none          Long Tract Findings: none                                        LABS:                        9.9    10.81 )-----------( 151      ( 14 Aug 2020 04:45 )             28.8     08-14    142  |  113<H>  |  19  ----------------------------<  141<H>  4.1   |  22  |  0.77    Ca    6.6<L>      14 Aug 2020 04:45      PT/INR - ( 13 Aug 2020 04:41 )   PT: 12.9 sec;   INR: 1.11 ratio         PTT - ( 13 Aug 2020 04:41 )  PTT:26.1 sec

## 2020-08-14 NOTE — PROGRESS NOTE ADULT - SUBJECTIVE AND OBJECTIVE BOX
Patient seen and examined at bedside in ICU, intubated, lightly sedated, responsive to commands.  On levo with MAP's in 60's, D/w attending and RN about keeping maps above 70-75.  On CPAP, possible extubation today.    Vital Signs Last 24 Hrs  T(C): 37.4 (14 Aug 2020 06:28), Max: 37.4 (14 Aug 2020 06:28)  T(F): 99.4 (14 Aug 2020 06:28), Max: 99.4 (14 Aug 2020 06:28)  HR: 103 (14 Aug 2020 07:00) (78 - 118)  BP: 85/40 (14 Aug 2020 07:00) (76/22 - 124/57)  BP(mean): 51 (14 Aug 2020 07:00) (36 - 63)  RR: 14 (14 Aug 2020 07:00) (0 - 23)  SpO2: 96% (14 Aug 2020 06:45) (94% - 100%)    PE: Gen: NAD, following commands  Motor: Moving bilateral LE with DF/PF, HF and KE, no observed deficits  B/L UE shoulder abduction, Arm Flex/Ext, Hand intrinsics intact  no calf TTP  B/L radial pulse palpable, cold digits in hands, feet warm and perfused, + Dp pulses      A/P: 74 yo Female s/p ALIF L5-S1 and XLIF T12-L1 on 8/11, L1-L4 WILIAN, L4 PSO and T4-S2 instrumentation with PSF POD 1    - antibiotics while drains are in  - 1 U this AM for pressor support and decreasing H/H, recommend transfusions as needed as H/H will likely continue to downtrend given extent of surgery and incision  - Monitor drain output, management per plastic surgery  - Chest tube management per Dr. Reuben Shipman  - FU extubation today  - Maintain MAPS >70-75 for atleast 48 hours  - PT/OT, no precautions, TLSO brace while ambulating and sitting in chair  - Dispo planning  - Will d/w Dr. Epperson

## 2020-08-14 NOTE — PROGRESS NOTE ADULT - SUBJECTIVE AND OBJECTIVE BOX
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    Allergies    No Known Allergies    Intolerances          ICU Vital Signs Last 24 Hrs  T(C): 37.9 (14 Aug 2020 20:00), Max: 38.2 (14 Aug 2020 14:00)  T(F): 100.3 (14 Aug 2020 20:00), Max: 100.8 (14 Aug 2020 14:00)  HR: 92 (14 Aug 2020 20:00) (89 - 118)  BP: 104/35 (14 Aug 2020 20:00) (76/22 - 123/45)  BP(mean): 52 (14 Aug 2020 20:00) (36 - 64)  ABP: 111/57 (14 Aug 2020 20:00) (63/38 - 129/67)  ABP(mean): 77 (14 Aug 2020 20:00) (47 - 92)  RR: 23 (14 Aug 2020 20:00) (0 - 24)  SpO2: 95% (14 Aug 2020 19:00) (92% - 100%)    Physical Examination:    General: NAD    HEENT: no JVD    PULM: bilateral BS L CT serous drainage     CVS: s1 s2 reg    ABD: soft NT    EXT:  L arm dec cap refill a bit cool, some skin blistering above a former IV site     SKIN:  blistering L forearm     Neuro:    ABG - ( 13 Aug 2020 22:46 )  pH, Arterial: 7.37  pH, Blood: x     /  pCO2: 30    /  pO2: 197   / HCO3: 17    / Base Excess: -6.6  /  SaO2: 99                Mode: PS (Pressure Support)/ Spontaneous  FiO2: 30  PEEP: 5  PS: 10  PIP: 15    Mode: PS (Pressure Support)/ Spontaneous, FiO2: 30, PEEP: 5, PS: 10, PIP: 15  LABS:                        9.9    10.81 )-----------( 151      ( 14 Aug 2020 04:45 )             28.8     08-14    142  |  113<H>  |  19  ----------------------------<  141<H>  4.1   |  22  |  0.77    Ca    6.6<L>      14 Aug 2020 04:45            CAPILLARY BLOOD GLUCOSE        PT/INR - ( 13 Aug 2020 04:41 )   PT: 12.9 sec;   INR: 1.11 ratio         PTT - ( 13 Aug 2020 04:41 )  PTT:26.1 sec    CULTURES:      Medications:  MEDICATIONS  (STANDING):  acetaminophen   Tablet .. 975 milliGRAM(s) Oral every 8 hours  calcium carbonate 1250 mG  + Vitamin D (OsCal 500 + D) 1 Tablet(s) Oral three times a day  ceFAZolin   IVPB 2000 milliGRAM(s) IV Intermittent every 8 hours  cyclobenzaprine 10 milliGRAM(s) Oral every 8 hours  lactated ringers. 1000 milliLiter(s) (100 mL/Hr) IV Continuous <Continuous>  melatonin 3 milliGRAM(s) Oral at bedtime  pantoprazole  Injectable 40 milliGRAM(s) IV Push daily  phenylephrine    Infusion 0.49 MICROgram(s)/kG/Min (15 mL/Hr) IV Continuous <Continuous>  senna 2 Tablet(s) Oral at bedtime    MEDICATIONS  (PRN):  benzocaine 15 mG/menthol 3.6 mG (Sugar-Free) Lozenge 1 Lozenge Oral every 6 hours PRN Sore Throat  diphenhydrAMINE   Injectable 12.5 milliGRAM(s) IV Push every 4 hours PRN Itching  famotidine    Tablet 20 milliGRAM(s) Oral every 12 hours PRN Dyspepsia  fentaNYL    Injectable 100 MICROGram(s) IV Push every 3 hours PRN distress  magnesium hydroxide Suspension 30 milliLiter(s) Oral every 12 hours PRN Constipation  morphine  - Injectable 4 milliGRAM(s) IV Push every 4 hours PRN Severe Pain (7 - 10)  naloxone Injectable 0.1 milliGRAM(s) IV Push every 3 minutes PRN For ANY of the following changes in patient status:  A. RR LESS THAN 10 breaths per minute, B. Oxygen saturation LESS THAN 90%, C. Sedation score of 6  ondansetron Injectable 4 milliGRAM(s) IV Push every 6 hours PRN Nausea        08-13 @ 07:01  -  08-14 @ 07:00  --------------------------------------------------------  IN: 77517 mL / OUT: 1530 mL / NET: 9235 mL    08-14 @ 07:01 - 08-14 @ 22:07  --------------------------------------------------------  IN: 3847 mL / OUT: 1195 mL / NET: 2652 mL        RADIOLOGY/IMAGING/ECHO    < from: Xray Chest 1 View- PORTABLE-Routine (08.14.20 @ 09:29) >  IMPRESSION:   No evidence of active chest disease.  ET tube and LEFT chest tube in place.  No interval change.          Assessment/Plan    75F hx Flat-back syndrome, OA, remote PE , obesity, admitted on 8/11 for 2 staged spinal surgery. Underwent anterior lumbar discectomy and fusion on 8/11 via thoracotomy.       RTOR 8/13 prolonged OR course with high ABLA  1.7 l , L1-L4 WILIAN, L4 PSO and T4-S2 instrumentation with PSF POD 1    Post op spinal shock requiring high dose phenylephrine infusion, add midodrine      Likely phenylephrine infiltration into the L arm.  IV removed and re sited.  No clear site to inject phentolamine   Elevate the arm.  Watch for progression.    Vascular eval if worsens or develops compartment syndrome which she does not have now.      SCD's   DVT chemoprophylaxis when celared by ortho-spine           CRITICAL CARE TIME SPENT: 37 minutes assessing presenting problems of acute illness, which pose high probability of life threatening deterioration or end organ damage/dysfunction, as well as medical decision making including initiating plan of care, reviewing data, reviewing radiologic exams, discussing with multidisciplinary team,  discussing goals of care with patient/family, and writing this note.  Non-inclusive of procedures performed,

## 2020-08-14 NOTE — PROGRESS NOTE ADULT - SUBJECTIVE AND OBJECTIVE BOX
Patient 74yo female with PMHx Flat-back syndrome, OA, PE (completed 6 month course of apixaban), obesity, admitted on 8/11 for 2 staged spinal surgery. Underwent anterior lumbar discectomy and fusion on 8/11 via thoracotomy. Left sided formal chest tube placed as well. No reported complications.    Last night patient returned to the OR today for hardware removal and further spinal fusions via posterior approach. Procedure was extensive and lasted approximately 12 hours with 1700cc EBL. Received 700cc blood back from [facility] and additional 1u PRBC. 5.5L crystalloid intra-op.     8/14: patient seen and examined, at bedside, placed on CPAP trial, did well for 1 hour, extubated to 4LNC  Remains no low dose Dashawn for MAP support  receiving 1u PRBC  Afebrile, HD stable, comfortable in NAD    T(C): 37.3 (08-14-20 @ 09:10), Max: 38 (08-14-20 @ 08:00)  HR: 104 (08-14-20 @ 10:00) (78 - 118)  BP: 115/39 (08-14-20 @ 10:00) (76/22 - 124/57)  RR: 12 (08-14-20 @ 10:00) (0 - 24)  SpO2: 97% (08-14-20 @ 10:00) (92% - 100%)  Wt(kg): --      Gen: Awake, alert, NAD  HEENT: NCAT, EOMI  Neck: Supple  CV: nml S1S2, RRR  Lungs: CTABL, L CT in place, no air leak  Abd: Soft, NT, ND, BS+  Ext: No edema  Neuro: Non focal, following commands  Skin: skin tears on b/l hips, small dressing over periumbilical region c/d/i, long dressing from T-spine to S-spine c/d/i, 4 drains with bright red blood draining                           9.9    10.81 )-----------( 151      ( 14 Aug 2020 04:45 )             28.8     14 Aug 2020 04:45    142    |  113    |  19     ----------------------------<  141    4.1     |  22     |  0.77     Ca    6.6        14 Aug 2020 04:45      PT/INR - ( 13 Aug 2020 04:41 )   PT: 12.9 sec;   INR: 1.11 ratio         PTT - ( 13 Aug 2020 04:41 )  PTT:26.1 sec  CAPILLARY BLOOD GLUCOSE

## 2020-08-14 NOTE — PROVIDER CONTACT NOTE (CHANGE IN STATUS NOTIFICATION) - SITUATION
full assessment completed, left wrist with old I.V site with dressing in place. noted pt's left wrist ecchymotic, edematous, tender to touch, with intact blister. LAYLA Vilchis intensivist notified and come to bedside to assess patient. + radial pulse, + capillary refill <3 seconds, fingertips cool to touch. elevated left arm above the heart. no further orders given. will continue to monitor closely

## 2020-08-14 NOTE — PROGRESS NOTE ADULT - ASSESSMENT
76 y/o F with a h/o Flat-back syndrome, OA, PE (completed 6 month course of apixaban), obesity, s/p extensive spinal surgery, with post-op hypotension     PLAN:  - supp o2 as needed, MDI PRN  - replete electrolytes  - follow up CBC  - hold A/C, Antiplatelet agents  - cont with LR 100cc/hr  - Cefazolin for ppx  - DVT ppx, SCDs  - Monitor in MICU    Critical care time 35 minutes

## 2020-08-14 NOTE — PROGRESS NOTE ADULT - SUBJECTIVE AND OBJECTIVE BOX
Patient is c/o of post surgical pain.     PE:  laying comfortably in bed.   Dressing is dry, small area of dried blood.   Drains intact, serosanguineous fluid, tubes are patent.     Drain Output     Accordian (mL) ml Out: 80 mL  Site: R HV  Accordian (mL) ml Out: 100 mL  Site: L HV  Bulb (mL) ml Out: 5 mL  Site: R JERSEY  Bulb (mL) ml Out: 40 mL  Site: L JERSEY

## 2020-08-14 NOTE — PROGRESS NOTE ADULT - ASSESSMENT
A/P: s/p two-stage complex thoracolumbar fusion, kyphoplasty, osteotomy - stable  1. Medical care as per intensivist  2. Drains as per plastic surgeon, CT tube as per Dr. Alexis  3. PT/mobilization when stable to do so

## 2020-08-14 NOTE — PROGRESS NOTE ADULT - SUBJECTIVE AND OBJECTIVE BOX
Subjective: POD#3/1    Objective: L thoracotomy    Heent: N/C, AT PER no scleral icterus, No JVD  Pul: clear  Cor: RRR  Abdomen: soft, normal BS.   Extremities: without edema, motor/sensory intact    08-14    142  |  113<H>  |  19  ----------------------------<  141<H>  4.1   |  22  |  0.77    Ca    6.6<L>      14 Aug 2020 04:45                              9.9    10.81 )-----------( 151      ( 14 Aug 2020 04:45 )             28.8       L CT 70cc/12h. No air leak.

## 2020-08-15 LAB
ALBUMIN SERPL ELPH-MCNC: 1.3 G/DL — LOW (ref 3.3–5)
ALP SERPL-CCNC: 44 U/L — SIGNIFICANT CHANGE UP (ref 40–120)
ALT FLD-CCNC: 16 U/L — SIGNIFICANT CHANGE UP (ref 12–78)
ANION GAP SERPL CALC-SCNC: 5 MMOL/L — SIGNIFICANT CHANGE UP (ref 5–17)
AST SERPL-CCNC: 52 U/L — HIGH (ref 15–37)
BASOPHILS # BLD AUTO: 0.04 K/UL — SIGNIFICANT CHANGE UP (ref 0–0.2)
BASOPHILS NFR BLD AUTO: 0.5 % — SIGNIFICANT CHANGE UP (ref 0–2)
BILIRUB SERPL-MCNC: 0.3 MG/DL — SIGNIFICANT CHANGE UP (ref 0.2–1.2)
BUN SERPL-MCNC: 15 MG/DL — SIGNIFICANT CHANGE UP (ref 7–23)
CALCIUM SERPL-MCNC: 7.1 MG/DL — LOW (ref 8.5–10.1)
CHLORIDE SERPL-SCNC: 114 MMOL/L — HIGH (ref 96–108)
CO2 SERPL-SCNC: 25 MMOL/L — SIGNIFICANT CHANGE UP (ref 22–31)
CREAT SERPL-MCNC: 0.5 MG/DL — SIGNIFICANT CHANGE UP (ref 0.5–1.3)
EOSINOPHIL # BLD AUTO: 0.04 K/UL — SIGNIFICANT CHANGE UP (ref 0–0.5)
EOSINOPHIL NFR BLD AUTO: 0.5 % — SIGNIFICANT CHANGE UP (ref 0–6)
GLUCOSE SERPL-MCNC: 107 MG/DL — HIGH (ref 70–99)
HCT VFR BLD CALC: 25.5 % — LOW (ref 34.5–45)
HCT VFR BLD CALC: 26.7 % — LOW (ref 34.5–45)
HGB BLD-MCNC: 8.4 G/DL — LOW (ref 11.5–15.5)
HGB BLD-MCNC: 8.9 G/DL — LOW (ref 11.5–15.5)
IMM GRANULOCYTES NFR BLD AUTO: 0.6 % — SIGNIFICANT CHANGE UP (ref 0–1.5)
LYMPHOCYTES # BLD AUTO: 1.74 K/UL — SIGNIFICANT CHANGE UP (ref 1–3.3)
LYMPHOCYTES # BLD AUTO: 21.3 % — SIGNIFICANT CHANGE UP (ref 13–44)
MCHC RBC-ENTMCNC: 31 PG — SIGNIFICANT CHANGE UP (ref 27–34)
MCHC RBC-ENTMCNC: 31.1 PG — SIGNIFICANT CHANGE UP (ref 27–34)
MCHC RBC-ENTMCNC: 32.9 GM/DL — SIGNIFICANT CHANGE UP (ref 32–36)
MCHC RBC-ENTMCNC: 33.3 GM/DL — SIGNIFICANT CHANGE UP (ref 32–36)
MCV RBC AUTO: 93 FL — SIGNIFICANT CHANGE UP (ref 80–100)
MCV RBC AUTO: 94.4 FL — SIGNIFICANT CHANGE UP (ref 80–100)
MONOCYTES # BLD AUTO: 0.73 K/UL — SIGNIFICANT CHANGE UP (ref 0–0.9)
MONOCYTES NFR BLD AUTO: 9 % — SIGNIFICANT CHANGE UP (ref 2–14)
NEUTROPHILS # BLD AUTO: 5.55 K/UL — SIGNIFICANT CHANGE UP (ref 1.8–7.4)
NEUTROPHILS NFR BLD AUTO: 68.1 % — SIGNIFICANT CHANGE UP (ref 43–77)
PLATELET # BLD AUTO: 105 K/UL — LOW (ref 150–400)
PLATELET # BLD AUTO: 126 K/UL — LOW (ref 150–400)
POTASSIUM SERPL-MCNC: 3.7 MMOL/L — SIGNIFICANT CHANGE UP (ref 3.5–5.3)
POTASSIUM SERPL-SCNC: 3.7 MMOL/L — SIGNIFICANT CHANGE UP (ref 3.5–5.3)
PROT SERPL-MCNC: 4.1 GM/DL — LOW (ref 6–8.3)
RBC # BLD: 2.7 M/UL — LOW (ref 3.8–5.2)
RBC # BLD: 2.87 M/UL — LOW (ref 3.8–5.2)
RBC # FLD: 13.7 % — SIGNIFICANT CHANGE UP (ref 10.3–14.5)
RBC # FLD: 13.7 % — SIGNIFICANT CHANGE UP (ref 10.3–14.5)
SODIUM SERPL-SCNC: 144 MMOL/L — SIGNIFICANT CHANGE UP (ref 135–145)
WBC # BLD: 6.99 K/UL — SIGNIFICANT CHANGE UP (ref 3.8–10.5)
WBC # BLD: 8.15 K/UL — SIGNIFICANT CHANGE UP (ref 3.8–10.5)
WBC # FLD AUTO: 6.99 K/UL — SIGNIFICANT CHANGE UP (ref 3.8–10.5)
WBC # FLD AUTO: 8.15 K/UL — SIGNIFICANT CHANGE UP (ref 3.8–10.5)

## 2020-08-15 PROCEDURE — 71045 X-RAY EXAM CHEST 1 VIEW: CPT | Mod: 26

## 2020-08-15 PROCEDURE — 99233 SBSQ HOSP IP/OBS HIGH 50: CPT

## 2020-08-15 RX ADMIN — PANTOPRAZOLE SODIUM 40 MILLIGRAM(S): 20 TABLET, DELAYED RELEASE ORAL at 09:51

## 2020-08-15 RX ADMIN — MORPHINE SULFATE 4 MILLIGRAM(S): 50 CAPSULE, EXTENDED RELEASE ORAL at 12:25

## 2020-08-15 RX ADMIN — MORPHINE SULFATE 4 MILLIGRAM(S): 50 CAPSULE, EXTENDED RELEASE ORAL at 20:00

## 2020-08-15 RX ADMIN — Medication 100 MILLIGRAM(S): at 22:55

## 2020-08-15 RX ADMIN — MIDODRINE HYDROCHLORIDE 10 MILLIGRAM(S): 2.5 TABLET ORAL at 14:55

## 2020-08-15 RX ADMIN — Medication 1 TABLET(S): at 23:12

## 2020-08-15 RX ADMIN — MORPHINE SULFATE 4 MILLIGRAM(S): 50 CAPSULE, EXTENDED RELEASE ORAL at 03:27

## 2020-08-15 RX ADMIN — Medication 975 MILLIGRAM(S): at 23:11

## 2020-08-15 RX ADMIN — CYCLOBENZAPRINE HYDROCHLORIDE 10 MILLIGRAM(S): 10 TABLET, FILM COATED ORAL at 14:56

## 2020-08-15 RX ADMIN — CYCLOBENZAPRINE HYDROCHLORIDE 10 MILLIGRAM(S): 10 TABLET, FILM COATED ORAL at 23:13

## 2020-08-15 RX ADMIN — MIDODRINE HYDROCHLORIDE 10 MILLIGRAM(S): 2.5 TABLET ORAL at 06:37

## 2020-08-15 RX ADMIN — CYCLOBENZAPRINE HYDROCHLORIDE 10 MILLIGRAM(S): 10 TABLET, FILM COATED ORAL at 06:37

## 2020-08-15 RX ADMIN — Medication 3 MILLIGRAM(S): at 22:55

## 2020-08-15 RX ADMIN — Medication 1 TABLET(S): at 14:56

## 2020-08-15 RX ADMIN — PHENYLEPHRINE HYDROCHLORIDE 15 MICROGRAM(S)/KG/MIN: 10 INJECTION INTRAVENOUS at 11:26

## 2020-08-15 RX ADMIN — SENNA PLUS 2 TABLET(S): 8.6 TABLET ORAL at 22:55

## 2020-08-15 RX ADMIN — Medication 100 MILLIGRAM(S): at 14:16

## 2020-08-15 RX ADMIN — MIDODRINE HYDROCHLORIDE 10 MILLIGRAM(S): 2.5 TABLET ORAL at 23:11

## 2020-08-15 RX ADMIN — Medication 975 MILLIGRAM(S): at 14:16

## 2020-08-15 RX ADMIN — Medication 1 TABLET(S): at 06:37

## 2020-08-15 RX ADMIN — Medication 975 MILLIGRAM(S): at 06:37

## 2020-08-15 RX ADMIN — Medication 100 MILLIGRAM(S): at 06:38

## 2020-08-15 RX ADMIN — MORPHINE SULFATE 4 MILLIGRAM(S): 50 CAPSULE, EXTENDED RELEASE ORAL at 04:00

## 2020-08-15 RX ADMIN — Medication 975 MILLIGRAM(S): at 06:30

## 2020-08-15 RX ADMIN — MORPHINE SULFATE 4 MILLIGRAM(S): 50 CAPSULE, EXTENDED RELEASE ORAL at 12:10

## 2020-08-15 RX ADMIN — MORPHINE SULFATE 4 MILLIGRAM(S): 50 CAPSULE, EXTENDED RELEASE ORAL at 19:41

## 2020-08-15 RX ADMIN — Medication 975 MILLIGRAM(S): at 15:15

## 2020-08-15 NOTE — PHYSICAL THERAPY INITIAL EVALUATION ADULT - PERTINENT HX OF CURRENT PROBLEM, REHAB EVAL
S/P complex two-stage thoracolumbar surgery - s/p ALIF L5-S1 and XLIF T12-L1 on 8/11, L1-L4 WILIAN, L4 PSO and T4-S2 instrumentation with PSF on 8/13. 1st stg sx via thoracotomy, CT remains. 2nd stg Procedure was extensive and lasted approximately 12 hours with 1700cc EBL, pt remaining intubated in ICU post-op on pressor for MAP support. extubated 8/14. course c/b Post op spinal shock requiring high dose phenylephrine infusion, Likely phenylephrine infiltration into the L arm S/P complex two-stage thoracolumbar surgery - s/p ALIF L5-S1 and XLIF T12-L1 on 8/11, L1-L4 WILIAN, L4 PSO and T4-S2 instrumentation with PSF on 8/13. 1st stg sx via thoracotomy, CT remains. 2nd stg Procedure was extensive and lasted approximately 12 hours with 1700cc EBL, pt remaining intubated in ICU post-op on pressor for MAP support. extubated 8/14. course c/b Post op spinal shock requiring high dose phenylephrine infusion, likely phenylephrine infiltration into the L arm. ok for bedside PT thi

## 2020-08-15 NOTE — PHYSICAL THERAPY INITIAL EVALUATION ADULT - IMPAIRMENTS FOUND, PT EVAL
gait, locomotion, and balance
aerobic capacity/endurance/gait, locomotion, and balance/muscle strength/gross motor

## 2020-08-15 NOTE — PHYSICAL THERAPY INITIAL EVALUATION ADULT - DID THE PATIENT HAVE SURGERY?
yes/s/p ALIF L5-S1 and XLIF T12-L1 on 8/11, L1-L4 WILIAN, L4 PSO and T4-S2 instrumentation with PSF on 8/13
yes/s/p elective ALIF L5-S1; XLIF T12-L1

## 2020-08-15 NOTE — PROGRESS NOTE ADULT - SUBJECTIVE AND OBJECTIVE BOX
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    Allergies    No Known Allergies    Intolerances          ICU Vital Signs Last 24 Hrs  T(C): 37.1 (15 Aug 2020 16:00), Max: 37.4 (15 Aug 2020 06:00)  T(F): 98.7 (15 Aug 2020 16:00), Max: 99.4 (15 Aug 2020 06:00)  HR: 85 (15 Aug 2020 21:00) (81 - 98)  BP: 105/42 (15 Aug 2020 21:00) (95/39 - 124/46)  BP(mean): 55 (15 Aug 2020 21:00) (49 - 65)  ABP: 107/49 (15 Aug 2020 21:00) (82/36 - 127/72)  ABP(mean): 72 (15 Aug 2020 21:00) (52 - 93)  RR: 16 (15 Aug 2020 21:00) (16 - 29)  SpO2: 95% (15 Aug 2020 19:00) (95% - 100%)    Physical Examination:    General:     HEENT:     PULM:     CVS:     ABD:     EXT:     SKIN:     Neuro:          LABS:                        8.9    6.99  )-----------( 105      ( 15 Aug 2020 15:10 )             26.7     08-15    144  |  114<H>  |  15  ----------------------------<  107<H>  3.7   |  25  |  0.50    Ca    7.1<L>      15 Aug 2020 07:05    TPro  4.1<L>  /  Alb  1.3<L>  /  TBili  0.3  /  DBili  x   /  AST  52<H>  /  ALT  16  /  AlkPhos  44  08-15          CAPILLARY BLOOD GLUCOSE            CULTURES:      Medications:  MEDICATIONS  (STANDING):  acetaminophen   Tablet .. 975 milliGRAM(s) Oral every 8 hours  calcium carbonate 1250 mG  + Vitamin D (OsCal 500 + D) 1 Tablet(s) Oral three times a day  ceFAZolin   IVPB 2000 milliGRAM(s) IV Intermittent every 8 hours  cyclobenzaprine 10 milliGRAM(s) Oral every 8 hours  melatonin 3 milliGRAM(s) Oral at bedtime  midodrine 10 milliGRAM(s) Oral every 8 hours  pantoprazole  Injectable 40 milliGRAM(s) IV Push daily  phenylephrine    Infusion 0.49 MICROgram(s)/kG/Min (15 mL/Hr) IV Continuous <Continuous>  senna 2 Tablet(s) Oral at bedtime    MEDICATIONS  (PRN):  benzocaine 15 mG/menthol 3.6 mG (Sugar-Free) Lozenge 1 Lozenge Oral every 6 hours PRN Sore Throat  diphenhydrAMINE   Injectable 12.5 milliGRAM(s) IV Push every 4 hours PRN Itching  famotidine    Tablet 20 milliGRAM(s) Oral every 12 hours PRN Dyspepsia  fentaNYL    Injectable 100 MICROGram(s) IV Push every 3 hours PRN distress  magnesium hydroxide Suspension 30 milliLiter(s) Oral every 12 hours PRN Constipation  morphine  - Injectable 4 milliGRAM(s) IV Push every 4 hours PRN Severe Pain (7 - 10)  naloxone Injectable 0.1 milliGRAM(s) IV Push every 3 minutes PRN For ANY of the following changes in patient status:  A. RR LESS THAN 10 breaths per minute, B. Oxygen saturation LESS THAN 90%, C. Sedation score of 6  ondansetron Injectable 4 milliGRAM(s) IV Push every 6 hours PRN Nausea        08-14 @ 07:01  -  08-15 @ 07:00  --------------------------------------------------------  IN: 5415 mL / OUT: 2590 mL / NET: 2825 mL    08-15 @ 07:01  -  08-15 @ 23:14  --------------------------------------------------------  IN: 572 mL / OUT: 860 mL / NET: -288 mL        RADIOLOGY/IMAGING/ECHO    Critical care point of care ultrasound:    Assessment/Plan:          CRITICAL CARE TIME SPENT: 37 minutes assessing presenting problems of acute illness, which pose high probability of life threatening deterioration or end organ damage/dysfunction, as well as medical decision making including initiating plan of care, reviewing data, reviewing radiologic exams, discussing with multidisciplinary team,  discussing goals of care with patient/family, and writing this note.  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    Allergies    No Known Allergies    Intolerances          ICU Vital Signs Last 24 Hrs  T(C): 37.1 (15 Aug 2020 16:00), Max: 37.4 (15 Aug 2020 06:00)  T(F): 98.7 (15 Aug 2020 16:00), Max: 99.4 (15 Aug 2020 06:00)  HR: 85 (15 Aug 2020 21:00) (81 - 98)  BP: 105/42 (15 Aug 2020 21:00) (95/39 - 124/46)  BP(mean): 55 (15 Aug 2020 21:00) (49 - 65)  ABP: 107/49 (15 Aug 2020 21:00) (82/36 - 127/72)  ABP(mean): 72 (15 Aug 2020 21:00) (52 - 93)  RR: 16 (15 Aug 2020 21:00) (16 - 29)  SpO2: 95% (15 Aug 2020 19:00) (95% - 100%)    Physical Examination:    General:     HEENT:     PULM:     CVS:     ABD:     EXT:     SKIN:     Neuro:          LABS:                        8.9    6.99  )-----------( 105      ( 15 Aug 2020 15:10 )             26.7     08-15    144  |  114<H>  |  15  ----------------------------<  107<H>  3.7   |  25  |  0.50    Ca    7.1<L>      15 Aug 2020 07:05    TPro  4.1<L>  /  Alb  1.3<L>  /  TBili  0.3  /  DBili  x   /  AST  52<H>  /  ALT  16  /  AlkPhos  44  08-15          CAPILLARY BLOOD GLUCOSE            CULTURES:      Medications:  MEDICATIONS  (STANDING):  acetaminophen   Tablet .. 975 milliGRAM(s) Oral every 8 hours  calcium carbonate 1250 mG  + Vitamin D (OsCal 500 + D) 1 Tablet(s) Oral three times a day  ceFAZolin   IVPB 2000 milliGRAM(s) IV Intermittent every 8 hours  cyclobenzaprine 10 milliGRAM(s) Oral every 8 hours  melatonin 3 milliGRAM(s) Oral at bedtime  midodrine 10 milliGRAM(s) Oral every 8 hours  pantoprazole  Injectable 40 milliGRAM(s) IV Push daily  phenylephrine    Infusion 0.49 MICROgram(s)/kG/Min (15 mL/Hr) IV Continuous <Continuous>  senna 2 Tablet(s) Oral at bedtime    MEDICATIONS  (PRN):  benzocaine 15 mG/menthol 3.6 mG (Sugar-Free) Lozenge 1 Lozenge Oral every 6 hours PRN Sore Throat  diphenhydrAMINE   Injectable 12.5 milliGRAM(s) IV Push every 4 hours PRN Itching  famotidine    Tablet 20 milliGRAM(s) Oral every 12 hours PRN Dyspepsia  fentaNYL    Injectable 100 MICROGram(s) IV Push every 3 hours PRN distress  magnesium hydroxide Suspension 30 milliLiter(s) Oral every 12 hours PRN Constipation  morphine  - Injectable 4 milliGRAM(s) IV Push every 4 hours PRN Severe Pain (7 - 10)  naloxone Injectable 0.1 milliGRAM(s) IV Push every 3 minutes PRN For ANY of the following changes in patient status:  A. RR LESS THAN 10 breaths per minute, B. Oxygen saturation LESS THAN 90%, C. Sedation score of 6  ondansetron Injectable 4 milliGRAM(s) IV Push every 6 hours PRN Nausea        08-14 @ 07:01  -  08-15 @ 07:00  --------------------------------------------------------  IN: 5415 mL / OUT: 2590 mL / NET: 2825 mL    08-15 @ 07:01  -  08-15 @ 23:14  --------------------------------------------------------  IN: 572 mL / OUT: 860 mL / NET: -288 mL        RADIOLOGY/IMAGING/ECHO    Critical care point of care ultrasound:    Assessment/Plan:    75F hx Flat-back syndrome, OA, remote PE , obesity, admitted on 8/11 for 2 staged spinal surgery. Underwent anterior lumbar discectomy and fusion on 8/11 via thoracotomy.       RTOR 8/13 prolonged OR course with high ABLA  1.7 l , L1-L4 WILIAN, L4 PSO and T4-S2 instrumentation with PSF POD 1    Post op spinal shock requiring  phenylephrine infusion, added  midodrine    SCD's   DVT chemoprophylaxis when cleared by ortho-spine Patient is a 75y old  Female who presents with a chief complaint of back p[ain (15 Aug 2020 08:04)    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  H/O spinal fusion: 1997 lumbar    NIKOS ZAY   75y    Female    Review of Systems:  NO CP SOB                     All other ROS are negative.    Allergies    No Known Allergies    Intolerances          ICU Vital Signs Last 24 Hrs  T(C): 37.1 (15 Aug 2020 16:00), Max: 37.4 (15 Aug 2020 06:00)  T(F): 98.7 (15 Aug 2020 16:00), Max: 99.4 (15 Aug 2020 06:00)  HR: 85 (15 Aug 2020 21:00) (81 - 98)  BP: 105/42 (15 Aug 2020 21:00) (95/39 - 124/46)  BP(mean): 55 (15 Aug 2020 21:00) (49 - 65)  ABP: 107/49 (15 Aug 2020 21:00) (82/36 - 127/72)  ABP(mean): 72 (15 Aug 2020 21:00) (52 - 93)  RR: 16 (15 Aug 2020 21:00) (16 - 29)  SpO2: 95% (15 Aug 2020 19:00) (95% - 100%)    Physical Examination:      Gen: Awake, alert, NAD  HEENT: NCAT, EOMI  Neck: Supple  CV: nml S1S2, RRR  Lungs: CTABL, L CT in place, no air leak  serous fluid   Abd: Soft, NT, ND, BS+  Ext: No edema  Neuro: Non focal, following commands  Skin:  L arm blistering improved                         8.9    6.99  )-----------( 105      ( 15 Aug 2020 15:10 )             26.7     08-15    144  |  114<H>  |  15  ----------------------------<  107<H>  3.7   |  25  |  0.50    Ca    7.1<L>      15 Aug 2020 07:05    TPro  4.1<L>  /  Alb  1.3<L>  /  TBili  0.3  /  DBili  x   /  AST  52<H>  /  ALT  16  /  AlkPhos  44  08-15      CAPILLARY BLOOD GLUCOSE      CULTURES:      Medications:  MEDICATIONS  (STANDING):  acetaminophen   Tablet .. 975 milliGRAM(s) Oral every 8 hours  calcium carbonate 1250 mG  + Vitamin D (OsCal 500 + D) 1 Tablet(s) Oral three times a day  ceFAZolin   IVPB 2000 milliGRAM(s) IV Intermittent every 8 hours  cyclobenzaprine 10 milliGRAM(s) Oral every 8 hours  melatonin 3 milliGRAM(s) Oral at bedtime  midodrine 10 milliGRAM(s) Oral every 8 hours  pantoprazole  Injectable 40 milliGRAM(s) IV Push daily  phenylephrine    Infusion 0.49 MICROgram(s)/kG/Min (15 mL/Hr) IV Continuous <Continuous>  senna 2 Tablet(s) Oral at bedtime    MEDICATIONS  (PRN):  benzocaine 15 mG/menthol 3.6 mG (Sugar-Free) Lozenge 1 Lozenge Oral every 6 hours PRN Sore Throat  diphenhydrAMINE   Injectable 12.5 milliGRAM(s) IV Push every 4 hours PRN Itching  famotidine    Tablet 20 milliGRAM(s) Oral every 12 hours PRN Dyspepsia  fentaNYL    Injectable 100 MICROGram(s) IV Push every 3 hours PRN distress  magnesium hydroxide Suspension 30 milliLiter(s) Oral every 12 hours PRN Constipation  morphine  - Injectable 4 milliGRAM(s) IV Push every 4 hours PRN Severe Pain (7 - 10)  naloxone Injectable 0.1 milliGRAM(s) IV Push every 3 minutes PRN For ANY of the following changes in patient status:  A. RR LESS THAN 10 breaths per minute, B. Oxygen saturation LESS THAN 90%, C. Sedation score of 6  ondansetron Injectable 4 milliGRAM(s) IV Push every 6 hours PRN Nausea        08-14 @ 07:01  -  08-15 @ 07:00  --------------------------------------------------------  IN: 5415 mL / OUT: 2590 mL / NET: 2825 mL    08-15 @ 07:01  -  08-15 @ 23:14  --------------------------------------------------------  IN: 572 mL / OUT: 860 mL / NET: -288 mL        RADIOLOGY/IMAGING/ECHO    Critical care point of care ultrasound:    Assessment/Plan:    75F hx Flat-back syndrome, OA, remote PE , obesity, admitted on 8/11 for 2 staged spinal surgery. Underwent anterior lumbar discectomy and fusion on 8/11 via thoracotomy.       RTOR 8/13 prolonged OR course with high ABLA  1.7 l , L1-L4 WILIAN, L4 PSO and T4-S2 instrumentation with PSF POD 2    Post op spinal shock requiring  phenylephrine infusion, added  midodrine  tapered tawny,  now increase to 15 q8,      A unit of packed cells fro oozy drains with dropping hgb     SCD's   DVT chemoprophylaxis hgb stable and when cleared by ortho-spine

## 2020-08-15 NOTE — PHYSICAL THERAPY INITIAL EVALUATION ADULT - CRITERIA FOR SKILLED THERAPEUTIC INTERVENTIONS
Note: 2nd stage of spine sx planned for 8/13/20/impairments found
functional limitations in following categories/impairments found

## 2020-08-15 NOTE — PROGRESS NOTE ADULT - SUBJECTIVE AND OBJECTIVE BOX
POD #4 STAGE I - L5-S1 osteotomy, ALIF -hyperlordotic cage, T12-L1 osteotomy anterior lateral with hyperlordotic cage, release of ALL  POD# 2 removal of L1-4 screws, PSO L4, T4-pelvis instrumentation, CT TRAYC, Kyphoplasty T5,6,7, 3 BMPs, local bone, plastics closure    Pt seen resting in bed. Alert and oriented x 3 with drowsiness. PT has incisional site soreness tolerable with current medications. PT denies lower extremities pain, numbness, and weakness.  Galloway intact.     PE  Gen appearance: Drowsy  Motor strength: moving lower extremities well. -5/5 of b/l HF. 5/5 of b/l ant tibs, EHL  Sensation: intact to light touch  venodynes on bilaterally No calf tenderness bilaterally    Plan  Tmax:100.8, BP:107/30 eval per intensivist  WBC:8.15, H/H: 8.4/25.5 transfusion  Left CT continue management per Surgery 300cc over 24 hours  Continue drains per Plastic surgery 100/60/40/50cc  Pt cleared to do physical therapy in bed  Discussed case with Dr. Chery

## 2020-08-15 NOTE — PHYSICAL THERAPY INITIAL EVALUATION ADULT - DIAGNOSIS, PT EVAL
Flatback syndrome, s/p ALIF L5-S1 and XLIF T12-L1 on 8/11, L1-L4 WILIAN, L4 PSO and T4-S2 instrumentation with PSF on 8/13 Flatback syndrome, POD #4 STAGE I - L5-S1 osteotomy, ALIF -hyperlordotic cage, T12-L1 osteotomy anterior lateral with hyperlordotic cage, release of ALL

## 2020-08-15 NOTE — PROVIDER CONTACT NOTE (OTHER) - SITUATION
Spoke to Antonina at service regarding consult.
Patient complains of pain when IV flushed after morphine given.  Redness noted.  Patient denies pain at site when IV not in use.

## 2020-08-15 NOTE — PROGRESS NOTE ADULT - SUBJECTIVE AND OBJECTIVE BOX
Subjective: POD#4    Objective: L thoracotomy    Heent: N/C, AT PER no scleral icterus, No JVD  Pul: clear  Cor: RRR  Abdomen: soft, normal BS. Wound - clean  Extremities: mild edema    08-15    144  |  114<H>  |  15  ----------------------------<  107<H>  3.7   |  25  |  0.50    Ca    7.1<L>      15 Aug 2020 07:05    TPro  4.1<L>  /  Alb  1.3<L>  /  TBili  0.3  /  DBili  x   /  AST  52<H>  /  ALT  16  /  AlkPhos  44  08-15                            8.4    x     )-----------( 126      ( 15 Aug 2020 07:05 )             25.5

## 2020-08-15 NOTE — PHYSICAL THERAPY INITIAL EVALUATION ADULT - GENERAL OBSERVATIONS, REHAB EVAL
pt rec'd supine in bed, drowsy, monitors, O2, SCDs, CT, drains/HVACs in place,claros,A-line
O2 3L/min nc; IV; L CT to WS; claros; flowtrons; HM; BP cuff; pulse oxym; pt rec'd in bed supine; HR 84; BP 94/35; O2 Sat 98%; RR 19; pt denied pain @ rest

## 2020-08-15 NOTE — PROGRESS NOTE ADULT - ASSESSMENT
76 y/o F with a h/o Flat-back syndrome, OA, PE (completed 6 month course of apixaban), obesity, s/p extensive spinal surgery, in MICU for post op care     PLAN:  - supp o2 as needed, MDI PRN  - replete electrolytes  - follow up CBC after 1u PRBC transfusion  - hold A/C, Antiplatelet agents  - cont with LR 100cc/hr  - Cefazolin for ppx  - DVT ppx, SCDs  - Monitor in MICU

## 2020-08-15 NOTE — PROGRESS NOTE ADULT - ASSESSMENT
74 yo Female s/p ALIF L5-S1 and XLIF T12-L1 on 8/11, L1-L4 WILIAN, L4 PSO and T4-S2 instrumentation with PSF POD 2    - antibiotics while drains are in  - FU 1 U (8/15) for pressor support and decreasing H/H, recommend transfusions as needed as H/H will likely continue to downtrend given extent of surgery and incision  - Monitor drain output, management per plastic surgery  - Chest tube management per Dr. Reuben Shipman  - extubated 8/14  - Maintain MAPS >70-75 for at least 48 hours  - PT/OT, no precautions, TLSO brace while ambulating and sitting in chair  - Dispo planning  - Will d/w Dr. Epperson

## 2020-08-15 NOTE — PHYSICAL THERAPY INITIAL EVALUATION ADULT - ACTIVE RANGE OF MOTION EXAMINATION, REHAB EVAL
R shld flex ~70, elbow WFL, ~3/4 fist (swollen), L shld min-no elev (h/o erb's palsy), elbow WFL, ~3/4 fist (hand swollen), B DF WFL, min hip/knee flex (~20-30 deg), L knee ext ~ 0 deg fro TKE position), R KE min in supine
A/AAROM/no Active ROM deficits were identified

## 2020-08-15 NOTE — PHYSICAL THERAPY INITIAL EVALUATION ADULT - PLANNED THERAPY INTERVENTIONS, PT EVAL
stair training when appropriate/bed mobility training/transfer training/gait training
bed mobility training/transfer training/balance training/OOB activities once pt is cleared, <MD orders indicate TLSO when OOB but won't be able to don w/ presence of CT>/gait training/strengthening

## 2020-08-15 NOTE — PHYSICAL THERAPY INITIAL EVALUATION ADULT - PRECAUTIONS/LIMITATIONS, REHAB EVAL
TLSO brace while ambulating and sitting in chair spinal precautions/TLSO brace while ambulating and sitting in chair

## 2020-08-15 NOTE — PROGRESS NOTE ADULT - SUBJECTIVE AND OBJECTIVE BOX
Patient seen and examined at bedside in ICU, intubated, lightly sedated, responsive to commands.  On levo with MAP's in 60's, D/w attending and RN about keeping maps above 70-75.  On CPAP, possible extubation today.    Vital Signs Last 24 Hrs  T(C): 36.7 (15 Aug 2020 12:30), Max: 37.9 (14 Aug 2020 20:00)  T(F): 98 (15 Aug 2020 12:30), Max: 100.3 (14 Aug 2020 20:00)  HR: 84 (15 Aug 2020 13:00) (81 - 104)  BP: 107/30 (15 Aug 2020 08:00) (104/35 - 124/46)  BP(mean): 49 (15 Aug 2020 08:00) (49 - 65)  RR: 19 (15 Aug 2020 13:00) (16 - 29)  SpO2: 97% (15 Aug 2020 13:00) (92% - 100%)    PE: Gen: NAD, following commands  Motor: Moving bilateral LE with DF/PF, HF and KE, no observed deficits  B/L UE shoulder abduction, Arm Flex/Ext, Hand intrinsics intact  no calf TTP  B/L radial pulse palpable, cold digits in hands, feet warm and perfused, + Dp pulses        LABS:                          8.4    8.15  )-----------( 126      ( 15 Aug 2020 07:05 )             25.5     08-15    144  |  114<H>  |  15  ----------------------------<  107<H>  3.7   |  25  |  0.50    Ca    7.1<L>      15 Aug 2020 07:05    TPro  4.1<L>  /  Alb  1.3<L>  /  TBili  0.3  /  DBili  x   /  AST  52<H>  /  ALT  16  /  AlkPhos  44  08-15

## 2020-08-15 NOTE — PROGRESS NOTE ADULT - ASSESSMENT
300cc/24h per L CT. Cont to suction. Improved diet should allow for better oncotic pressure and less chest tube drainage.

## 2020-08-15 NOTE — PHYSICAL THERAPY INITIAL EVALUATION ADULT - MANUAL MUSCLE TESTING RESULTS, REHAB EVAL
R shld 3-, L shld trace, L elbow 4/5, R elbow 4-, L KE ~3/5, R KE 3-, B knee flex (w/in avail AROM) 4, DF 5 L, 5- R
except L shld FE 2/5/no strength deficits were identified

## 2020-08-15 NOTE — PROGRESS NOTE ADULT - SUBJECTIVE AND OBJECTIVE BOX
Patient 76yo female with PMHx Flat-back syndrome, OA, PE (completed 6 month course of apixaban), obesity, admitted on 8/11 for 2 staged spinal surgery. Underwent anterior lumbar discectomy and fusion on 8/11 via thoracotomy. Left sided formal chest tube placed as well. No reported complications.    Last night patient returned to the OR today for hardware removal and further spinal fusions via posterior approach. Procedure was extensive and lasted approximately 12 hours with 1700cc EBL. Received 700cc blood back from [facility] and additional 1u PRBC. 5.5L crystalloid intra-op.     8/14: patient seen and examined, at bedside, placed on CPAP trial, did well for 1 hour, extubated to 4LNC  Remains no low dose Dashawn for MAP support  receiving 1u PRBC  Afebrile, HD stable, comfortable in NAD    8/15 patients afebrile, HD stable, comfortable in NAD  HH downtrending, will give 1u PRBC  LUE mildly swollen, elevated             T(C): 36.8 (08-15-20 @ 08:00), Max: 38.2 (08-14-20 @ 14:00)  HR: 90 (08-15-20 @ 09:00) (81 - 104)  BP: 107/30 (08-15-20 @ 08:00) (104/35 - 124/46)  RR: 16 (08-15-20 @ 09:00) (16 - 28)  SpO2: 96% (08-15-20 @ 09:00) (92% - 100%)  Wt(kg): --    Gen: Awake, alert, NAD  HEENT: NCAT, EOMI  Neck: Supple  CV: nml S1S2, RRR  Lungs: CTABL, L CT in place, no air leak  Abd: Soft, NT, ND, BS+  Ext: No edema  Neuro: Non focal, following commands  Skin: skin tears on b/l hips, small dressing over periumbilical region c/d/i, long dressing from T-spine to S-spine c/d/i, 4 drains with bright red blood draining   LUE sevral blisters on forarm, radial pulse intact, motor strength 5/5                                8.4    8.15  )-----------( 126      ( 15 Aug 2020 07:05 )             25.5     15 Aug 2020 07:05    144    |  114    |  15     ----------------------------<  107    3.7     |  25     |  0.50     Ca    7.1        15 Aug 2020 07:05    TPro  4.1    /  Alb  1.3    /  TBili  0.3    /  DBili  x      /  AST  52     /  ALT  16     /  AlkPhos  44     15 Aug 2020 07:05      CAPILLARY BLOOD GLUCOSE        LIVER FUNCTIONS - ( 15 Aug 2020 07:05 )  Alb: 1.3 g/dL / Pro: 4.1 gm/dL / ALK PHOS: 44 U/L / ALT: 16 U/L / AST: 52 U/L / GGT: x

## 2020-08-15 NOTE — PROVIDER CONTACT NOTE (OTHER) - BACKGROUND
Phenylephrine infusing left forearm IV; gtt held and IV flushed to administer morphine.  IV flushed again with 10cc saline after morphine administered, and patient complained of pain during flush.

## 2020-08-16 LAB
ALBUMIN SERPL ELPH-MCNC: 1.3 G/DL — LOW (ref 3.3–5)
ALP SERPL-CCNC: 45 U/L — SIGNIFICANT CHANGE UP (ref 40–120)
ALT FLD-CCNC: 14 U/L — SIGNIFICANT CHANGE UP (ref 12–78)
ANION GAP SERPL CALC-SCNC: 7 MMOL/L — SIGNIFICANT CHANGE UP (ref 5–17)
AST SERPL-CCNC: 41 U/L — HIGH (ref 15–37)
BASOPHILS # BLD AUTO: 0 K/UL — SIGNIFICANT CHANGE UP (ref 0–0.2)
BASOPHILS NFR BLD AUTO: 0 % — SIGNIFICANT CHANGE UP (ref 0–2)
BILIRUB SERPL-MCNC: 0.4 MG/DL — SIGNIFICANT CHANGE UP (ref 0.2–1.2)
BUN SERPL-MCNC: 15 MG/DL — SIGNIFICANT CHANGE UP (ref 7–23)
CALCIUM SERPL-MCNC: 7.3 MG/DL — LOW (ref 8.5–10.1)
CHLORIDE SERPL-SCNC: 112 MMOL/L — HIGH (ref 96–108)
CO2 SERPL-SCNC: 25 MMOL/L — SIGNIFICANT CHANGE UP (ref 22–31)
CREAT SERPL-MCNC: 0.38 MG/DL — LOW (ref 0.5–1.3)
EOSINOPHIL # BLD AUTO: 0.27 K/UL — SIGNIFICANT CHANGE UP (ref 0–0.5)
EOSINOPHIL NFR BLD AUTO: 4 % — SIGNIFICANT CHANGE UP (ref 0–6)
GLUCOSE SERPL-MCNC: 80 MG/DL — SIGNIFICANT CHANGE UP (ref 70–99)
HCT VFR BLD CALC: 25.3 % — LOW (ref 34.5–45)
HGB BLD-MCNC: 8.6 G/DL — LOW (ref 11.5–15.5)
LYMPHOCYTES # BLD AUTO: 1.44 K/UL — SIGNIFICANT CHANGE UP (ref 1–3.3)
LYMPHOCYTES # BLD AUTO: 21 % — SIGNIFICANT CHANGE UP (ref 13–44)
MCHC RBC-ENTMCNC: 31.4 PG — SIGNIFICANT CHANGE UP (ref 27–34)
MCHC RBC-ENTMCNC: 34 GM/DL — SIGNIFICANT CHANGE UP (ref 32–36)
MCV RBC AUTO: 92.3 FL — SIGNIFICANT CHANGE UP (ref 80–100)
MONOCYTES # BLD AUTO: 0.34 K/UL — SIGNIFICANT CHANGE UP (ref 0–0.9)
MONOCYTES NFR BLD AUTO: 5 % — SIGNIFICANT CHANGE UP (ref 2–14)
NEUTROPHILS # BLD AUTO: 4.73 K/UL — SIGNIFICANT CHANGE UP (ref 1.8–7.4)
NEUTROPHILS NFR BLD AUTO: 69 % — SIGNIFICANT CHANGE UP (ref 43–77)
NRBC # BLD: SIGNIFICANT CHANGE UP /100 WBCS (ref 0–0)
PLATELET # BLD AUTO: 105 K/UL — LOW (ref 150–400)
POTASSIUM SERPL-MCNC: 3.4 MMOL/L — LOW (ref 3.5–5.3)
POTASSIUM SERPL-SCNC: 3.4 MMOL/L — LOW (ref 3.5–5.3)
PROT SERPL-MCNC: 4.1 GM/DL — LOW (ref 6–8.3)
RBC # BLD: 2.74 M/UL — LOW (ref 3.8–5.2)
RBC # FLD: 14.2 % — SIGNIFICANT CHANGE UP (ref 10.3–14.5)
SODIUM SERPL-SCNC: 144 MMOL/L — SIGNIFICANT CHANGE UP (ref 135–145)
WBC # BLD: 6.85 K/UL — SIGNIFICANT CHANGE UP (ref 3.8–10.5)
WBC # FLD AUTO: 6.85 K/UL — SIGNIFICANT CHANGE UP (ref 3.8–10.5)

## 2020-08-16 PROCEDURE — 71045 X-RAY EXAM CHEST 1 VIEW: CPT | Mod: 26

## 2020-08-16 PROCEDURE — 99233 SBSQ HOSP IP/OBS HIGH 50: CPT

## 2020-08-16 RX ORDER — MIDODRINE HYDROCHLORIDE 2.5 MG/1
15 TABLET ORAL EVERY 8 HOURS
Refills: 0 | Status: DISCONTINUED | OUTPATIENT
Start: 2020-08-16 | End: 2020-08-17

## 2020-08-16 RX ORDER — POTASSIUM CHLORIDE 20 MEQ
40 PACKET (EA) ORAL ONCE
Refills: 0 | Status: COMPLETED | OUTPATIENT
Start: 2020-08-16 | End: 2020-08-16

## 2020-08-16 RX ADMIN — Medication 975 MILLIGRAM(S): at 09:10

## 2020-08-16 RX ADMIN — Medication 975 MILLIGRAM(S): at 13:14

## 2020-08-16 RX ADMIN — Medication 975 MILLIGRAM(S): at 06:09

## 2020-08-16 RX ADMIN — Medication 40 MILLIEQUIVALENT(S): at 09:16

## 2020-08-16 RX ADMIN — MORPHINE SULFATE 4 MILLIGRAM(S): 50 CAPSULE, EXTENDED RELEASE ORAL at 04:21

## 2020-08-16 RX ADMIN — Medication 100 MILLIGRAM(S): at 21:48

## 2020-08-16 RX ADMIN — Medication 1 TABLET(S): at 06:11

## 2020-08-16 RX ADMIN — Medication 3 MILLIGRAM(S): at 21:48

## 2020-08-16 RX ADMIN — CYCLOBENZAPRINE HYDROCHLORIDE 10 MILLIGRAM(S): 10 TABLET, FILM COATED ORAL at 21:48

## 2020-08-16 RX ADMIN — CYCLOBENZAPRINE HYDROCHLORIDE 10 MILLIGRAM(S): 10 TABLET, FILM COATED ORAL at 06:11

## 2020-08-16 RX ADMIN — MIDODRINE HYDROCHLORIDE 15 MILLIGRAM(S): 2.5 TABLET ORAL at 21:47

## 2020-08-16 RX ADMIN — Medication 975 MILLIGRAM(S): at 00:30

## 2020-08-16 RX ADMIN — MORPHINE SULFATE 4 MILLIGRAM(S): 50 CAPSULE, EXTENDED RELEASE ORAL at 18:28

## 2020-08-16 RX ADMIN — CYCLOBENZAPRINE HYDROCHLORIDE 10 MILLIGRAM(S): 10 TABLET, FILM COATED ORAL at 13:13

## 2020-08-16 RX ADMIN — MIDODRINE HYDROCHLORIDE 15 MILLIGRAM(S): 2.5 TABLET ORAL at 06:09

## 2020-08-16 RX ADMIN — SENNA PLUS 2 TABLET(S): 8.6 TABLET ORAL at 21:47

## 2020-08-16 RX ADMIN — MIDODRINE HYDROCHLORIDE 15 MILLIGRAM(S): 2.5 TABLET ORAL at 13:13

## 2020-08-16 RX ADMIN — FAMOTIDINE 20 MILLIGRAM(S): 10 INJECTION INTRAVENOUS at 03:51

## 2020-08-16 RX ADMIN — MORPHINE SULFATE 4 MILLIGRAM(S): 50 CAPSULE, EXTENDED RELEASE ORAL at 03:51

## 2020-08-16 RX ADMIN — Medication 1 TABLET(S): at 21:47

## 2020-08-16 RX ADMIN — Medication 100 MILLIGRAM(S): at 13:13

## 2020-08-16 RX ADMIN — Medication 1 TABLET(S): at 13:13

## 2020-08-16 RX ADMIN — Medication 975 MILLIGRAM(S): at 21:48

## 2020-08-16 RX ADMIN — Medication 100 MILLIGRAM(S): at 06:11

## 2020-08-16 RX ADMIN — PANTOPRAZOLE SODIUM 40 MILLIGRAM(S): 20 TABLET, DELAYED RELEASE ORAL at 09:17

## 2020-08-16 RX ADMIN — MORPHINE SULFATE 4 MILLIGRAM(S): 50 CAPSULE, EXTENDED RELEASE ORAL at 23:27

## 2020-08-16 NOTE — PROGRESS NOTE ADULT - SUBJECTIVE AND OBJECTIVE BOX
Patient seen and examined at bedside. Patient improving. Denies weakness, tingling or numbness. No other acute complaints.     Vital Signs Last 24 Hrs  T(C): 37.1 (16 Aug 2020 05:00), Max: 37.1 (15 Aug 2020 16:00)  T(F): 98.7 (16 Aug 2020 05:00), Max: 98.7 (15 Aug 2020 16:00)  HR: 98 (16 Aug 2020 09:00) (82 - 98)  BP: 98/43 (16 Aug 2020 04:00) (95/39 - 118/43)  BP(mean): 57 (16 Aug 2020 04:00) (43 - 61)  RR: 21 (16 Aug 2020 09:00) (7 - 29)  SpO2: 100% (16 Aug 2020 08:00) (95% - 100%)    PE: Gen: NAD, following commands  Motor: Moving bilateral LE with DF/PF, HF and KE, no observed deficits  B/L UE shoulder abduction, Arm Flex/Ext, Hand intrinsics intact  no calf TTP  B/L radial pulse palpable, cold digits in hands, feet warm and perfused, + Dp pulses        LABS:                          8.4    8.15  )-----------( 126      ( 15 Aug 2020 07:05 )             25.5     08-15    144  |  114<H>  |  15  ----------------------------<  107<H>  3.7   |  25  |  0.50    Ca    7.1<L>      15 Aug 2020 07:05    TPro  4.1<L>  /  Alb  1.3<L>  /  TBili  0.3  /  DBili  x   /  AST  52<H>  /  ALT  16  /  AlkPhos  44  08-15

## 2020-08-16 NOTE — PROGRESS NOTE ADULT - ASSESSMENT
76 yo Female s/p ALIF L5-S1 and XLIF T12-L1 on 8/11, L1-L4 WILIAN, L4 PSO and T4-S2 instrumentation with PSF POD 3    - FU 1 U (8/15) for pressor support and decreasing H/H, recommend transfusions as needed as H/H will likely continue to downtrend given extent of surgery and incision  - Monitor drain output, management per plastic surgery  - Chest tube management per Dr. Reuben Shipman  - extubated 8/14  - Maintain MAPS >70-75 for at least 48 hours  - PT/OT, no precautions, TLSO brace while ambulating and sitting in chair  - Dispo planning  - Will d/w Dr. Epperson 74 yo Female s/p ALIF L5-S1 and XLIF T12-L1 on 8/11, L1-L4 WILIAN, L4 PSO and T4-S2 instrumentation with PSF POD 3    - FU 1 U (8/15) for pressor support and decreasing H/H, recommend transfusions as needed as H/H will likely continue to downtrend given extent of surgery and incision  - Monitor drain output, management per plastic surgery  - Chest tube management per Dr. Reuben Shipman  - extubated 8/14  - PT/OT, no precautions, TLSO brace while ambulating and sitting in chair  - Dispo planning  - Will d/w Dr. Epperson

## 2020-08-16 NOTE — PROGRESS NOTE ADULT - ASSESSMENT
74 y/o F with a h/o Flat-back syndrome, OA, PE (completed 6 month course of apixaban), obesity, s/p extensive spinal surgery, in MICU for post op care         PLAN:  - supp o2 as needed, MDI PRN  - replete electrolytes  - CT mgmt as per surgery, drained 300cc overnight  - monitor CBC  - hold A/C, Antiplatelet agents  - DC IVF  - Cefazolin for ppx  - Midodrine 15mg PO TID  - DC Neoseynpehrine  - DC art line  - DVT ppx, SCDs  - Stable, would transfer to Surg Step down

## 2020-08-16 NOTE — PROGRESS NOTE ADULT - ASSESSMENT
Will continue drains for now  If output remains low, may start removing drains tomorrow  Continue care per Dr. Epperson and all consultants.

## 2020-08-16 NOTE — PROGRESS NOTE ADULT - SUBJECTIVE AND OBJECTIVE BOX
Patient 76yo female with PMHx Flat-back syndrome, OA, PE (completed 6 month course of apixaban), obesity, admitted on 8/11 for 2 staged spinal surgery. Underwent anterior lumbar discectomy and fusion on 8/11 via thoracotomy. Left sided formal chest tube placed as well. No reported complications.    Last night patient returned to the OR today for hardware removal and further spinal fusions via posterior approach. Procedure was extensive and lasted approximately 12 hours with 1700cc EBL. Received 700cc blood back from [facility] and additional 1u PRBC. 5.5L crystalloid intra-op.     8/14: patient seen and examined, at bedside, placed on CPAP trial, did well for 1 hour, extubated to 4LNC  Remains no low dose Dashawn for MAP support  receiving 1u PRBC  Afebrile, HD stable, comfortable in NAD    8/15 patients afebrile, HD stable, comfortable in NAD  HH downtrending, will give 1u PRBC  LUE mildly swollen, elevated    8/16 Patient off Neosynephrine, ON midodrine 15mg PO TID, MAP 75-80  ARt line to dcd    T(C): 37.1 (08-16-20 @ 05:00), Max: 37.1 (08-15-20 @ 16:00)  HR: 88 (08-16-20 @ 10:00) (82 - 98)  BP: 96/44 (08-16-20 @ 10:00) (95/39 - 118/43)  RR: 15 (08-16-20 @ 10:00) (7 - 26)  SpO2: 100% (08-16-20 @ 08:00) (95% - 100%)  Wt(kg): --    Gen: Awake, alert, NAD  HEENT: NCAT, EOMI  Neck: Supple  CV: nml S1S2, RRR  Lungs: CTABL, L CT in place, no air leak  Abd: Soft, NT, ND, BS+  Ext: No edema  Neuro: Non focal, following commands  Skin: skin tears on b/l hips, small dressing over periumbilical region c/d/i, long dressing from T-spine to S-spine c/d/i, 4 drains with bright red blood draining   LUE several blisters on forearm radial pulse intact, motor strength 5/5                        8.6    6.85  )-----------( 105      ( 16 Aug 2020 05:50 )             25.3     16 Aug 2020 05:50    144    |  112    |  15     ----------------------------<  80     3.4     |  25     |  0.38     Ca    7.3        16 Aug 2020 05:50    TPro  4.1    /  Alb  1.3    /  TBili  0.4    /  DBili  x      /  AST  41     /  ALT  14     /  AlkPhos  45     16 Aug 2020 05:50      CAPILLARY BLOOD GLUCOSE        LIVER FUNCTIONS - ( 16 Aug 2020 05:50 )  Alb: 1.3 g/dL / Pro: 4.1 gm/dL / ALK PHOS: 45 U/L / ALT: 14 U/L / AST: 41 U/L / GGT: x

## 2020-08-17 LAB
ALBUMIN SERPL ELPH-MCNC: 1.3 G/DL — LOW (ref 3.3–5)
ALP SERPL-CCNC: 50 U/L — SIGNIFICANT CHANGE UP (ref 40–120)
ALT FLD-CCNC: 12 U/L — SIGNIFICANT CHANGE UP (ref 12–78)
ANION GAP SERPL CALC-SCNC: 6 MMOL/L — SIGNIFICANT CHANGE UP (ref 5–17)
AST SERPL-CCNC: 37 U/L — SIGNIFICANT CHANGE UP (ref 15–37)
BILIRUB SERPL-MCNC: 0.2 MG/DL — SIGNIFICANT CHANGE UP (ref 0.2–1.2)
BUN SERPL-MCNC: 16 MG/DL — SIGNIFICANT CHANGE UP (ref 7–23)
CALCIUM SERPL-MCNC: 7.4 MG/DL — LOW (ref 8.5–10.1)
CHLORIDE SERPL-SCNC: 110 MMOL/L — HIGH (ref 96–108)
CO2 SERPL-SCNC: 26 MMOL/L — SIGNIFICANT CHANGE UP (ref 22–31)
CREAT SERPL-MCNC: 0.46 MG/DL — LOW (ref 0.5–1.3)
GLUCOSE SERPL-MCNC: 89 MG/DL — SIGNIFICANT CHANGE UP (ref 70–99)
HCT VFR BLD CALC: 26.3 % — LOW (ref 34.5–45)
HGB BLD-MCNC: 8.9 G/DL — LOW (ref 11.5–15.5)
MCHC RBC-ENTMCNC: 31.1 PG — SIGNIFICANT CHANGE UP (ref 27–34)
MCHC RBC-ENTMCNC: 33.8 GM/DL — SIGNIFICANT CHANGE UP (ref 32–36)
MCV RBC AUTO: 92 FL — SIGNIFICANT CHANGE UP (ref 80–100)
PLATELET # BLD AUTO: 135 K/UL — LOW (ref 150–400)
POTASSIUM SERPL-MCNC: 3.5 MMOL/L — SIGNIFICANT CHANGE UP (ref 3.5–5.3)
POTASSIUM SERPL-SCNC: 3.5 MMOL/L — SIGNIFICANT CHANGE UP (ref 3.5–5.3)
PROT SERPL-MCNC: 4.3 GM/DL — LOW (ref 6–8.3)
RBC # BLD: 2.86 M/UL — LOW (ref 3.8–5.2)
RBC # FLD: 14 % — SIGNIFICANT CHANGE UP (ref 10.3–14.5)
SODIUM SERPL-SCNC: 142 MMOL/L — SIGNIFICANT CHANGE UP (ref 135–145)
WBC # BLD: 7.24 K/UL — SIGNIFICANT CHANGE UP (ref 3.8–10.5)
WBC # FLD AUTO: 7.24 K/UL — SIGNIFICANT CHANGE UP (ref 3.8–10.5)

## 2020-08-17 PROCEDURE — 99232 SBSQ HOSP IP/OBS MODERATE 35: CPT

## 2020-08-17 RX ORDER — MIDODRINE HYDROCHLORIDE 2.5 MG/1
10 TABLET ORAL EVERY 8 HOURS
Refills: 0 | Status: DISCONTINUED | OUTPATIENT
Start: 2020-08-17 | End: 2020-08-21

## 2020-08-17 RX ORDER — MORPHINE SULFATE 50 MG/1
2 CAPSULE, EXTENDED RELEASE ORAL EVERY 4 HOURS
Refills: 0 | Status: DISCONTINUED | OUTPATIENT
Start: 2020-08-17 | End: 2020-08-18

## 2020-08-17 RX ADMIN — CYCLOBENZAPRINE HYDROCHLORIDE 10 MILLIGRAM(S): 10 TABLET, FILM COATED ORAL at 13:55

## 2020-08-17 RX ADMIN — MORPHINE SULFATE 2 MILLIGRAM(S): 50 CAPSULE, EXTENDED RELEASE ORAL at 10:33

## 2020-08-17 RX ADMIN — MORPHINE SULFATE 2 MILLIGRAM(S): 50 CAPSULE, EXTENDED RELEASE ORAL at 21:57

## 2020-08-17 RX ADMIN — MORPHINE SULFATE 4 MILLIGRAM(S): 50 CAPSULE, EXTENDED RELEASE ORAL at 05:40

## 2020-08-17 RX ADMIN — MORPHINE SULFATE 2 MILLIGRAM(S): 50 CAPSULE, EXTENDED RELEASE ORAL at 10:45

## 2020-08-17 RX ADMIN — MORPHINE SULFATE 4 MILLIGRAM(S): 50 CAPSULE, EXTENDED RELEASE ORAL at 00:00

## 2020-08-17 RX ADMIN — Medication 975 MILLIGRAM(S): at 21:56

## 2020-08-17 RX ADMIN — CYCLOBENZAPRINE HYDROCHLORIDE 10 MILLIGRAM(S): 10 TABLET, FILM COATED ORAL at 05:33

## 2020-08-17 RX ADMIN — MIDODRINE HYDROCHLORIDE 10 MILLIGRAM(S): 2.5 TABLET ORAL at 21:56

## 2020-08-17 RX ADMIN — Medication 975 MILLIGRAM(S): at 22:30

## 2020-08-17 RX ADMIN — Medication 1 TABLET(S): at 05:33

## 2020-08-17 RX ADMIN — Medication 1 TABLET(S): at 13:56

## 2020-08-17 RX ADMIN — MORPHINE SULFATE 2 MILLIGRAM(S): 50 CAPSULE, EXTENDED RELEASE ORAL at 22:30

## 2020-08-17 RX ADMIN — MIDODRINE HYDROCHLORIDE 10 MILLIGRAM(S): 2.5 TABLET ORAL at 13:53

## 2020-08-17 RX ADMIN — Medication 100 MILLIGRAM(S): at 05:33

## 2020-08-17 RX ADMIN — Medication 100 MILLIGRAM(S): at 13:53

## 2020-08-17 RX ADMIN — Medication 975 MILLIGRAM(S): at 13:55

## 2020-08-17 RX ADMIN — Medication 975 MILLIGRAM(S): at 14:00

## 2020-08-17 RX ADMIN — Medication 975 MILLIGRAM(S): at 05:33

## 2020-08-17 RX ADMIN — CYCLOBENZAPRINE HYDROCHLORIDE 10 MILLIGRAM(S): 10 TABLET, FILM COATED ORAL at 21:56

## 2020-08-17 RX ADMIN — MIDODRINE HYDROCHLORIDE 15 MILLIGRAM(S): 2.5 TABLET ORAL at 05:33

## 2020-08-17 RX ADMIN — SENNA PLUS 2 TABLET(S): 8.6 TABLET ORAL at 21:56

## 2020-08-17 RX ADMIN — Medication 3 MILLIGRAM(S): at 21:56

## 2020-08-17 RX ADMIN — MORPHINE SULFATE 4 MILLIGRAM(S): 50 CAPSULE, EXTENDED RELEASE ORAL at 05:33

## 2020-08-17 RX ADMIN — Medication 975 MILLIGRAM(S): at 06:00

## 2020-08-17 RX ADMIN — Medication 100 MILLIGRAM(S): at 21:57

## 2020-08-17 RX ADMIN — Medication 1 TABLET(S): at 21:56

## 2020-08-17 NOTE — PROGRESS NOTE ADULT - SUBJECTIVE AND OBJECTIVE BOX
HPI:  76 y/o female with oa, pe in the past (hypercoag workup neg), previous spinal fusion s/p anterior lumbar discectomy and fusion  Medicine consult requested for post op medical management  20: no cp, sob, n/v/f/c; feels tired, back pain controlled  20: Pt now stabilized in the ICU and to be downgraded to step down;  Pos back pain and feels like the stickers are all itchy on her belly; no n/v/f/c  no cp, sob; discomfort at the side of the tube      REVIEW OF SYSTEMS:   All 10 systems reviewed in detailed and found to be negative with the exception of what has already been described above    PHYSICAL EXAM:    Vital Signs Last 24 Hrs  T(C): 37.1 (17 Aug 2020 04:00), Max: 37.2 (16 Aug 2020 18:00)  T(F): 98.8 (17 Aug 2020 04:00), Max: 98.9 (16 Aug 2020 18:00)  HR: 95 (17 Aug 2020 06:00) (84 - 100)  BP: 119/48 (17 Aug 2020 06:00) (96/44 - 131/46)  BP(mean): 65 (17 Aug 2020 06:00) (53 - 69)  RR: 20 (17 Aug 2020 06:00) (14 - 23)  SpO2: 95% (17 Aug 2020 05:00) (92% - 100%)    GEN: A and O, NAD,  mood stable  HEENT:   NC/AT EOMI, no oropharyngeal lesions, erythema, exudates, oral thrush    NECK:   supple    CV:  +S1, +S2, regular, no murmurs or rubs    RESP:   lungs clear to auscultation bilaterally, no wheezing, rales, rhonchi, good air entry bilaterally  Left CT in place    GI:  abdomen soft, non-tender, non-distended, normal B no abdominal masses, no palpable masses  LEFT FLANK DRESSING C/D/I WITH DRAIN, lower abd inciion c/d/i  BACK  DRAIN IN PLACE    RECTAL:  not examined      MSK:   normal muscle tone, no atrophy, no rigidity, no contractions lue weakness (not new per pt, since birth)    EXT:   no clubbing, no cyanosis, no edema, no calf pain, swelling or erythema    VASCULAR:  pulses equal and symmetric in the upper and lower extremities    NEURO:  AAOX3, no focal neurological deficits, follows all commands, able to move extremities spontaneously    SKIN:  no ulcers, lesions or rashes    LABS/IMAGIN.9    7.24  )-----------( 135      ( 17 Aug 2020 06:46 )             26.3         142  |  110<H>  |  16  ----------------------------<  89  3.5   |  26  |  0.46<L>    Ca    7.4<L>      17 Aug 2020 06:46    TPro  4.3<L>  /  Alb  1.3<L>  /  TBili  0.2  /  DBili  x   /  AST  37  /  ALT  12  /  AlkPhos  50          LIVER FUNCTIONS - ( 17 Aug 2020 06:46 )  Alb: 1.3 g/dL / Pro: 4.3 gm/dL / ALK PHOS: 50 U/L / ALT: 12 U/L / AST: 37 U/L / GGT: x             MEDICATIONS  (STANDING):  acetaminophen   Tablet .. 975 milliGRAM(s) Oral every 8 hours  calcium carbonate 1250 mG  + Vitamin D (OsCal 500 + D) 1 Tablet(s) Oral three times a day  ceFAZolin   IVPB 2000 milliGRAM(s) IV Intermittent every 8 hours  cyclobenzaprine 10 milliGRAM(s) Oral every 8 hours  melatonin 3 milliGRAM(s) Oral at bedtime  midodrine 15 milliGRAM(s) Oral every 8 hours  pantoprazole  Injectable 40 milliGRAM(s) IV Push daily  senna 2 Tablet(s) Oral at bedtime    MEDICATIONS  (PRN):  benzocaine 15 mG/menthol 3.6 mG (Sugar-Free) Lozenge 1 Lozenge Oral every 6 hours PRN Sore Throat  diphenhydrAMINE   Injectable 12.5 milliGRAM(s) IV Push every 4 hours PRN Itching  famotidine    Tablet 20 milliGRAM(s) Oral every 12 hours PRN Dyspepsia  fentaNYL    Injectable 100 MICROGram(s) IV Push every 3 hours PRN distress  magnesium hydroxide Suspension 30 milliLiter(s) Oral every 12 hours PRN Constipation  morphine  - Injectable 4 milliGRAM(s) IV Push every 4 hours PRN Severe Pain (7 - 10)  naloxone Injectable 0.1 milliGRAM(s) IV Push every 3 minutes PRN For ANY of the following changes in patient status:  A. RR LESS THAN 10 breaths per minute, B. Oxygen saturation LESS THAN 90%, C. Sedation score of 6  ondansetron Injectable 4 milliGRAM(s) IV Push every 6 hours PRN Nausea                                                                          EKG:     [id number]

## 2020-08-17 NOTE — PROGRESS NOTE ADULT - SUBJECTIVE AND OBJECTIVE BOX
Events Overnight: off pressors, bp 100(systolic)    HPI:      Patient 74yo female with PMHx Flat-back syndrome, OA, PE (completed 6 month course of apixaban), obesity, admitted on 8/11 for 2 staged spinal surgery. Underwent anterior lumbar discectomy and fusion on 8/11 via thoracotomy. Left sided formal chest tube placed as well.  on 8/13 had second stage surgery  extubated 8/14  hgb is stable, of pressors on 8/16 on midodrine  awake, alert, in brace incisional pain , moving all extremiteis    Back pain, incisional pain, no fever, no chills, no chest pain, no shortness of breathe,   no neurologic deficits, no tingling in fingers or toes  ros otherwise negative       MEDICATIONS  (STANDING):  acetaminophen   Tablet .. 975 milliGRAM(s) Oral every 8 hours  calcium carbonate 1250 mG  + Vitamin D (OsCal 500 + D) 1 Tablet(s) Oral three times a day  ceFAZolin   IVPB 2000 milliGRAM(s) IV Intermittent every 8 hours  cyclobenzaprine 10 milliGRAM(s) Oral every 8 hours  melatonin 3 milliGRAM(s) Oral at bedtime  midodrine 10 milliGRAM(s) Oral every 8 hours  senna 2 Tablet(s) Oral at bedtime    MEDICATIONS  (PRN):  benzocaine 15 mG/menthol 3.6 mG (Sugar-Free) Lozenge 1 Lozenge Oral every 6 hours PRN Sore Throat  diphenhydrAMINE   Injectable 12.5 milliGRAM(s) IV Push every 4 hours PRN Itching  famotidine    Tablet 20 milliGRAM(s) Oral every 12 hours PRN Dyspepsia  fentaNYL    Injectable 100 MICROGram(s) IV Push every 3 hours PRN distress  magnesium hydroxide Suspension 30 milliLiter(s) Oral every 12 hours PRN Constipation  morphine  - Injectable 2 milliGRAM(s) IV Push every 4 hours PRN Moderate Pain (4 - 6)  naloxone Injectable 0.1 milliGRAM(s) IV Push every 3 minutes PRN For ANY of the following changes in patient status:  A. RR LESS THAN 10 breaths per minute, B. Oxygen saturation LESS THAN 90%, C. Sedation score of 6  ondansetron Injectable 4 milliGRAM(s) IV Push every 6 hours PRN Nausea    ICU Vital Signs Last 24 Hrs  T(C): 36.6 (17 Aug 2020 07:00), Max: 37.2 (16 Aug 2020 18:00)  T(F): 97.9 (17 Aug 2020 07:00), Max: 98.9 (16 Aug 2020 18:00)  HR: 98 (17 Aug 2020 10:00) (80 - 103)  BP: 100/29 (17 Aug 2020 10:00) (100/29 - 131/46)  BP(mean): 46 (17 Aug 2020 10:00) (46 - 69)  ABP: --  ABP(mean): --  RR: 17 (17 Aug 2020 10:00) (14 - 191)  SpO2: 98% (17 Aug 2020 09:31) (92% - 100%)    I&O's Summary    16 Aug 2020 07:01  -  17 Aug 2020 07:00  --------------------------------------------------------  IN: 100 mL / OUT: 1715 mL / NET: -1615 mL                            8.9    7.24  )-----------( 135      ( 17 Aug 2020 06:46 )             26.3       08-17    142  |  110<H>  |  16  ----------------------------<  89  3.5   |  26  |  0.46<L>    Ca    7.4<L>      17 Aug 2020 06:46    TPro  4.3<L>  /  Alb  1.3<L>  /  TBili  0.2  /  DBili  x   /  AST  37  /  ALT  12  /  AlkPhos  50  08-17      DVT Prophylaxis:   venodynes,                                                               Advanced Directives: full code

## 2020-08-17 NOTE — PROGRESS NOTE ADULT - SUBJECTIVE AND OBJECTIVE BOX
HPI:  Patient 74yo female with PMHx Flat-back syndrome, OA, PE (completed 6 month course of apixaban), obesity, admitted on 8/11 for 2 staged spinal surgery. Underwent anterior lumbar discectomy and fusion on 8/11 via thoracotomy. Left sided formal chest tube placed as well. No reported complications.    Last night patient returned to the OR today for hardware removal and further spinal fusions via posterior approach. Procedure was extensive and lasted approximately 12 hours with 1700cc EBL. Received 700cc blood back from [facility] and additional 1u PRBC. 5.5L crystalloid intra-op.     8/14: patient seen and examined, at bedside, placed on CPAP trial, did well for 1 hour, extubated to 4LNC  Remains no low dose Dashawn for MAP support  receiving 1u PRBC  Afebrile, HD stable, comfortable in NAD    8/15 patients afebrile, HD stable, comfortable in NAD  HH downtrending, will give 1u PRBC  LUE mildly swollen, elevated    8/16 Patient off Neosynephrine, ON midodrine 15mg PO TID, MAP 75-80  ARt line to dcd    8/17 Pt is evaluated at bedside and found NAD and in no acute distress. She reports mild back pain relieved by morphine. Morphine dose decreased to 2 mg every 4 hrs. H/H have remained stable. Pt have been off  vasopressor since one day ago and BP have remained stable.No other events overnight.     MEDICATIONS  (STANDING):  acetaminophen   Tablet .. 975 milliGRAM(s) Oral every 8 hours  calcium carbonate 1250 mG  + Vitamin D (OsCal 500 + D) 1 Tablet(s) Oral three times a day  ceFAZolin   IVPB 2000 milliGRAM(s) IV Intermittent every 8 hours  cyclobenzaprine 10 milliGRAM(s) Oral every 8 hours  melatonin 3 milliGRAM(s) Oral at bedtime  midodrine 15 milliGRAM(s) Oral every 8 hours  senna 2 Tablet(s) Oral at bedtime    MEDICATIONS  (PRN):  benzocaine 15 mG/menthol 3.6 mG (Sugar-Free) Lozenge 1 Lozenge Oral every 6 hours PRN Sore Throat  diphenhydrAMINE   Injectable 12.5 milliGRAM(s) IV Push every 4 hours PRN Itching  famotidine    Tablet 20 milliGRAM(s) Oral every 12 hours PRN Dyspepsia  fentaNYL    Injectable 100 MICROGram(s) IV Push every 3 hours PRN distress  magnesium hydroxide Suspension 30 milliLiter(s) Oral every 12 hours PRN Constipation  morphine  - Injectable 2 milliGRAM(s) IV Push every 4 hours PRN Moderate Pain (4 - 6)  naloxone Injectable 0.1 milliGRAM(s) IV Push every 3 minutes PRN For ANY of the following changes in patient status:  A. RR LESS THAN 10 breaths per minute, B. Oxygen saturation LESS THAN 90%, C. Sedation score of 6  ondansetron Injectable 4 milliGRAM(s) IV Push every 6 hours PRN Nausea      Vital Signs Last 24 Hrs  T(C): 36.6 (17 Aug 2020 07:00), Max: 37.2 (16 Aug 2020 18:00)  T(F): 97.9 (17 Aug 2020 07:00), Max: 98.9 (16 Aug 2020 18:00)  HR: 80 (17 Aug 2020 08:00) (80 - 100)  BP: 119/37 (17 Aug 2020 08:00) (96/44 - 131/46)  BP(mean): 58 (17 Aug 2020 08:00) (53 - 69)  RR: 15 (17 Aug 2020 08:00) (14 - 23)  SpO2: 95% (17 Aug 2020 05:00) (92% - 100%)    GEN: NAD, comfortable, resting in bed  HEENT: NC/AT, EOMI, PERRLA, MMM, clear conjunctiva and sclera, normal hearing, no nasal discharge, throat clear, no thrush, normal dentition.   NECK: supple, no JVD, no LAD, no thyromegaly  BACK:  CT tube in place drainage 200cs serous fluid   CV: S1S2, RRR, no murmur  RESP: good air movement, CTABL, no rales, rhonchi or wheezing, respirations unlabored  ABD: +BS, soft, ND, NT, no guarding, no rigidity, no HSM  EXT: +2 radial and pedal pulses, no edema, no calve tenderness      LABS:                          8.9    7.24  )-----------( 135      ( 17 Aug 2020 06:46 )             26.3     17 Aug 2020 06:46    142    |  110    |  16     ----------------------------<  89     3.5     |  26     |  0.46     Ca    7.4        17 Aug 2020 06:46    TPro  4.3    /  Alb  1.3    /  TBili  0.2    /  DBili  x      /  AST  37     /  ALT  12     /  AlkPhos  50     17 Aug 2020 06:46    LIVER FUNCTIONS - ( 17 Aug 2020 06:46 )  Alb: 1.3 g/dL / Pro: 4.3 gm/dL / ALK PHOS: 50 U/L / ALT: 12 U/L / AST: 37 U/L / GGT: x             CAPILLARY BLOOD GLUCOSE

## 2020-08-17 NOTE — PROGRESS NOTE ADULT - SUBJECTIVE AND OBJECTIVE BOX
Subjective: POD#6    Objective: L thoracotomy    Heent: N/C, AT PER no scleral icterus, No JVD  Pul: clear  Cor: RRR  Abdomen: soft, normal BS. Wound - clean  Extremities: mild edema, motor/sensory intact    08-17    142  |  110<H>  |  16  ----------------------------<  89  3.5   |  26  |  0.46<L>    Ca    7.4<L>      17 Aug 2020 06:46    TPro  4.3<L>  /  Alb  1.3<L>  /  TBili  0.2  /  DBili  x   /  AST  37  /  ALT  12  /  AlkPhos  50  08-17                            8.9    7.24  )-----------( 135      ( 17 Aug 2020 06:46 )             26.3

## 2020-08-17 NOTE — PROGRESS NOTE ADULT - ASSESSMENT
76 y/o F with a h/o Flat-back syndrome, OA, PE (completed 6 month course of apixaban), obesity, s/p extensive spinal surgery transferred to ICU on 8/13 after shock and prolonged ventilation after spinal surgery.       PLAN:  - POD#6 Spinal fusion   - CT chest  managed by surgery. Drainage 200cc overnight   -  H/H stable   - Continue monitoring H/H   - Cefazolin for ppx  - Continue Midodrine 15mg PO TID  - Neoseynpehrine was d/c one day ago and BP have remained stable with a MAP 58-65   - Continue pain management with morphine 2 mg IV every 4 hrs   - DVT ppx, SCDs  - Pt have remained stable and will transfer to floor     Case discussed  with Dr Mckeon

## 2020-08-17 NOTE — PROGRESS NOTE ADULT - ASSESSMENT
74 y/o F with a h/o Flat-back syndrome, OA, PE (completed 6 month course of apixaban), obesity, s/p extensive spinal surgery transferred to ICU on 8/13 after shock and prolonged ventilation after spinal surgery.       PLAN:  - POD#6 Spinal fusion   - CT chest  managed by surgery. Drainage 200cc overnight in place  -  H/H stable   - Continue monitoring H/H   - Cefazolin for ppx  - titrate down midodrine  - Neoseynpehrine off pressors  - Continue pain management with morphine 2 mg IV every 4 hrs   - DVT ppx, SCDs  - Pt have remained stable  can be transferred to floor

## 2020-08-17 NOTE — PROGRESS NOTE ADULT - ASSESSMENT
76 Y/O FEMALE WITH THE ABOVE MED HX S/P ANTERIOR LUMBAR DISCECTOMY AND FUSION    *POSTPROCEDURAL STATE -s/p ANTERIOR APPROACH FIRST THEN POSTERIOR APPROACH  LEFT CHEST TUBE WAS PLACED  PT WENT TO ICU POST SECOND STAGE DUE TO HYPOTENSION, REMAINED INTUBATED  NOW STABILIZED, OFF PHENYLEPHRINE  ON MIDODRINE FOR BP SUPPORT  PAIN CONTROL  PHYSICAL THERAPY  MONITOR DRAIN OUTPUT    *ANEMIA - SECONDARY TO ACUTE BLOOD LOSS  H/H STABLE      *DVT PROPHY - VENODYNES ONLY IN THE SETTING OF RECENT SPINE SURGERY AND DRAIN IN PLACE 76 Y/O FEMALE WITH THE ABOVE MED HX S/P ANTERIOR LUMBAR DISCECTOMY AND FUSION    *POSTPROCEDURAL STATE -s/p ANTERIOR APPROACH FIRST THEN POSTERIOR APPROACH  LEFT CHEST TUBE WAS PLACED  PT WENT TO ICU POST SECOND STAGE DUE TO HYPOTENSION, REMAINED INTUBATED  NOW STABILIZED, OFF PHENYLEPHRINE  ON MIDODRINE FOR BP SUPPORT  PAIN CONTROL  PHYSICAL THERAPY  MONITOR DRAIN OUTPUT  on acef empirically, consider dc if no signs of infection  *ANEMIA - SECONDARY TO ACUTE BLOOD LOSS  H/H STABLE      *DVT PROPHY - VENODYNES ONLY IN THE SETTING OF RECENT SPINE SURGERY AND DRAIN IN PLACE

## 2020-08-17 NOTE — PROGRESS NOTE ADULT - ASSESSMENT
removed two of the lowest output drains  One JERSEY and one Hemovac remain  Continue current care per Dr. Epperson and all consultants.

## 2020-08-17 NOTE — PROGRESS NOTE ADULT - SUBJECTIVE AND OBJECTIVE BOX
POD #6  STAGE I - L5-S1 osteotomy, ALIF -hyperlordotic cage, T12-L1 osteotomy anterior lateral with hyperlordotic cage, release of ALL  POD #4 removal of L1-4 screws, PSO L4, T4-pelvis instrumentation, CT TRACY, Kyphoplasty T5,6,7, 3 BMPs, local bone, plastics closure.    Pt seen resting on chair-sitting upright with brace. Pt awake, alert and oriented x3. Pt has incisional site soreness. Pain is tolerable with current medications. Pain is non-radiating. Pt denies lower ext numbness and weakness.  Galloway was discontinued today. Pt denies headaches, vertigo, chest pain, SOB.     PE  Gen appearance: NAD  motor strength: 5-/5 of b/l HF, 5/5 of b/l ant tibs, gastrocs, EHL  Sensation: intact to light touch  no calf tenderness noted. Venodynes on bilaterally  Drains:10/30/40/20- Plastic surgery removed 2 drains--One JERSEY and one Hemovac remain  L CT still 200cc/24h. Cont to suction per surgery    Plan  afebrile, BP:100/29---eval per MED  WBC:7.24, H/H:8.9/26.3---eval per MED  mobilize with physical therapy and continue analgesics  Encouraged incentive spirometer.   Discussed case with Dr. Kwon.

## 2020-08-18 LAB
ANION GAP SERPL CALC-SCNC: 6 MMOL/L — SIGNIFICANT CHANGE UP (ref 5–17)
BUN SERPL-MCNC: 13 MG/DL — SIGNIFICANT CHANGE UP (ref 7–23)
CALCIUM SERPL-MCNC: 7.5 MG/DL — LOW (ref 8.5–10.1)
CHLORIDE SERPL-SCNC: 108 MMOL/L — SIGNIFICANT CHANGE UP (ref 96–108)
CO2 SERPL-SCNC: 29 MMOL/L — SIGNIFICANT CHANGE UP (ref 22–31)
CREAT SERPL-MCNC: 0.49 MG/DL — LOW (ref 0.5–1.3)
GLUCOSE SERPL-MCNC: 99 MG/DL — SIGNIFICANT CHANGE UP (ref 70–99)
HCT VFR BLD CALC: 28.5 % — LOW (ref 34.5–45)
HGB BLD-MCNC: 9.2 G/DL — LOW (ref 11.5–15.5)
MCHC RBC-ENTMCNC: 30.3 PG — SIGNIFICANT CHANGE UP (ref 27–34)
MCHC RBC-ENTMCNC: 32.3 GM/DL — SIGNIFICANT CHANGE UP (ref 32–36)
MCV RBC AUTO: 93.8 FL — SIGNIFICANT CHANGE UP (ref 80–100)
PLATELET # BLD AUTO: 185 K/UL — SIGNIFICANT CHANGE UP (ref 150–400)
POTASSIUM SERPL-MCNC: 4 MMOL/L — SIGNIFICANT CHANGE UP (ref 3.5–5.3)
POTASSIUM SERPL-SCNC: 4 MMOL/L — SIGNIFICANT CHANGE UP (ref 3.5–5.3)
RBC # BLD: 3.04 M/UL — LOW (ref 3.8–5.2)
RBC # FLD: 13.7 % — SIGNIFICANT CHANGE UP (ref 10.3–14.5)
SODIUM SERPL-SCNC: 143 MMOL/L — SIGNIFICANT CHANGE UP (ref 135–145)
WBC # BLD: 6.87 K/UL — SIGNIFICANT CHANGE UP (ref 3.8–10.5)
WBC # FLD AUTO: 6.87 K/UL — SIGNIFICANT CHANGE UP (ref 3.8–10.5)

## 2020-08-18 PROCEDURE — 71045 X-RAY EXAM CHEST 1 VIEW: CPT | Mod: 26

## 2020-08-18 RX ORDER — OXYCODONE HYDROCHLORIDE 5 MG/1
10 TABLET ORAL EVERY 4 HOURS
Refills: 0 | Status: DISCONTINUED | OUTPATIENT
Start: 2020-08-18 | End: 2020-08-21

## 2020-08-18 RX ORDER — TRAMADOL HYDROCHLORIDE 50 MG/1
50 TABLET ORAL EVERY 4 HOURS
Refills: 0 | Status: DISCONTINUED | OUTPATIENT
Start: 2020-08-18 | End: 2020-08-21

## 2020-08-18 RX ORDER — OXYCODONE HYDROCHLORIDE 5 MG/1
5 TABLET ORAL EVERY 4 HOURS
Refills: 0 | Status: DISCONTINUED | OUTPATIENT
Start: 2020-08-18 | End: 2020-08-21

## 2020-08-18 RX ORDER — MORPHINE SULFATE 50 MG/1
2 CAPSULE, EXTENDED RELEASE ORAL EVERY 4 HOURS
Refills: 0 | Status: DISCONTINUED | OUTPATIENT
Start: 2020-08-18 | End: 2020-08-21

## 2020-08-18 RX ORDER — POLYETHYLENE GLYCOL 3350 17 G/17G
17 POWDER, FOR SOLUTION ORAL
Refills: 0 | Status: DISCONTINUED | OUTPATIENT
Start: 2020-08-18 | End: 2020-08-21

## 2020-08-18 RX ORDER — GABAPENTIN 400 MG/1
300 CAPSULE ORAL AT BEDTIME
Refills: 0 | Status: DISCONTINUED | OUTPATIENT
Start: 2020-08-18 | End: 2020-08-21

## 2020-08-18 RX ORDER — CELECOXIB 200 MG/1
200 CAPSULE ORAL
Refills: 0 | Status: DISCONTINUED | OUTPATIENT
Start: 2020-08-18 | End: 2020-08-20

## 2020-08-18 RX ADMIN — Medication 1 TABLET(S): at 21:43

## 2020-08-18 RX ADMIN — OXYCODONE HYDROCHLORIDE 10 MILLIGRAM(S): 5 TABLET ORAL at 23:19

## 2020-08-18 RX ADMIN — SENNA PLUS 2 TABLET(S): 8.6 TABLET ORAL at 21:43

## 2020-08-18 RX ADMIN — Medication 100 MILLIGRAM(S): at 21:43

## 2020-08-18 RX ADMIN — Medication 975 MILLIGRAM(S): at 05:53

## 2020-08-18 RX ADMIN — Medication 100 MILLIGRAM(S): at 05:53

## 2020-08-18 RX ADMIN — Medication 100 MILLIGRAM(S): at 14:39

## 2020-08-18 RX ADMIN — MIDODRINE HYDROCHLORIDE 10 MILLIGRAM(S): 2.5 TABLET ORAL at 21:43

## 2020-08-18 RX ADMIN — CYCLOBENZAPRINE HYDROCHLORIDE 10 MILLIGRAM(S): 10 TABLET, FILM COATED ORAL at 05:53

## 2020-08-18 RX ADMIN — MIDODRINE HYDROCHLORIDE 10 MILLIGRAM(S): 2.5 TABLET ORAL at 05:53

## 2020-08-18 RX ADMIN — CELECOXIB 200 MILLIGRAM(S): 200 CAPSULE ORAL at 08:46

## 2020-08-18 RX ADMIN — Medication 975 MILLIGRAM(S): at 15:15

## 2020-08-18 RX ADMIN — POLYETHYLENE GLYCOL 3350 17 GRAM(S): 17 POWDER, FOR SOLUTION ORAL at 10:11

## 2020-08-18 RX ADMIN — OXYCODONE HYDROCHLORIDE 10 MILLIGRAM(S): 5 TABLET ORAL at 08:47

## 2020-08-18 RX ADMIN — CELECOXIB 200 MILLIGRAM(S): 200 CAPSULE ORAL at 21:43

## 2020-08-18 RX ADMIN — Medication 1 TABLET(S): at 14:41

## 2020-08-18 RX ADMIN — MORPHINE SULFATE 2 MILLIGRAM(S): 50 CAPSULE, EXTENDED RELEASE ORAL at 07:51

## 2020-08-18 RX ADMIN — Medication 975 MILLIGRAM(S): at 21:45

## 2020-08-18 RX ADMIN — CELECOXIB 200 MILLIGRAM(S): 200 CAPSULE ORAL at 09:15

## 2020-08-18 RX ADMIN — MORPHINE SULFATE 2 MILLIGRAM(S): 50 CAPSULE, EXTENDED RELEASE ORAL at 02:25

## 2020-08-18 RX ADMIN — CYCLOBENZAPRINE HYDROCHLORIDE 10 MILLIGRAM(S): 10 TABLET, FILM COATED ORAL at 14:39

## 2020-08-18 RX ADMIN — MORPHINE SULFATE 2 MILLIGRAM(S): 50 CAPSULE, EXTENDED RELEASE ORAL at 02:09

## 2020-08-18 RX ADMIN — Medication 975 MILLIGRAM(S): at 14:40

## 2020-08-18 RX ADMIN — OXYCODONE HYDROCHLORIDE 10 MILLIGRAM(S): 5 TABLET ORAL at 09:15

## 2020-08-18 RX ADMIN — MORPHINE SULFATE 2 MILLIGRAM(S): 50 CAPSULE, EXTENDED RELEASE ORAL at 08:15

## 2020-08-18 RX ADMIN — CYCLOBENZAPRINE HYDROCHLORIDE 10 MILLIGRAM(S): 10 TABLET, FILM COATED ORAL at 21:43

## 2020-08-18 RX ADMIN — Medication 3 MILLIGRAM(S): at 21:43

## 2020-08-18 RX ADMIN — GABAPENTIN 300 MILLIGRAM(S): 400 CAPSULE ORAL at 21:44

## 2020-08-18 RX ADMIN — MIDODRINE HYDROCHLORIDE 10 MILLIGRAM(S): 2.5 TABLET ORAL at 14:41

## 2020-08-18 RX ADMIN — Medication 1 TABLET(S): at 05:53

## 2020-08-18 RX ADMIN — CELECOXIB 200 MILLIGRAM(S): 200 CAPSULE ORAL at 21:45

## 2020-08-18 RX ADMIN — POLYETHYLENE GLYCOL 3350 17 GRAM(S): 17 POWDER, FOR SOLUTION ORAL at 21:43

## 2020-08-18 NOTE — PROGRESS NOTE ADULT - ASSESSMENT
74 Y/O FEMALE WITH THE ABOVE MED HX S/P ANTERIOR LUMBAR DISCECTOMY AND FUSION    *POSTPROCEDURAL STATE -s/p ANTERIOR APPROACH FIRST THEN POSTERIOR APPROACH  LEFT CHEST TUBE WAS PLACED, PUT OUT OVER 200 LAST NIGHT  PT WENT TO ICU POST SECOND STAGE DUE TO HYPOTENSION, REMAINED INTUBATED  NOW STABILIZED, OFF PHENYLEPHRINE  ON MIDODRINE FOR BP SUPPORT  PAIN CONTROL  PHYSICAL THERAPY  MONITOR DRAIN OUTPUT  on acef empirically, consider dc if no signs of infection  *ANEMIA - SECONDARY TO ACUTE BLOOD LOSS  H/H STABLE  *CONSTIPA TION, ADD MIRILAX  *URINARY RETENTION - WOULD PLACE FLORES BACK IN IF RETAINS AGAIN      *DVT PROPHY - VENODYNES ONLY IN THE SETTING OF RECENT SPINE SURGERY AND DRAIN IN PLACE

## 2020-08-18 NOTE — PROGRESS NOTE ADULT - SUBJECTIVE AND OBJECTIVE BOX
HPI:  74 y/o female with oa, pe in the past (hypercoag workup neg), previous spinal fusion s/p anterior lumbar discectomy and fusion  Medicine consult requested for post op medical management  20: no cp, sob, n/v/f/c; feels tired, back pain controlled  20: Pt now stabilized in the ICU and to be downgraded to step down;  Pos back pain and feels like the stickers are all itchy on her belly; no n/v/f/c  no cp, sob; discomfort at the side of the tube  20: No cp, sob just discomfort at the area of the chest tube, retaining urine was straight cathed a couple times; pos back soreness, was able to sit up yest but not ambulatory yet    REVIEW OF SYSTEMS:   All 10 systems reviewed in detailed and found to be negative with the exception of what has already been described above    PHYSICAL EXAM:    Vital Signs Last 24 Hrs  T(C): 37.1 (18 Aug 2020 05:00), Max: 37.2 (17 Aug 2020 18:35)  T(F): 98.8 (18 Aug 2020 05:00), Max: 98.9 (17 Aug 2020 18:35)  HR: 96 (18 Aug 2020 08:00) (83 - 103)  BP: 123/84 (18 Aug 2020 08:00) (84/65 - 129/51)  BP(mean): 95 (18 Aug 2020 08:00) (46 - 95)  RR: 20 (18 Aug 2020 08:00) (14 - 23)  SpO2: 94% (18 Aug 2020 08:00) (93% - 100%)    GEN: A and O, NAD,  mood stable  HEENT:   NC/AT EOMI, no oropharyngeal lesions, erythema, exudates, oral thrush    NECK:   supple    CV:  +S1, +S2, regular, no murmurs or rubs    RESP:   lungs clear to auscultation bilaterally, no wheezing, rales at left base  Left CT in place    GI:  abdomen soft, non-tender, non-distended, normal B no abdominal masses, no palpable masses  LEFT FLANK DRESSING C/D/I WITH DRAIN, lower abd incision c/d/i  BACK  DRAIN IN PLACE    RECTAL:  not examined      MSK:   normal muscle tone, no atrophy, no rigidity, no contractions lue weakness (not new per pt, since birth)    EXT:   no clubbing, no cyanosis, no edema, no calf pain, swelling or erythema    VASCULAR:  pulses equal and symmetric in the upper and lower extremities    NEURO:  AAOX3, no focal neurological deficits, follows all commands, able to move extremities spontaneously    SKIN:  no ulcers, lesions or rashes    LABS/IMAGIN.2    6.87  )-----------( 185      ( 18 Aug 2020 06:12 )             28.5     08-18    143  |  108  |  13  ----------------------------<  99  4.0   |  29  |  0.49<L>    Ca    7.5<L>      18 Aug 2020 06:12    TPro  4.3<L>  /  Alb  1.3<L>  /  TBili  0.2  /  DBili  x   /  AST  37  /  ALT  12  /  AlkPhos  50  08-17        LIVER FUNCTIONS - ( 17 Aug 2020 06:46 )  Alb: 1.3 g/dL / Pro: 4.3 gm/dL / ALK PHOS: 50 U/L / ALT: 12 U/L / AST: 37 U/L / GGT: x             MEDICATIONS  (STANDING):  acetaminophen   Tablet .. 975 milliGRAM(s) Oral every 8 hours  calcium carbonate 1250 mG  + Vitamin D (OsCal 500 + D) 1 Tablet(s) Oral three times a day  ceFAZolin   IVPB 2000 milliGRAM(s) IV Intermittent every 8 hours  celecoxib 200 milliGRAM(s) Oral two times a day  cyclobenzaprine 10 milliGRAM(s) Oral every 8 hours  gabapentin 300 milliGRAM(s) Oral at bedtime  melatonin 3 milliGRAM(s) Oral at bedtime  midodrine 10 milliGRAM(s) Oral every 8 hours  senna 2 Tablet(s) Oral at bedtime    MEDICATIONS  (PRN):  benzocaine 15 mG/menthol 3.6 mG (Sugar-Free) Lozenge 1 Lozenge Oral every 6 hours PRN Sore Throat  diphenhydrAMINE   Injectable 12.5 milliGRAM(s) IV Push every 4 hours PRN Itching  famotidine    Tablet 20 milliGRAM(s) Oral every 12 hours PRN Dyspepsia  fentaNYL    Injectable 100 MICROGram(s) IV Push every 3 hours PRN distress  magnesium hydroxide Suspension 30 milliLiter(s) Oral every 12 hours PRN Constipation  morphine  - Injectable 2 milliGRAM(s) IV Push every 4 hours PRN breakthrough pain  naloxone Injectable 0.1 milliGRAM(s) IV Push every 3 minutes PRN For ANY of the following changes in patient status:  A. RR LESS THAN 10 breaths per minute, B. Oxygen saturation LESS THAN 90%, C. Sedation score of 6  ondansetron Injectable 4 milliGRAM(s) IV Push every 6 hours PRN Nausea  oxyCODONE    IR 5 milliGRAM(s) Oral every 4 hours PRN Moderate Pain (4 - 6)  oxyCODONE    IR 10 milliGRAM(s) Oral every 4 hours PRN Severe Pain (7 - 10)  traMADol 50 milliGRAM(s) Oral every 4 hours PRN Mild Pain (1 - 3)                                                                                                                                          EKG:     NSR@69BPM

## 2020-08-18 NOTE — PROGRESS NOTE ADULT - SUBJECTIVE AND OBJECTIVE BOX
SDU 61  POD #7 STAGE I - L5-S1 osteotomy, ALIF -hyperlordotic cage, T12-L1 osteotomy anterior lateral with hyperlordotic cage, release of ALL  POD #5 removal of L1-4 screws, PSO L4, T4-pelvis instrumentation, CT TRACY, Kyphoplasty T5,6,7, 3 BMPs, local bone, plastics closure.    Patient seen and examined, sitting up in a chair in good spirits.,   Notes pain well managed at this time.   Chest tube with 230cc output - kept and managed by surgery/  Drains, Hemovac 5 / Carroll 30 - both kept managed by plastics  Hbg 9.2 / Hct 28. 5 this am  Bandages intact.     Patient continues to improve,  recommend physical therapy evaluation OOB with brace on as tolerated.   Pain management

## 2020-08-18 NOTE — PROGRESS NOTE ADULT - ASSESSMENT
Hemovac drain removed due to low output  Continue current care per Dr. Epperson and all consultants.

## 2020-08-19 PROCEDURE — 93970 EXTREMITY STUDY: CPT | Mod: 26

## 2020-08-19 RX ADMIN — OXYCODONE HYDROCHLORIDE 5 MILLIGRAM(S): 5 TABLET ORAL at 09:32

## 2020-08-19 RX ADMIN — Medication 975 MILLIGRAM(S): at 14:21

## 2020-08-19 RX ADMIN — Medication 100 MILLIGRAM(S): at 05:02

## 2020-08-19 RX ADMIN — Medication 975 MILLIGRAM(S): at 05:02

## 2020-08-19 RX ADMIN — OXYCODONE HYDROCHLORIDE 5 MILLIGRAM(S): 5 TABLET ORAL at 09:55

## 2020-08-19 RX ADMIN — Medication 3 MILLIGRAM(S): at 21:49

## 2020-08-19 RX ADMIN — Medication 1 TABLET(S): at 05:01

## 2020-08-19 RX ADMIN — GABAPENTIN 300 MILLIGRAM(S): 400 CAPSULE ORAL at 21:48

## 2020-08-19 RX ADMIN — CELECOXIB 200 MILLIGRAM(S): 200 CAPSULE ORAL at 21:47

## 2020-08-19 RX ADMIN — CELECOXIB 200 MILLIGRAM(S): 200 CAPSULE ORAL at 22:15

## 2020-08-19 RX ADMIN — Medication 100 MILLIGRAM(S): at 21:59

## 2020-08-19 RX ADMIN — Medication 1 TABLET(S): at 21:47

## 2020-08-19 RX ADMIN — Medication 975 MILLIGRAM(S): at 21:47

## 2020-08-19 RX ADMIN — Medication 1 TABLET(S): at 14:21

## 2020-08-19 RX ADMIN — OXYCODONE HYDROCHLORIDE 10 MILLIGRAM(S): 5 TABLET ORAL at 05:02

## 2020-08-19 RX ADMIN — CYCLOBENZAPRINE HYDROCHLORIDE 10 MILLIGRAM(S): 10 TABLET, FILM COATED ORAL at 14:20

## 2020-08-19 RX ADMIN — POLYETHYLENE GLYCOL 3350 17 GRAM(S): 17 POWDER, FOR SOLUTION ORAL at 09:32

## 2020-08-19 RX ADMIN — CYCLOBENZAPRINE HYDROCHLORIDE 10 MILLIGRAM(S): 10 TABLET, FILM COATED ORAL at 21:48

## 2020-08-19 RX ADMIN — OXYCODONE HYDROCHLORIDE 10 MILLIGRAM(S): 5 TABLET ORAL at 23:27

## 2020-08-19 RX ADMIN — MIDODRINE HYDROCHLORIDE 10 MILLIGRAM(S): 2.5 TABLET ORAL at 21:48

## 2020-08-19 RX ADMIN — CELECOXIB 200 MILLIGRAM(S): 200 CAPSULE ORAL at 09:55

## 2020-08-19 RX ADMIN — Medication 975 MILLIGRAM(S): at 22:15

## 2020-08-19 RX ADMIN — CELECOXIB 200 MILLIGRAM(S): 200 CAPSULE ORAL at 09:32

## 2020-08-19 RX ADMIN — Medication 100 MILLIGRAM(S): at 14:21

## 2020-08-19 RX ADMIN — Medication 975 MILLIGRAM(S): at 05:01

## 2020-08-19 RX ADMIN — SENNA PLUS 2 TABLET(S): 8.6 TABLET ORAL at 21:49

## 2020-08-19 RX ADMIN — MIDODRINE HYDROCHLORIDE 10 MILLIGRAM(S): 2.5 TABLET ORAL at 14:21

## 2020-08-19 RX ADMIN — CYCLOBENZAPRINE HYDROCHLORIDE 10 MILLIGRAM(S): 10 TABLET, FILM COATED ORAL at 05:00

## 2020-08-19 RX ADMIN — Medication 975 MILLIGRAM(S): at 15:05

## 2020-08-19 NOTE — PROGRESS NOTE ADULT - SUBJECTIVE AND OBJECTIVE BOX
Subjective: POD#9    Objective: L thoracotomy    Heent: N/C, AT PER no scleral icterus, No JVD  Pul: clear  Cor: RRR  Abdomen: soft, normal BS. Wound - clean  Extremities: mild edema    08-18    143  |  108  |  13  ----------------------------<  99  4.0   |  29  |  0.49<L>    Ca    7.5<L>      18 Aug 2020 06:12                              9.2    6.87  )-----------( 185      ( 18 Aug 2020 06:12 )             28.5

## 2020-08-19 NOTE — PROGRESS NOTE ADULT - SUBJECTIVE AND OBJECTIVE BOX
HPI:  76 y/o female with oa, pe in the past (hypercoag workup neg), previous spinal fusion s/p anterior lumbar discectomy and fusion  Medicine consult requested for post op medical management  20: no cp, sob, n/v/f/c; feels tired, back pain controlled  20: Pt now stabilized in the ICU and to be downgraded to step down;  Pos back pain and feels like the stickers are all itchy on her belly; no n/v/f/c  no cp, sob; discomfort at the side of the tube  20: No cp, sob just discomfort at the area of the chest tube, retaining urine was straight cathed a couple times; pos back soreness, was able to sit up yest but not ambulatory yet  20: No cp, sob, n/v/f/c; no cough; chest tube in place  claros placed back in    REVIEW OF SYSTEMS:   All 10 systems reviewed in detailed and found to be negative with the exception of what has already been described above    PHYSICAL EXAM:    Vital Signs Last 24 Hrs  T(C): 37.1 (19 Aug 2020 05:28), Max: 37.1 (19 Aug 2020 05:28)  T(F): 98.8 (19 Aug 2020 05:28), Max: 98.8 (19 Aug 2020 05:28)  HR: 84 (19 Aug 2020 08:00) (79 - 109)  BP: 115/56 (19 Aug 2020 08:00) (90/47 - 132/66)  BP(mean): 71 (19 Aug 2020 08:00) (58 - 82)  RR: 15 (19 Aug 2020 08:00) (13 - 27)  SpO2: 91% (19 Aug 2020 06:00) (77% - 98%)    GEN: A and O, NAD,  mood stable  HEENT:   NC/AT EOMI, no oropharyngeal lesions, erythema, exudates, oral thrush    NECK:   supple    CV:  +S1, +S2, regular, no murmurs or rubs    RESP:   lungs clear to auscultation bilaterally, no wheezing, rales at left base  Left CT in place    GI:  abdomen soft, non-tender, non-distended, normal B no abdominal masses, no palpable masses  LEFT FLANK DRESSING C/D/I WITH DRAIN, lower abd incision c/d/i  BACK  [place] DRAIN IN PLACE    RECTAL:  not examined  : CLAROS PLACED BACK IN      MSK:   normal muscle tone, no atrophy, no rigidity, no contractions lue weakness (not new per pt, since birth)    EXT:   no clubbing, no cyanosis, no edema, no calf pain, swelling or erythema    VASCULAR:  pulses equal and symmetric in the upper and lower extremities    NEURO:  AAOX3, no focal neurological deficits, follows all commands, able to move extremities spontaneously    SKIN:  no ulcers, lesions or rashes    LABS/IMAGIN.2    6.87  )-----------( 185      ( 18 Aug 2020 06:12 )             28.5     08-18    143  |  108  |  13  ----------------------------<  99  4.0   |  29  |  0.49<L>    Ca    7.5<L>      18 Aug 2020 06:12    TPro  4.3<L>  /  Alb  1.3<L>  /  TBili  0.2  /  DBili  x   /  AST  37  /  ALT  12  /  AlkPhos  50  08-17        LIVER FUNCTIONS - ( 17 Aug 2020 06:46 )  Alb: 1.3 g/dL / Pro: 4.3 gm/dL / ALK PHOS: 50 U/L / ALT: 12 U/L / AST: 37 U/L / GGT: x             MEDICATIONS  (STANDING):  acetaminophen   Tablet .. 975 milliGRAM(s) Oral every 8 hours  calcium carbonate 1250 mG  + Vitamin D (OsCal 500 + D) 1 Tablet(s) Oral three times a day  ceFAZolin   IVPB 2000 milliGRAM(s) IV Intermittent every 8 hours  celecoxib 200 milliGRAM(s) Oral two times a day  cyclobenzaprine 10 milliGRAM(s) Oral every 8 hours  gabapentin 300 milliGRAM(s) Oral at bedtime  melatonin 3 milliGRAM(s) Oral at bedtime  midodrine 10 milliGRAM(s) Oral every 8 hours  polyethylene glycol 3350 17 Gram(s) Oral two times a day  senna 2 Tablet(s) Oral at bedtime    MEDICATIONS  (PRN):  benzocaine 15 mG/menthol 3.6 mG (Sugar-Free) Lozenge 1 Lozenge Oral every 6 hours PRN Sore Throat  diphenhydrAMINE   Injectable 12.5 milliGRAM(s) IV Push every 4 hours PRN Itching  famotidine    Tablet 20 milliGRAM(s) Oral every 12 hours PRN Dyspepsia  fentaNYL    Injectable 100 MICROGram(s) IV Push every 3 hours PRN distress  magnesium hydroxide Suspension 30 milliLiter(s) Oral every 12 hours PRN Constipation  morphine  - Injectable 2 milliGRAM(s) IV Push every 4 hours PRN breakthrough pain  naloxone Injectable 0.1 milliGRAM(s) IV Push every 3 minutes PRN For ANY of the following changes in patient status:  A. RR LESS THAN 10 breaths per minute, B. Oxygen saturation LESS THAN 90%, C. Sedation score of 6  ondansetron Injectable 4 milliGRAM(s) IV Push every 6 hours PRN Nausea  oxyCODONE    IR 5 milliGRAM(s) Oral every 4 hours PRN Moderate Pain (4 - 6)  oxyCODONE    IR 10 milliGRAM(s) Oral every 4 hours PRN Severe Pain (7 - 10)  traMADol 50 milliGRAM(s) Oral every 4 hours PRN Mild Pain (1 - 3)                                                                                                                                            EKG:     NSR@69BPM

## 2020-08-19 NOTE — PROGRESS NOTE ADULT - ASSESSMENT
74 Y/O FEMALE WITH THE ABOVE MED HX S/P ANTERIOR LUMBAR DISCECTOMY AND FUSION, PROLONGED HOSPITAL STAY POST SECOND STAGE WITH HYPOTENSION NOW STABLE    *POSTPROCEDURAL STATE -s/p ANTERIOR APPROACH FIRST THEN POSTERIOR APPROACH  LEFT CHEST TUBE - TO WATER SEAL, CXR YEST WITH POSSIBLE RIGHT INFILTRATE UT NO S/SX OF PNA, AFEBRILE, WBC NORMAL  WILL MONITOR  PT WENT TO ICU POST SECOND STAGE DUE TO HYPOTENSION, REMAINED INTUBATED  NOW STABILIZED, OFF PHENYLEPHRINE  ON MIDODRINE FOR BP SUPPORT  PAIN CONTROL  PHYSICAL THERAPY  MONITOR DRAIN OUTPUT  *HYPOTENSION - ON MIDODRINE, STILL WITH LOW BP LEVELS BUT ASYMPTOMATIC  on acef empirically, consider dc if no signs of infection  *ANEMIA - SECONDARY TO ACUTE BLOOD LOSS  H/H STABLE  *CONSTIPATION, ADD MIRILAX  *URINARY RETENTION - FLORES PLACED BACK IN  WILL NEED TO MOBILIZE MORE      *DVT PROPHY - VENODYNES ONLY IN THE SETTING OF RECENT SPINE SURGERY AND DRAIN IN PLACE

## 2020-08-19 NOTE — PROGRESS NOTE ADULT - SUBJECTIVE AND OBJECTIVE BOX
[facility]                                                           Orthopedic Spine Progress Note      POST OPERATIVE DAY #: 6/8   STATUS POST: two-stage complex thoracolumbar osteotomy, instrumented fusion                Pre-Op Dx: Flatback syndrome    Post-Op Dx:  Flatback syndrome    SUBJECTIVE: Patient seen and examined at 0930 this morning, OOB in chair, CT set to water seal - possible removal tomorrow as per Dr. Alexis, was straight cath'd for urinary retention and now has claros again, CXR yesterday w/? right infiltrate but no signs/symptoms of pneumonia, last HV drain removed by Dr. Gr - one JERSEY remains    Current Pain Management:  [ ] PCA   [x] Po Analgesics [ ] IM /IV Anagesics     Vital Signs Last 24 Hrs  T(C): 36.4 (19 Aug 2020 08:22), Max: 37.1 (19 Aug 2020 05:28)  T(F): 97.6 (19 Aug 2020 08:22), Max: 98.8 (19 Aug 2020 05:28)  HR: 106 (19 Aug 2020 10:00) (79 - 106)  BP: 110/42 (19 Aug 2020 10:00) (92/50 - 132/66)  BP(mean): 60 (19 Aug 2020 10:00) (58 - 82)  RR: 35 (19 Aug 2020 10:00) (13 - 35)  SpO2: 96% (19 Aug 2020 10:00) (77% - 96%)  I&O's Detail    18 Aug 2020 07:01  -  19 Aug 2020 07:00  --------------------------------------------------------  IN:  Total IN: 0 mL    OUT:    Bulb: 65 mL    Chest Tube: 270 mL    Indwelling Catheter - Urethral: 1100 mL  Total OUT: 1435 mL    Total NET: -1435 mL          OBJECTIVE:       Wound /Dressing: dressing intact  Drain: [place] w/15cc - kept  Lumbar ROM: not tested  Neurological: A/O x 3              Sensation: [x] intact to light touch  [ ] decreased:          Motor exam: [x]          [x] Upper extremity    Delt      Bicp       Tri      Wrist ext  Wrst Flex       Digit Ext Digit Flex                               R         5/5       5/5        5/5       5/5          5/5           5/5       5/5          5/5                               L          5/5       5/5        5/5       5/5          5/5           5/5       5/5          5/5         [x] Lower ext.     Hip Flx    Quad   Hamstrg     TA       EHL      GS                          R        5/5        5/5        5/5          5/5      5/5      5/5                                L         5/5        5/5        5/5          5/5      5/5      5/5                                                              [x] Vascular: intact           Tension Signs: none          Long Tract Findings: none     RADIOLOGY & ADDITIONAL STUDIES:                                               LABS:                        9.2    6.87  )-----------( 185      ( 18 Aug 2020 06:12 )             28.5     08-18    143  |  108  |  13  ----------------------------<  99  4.0   |  29  |  0.49<L>    Ca    7.5<L>      18 Aug 2020 06:12

## 2020-08-19 NOTE — PROGRESS NOTE ADULT - ASSESSMENT
A/P: s/p complex thoracolumbar osteotomy, instrumented fusion - stable  1. PT/mobilization  2. Void trial after some mobilization  3. Rehab placement when medically cleared, CT removed, etc

## 2020-08-20 LAB
HCT VFR BLD CALC: 28 % — LOW (ref 34.5–45)
HGB BLD-MCNC: 9.1 G/DL — LOW (ref 11.5–15.5)
MCHC RBC-ENTMCNC: 30.5 PG — SIGNIFICANT CHANGE UP (ref 27–34)
MCHC RBC-ENTMCNC: 32.5 GM/DL — SIGNIFICANT CHANGE UP (ref 32–36)
MCV RBC AUTO: 94 FL — SIGNIFICANT CHANGE UP (ref 80–100)
PLATELET # BLD AUTO: 346 K/UL — SIGNIFICANT CHANGE UP (ref 150–400)
RBC # BLD: 2.98 M/UL — LOW (ref 3.8–5.2)
RBC # FLD: 13.8 % — SIGNIFICANT CHANGE UP (ref 10.3–14.5)
WBC # BLD: 8.4 K/UL — SIGNIFICANT CHANGE UP (ref 3.8–10.5)
WBC # FLD AUTO: 8.4 K/UL — SIGNIFICANT CHANGE UP (ref 3.8–10.5)

## 2020-08-20 PROCEDURE — 71045 X-RAY EXAM CHEST 1 VIEW: CPT | Mod: 26

## 2020-08-20 PROCEDURE — 99223 1ST HOSP IP/OBS HIGH 75: CPT

## 2020-08-20 RX ORDER — ENOXAPARIN SODIUM 100 MG/ML
40 INJECTION SUBCUTANEOUS DAILY
Refills: 0 | Status: DISCONTINUED | OUTPATIENT
Start: 2020-08-20 | End: 2020-08-21

## 2020-08-20 RX ADMIN — MIDODRINE HYDROCHLORIDE 10 MILLIGRAM(S): 2.5 TABLET ORAL at 06:42

## 2020-08-20 RX ADMIN — MIDODRINE HYDROCHLORIDE 10 MILLIGRAM(S): 2.5 TABLET ORAL at 14:14

## 2020-08-20 RX ADMIN — CYCLOBENZAPRINE HYDROCHLORIDE 10 MILLIGRAM(S): 10 TABLET, FILM COATED ORAL at 06:42

## 2020-08-20 RX ADMIN — Medication 3 MILLIGRAM(S): at 22:00

## 2020-08-20 RX ADMIN — Medication 100 MILLIGRAM(S): at 06:41

## 2020-08-20 RX ADMIN — ENOXAPARIN SODIUM 40 MILLIGRAM(S): 100 INJECTION SUBCUTANEOUS at 14:13

## 2020-08-20 RX ADMIN — GABAPENTIN 300 MILLIGRAM(S): 400 CAPSULE ORAL at 22:00

## 2020-08-20 RX ADMIN — OXYCODONE HYDROCHLORIDE 10 MILLIGRAM(S): 5 TABLET ORAL at 19:50

## 2020-08-20 RX ADMIN — MORPHINE SULFATE 2 MILLIGRAM(S): 50 CAPSULE, EXTENDED RELEASE ORAL at 10:00

## 2020-08-20 RX ADMIN — Medication 975 MILLIGRAM(S): at 15:15

## 2020-08-20 RX ADMIN — Medication 975 MILLIGRAM(S): at 06:42

## 2020-08-20 RX ADMIN — OXYCODONE HYDROCHLORIDE 10 MILLIGRAM(S): 5 TABLET ORAL at 20:45

## 2020-08-20 RX ADMIN — OXYCODONE HYDROCHLORIDE 10 MILLIGRAM(S): 5 TABLET ORAL at 00:00

## 2020-08-20 RX ADMIN — Medication 1 TABLET(S): at 22:00

## 2020-08-20 RX ADMIN — MORPHINE SULFATE 2 MILLIGRAM(S): 50 CAPSULE, EXTENDED RELEASE ORAL at 10:30

## 2020-08-20 RX ADMIN — CELECOXIB 200 MILLIGRAM(S): 200 CAPSULE ORAL at 09:55

## 2020-08-20 RX ADMIN — SENNA PLUS 2 TABLET(S): 8.6 TABLET ORAL at 22:01

## 2020-08-20 RX ADMIN — CELECOXIB 200 MILLIGRAM(S): 200 CAPSULE ORAL at 10:55

## 2020-08-20 RX ADMIN — CYCLOBENZAPRINE HYDROCHLORIDE 10 MILLIGRAM(S): 10 TABLET, FILM COATED ORAL at 14:14

## 2020-08-20 RX ADMIN — POLYETHYLENE GLYCOL 3350 17 GRAM(S): 17 POWDER, FOR SOLUTION ORAL at 09:55

## 2020-08-20 RX ADMIN — Medication 975 MILLIGRAM(S): at 07:10

## 2020-08-20 RX ADMIN — Medication 975 MILLIGRAM(S): at 14:14

## 2020-08-20 RX ADMIN — Medication 1 TABLET(S): at 06:42

## 2020-08-20 RX ADMIN — MIDODRINE HYDROCHLORIDE 10 MILLIGRAM(S): 2.5 TABLET ORAL at 22:00

## 2020-08-20 RX ADMIN — CYCLOBENZAPRINE HYDROCHLORIDE 10 MILLIGRAM(S): 10 TABLET, FILM COATED ORAL at 21:59

## 2020-08-20 RX ADMIN — Medication 1 TABLET(S): at 14:14

## 2020-08-20 RX ADMIN — Medication 975 MILLIGRAM(S): at 22:45

## 2020-08-20 RX ADMIN — Medication 975 MILLIGRAM(S): at 21:59

## 2020-08-20 NOTE — CONSULT NOTE ADULT - SUBJECTIVE AND OBJECTIVE BOX
HPI  HPI:      Patient is a 75y old  Female who presents with a chief complaint of back pain & weakness S/P lumbar-thoraco anterior/posterior fusion (20 Aug 2020 10:51)      Consulted by Dr. Epperson for VTE prophylaxis, risk stratification, and anticoagulation management.    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 Discussed necessity for VTE prophylaxis given her history of PE. Setting of PE was limited mobility and hospitalization for diskitis . She was on eliquis for 6 months and denies any hypercoaguable workup. She denies any family history of clots or bleeding disorders. POD#10, chest tube was dc'ed today. Patient is working with PT. 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     BMI: 28.2    CrCl: 123.1    Caprini VTE Risk Score:  CAPRINI SCORE  AGE RELATED RISK FACTORS                                                       MOBILITY RELATED FACTORS  [ ] Age 41-60 years                                            (1 Point)                  [ ] Bed rest                                                        (1 Point)  [ ] Age: 61-74 years                                           (2 Points)                [ ] Plaster cast                                                   (2 Points)  [x ] Age= 75 years                                              (3 Points)                 [ ] Bed bound for more than 72 hours                   (2 Points)    DISEASE RELATED RISK FACTORS                                               GENDER SPECIFIC FACTORS  [ ] Edema in the lower extremities                       (1 Point)           [ ] Pregnancy                                                            (1 Point)  [ ] Varicose veins                                               (1 Point)                  [ ] Post-partum < 6 weeks                                      (1 Point)             [x ] BMI > 25 Kg/m2                                            (1 Point)                  [ ] Hormonal therapy or oral contraception       (1 Point)                 [ ] Sepsis (in the previous month)                        (1 Point)             [ ] History of pregnancy complications                (1Point)  [ ] Pneumonia or serious lung disease                                             [ ] Unexplained or recurrent  (=/>3), premature                                 (In the previous month)                               (1 Point)                birth with toxemia or growth-restricted infant (1 Point)  [ ] Abnormal pulmonary function test            (1 Point)                                   SURGERY RELATED RISK FACTORS  [ ] Acute myocardial infarction                       (1 Point)                  [ ]  Section                                         (1 Point)  [ ] Congestive heart failure (in the previous month) (1 Point)   [ ] Minor surgery   lasting <45 minutes       (1 Point)   [ ] Inflammatory bowel disease                             (1 Point)          [ ] Arthroscopic surgery                                  (2 Points)  [ ] Central venous access                                    (2 Points)            [ x] General surgery lasting >45 minutes      (2 Points)       [ ] Stroke (in the previous month)                  (5 Points)            [ ] Elective major lower extremity arthroplasty (5 Points)                                   [  ] Malignancy (present or past include skin melanoma                                          but exclude  basal skin cell)    (2 points)                                      TRAUMA RELATED RISK FACTORS                HEMATOLOGY RELATED FACTORS                                  [ ] Fracture of the hip, pelvis, or leg                       (5 Points)  [x ] Prior episodes of VTE                                     (3 Points)          [ ] Acute spinal cord injury (in the previous month)  (5 Points)  [ ] Positive family history for VTE                         (3 Points)       [ ] Paralysis (less than 1 month)                          (5 Points)  [ ] Prothrombin 17297 A                                      (3 Points)         [ ] Multiple Trauma (within 1month)                 (5Points)                                                                                                                                                                [ ] Factor V Leiden                                          (3 Points)                                OTHER RISK FACTORS                          [ ] Lupus anticoagulants                                     (3 Points)                       [ ] BMI > 40                          (1 Point)                                                         [ ] Anticardiolipin antibodies                                (3 Points)                   [ ] Smoking                              (1Point)                                                [ ] High homocysteine in the blood                      (3 Points)                [  ] Diabetes requiring insulin (1point)                         [ ] Other congenital or acquired thrombophilia       (3 Points)          [  ] Chemotherapy                   (1 Point)  [ ] Heparin induced thrombocytopenia                  (3 Points)             [  ] Blood Transfusion                (1 point)                                                                                                             Total Score [      9    ]                                                                                                                                                                                                                                                                                                                                                                                                                                           IMPROVE Bleeding Risk Score:1.5      Falls Risk:   High ( x )  Mod (  )  Low (  )      FAMILY HISTORY:  No known problems    Denies any personal or familial history of clotting or bleeding disorders.    Allergies    No Known Allergies    Intolerances        REVIEW OF SYSTEMS    (  )Fever	     (  )Constipation	(  )SOB				(  )Headache	(  )Dysuria  (  )Chills	     (  )Melena	(  )Dyspnea present on exertion (  )Dizziness   (  )Polyuria  (  )Nausea	     (  )Hematochezia	(  )Cough         (  )Syncope   	         (  )Hematuria  (  )Vomiting    (  )Chest Pain	(  )Wheezing			( x )Weakness  (  )Diarrhea     (  )Palpitations	(  )Anorexia			( x )Myalgia   (  x ) Arthralgia    Pertinent positives in HPI and daily subjective. All other systems negative.    PHYSICAL EXAM:    Constitutional: Appears pale    Neurological: A& O x 3    Skin: Warm    Respiratory and Thorax: normal effort; Breath sounds: normal; No rales/wheezing/rhonchi  	  Cardiovascular: S1, S2, regular, NMBR	    Gastrointestinal: BS + x 4Q, nontender	    Genitourinary:  Bladder nondistended, nontender    Musculoskeletal:   General Right:   no muscle/joint tenderness,   normal tone, no joint swelling,   ROM: limited	    General Left:   no muscle/joint tenderness,   normal tone, no joint swelling,   ROM: limited    Abd incision CDI with steris    Back: midline dressing CDI    Skin: numerous blisters/abrasions with left hip blister oozing    Lower extrems:   Right: no calf tenderness              negative manju's sign               + pedal pulses    Left:   no calf tenderness              negative manju's sign               + pedal pulses            MEDICATIONS  (STANDING):  acetaminophen   Tablet .. 975 milliGRAM(s) Oral every 8 hours  calcium carbonate 1250 mG  + Vitamin D (OsCal 500 + D) 1 Tablet(s) Oral three times a day  cyclobenzaprine 10 milliGRAM(s) Oral every 8 hours  enoxaparin Injectable 40 milliGRAM(s) SubCutaneous daily  gabapentin 300 milliGRAM(s) Oral at bedtime  melatonin 3 milliGRAM(s) Oral at bedtime  midodrine 10 milliGRAM(s) Oral every 8 hours  polyethylene glycol 3350 17 Gram(s) Oral two times a day  senna 2 Tablet(s) Oral at bedtime      Vital Signs Last 24 Hrs  T(C): 36.5 (20 Aug 2020 08:33), Max: 37.2 (19 Aug 2020 21:08)  T(F): 97.7 (20 Aug 2020 08:33), Max: 99 (19 Aug 2020 21:08)  HR: 94 (20 Aug 2020 13:00) (75 - 106)  BP: 100/36 (20 Aug 2020 13:00) (92/50 - 140/74)  BP(mean): 55 (20 Aug 2020 13:00) (55 - 89)  RR: 21 (20 Aug 2020 13:00) (13 - 25)  SpO2: 95% (20 Aug 2020 13:00) (94% - 100%)      **Current DVT Prophylaxis:    LMWH                   ( x )  Heparin SQ           (  )  Coumadin             (  )  Xarelto                  (  )  Eliquis                   (  )  Venodynes           ( x )  Ambulation          ( x )  UFH                       (  )  ECASA                   (  )  Contraindicated  (  ) HPI  HPI:      Patient is a 75y old  Female who presents with a chief complaint of back pain & weakness S/P lumbar-thoraco anterior/posterior fusion (20 Aug 2020 10:51)      Consulted by Dr. Epperson for VTE prophylaxis, risk stratification, and anticoagulation management.    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 Discussed necessity for VTE prophylaxis given her history of PE. Setting of PE was limited mobility and hospitalization for diskitis . She was on eliquis for 6 months and denies any hypercoaguable workup. She denies any family history of clots or bleeding disorders. POD#10, chest tube was dc'ed today. Patient is working with PT. 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     FINDINGS:    There is normal compressibility of the bilateral common femoral, femoral and popliteal veins.  Doppler examination shows normal spontaneous and phasic flow.    No calf vein thrombosis is detected.    IMPRESSION:  No evidence of deep venous thrombosis in either lower extremity.    BMI: 28.2    CrCl: 123.1    Caprini VTE Risk Score:  CAPRINI SCORE  AGE RELATED RISK FACTORS                                                       MOBILITY RELATED FACTORS  [ ] Age 41-60 years                                            (1 Point)                  [ ] Bed rest                                                        (1 Point)  [ ] Age: 61-74 years                                           (2 Points)                [ ] Plaster cast                                                   (2 Points)  [x ] Age= 75 years                                              (3 Points)                 [ ] Bed bound for more than 72 hours                   (2 Points)    DISEASE RELATED RISK FACTORS                                               GENDER SPECIFIC FACTORS  [ ] Edema in the lower extremities                       (1 Point)           [ ] Pregnancy                                                            (1 Point)  [ ] Varicose veins                                               (1 Point)                  [ ] Post-partum < 6 weeks                                      (1 Point)             [x ] BMI > 25 Kg/m2                                            (1 Point)                  [ ] Hormonal therapy or oral contraception       (1 Point)                 [ ] Sepsis (in the previous month)                        (1 Point)             [ ] History of pregnancy complications                (1Point)  [ ] Pneumonia or serious lung disease                                             [ ] Unexplained or recurrent  (=/>3), premature                                 (In the previous month)                               (1 Point)                birth with toxemia or growth-restricted infant (1 Point)  [ ] Abnormal pulmonary function test            (1 Point)                                   SURGERY RELATED RISK FACTORS  [ ] Acute myocardial infarction                       (1 Point)                  [ ]  Section                                         (1 Point)  [ ] Congestive heart failure (in the previous month) (1 Point)   [ ] Minor surgery   lasting <45 minutes       (1 Point)   [ ] Inflammatory bowel disease                             (1 Point)          [ ] Arthroscopic surgery                                  (2 Points)  [ ] Central venous access                                    (2 Points)            [ x] General surgery lasting >45 minutes      (2 Points)       [ ] Stroke (in the previous month)                  (5 Points)            [ ] Elective major lower extremity arthroplasty (5 Points)                                   [  ] Malignancy (present or past include skin melanoma                                          but exclude  basal skin cell)    (2 points)                                      TRAUMA RELATED RISK FACTORS                HEMATOLOGY RELATED FACTORS                                  [ ] Fracture of the hip, pelvis, or leg                       (5 Points)  [x ] Prior episodes of VTE                                     (3 Points)          [ ] Acute spinal cord injury (in the previous month)  (5 Points)  [ ] Positive family history for VTE                         (3 Points)       [ ] Paralysis (less than 1 month)                          (5 Points)  [ ] Prothrombin 34152 A                                      (3 Points)         [ ] Multiple Trauma (within 1month)                 (5Points)                                                                                                                                                                [ ] Factor V Leiden                                          (3 Points)                                OTHER RISK FACTORS                          [ ] Lupus anticoagulants                                     (3 Points)                       [ ] BMI > 40                          (1 Point)                                                         [ ] Anticardiolipin antibodies                                (3 Points)                   [ ] Smoking                              (1Point)                                                [ ] High homocysteine in the blood                      (3 Points)                [  ] Diabetes requiring insulin (1point)                         [ ] Other congenital or acquired thrombophilia       (3 Points)          [  ] Chemotherapy                   (1 Point)  [ ] Heparin induced thrombocytopenia                  (3 Points)             [  ] Blood Transfusion                (1 point)                                                                                                             Total Score [      9    ]                                                                                                                                                                                                                                                                                                                                                                                                                                           IMPROVE Bleeding Risk Score:1.5      Falls Risk:   High ( x )  Mod (  )  Low (  )      FAMILY HISTORY:  No known problems    Denies any personal or familial history of clotting or bleeding disorders.    Allergies    No Known Allergies    Intolerances        REVIEW OF SYSTEMS    (  )Fever	     (  )Constipation	(  )SOB				(  )Headache	(  )Dysuria  (  )Chills	     (  )Melena	(  )Dyspnea present on exertion (  )Dizziness   (  )Polyuria  (  )Nausea	     (  )Hematochezia	(  )Cough         (  )Syncope   	         (  )Hematuria  (  )Vomiting    (  )Chest Pain	(  )Wheezing			( x )Weakness  (  )Diarrhea     (  )Palpitations	(  )Anorexia			( x )Myalgia   (  x ) Arthralgia    Pertinent positives in HPI and daily subjective. All other systems negative.    PHYSICAL EXAM:    Constitutional: Appears pale    Neurological: A& O x 3    Skin: Warm    Respiratory and Thorax: normal effort; Breath sounds: normal; No rales/wheezing/rhonchi  	  Cardiovascular: S1, S2, regular, NMBR	    Gastrointestinal: BS + x 4Q, nontender	    Genitourinary:  Bladder nondistended, nontender    Musculoskeletal:   General Right:   no muscle/joint tenderness,   normal tone, no joint swelling,   ROM: limited	    General Left:   no muscle/joint tenderness,   normal tone, no joint swelling,   ROM: limited    Abd incision CDI with steris    Back: midline dressing CDI    Skin: numerous blisters/abrasions with left hip blister oozing    Lower extrems:   Right: no calf tenderness              negative manju's sign               + pedal pulses    Left:   no calf tenderness              negative manju's sign               + pedal pulses            MEDICATIONS  (STANDING):  acetaminophen   Tablet .. 975 milliGRAM(s) Oral every 8 hours  calcium carbonate 1250 mG  + Vitamin D (OsCal 500 + D) 1 Tablet(s) Oral three times a day  cyclobenzaprine 10 milliGRAM(s) Oral every 8 hours  enoxaparin Injectable 40 milliGRAM(s) SubCutaneous daily  gabapentin 300 milliGRAM(s) Oral at bedtime  melatonin 3 milliGRAM(s) Oral at bedtime  midodrine 10 milliGRAM(s) Oral every 8 hours  polyethylene glycol 3350 17 Gram(s) Oral two times a day  senna 2 Tablet(s) Oral at bedtime      Vital Signs Last 24 Hrs  T(C): 36.5 (20 Aug 2020 08:33), Max: 37.2 (19 Aug 2020 21:08)  T(F): 97.7 (20 Aug 2020 08:33), Max: 99 (19 Aug 2020 21:08)  HR: 94 (20 Aug 2020 13:00) (75 - 106)  BP: 100/36 (20 Aug 2020 13:00) (92/50 - 140/74)  BP(mean): 55 (20 Aug 2020 13:00) (55 - 89)  RR: 21 (20 Aug 2020 13:00) (13 - 25)  SpO2: 95% (20 Aug 2020 13:00) (94% - 100%)      **Current DVT Prophylaxis:    LMWH                   ( x )  Heparin SQ           (  )  Coumadin             (  )  Xarelto                  (  )  Eliquis                   (  )  Venodynes           ( x )  Ambulation          ( x )  UFH                       (  )  ECASA                   (  )  Contraindicated  (  )

## 2020-08-20 NOTE — PROGRESS NOTE ADULT - ASSESSMENT
76 Y/O FEMALE WITH THE ABOVE MED HX S/P ANTERIOR LUMBAR DISCECTOMY AND FUSION, PROLONGED HOSPITAL STAY POST SECOND STAGE WITH HYPOTENSION NOW STABLE    *POSTPROCEDURAL STATE -s/p ANTERIOR APPROACH FIRST THEN POSTERIOR APPROACH  LEFT CHEST TUBE - TO WATER SEAL, CXR YEST WITH POSSIBLE RIGHT INFILTRATE UT NO S/SX OF PNA, AFEBRILE, WBC NORMAL  WILL MONITOR  PT WENT TO ICU POST SECOND STAGE DUE TO HYPOTENSION, REMAINED INTUBATED  NOW STABILIZED, OFF PHENYLEPHRINE  ON MIDODRINE FOR BP SUPPORT, BP BETTER  PAIN CONTROL  PHYSICAL THERAPY  *HYPOTENSION - ON MIDODRINE, STILL WITH LOW BP LEVELS BETTER  WAS PLACED ON ANCEF FOR PROPHYLAXIS BUT WOULD DC AS NOW NO CLEAR SIGN OF INF  MONITOR OFF ABX  *ANEMIA - SECONDARY TO ACUTE BLOOD LOSS  H/H STABLE  *CONSTIPATION, ADD MIRILAX  *URINARY RETENTION - FLORES PLACED BACK IN  WILL NEED TO MOBILIZE MORE  VOIDING TRIAL AS OUTPT      *DVT PROPHY - VENODYNES ONLY IN THE SETTING OF RECENT SPINE SURGERY AND DRAIN IN PLACE

## 2020-08-20 NOTE — PROGRESS NOTE ADULT - SUBJECTIVE AND OBJECTIVE BOX
HPI:  76 y/o female with oa, pe in the past (hypercoag workup neg), previous spinal fusion s/p anterior lumbar discectomy and fusion  Medicine consult requested for post op medical management  20: no cp, sob, n/v/f/c; feels tired, back pain controlled  20: Pt now stabilized in the ICU and to be downgraded to step down;  Pos back pain and feels like the stickers are all itchy on her belly; no n/v/f/c  no cp, sob; discomfort at the side of the tube  20: No cp, sob just discomfort at the area of the chest tube, retaining urine was straight cathed a couple times; pos back soreness, was able to sit up yest but not ambulatory yet  20: No cp, sob, n/v/f/c; no cough; chest tube in place  claros placed back in  20; No cp, sob, n/v/f/c; chest tube to be removed this am; did ambulate a few steps yest; feels tired.   back pain controlled    REVIEW OF SYSTEMS:   All 10 systems reviewed in detailed and found to be negative with the exception of what has already been described above    PHYSICAL EXAM:    Vital Signs Last 24 Hrs  T(C): 36.4 (20 Aug 2020 06:06), Max: 37.2 (19 Aug 2020 21:08)  T(F): 97.6 (20 Aug 2020 06:06), Max: 99 (19 Aug 2020 21:08)  HR: 75 (20 Aug 2020 06:00) (75 - 106)  BP: 98/74 (20 Aug 2020 06:00) (97/36 - 140/74)  BP(mean): 83 (20 Aug 2020 06:00) (55 - 89)  RR: 14 (20 Aug 2020 06:00) (13 - 35)  SpO2: 100% (20 Aug 2020 06:00) (96% - 100%)    GEN: A and O, NAD,  mood stable  HEENT:   NC/AT EOMI, no oropharyngeal lesions, erythema, exudates, oral thrush    NECK:   supple    CV:  +S1, +S2, regular, no murmurs or rubs    RESP:   lungs clear to auscultation bilaterally, no wheezing, rales at left base  Left CT in place    GI:  abdomen soft, non-tender, non-distended, normal B no abdominal masses, no palpable masses  LEFT FLANK DRESSING C/D/I , lower abd incision c/d/i  BACK  JERSEY DRAIN IN PLACE    RECTAL:  not examined  : CLAROS PLACED BACK IN      MSK:   normal muscle tone, no atrophy, no rigidity, no contractions lue weakness (not new per pt, since birth)    EXT:   no clubbing, no cyanosis, no edema, no calf pain, swelling or erythema    VASCULAR:  pulses equal and symmetric in the upper and lower extremities    NEURO:  AAOX3, no focal neurological deficits, follows all commands, able to move extremities spontaneously    SKIN:  no ulcers, lesions or rashes    LABS/IMAGIN.2    6.87  )-----------( 185      ( 18 Aug 2020 06:12 )             28.5     08-18    143  |  108  |  13  ----------------------------<  99  4.0   |  29  |  0.49<L>    Ca    7.5<L>      18 Aug 2020 06:12    TPro  4.3<L>  /  Alb  1.3<L>  /  TBili  0.2  /  DBili  x   /  AST  37  /  ALT  12  /  AlkPhos  50  08-17        LIVER FUNCTIONS - ( 17 Aug 2020 06:46 )  Alb: 1.3 g/dL / Pro: 4.3 gm/dL / ALK PHOS: 50 U/L / ALT: 12 U/L / AST: 37 U/L / GGT: x             MEDICATIONS  (STANDING):  acetaminophen   Tablet .. 975 milliGRAM(s) Oral every 8 hours  calcium carbonate 1250 mG  + Vitamin D (OsCal 500 + D) 1 Tablet(s) Oral three times a day  ceFAZolin   IVPB 2000 milliGRAM(s) IV Intermittent every 8 hours  celecoxib 200 milliGRAM(s) Oral two times a day  cyclobenzaprine 10 milliGRAM(s) Oral every 8 hours  gabapentin 300 milliGRAM(s) Oral at bedtime  melatonin 3 milliGRAM(s) Oral at bedtime  midodrine 10 milliGRAM(s) Oral every 8 hours  polyethylene glycol 3350 17 Gram(s) Oral two times a day  senna 2 Tablet(s) Oral at bedtime    MEDICATIONS  (PRN):  benzocaine 15 mG/menthol 3.6 mG (Sugar-Free) Lozenge 1 Lozenge Oral every 6 hours PRN Sore Throat  diphenhydrAMINE   Injectable 12.5 milliGRAM(s) IV Push every 4 hours PRN Itching  famotidine    Tablet 20 milliGRAM(s) Oral every 12 hours PRN Dyspepsia  fentaNYL    Injectable 100 MICROGram(s) IV Push every 3 hours PRN distress  magnesium hydroxide Suspension 30 milliLiter(s) Oral every 12 hours PRN Constipation  morphine  - Injectable 2 milliGRAM(s) IV Push every 4 hours PRN breakthrough pain  naloxone Injectable 0.1 milliGRAM(s) IV Push every 3 minutes PRN For ANY of the following changes in patient status:  A. RR LESS THAN 10 breaths per minute, B. Oxygen saturation LESS THAN 90%, C. Sedation score of 6  ondansetron Injectable 4 milliGRAM(s) IV Push every 6 hours PRN Nausea  oxyCODONE    IR 5 milliGRAM(s) Oral every 4 hours PRN Moderate Pain (4 - 6)  oxyCODONE    IR 10 milliGRAM(s) Oral every 4 hours PRN Severe Pain (7 - 10)  traMADol 50 milliGRAM(s) Oral every 4 hours PRN Mild Pain (1 - 3)                                                                                                                                              EKG:     NSR@69BPM

## 2020-08-20 NOTE — PROGRESS NOTE ADULT - SUBJECTIVE AND OBJECTIVE BOX
POD #9 STAGE I - L5-S1 osteotomy, ALIF -hyperlordotic cage, T12-L1 osteotomy anterior lateral with hyperlordotic cage, release of ALL  POD #7 removal of L1-4 screws, PSO L4, T4-pelvis instrumentation, CT TRACY, Kyphoplasty T5,6,7, 3 BMPs, local bone, plastics closure.  Patient seen and examined, resting comfortable in bed,   Notes pain tolerable with current meds  BP 96/51m  20cc MICHELLE , minimal output since last shift.  Bandages / drains as per plastics.    9.2 / 28.5 H&H this am.   Neuro exam remains unchanged, stable   Chest tube removed this am with  Dr Alexis.   Continue to mobilize patient with physical therapy.   Anticipate d/c michelle latdr today?, as per plastics   Once MICHELLE has been d/c may start Lovenox  Patient is stable to transfer to .

## 2020-08-20 NOTE — CONSULT NOTE ADULT - ASSESSMENT
This is a 75 year old female s/p anterior/posterior thoracolumbar fusion with high risk for VTE due to history PE, age, BMI, limited mobility, and surgery. She is love risk for bleeding. Agree with Lovenox 40 mg SQ daily until ambulating ad olaf due to history PE. [facility]ine ok with lovenox per note.    Plan:  ::Lovenox 40 mg SQ daily until ambulating ad olaf  ::Daily CBC  ::Stat CBC, last lab 8/18  ::Venodynes b/l  ::Ambulation per PT    Thank you for this consult, will continue to follow.

## 2020-08-20 NOTE — PROGRESS NOTE ADULT - SUBJECTIVE AND OBJECTIVE BOX
Subjective: POD#10    Objective: L thoracotomy    Heent: N/C, AT PER no scleral icterus, No JVD  Pul: clear  Cor: RRR  Abdomen: soft, normal BS.   Extremities: mild edema

## 2020-08-21 ENCOUNTER — TRANSCRIPTION ENCOUNTER (OUTPATIENT)
Age: 75
End: 2020-08-21

## 2020-08-21 VITALS — RESPIRATION RATE: 20 BRPM | HEART RATE: 94 BPM

## 2020-08-21 LAB
ANION GAP SERPL CALC-SCNC: 4 MMOL/L — LOW (ref 5–17)
BUN SERPL-MCNC: 14 MG/DL — SIGNIFICANT CHANGE UP (ref 7–23)
CALCIUM SERPL-MCNC: 7.9 MG/DL — LOW (ref 8.5–10.1)
CHLORIDE SERPL-SCNC: 105 MMOL/L — SIGNIFICANT CHANGE UP (ref 96–108)
CO2 SERPL-SCNC: 31 MMOL/L — SIGNIFICANT CHANGE UP (ref 22–31)
CREAT SERPL-MCNC: 0.55 MG/DL — SIGNIFICANT CHANGE UP (ref 0.5–1.3)
GLUCOSE SERPL-MCNC: 84 MG/DL — SIGNIFICANT CHANGE UP (ref 70–99)
HCT VFR BLD CALC: 28.3 % — LOW (ref 34.5–45)
HGB BLD-MCNC: 8.9 G/DL — LOW (ref 11.5–15.5)
MCHC RBC-ENTMCNC: 30.3 PG — SIGNIFICANT CHANGE UP (ref 27–34)
MCHC RBC-ENTMCNC: 31.4 GM/DL — LOW (ref 32–36)
MCV RBC AUTO: 96.3 FL — SIGNIFICANT CHANGE UP (ref 80–100)
PLATELET # BLD AUTO: 391 K/UL — SIGNIFICANT CHANGE UP (ref 150–400)
POTASSIUM SERPL-MCNC: 4 MMOL/L — SIGNIFICANT CHANGE UP (ref 3.5–5.3)
POTASSIUM SERPL-SCNC: 4 MMOL/L — SIGNIFICANT CHANGE UP (ref 3.5–5.3)
RBC # BLD: 2.94 M/UL — LOW (ref 3.8–5.2)
RBC # FLD: 14 % — SIGNIFICANT CHANGE UP (ref 10.3–14.5)
SARS-COV-2 RNA SPEC QL NAA+PROBE: SIGNIFICANT CHANGE UP
SODIUM SERPL-SCNC: 140 MMOL/L — SIGNIFICANT CHANGE UP (ref 135–145)
WBC # BLD: 6.49 K/UL — SIGNIFICANT CHANGE UP (ref 3.8–10.5)
WBC # FLD AUTO: 6.49 K/UL — SIGNIFICANT CHANGE UP (ref 3.8–10.5)

## 2020-08-21 PROCEDURE — 72100 X-RAY EXAM L-S SPINE 2/3 VWS: CPT | Mod: 26

## 2020-08-21 PROCEDURE — 72070 X-RAY EXAM THORAC SPINE 2VWS: CPT | Mod: 26

## 2020-08-21 PROCEDURE — 99231 SBSQ HOSP IP/OBS SF/LOW 25: CPT

## 2020-08-21 RX ORDER — ENOXAPARIN SODIUM 100 MG/ML
40 INJECTION SUBCUTANEOUS
Qty: 0 | Refills: 0 | DISCHARGE
Start: 2020-08-21

## 2020-08-21 RX ORDER — SENNA PLUS 8.6 MG/1
2 TABLET ORAL
Qty: 0 | Refills: 0 | DISCHARGE
Start: 2020-08-21

## 2020-08-21 RX ORDER — CYCLOBENZAPRINE HYDROCHLORIDE 10 MG/1
1 TABLET, FILM COATED ORAL
Qty: 0 | Refills: 0 | DISCHARGE
Start: 2020-08-21

## 2020-08-21 RX ORDER — OXYCODONE HYDROCHLORIDE 5 MG/1
1 TABLET ORAL
Qty: 0 | Refills: 0 | DISCHARGE
Start: 2020-08-21

## 2020-08-21 RX ORDER — LANOLIN ALCOHOL/MO/W.PET/CERES
1 CREAM (GRAM) TOPICAL
Qty: 0 | Refills: 0 | DISCHARGE
Start: 2020-08-21

## 2020-08-21 RX ORDER — FAMOTIDINE 10 MG/ML
1 INJECTION INTRAVENOUS
Qty: 0 | Refills: 0 | DISCHARGE
Start: 2020-08-21

## 2020-08-21 RX ADMIN — ENOXAPARIN SODIUM 40 MILLIGRAM(S): 100 INJECTION SUBCUTANEOUS at 10:30

## 2020-08-21 RX ADMIN — Medication 975 MILLIGRAM(S): at 06:01

## 2020-08-21 RX ADMIN — CYCLOBENZAPRINE HYDROCHLORIDE 10 MILLIGRAM(S): 10 TABLET, FILM COATED ORAL at 15:00

## 2020-08-21 RX ADMIN — MIDODRINE HYDROCHLORIDE 10 MILLIGRAM(S): 2.5 TABLET ORAL at 05:52

## 2020-08-21 RX ADMIN — Medication 1 TABLET(S): at 05:52

## 2020-08-21 RX ADMIN — POLYETHYLENE GLYCOL 3350 17 GRAM(S): 17 POWDER, FOR SOLUTION ORAL at 10:31

## 2020-08-21 RX ADMIN — Medication 975 MILLIGRAM(S): at 15:00

## 2020-08-21 RX ADMIN — CYCLOBENZAPRINE HYDROCHLORIDE 10 MILLIGRAM(S): 10 TABLET, FILM COATED ORAL at 05:53

## 2020-08-21 RX ADMIN — OXYCODONE HYDROCHLORIDE 10 MILLIGRAM(S): 5 TABLET ORAL at 10:30

## 2020-08-21 RX ADMIN — OXYCODONE HYDROCHLORIDE 10 MILLIGRAM(S): 5 TABLET ORAL at 11:23

## 2020-08-21 RX ADMIN — MIDODRINE HYDROCHLORIDE 10 MILLIGRAM(S): 2.5 TABLET ORAL at 15:00

## 2020-08-21 RX ADMIN — Medication 975 MILLIGRAM(S): at 05:52

## 2020-08-21 RX ADMIN — Medication 1 TABLET(S): at 15:00

## 2020-08-21 NOTE — PROGRESS NOTE ADULT - ASSESSMENT
74 Y/O FEMALE WITH THE ABOVE MED HX S/P ANTERIOR LUMBAR DISCECTOMY AND FUSION, PROLONGED HOSPITAL STAY POST SECOND STAGE WITH HYPOTENSION NOW STABLE    *POSTPROCEDURAL STATE -s/p ANTERIOR APPROACH FIRST THEN POSTERIOR APPROACH  LEFT CHEST TUBE - removed yest	  PT WENT TO ICU POST SECOND STAGE DUE TO HYPOTENSION, REMAINED INTUBATED  NOW STABILIZED, OFF PHENYLEPHRINE  ON MIDODRINE FOR BP SUPPORT, BP BETTER  PAIN CONTROL  PHYSICAL THERAPY  *HYPOTENSION - ON MIDODRINE, STILL WITH LOW BP LEVELS BETTER  WAS PLACED ON ANCEF FOR PROPHYLAXIS BUT WOULD DC AS NOW NO CLEAR SIGN OF INF  MONITOR OFF ABX  *ANEMIA - SECONDARY TO ACUTE BLOOD LOSS  H/H STABLE  *CONSTIPATION, ADD MIRILAX  *URINARY RETENTION - FLORES PLACED BACK IN  WILL NEED TO MOBILIZE MORE  VOIDING TRIAL AS OUTPT IN REHAB      *DVT PROPHY - VENODYNES , LOVENOX    DC PLANNING IF COVID COMES BACK NEG

## 2020-08-21 NOTE — DISCHARGE NOTE PROVIDER - CARE PROVIDER_API CALL
Maria G Epperson  ORTHOPAEDIC SURGERY  [address] [facility] [address] [facility], [address] [place] [address]  Phone: [phone]  Fax: [phone]  Follow Up Time:

## 2020-08-21 NOTE — DISCHARGE NOTE PROVIDER - NSDCCPTREATMENT_GEN_ALL_CORE_FT
PRINCIPAL PROCEDURE  Procedure: Fusion, spine, thoracolumbar, posterior approach  Findings and Treatment:

## 2020-08-21 NOTE — DISCHARGE NOTE PROVIDER - NSDCCPCAREPLAN_GEN_ALL_CORE_FT
PRINCIPAL DISCHARGE DIAGNOSIS  Diagnosis: Degenerative lumbar spinal stenosis  Assessment and Plan of Treatment:

## 2020-08-21 NOTE — PROGRESS NOTE ADULT - SUBJECTIVE AND OBJECTIVE BOX
[facility]                                                           Orthopedic Spine Progress Note      POST OPERATIVE DAY #: 10/8     STATUS POST: s/p two stage complex thoracolumbar osteotomy, fusion               Pre-Op Dx: Flatback syndrome    Post-Op Dx:  Flatback syndrome    SUBJECTIVE: Patient seen and examined at 0930, awake/alert, mobilizing some w/PT, last drain removed yesterday, on Lovenox now, Dopplers b/l LEs negative, claros still in - Dr. Velasquez recommended void trial in rehab    Current Pain Management:  [ ] PCA   [x] Po Analgesics [x] IM /IV Anagesics     Vital Signs Last 24 Hrs  T(C): 36.9 (21 Aug 2020 09:04), Max: 37.3 (20 Aug 2020 20:59)  T(F): 98.5 (21 Aug 2020 09:04), Max: 99.2 (20 Aug 2020 20:59)  HR: 95 (21 Aug 2020 09:00) (75 - 104)  BP: 112/51 (21 Aug 2020 06:00) (97/67 - 120/55)  BP(mean): 68 (21 Aug 2020 06:00) (55 - 98)  RR: 20 (21 Aug 2020 09:00) (13 - 24)  SpO2: 96% (21 Aug 2020 09:00) (92% - 97%)  I&O's Detail    20 Aug 2020 07:01  -  21 Aug 2020 07:00  --------------------------------------------------------  IN:  Total IN: 0 mL    OUT:    Indwelling Catheter - Urethral: 950 mL  Total OUT: 950 mL    Total NET: -950 mL          OBJECTIVE:   Wound /Dressing: clean, dry, intact  Lumbar ROM: not tested  Neurological: A/O x 3              Sensation: [x] intact to light touch  [ ] decreased:          Motor exam: [x]          [x] Upper extremity    Delt      Bicp       Tri      Wrist ext  Wrst Flex       Digit Ext Digit Flex                               R         5/5       5/5        5/5       5/5          5/5           5/5       5/5          5/5                               L          5/5       5/5        5/5       5/5          5/5           5/5       5/5          5/5         [x] Lower ext.     Hip Flx    Quad   Hamstrg     TA       EHL      GS                         R        5/5        5/5        5/5           5/5      5/5      5/5                             L         5/5        5/5        5/5           5/5      5/5      5/5                                                              [x] Vascular: intact           Tension Signs: none          Long Tract Findings: none                                                  LABS:                        8.9    6.49  )-----------( 391      ( 21 Aug 2020 06:06 )             28.3     08-21    140  |  105  |  14  ----------------------------<  84  4.0   |  31  |  0.55    Ca    7.9<L>      21 Aug 2020 06:06

## 2020-08-21 NOTE — PROGRESS NOTE ADULT - PROVIDER SPECIALTY LIST ADULT
Anticoag Management
Critical Care
Internal Medicine
Orthopedics
Plastic Surgery
Surgery
Critical Care
Surgery

## 2020-08-21 NOTE — PROGRESS NOTE ADULT - ASSESSMENT
Agree with rehab  ok to keep incision open to air and ok to shower  f/u in the office once home from rehab

## 2020-08-21 NOTE — DISCHARGE NOTE PROVIDER - HOSPITAL COURSE
The patient was admitted on 08- for Stage I of a two-stage elective thoracolumbar surgery. Stage I entailed a L5-S1 osteotomy, ALIF w/hyperlordotic cage, T12-L1 osteotomy anterior lateral w/hyperlordotic cage, release of ALL and was done on th day of admission. The patient tolerated surgery without complication and was transferred to SDU from the recovery room. POD #1 - pain was controlled with PCA, neuro was intact, VSS, afebrile, labs stable, medically cleared for Stage II the next day. POD #2 (08-) - the patient was back in the OR for Stage II that entailed removal of L1-4 screws, PSO L4, T4-pelvis instrumentation, kyphoplasties T5-7 w/plastic surgery closure by Dr. Gr. Surgery was tolerated and patient was transferred to ICU from the recovery room. POD #3/1 - patient still intubated but arousable and responsive to verbal commands, neuro/motor intact, labs stable, Tmax 100.4, drains 200/250/50/50cc - all kept, chest tube w/70cc - kept. Patient successfully extubated later in day and was transferred to SDU. POD #4/2 - Tmax 100.8, H/H 8.4/25.5, CT w/300cc - kept, 1U PRBCs transfused, drains 100/60/40/50cc - kept, cleared for PT/stretching exercised in bed only, PCA/claros kept. POD #5/3 - drains 20/30/55/60cc - kept, K+ 3.4 - repleted, CT 300cc - kept, claros kept, PCA discontinued, hypotensive at times - midodrine started, other labs stable, HF strength decreased, doing PT exercises in bed. POD #6/4 - out of bed in chair, claros removed, drains 10/30/40/20 - plastic surgery removed one JERSEY and one HV drain, CT 200cc - kept, afebrile, blood pressure controlled w/midodrine. POD #7/5 - CT 230cc - kept, drains 5/30cc - kept, neuro/motor intact. POD #8/6 - b/l LE dopplers ordered due to past h/o PE and slow to mobilize, VSS, afebrile, was straight cath'd the evening before for urinary retention - claros now placed again, CT 120cc - set to water seal by Dr. Alexis, last HV drain removed by plastics - one JERSEY remains (15cc), slow to mobilize, neuro/motor intact, wound healing. POD #9/7 - JERSEY drain 20cc - later removed by plastics, chest tube removed by Dr. Alexis, cleared to begin Lovenox now for anticoagulation, mobilization continued, COVID tested for rehab placement. POD #10/8 - VSS, afebrile, labs stable, on Lovenox now, Dopplers were negative, neuro/motor intact, wound healing well, medicine (Dr. Velasquez recommending void trial while in rehab). Notified at 1530 that COVID test was negative and arrangements made for rehab transfer. Patient is subsequently discharged to rehab in stable condition on 08-.

## 2020-08-21 NOTE — PROGRESS NOTE ADULT - SUBJECTIVE AND OBJECTIVE BOX
HPI:  74 y/o female with oa, pe in the past (hypercoag workup neg), previous spinal fusion s/p anterior lumbar discectomy and fusion  Medicine consult requested for post op medical management  20: no cp, sob, n/v/f/c; feels tired, back pain controlled  20: Pt now stabilized in the ICU and to be downgraded to step down;  Pos back pain and feels like the stickers are all itchy on her belly; no n/v/f/c  no cp, sob; discomfort at the side of the tube  20: No cp, sob just discomfort at the area of the chest tube, retaining urine was straight cathed a couple times; pos back soreness, was able to sit up yest but not ambulatory yet  20: No cp, sob, n/v/f/c; no cough; chest tube in place  claros placed back in  20; No cp, sob, n/v/f/c; chest tube to be removed this am; did ambulate a few steps yest; feels tired.   back pain controlled  20; ambulated a bit more yest, feels tired; covid swab done yest; no cp, sob, n/v    REVIEW OF SYSTEMS:   All 10 systems reviewed in detailed and found to be negative with the exception of what has already been described above    PHYSICAL EXAM:    Vital Signs Last 24 Hrs  T(C): 36.8 (21 Aug 2020 05:00), Max: 37.3 (20 Aug 2020 20:59)  T(F): 98.2 (21 Aug 2020 05:00), Max: 99.2 (20 Aug 2020 20:59)  HR: 77 (21 Aug 2020 07:00) (75 - 104)  BP: 112/51 (21 Aug 2020 06:00) (92/50 - 120/55)  BP(mean): 68 (21 Aug 2020 06:00) (55 - 98)  RR: 14 (21 Aug 2020 07:00) (13 - 25)  SpO2: 93% (21 Aug 2020 07:00) (92% - 100%)    GEN: A and O, NAD,  mood stable  HEENT:   NC/AT EOMI, no oropharyngeal lesions, erythema, exudates, oral thrush    NECK:   supple    CV:  +S1, +S2, regular, no murmurs or rubs    RESP:   lungs clear to auscultation bilaterally, no wheezing, rales at left base  Left CT in place    GI:  abdomen soft, non-tender, non-distended, normal B no abdominal masses, no palpable masses  LEFT FLANK DRESSING C/D/I , lower abd incision c/d/i  Back dressing c/d/i    RECTAL:  not examined  : CLAROS PLACED BACK IN      MSK:   normal muscle tone, no atrophy, no rigidity, no contractions lue weakness (not new per pt, since birth)    EXT:   no clubbing, no cyanosis, no edema, no calf pain, swelling or erythema    VASCULAR:  pulses equal and symmetric in the upper and lower extremities    NEURO:  AAOX3, no focal neurological deficits, follows all commands, able to move extremities spontaneously    SKIN:  no ulcers, lesions or rashes    LABS/IMAGIN.9    6.49  )-----------( 391      ( 21 Aug 2020 06:06 )             28.3     08-    140  |  105  |  14  ----------------------------<  84  4.0   |  31  |  0.55    Ca    7.9<L>      21 Aug 2020 06:06          MEDICATIONS  (STANDING):  acetaminophen   Tablet .. 975 milliGRAM(s) Oral every 8 hours  calcium carbonate 1250 mG  + Vitamin D (OsCal 500 + D) 1 Tablet(s) Oral three times a day  cyclobenzaprine 10 milliGRAM(s) Oral every 8 hours  enoxaparin Injectable 40 milliGRAM(s) SubCutaneous daily  gabapentin 300 milliGRAM(s) Oral at bedtime  melatonin 3 milliGRAM(s) Oral at bedtime  midodrine 10 milliGRAM(s) Oral every 8 hours  polyethylene glycol 3350 17 Gram(s) Oral two times a day  senna 2 Tablet(s) Oral at bedtime    MEDICATIONS  (PRN):  benzocaine 15 mG/menthol 3.6 mG (Sugar-Free) Lozenge 1 Lozenge Oral every 6 hours PRN Sore Throat  diphenhydrAMINE   Injectable 12.5 milliGRAM(s) IV Push every 4 hours PRN Itching  famotidine    Tablet 20 milliGRAM(s) Oral every 12 hours PRN Dyspepsia  magnesium hydroxide Suspension 30 milliLiter(s) Oral every 12 hours PRN Constipation  morphine  - Injectable 2 milliGRAM(s) IV Push every 4 hours PRN breakthrough pain  naloxone Injectable 0.1 milliGRAM(s) IV Push every 3 minutes PRN For ANY of the following changes in patient status:  A. RR LESS THAN 10 breaths per minute, B. Oxygen saturation LESS THAN 90%, C. Sedation score of 6  ondansetron Injectable 4 milliGRAM(s) IV Push every 6 hours PRN Nausea  oxyCODONE    IR 5 milliGRAM(s) Oral every 4 hours PRN Moderate Pain (4 - 6)  oxyCODONE    IR 10 milliGRAM(s) Oral every 4 hours PRN Severe Pain (7 - 10)  traMADol 50 milliGRAM(s) Oral every 4 hours PRN Mild Pain (1 - 3)                                                                                                                                                                                    EKG:     NSR@69BPM

## 2020-08-21 NOTE — DISCHARGE NOTE NURSING/CASE MANAGEMENT/SOCIAL WORK - PATIENT PORTAL LINK FT
You can access the FollowMyHealth Patient Portal offered by [facility] by registering at the following website: [email or web address]. By joining [employer]’s FollowMyHealth portal, you will also be able to view your health information using other applications (apps) compatible with our system.

## 2020-08-21 NOTE — DISCHARGE NOTE PROVIDER - NSDCMRMEDTOKEN_GEN_ALL_CORE_FT
cyclobenzaprine 10 mg oral tablet: 1 tab(s) orally every 8 hours  enoxaparin: 40 milligram(s) subcutaneously once a day  famotidine 20 mg oral tablet: 1 tab(s) orally every 12 hours, As needed, Dyspepsia  melatonin 3 mg oral tablet: 1 tab(s) orally once a day (at bedtime)  Neurontin 300 mg oral capsule: 1 cap(s) orally once a day (at bedtime)  oxyCODONE 5 mg oral tablet: 1 tab(s) orally every 4 hours, As needed, Moderate Pain (4 - 6)  senna oral tablet: 2 tab(s) orally once a day (at bedtime)

## 2020-08-21 NOTE — PROGRESS NOTE ADULT - ASSESSMENT
This is a 75 year old female s/p anterior/posterior thoracolumbar fusion with high risk for VTE due to history PE, age, BMI, limited mobility, and surgery. She is love risk for bleeding. Agree with Lovenox 40 mg SQ daily until ambulating ad olaf due to history PE. Orthospine ok with lovenox per note.    Plan:  ::Lovenox 40 mg SQ daily until ambulating ad olaf  ::Daily CBC  ::Venodynes b/l  ::Ambulation per PT    Will continue to follow.

## 2020-08-21 NOTE — DISCHARGE NOTE PROVIDER - NSDCACTIVITY_GEN_ALL_CORE
Stairs allowed/Do not drive or operate machinery/No heavy lifting/straining/Walking - Indoors allowed/Do not make important decisions/Walking - Outdoors allowed/Showering allowed

## 2020-08-21 NOTE — PROGRESS NOTE ADULT - SUBJECTIVE AND OBJECTIVE BOX
Patient seen and examined    Back pain controlled  Tolerating diet  had BM    [place]ay in due to failure to void    No perianal numbness    Wounds clean and dry    Afeb    Labs stable     - OK for D/C   - Instructions given   - Lovenox for DVT prophlylaxis   - F/U 10 days in office    ELIAZAR Epperson MD

## 2020-08-21 NOTE — PROGRESS NOTE ADULT - SUBJECTIVE AND OBJECTIVE BOX
HPI:  Patient is a 75y old  Female who presents with a chief complaint of back pain & weakness S/P lumbar-thoraco anterior/posterior fusion (20 Aug 2020 10:51)      Consulted by Dr. Epperson for VTE prophylaxis, risk stratification, and anticoagulation management.    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 Discussed necessity for VTE prophylaxis given her history of PE. Setting of PE was limited mobility and hospitalization for diskitis . She was on eliquis for 6 months and denies any hypercoaguable workup. She denies any family history of clots or bleeding disorders. POD#10, chest tube was dc'ed today. Patient is working with PT. 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   : Patient seen at bedside, about to get up with PT. Per PT, she did walk pretty well with him yesterday. Patient reports she "Feels very sleepy." today. Dressing CDI on back. Abd OSIRIS, dry. HH stable.    FINDINGS:    There is normal compressibility of the bilateral common femoral, femoral and popliteal veins.  Doppler examination shows normal spontaneous and phasic flow.    No calf vein thrombosis is detected.    IMPRESSION:  No evidence of deep venous thrombosis in either lower extremity.    BMI: 28.2    CrCl: 123.1    Caprini VTE Risk Score:  CAPRINI SCORE  AGE RELATED RISK FACTORS                                                       MOBILITY RELATED FACTORS  [ ] Age 41-60 years                                            (1 Point)                  [ ] Bed rest                                                        (1 Point)  [ ] Age: 61-74 years                                           (2 Points)                [ ] Plaster cast                                                   (2 Points)  [x ] Age= 75 years                                              (3 Points)                 [ ] Bed bound for more than 72 hours                   (2 Points)    DISEASE RELATED RISK FACTORS                                               GENDER SPECIFIC FACTORS  [ ] Edema in the lower extremities                       (1 Point)           [ ] Pregnancy                                                            (1 Point)  [ ] Varicose veins                                               (1 Point)                  [ ] Post-partum < 6 weeks                                      (1 Point)             [x ] BMI > 25 Kg/m2                                            (1 Point)                  [ ] Hormonal therapy or oral contraception       (1 Point)                 [ ] Sepsis (in the previous month)                        (1 Point)             [ ] History of pregnancy complications                (1Point)  [ ] Pneumonia or serious lung disease                                             [ ] Unexplained or recurrent  (=/>3), premature                                 (In the previous month)                               (1 Point)                birth with toxemia or growth-restricted infant (1 Point)  [ ] Abnormal pulmonary function test            (1 Point)                                   SURGERY RELATED RISK FACTORS  [ ] Acute myocardial infarction                       (1 Point)                  [ ]  Section                                         (1 Point)  [ ] Congestive heart failure (in the previous month) (1 Point)   [ ] Minor surgery   lasting <45 minutes       (1 Point)   [ ] Inflammatory bowel disease                             (1 Point)          [ ] Arthroscopic surgery                                  (2 Points)  [ ] Central venous access                                    (2 Points)            [ x] General surgery lasting >45 minutes      (2 Points)       [ ] Stroke (in the previous month)                  (5 Points)            [ ] Elective major lower extremity arthroplasty (5 Points)                                   [  ] Malignancy (present or past include skin melanoma                                          but exclude  basal skin cell)    (2 points)                                      TRAUMA RELATED RISK FACTORS                HEMATOLOGY RELATED FACTORS                                  [ ] Fracture of the hip, pelvis, or leg                       (5 Points)  [x ] Prior episodes of VTE                                     (3 Points)          [ ] Acute spinal cord injury (in the previous month)  (5 Points)  [ ] Positive family history for VTE                         (3 Points)       [ ] Paralysis (less than 1 month)                          (5 Points)  [ ] Prothrombin 05367 A                                      (3 Points)         [ ] Multiple Trauma (within 1month)                 (5Points)                                                                                                                                                                [ ] Factor V Leiden                                          (3 Points)                                OTHER RISK FACTORS                          [ ] Lupus anticoagulants                                     (3 Points)                       [ ] BMI > 40                          (1 Point)                                                         [ ] Anticardiolipin antibodies                                (3 Points)                   [ ] Smoking                              (1Point)                                                [ ] High homocysteine in the blood                      (3 Points)                [  ] Diabetes requiring insulin (1point)                         [ ] Other congenital or acquired thrombophilia       (3 Points)          [  ] Chemotherapy                   (1 Point)  [ ] Heparin induced thrombocytopenia                  (3 Points)             [  ] Blood Transfusion                (1 point)                                                                                                             Total Score [      9    ]                                                                                                                                                                                                                                                                                                                                                                                                                                           IMPROVE Bleeding Risk Score:1.5      Falls Risk:   High ( x )  Mod (  )  Low (  )      FAMILY HISTORY:  No known problems    Denies any personal or familial history of clotting or bleeding disorders.    Allergies    No Known Allergies    Intolerances        REVIEW OF SYSTEMS    (  )Fever	     (  )Constipation	(  )SOB				(  )Headache	(  )Dysuria  (  )Chills	     (  )Melena	(  )Dyspnea present on exertion (  )Dizziness   (  )Polyuria  (  )Nausea	     (  )Hematochezia	(  )Cough         (  )Syncope   	         (  )Hematuria  (  )Vomiting    (  )Chest Pain	(  )Wheezing			( x )Weakness  (  )Diarrhea     (  )Palpitations	(  )Anorexia			( x )Myalgia   (  x ) Arthralgia    Pertinent positives in HPI and daily subjective. All other systems negative.    PHYSICAL EXAM:    Constitutional: Appears pale    Neurological: A& O x 3    Skin: Warm    Respiratory and Thorax: normal effort; Breath sounds: normal; No rales/wheezing/rhonchi  	  Cardiovascular: S1, S2, regular, NMBR	    Gastrointestinal: BS + x 4Q, nontender	    Genitourinary:  Bladder nondistended, nontender    Musculoskeletal:   General Right:   no muscle/joint tenderness,   normal tone, no joint swelling,   ROM: limited	    General Left:   no muscle/joint tenderness,   normal tone, no joint swelling,   ROM: limited    Abd incision CDI with steris    Back: midline dressing CDI    Skin: numerous blisters/abrasions with left hip blister oozing    Lower extrems:   Right: no calf tenderness              negative manju's sign               + pedal pulses    Left:   no calf tenderness              negative manju's sign               + pedal pulses            MEDICATIONS  (STANDING):  acetaminophen   Tablet .. 975 milliGRAM(s) Oral every 8 hours  calcium carbonate 1250 mG  + Vitamin D (OsCal 500 + D) 1 Tablet(s) Oral three times a day  cyclobenzaprine 10 milliGRAM(s) Oral every 8 hours  enoxaparin Injectable 40 milliGRAM(s) SubCutaneous daily  gabapentin 300 milliGRAM(s) Oral at bedtime  melatonin 3 milliGRAM(s) Oral at bedtime  midodrine 10 milliGRAM(s) Oral every 8 hours  polyethylene glycol 3350 17 Gram(s) Oral two times a day  senna 2 Tablet(s) Oral at bedtime      Vital Signs Last 24 Hrs  T(C): 36.9 (20 @ 09:04), Max: 37.3 (20 @ 20:59)  T(F): 98.5 (20 @ 09:04), Max: 99.2 (20 @ 20:59)  HR: 95 (20 @ 09:00) (75 - 104)  BP: 112/51 (20 @ 06:00) (97/67 - 120/55)  BP(mean): 68 (20 @ 06:00) (55 - 98)  RR: 20 (20 @ 09:00) (13 - 24)  SpO2: 96% (20 @ 09:00) (92% - 97%)      **Current DVT Prophylaxis:    LMWH                   ( x )  Heparin SQ           (  )  Coumadin             (  )  Xarelto                  (  )  Eliquis                   (  )  Venodynes           ( x )  Ambulation          ( x )  UFH                       (  )  ECASA                   (  )  Contraindicated  (  )

## 2020-08-21 NOTE — PROGRESS NOTE ADULT - ADDITIONAL PE
Drains: 100/60/40/50
Drains: 20/30/55/60
Dressing changed  Incision c/d/i  Drains:10/30/40/20
Incision c/d/i  All drains are out  No fluid collections
Incision c/d/i  drains: 30/5
in brace

## 2020-08-21 NOTE — PROGRESS NOTE ADULT - ASSESSMENT
A/P: s/p two-stage complex thoracolumbar osteotomy, fusion - stable  1. PT/mobilization  2. Possible rehab later today pending COVID results

## 2020-08-21 NOTE — DISCHARGE NOTE NURSING/CASE MANAGEMENT/SOCIAL WORK - NSDCPNDISPN_GEN_ALL_CORE
Activities of daily living, including home environment that might     exacerbate pain or reduce effectiveness of the pain management plan of care as well as strategies to address these issues/Education provided on the pain management plan of care/Side effects of pain management treatment/Opioids not applicable/not prescribed/Safe use, storage and disposal of opioids when prescribed
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
## 2020-09-02 DIAGNOSIS — T41.205A ADVERSE EFFECT OF UNSPECIFIED GENERAL ANESTHETICS, INITIAL ENCOUNTER: ICD-10-CM

## 2020-09-02 DIAGNOSIS — G89.29 OTHER CHRONIC PAIN: ICD-10-CM

## 2020-09-02 DIAGNOSIS — E66.9 OBESITY, UNSPECIFIED: ICD-10-CM

## 2020-09-02 DIAGNOSIS — M19.90 UNSPECIFIED OSTEOARTHRITIS, UNSPECIFIED SITE: ICD-10-CM

## 2020-09-02 DIAGNOSIS — M47.816 SPONDYLOSIS WITHOUT MYELOPATHY OR RADICULOPATHY, LUMBAR REGION: ICD-10-CM

## 2020-09-02 DIAGNOSIS — Z86.711 PERSONAL HISTORY OF PULMONARY EMBOLISM: ICD-10-CM

## 2020-09-02 DIAGNOSIS — Z96.651 PRESENCE OF RIGHT ARTIFICIAL KNEE JOINT: ICD-10-CM

## 2020-09-02 DIAGNOSIS — I95.81 POSTPROCEDURAL HYPOTENSION: ICD-10-CM

## 2020-09-02 DIAGNOSIS — G93.41 METABOLIC ENCEPHALOPATHY: ICD-10-CM

## 2020-09-02 DIAGNOSIS — G71.02 FACIOSCAPULOHUMERAL MUSCULAR DYSTROPHY: ICD-10-CM

## 2020-09-02 DIAGNOSIS — D62 ACUTE POSTHEMORRHAGIC ANEMIA: ICD-10-CM

## 2020-09-02 DIAGNOSIS — M48.061 SPINAL STENOSIS, LUMBAR REGION WITHOUT NEUROGENIC CLAUDICATION: ICD-10-CM
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
## 2020-09-02 DIAGNOSIS — M40.36 FLATBACK SYNDROME, LUMBAR REGION: ICD-10-CM

## 2020-09-02 DIAGNOSIS — Z11.59 ENCOUNTER FOR SCREENING FOR OTHER VIRAL DISEASES: ICD-10-CM

## 2020-09-02 DIAGNOSIS — T81.19XA OTHER POSTPROCEDURAL SHOCK, INITIAL ENCOUNTER: ICD-10-CM

## 2020-09-02 DIAGNOSIS — M54.9 DORSALGIA, UNSPECIFIED: ICD-10-CM

## 2020-09-02 DIAGNOSIS — Z98.1 ARTHRODESIS STATUS: ICD-10-CM

## 2020-09-02 DIAGNOSIS — K59.00 CONSTIPATION, UNSPECIFIED: ICD-10-CM

## 2020-09-02 DIAGNOSIS — R33.9 RETENTION OF URINE, UNSPECIFIED: ICD-10-CM

## 2020-12-31 PROBLEM — I26.99 OTHER PULMONARY EMBOLISM WITHOUT ACUTE COR PULMONALE: Chronic | Status: ACTIVE | Noted: 2020-03-13

## 2020-12-31 PROBLEM — H26.9 UNSPECIFIED CATARACT: Chronic | Status: ACTIVE | Noted: 2020-08-05

## 2020-12-31 PROBLEM — M47.816 SPONDYLOSIS WITHOUT MYELOPATHY OR RADICULOPATHY, LUMBAR REGION: Chronic | Status: ACTIVE | Noted: 2020-08-05

## 2020-12-31 PROBLEM — M40.30: Chronic | Status: ACTIVE | Noted: 2020-08-05

## 2021-01-02 ENCOUNTER — APPOINTMENT (OUTPATIENT)
Dept: DISASTER EMERGENCY | Facility: CLINIC | Age: 76
End: 2021-01-02

## 2021-01-02 DIAGNOSIS — Z01.818 ENCOUNTER FOR OTHER PREPROCEDURAL EXAMINATION: ICD-10-CM

## 2021-01-03 LAB — SARS-COV-2 N GENE NPH QL NAA+PROBE: NOT DETECTED

## 2021-01-24 ENCOUNTER — APPOINTMENT (OUTPATIENT)
Dept: DISASTER EMERGENCY | Facility: CLINIC | Age: 76
End: 2021-01-24

## 2021-01-26 LAB — SARS-COV-2 N GENE NPH QL NAA+PROBE: NOT DETECTED

## 2021-01-27 ENCOUNTER — OUTPATIENT (OUTPATIENT)
Dept: INPATIENT UNIT | Facility: HOSPITAL | Age: 76
LOS: 1 days | Discharge: HOME | End: 2021-01-27
Payer: MEDICARE

## 2021-01-27 VITALS
HEART RATE: 67 BPM | TEMPERATURE: 97 F | OXYGEN SATURATION: 100 % | DIASTOLIC BLOOD PRESSURE: 64 MMHG | RESPIRATION RATE: 18 BRPM | SYSTOLIC BLOOD PRESSURE: 119 MMHG

## 2021-01-27 VITALS
DIASTOLIC BLOOD PRESSURE: 61 MMHG | HEIGHT: 67 IN | TEMPERATURE: 98 F | OXYGEN SATURATION: 100 % | WEIGHT: 169.98 LBS | RESPIRATION RATE: 14 BRPM | HEART RATE: 72 BPM | SYSTOLIC BLOOD PRESSURE: 120 MMHG

## 2021-01-27 DIAGNOSIS — H26.9 UNSPECIFIED CATARACT: ICD-10-CM

## 2021-01-27 DIAGNOSIS — Z98.51 TUBAL LIGATION STATUS: Chronic | ICD-10-CM

## 2021-01-27 DIAGNOSIS — Z96.651 PRESENCE OF RIGHT ARTIFICIAL KNEE JOINT: Chronic | ICD-10-CM

## 2021-01-27 DIAGNOSIS — H25.11 AGE-RELATED NUCLEAR CATARACT, RIGHT EYE: ICD-10-CM

## 2021-01-27 DIAGNOSIS — Z98.1 ARTHRODESIS STATUS: Chronic | ICD-10-CM

## 2021-01-27 DIAGNOSIS — Z90.89 ACQUIRED ABSENCE OF OTHER ORGANS: Chronic | ICD-10-CM

## 2021-01-27 DIAGNOSIS — M19.90 UNSPECIFIED OSTEOARTHRITIS, UNSPECIFIED SITE: ICD-10-CM

## 2021-01-27 PROCEDURE — V2632: CPT

## 2021-01-27 RX ORDER — OFLOXACIN 0.3 %
1 DROPS OPHTHALMIC (EYE)
Refills: 0 | Status: COMPLETED | OUTPATIENT
Start: 2021-01-27 | End: 2021-01-27

## 2021-01-27 RX ORDER — ACETAMINOPHEN 500 MG
650 TABLET ORAL EVERY 6 HOURS
Refills: 0 | Status: DISCONTINUED | OUTPATIENT
Start: 2021-01-27 | End: 2021-01-27

## 2021-01-27 RX ORDER — CYCLOPENTOLATE HYDROCHLORIDE 10 MG/ML
1 SOLUTION/ DROPS OPHTHALMIC
Refills: 0 | Status: COMPLETED | OUTPATIENT
Start: 2021-01-27 | End: 2021-01-27

## 2021-01-27 RX ORDER — PHENYLEPHRINE HCL 2.5 %
1 DROPS OPHTHALMIC (EYE)
Refills: 0 | Status: COMPLETED | OUTPATIENT
Start: 2021-01-27 | End: 2021-01-27

## 2021-01-27 RX ORDER — ACETAMINOPHEN 500 MG
2 TABLET ORAL
Qty: 0 | Refills: 0 | DISCHARGE
Start: 2021-01-27

## 2021-01-27 RX ORDER — TROPICAMIDE 1 %
1 DROPS OPHTHALMIC (EYE)
Refills: 0 | Status: COMPLETED | OUTPATIENT
Start: 2021-01-27 | End: 2021-01-27

## 2021-01-27 RX ADMIN — Medication 1 DROP(S): at 13:19

## 2021-01-27 RX ADMIN — CYCLOPENTOLATE HYDROCHLORIDE 1 DROP(S): 10 SOLUTION/ DROPS OPHTHALMIC at 13:44

## 2021-01-27 RX ADMIN — CYCLOPENTOLATE HYDROCHLORIDE 1 DROP(S): 10 SOLUTION/ DROPS OPHTHALMIC at 13:20

## 2021-01-27 RX ADMIN — Medication 1 DROP(S): at 13:32

## 2021-01-27 RX ADMIN — Medication 1 DROP(S): at 13:31

## 2021-01-27 RX ADMIN — Medication 1 DROP(S): at 13:44

## 2021-01-27 RX ADMIN — Medication 1 DROP(S): at 13:03

## 2021-01-27 RX ADMIN — Medication 1 DROP(S): at 13:43

## 2021-01-27 RX ADMIN — CYCLOPENTOLATE HYDROCHLORIDE 1 DROP(S): 10 SOLUTION/ DROPS OPHTHALMIC at 13:31

## 2021-01-27 RX ADMIN — Medication 1 DROP(S): at 13:21

## 2021-01-27 NOTE — ASU DISCHARGE PLAN (ADULT/PEDIATRIC) - FREQUENT HAND WASHING PREVENTS THE SPREAD OF INFECTION.
Pt unsure if she swallowed a battery. Pt states is was metal, small and looked \"like a mirror\". Pt denies pain. No distress noted. Statement Selected

## 2021-01-27 NOTE — ASU PATIENT PROFILE, ADULT - PMH
Cataract    Erbs muscular dystrophy  left arm  Flat-back syndrome    History of discitis    Lower back pain    Lumbar spondylosis    Osteoarthritis    Pulmonary emboli  2/2019 completed 6 months of eliquis; saw hematology no clotting disorder  Thyroid cyst

## 2021-01-27 NOTE — ASU PATIENT PROFILE, ADULT - PSH
H/O spinal fusion  1997 lumbar  H/O total knee replacement, right  10/2018  H/O tubal ligation    History of tonsillectomy  childhood

## 2021-02-02 ENCOUNTER — APPOINTMENT (OUTPATIENT)
Dept: DISASTER EMERGENCY | Facility: CLINIC | Age: 76
End: 2021-02-02

## 2021-02-02 DIAGNOSIS — Z01.818 ENCOUNTER FOR OTHER PREPROCEDURAL EXAMINATION: ICD-10-CM

## 2021-02-03 LAB — SARS-COV-2 N GENE NPH QL NAA+PROBE: NOT DETECTED

## 2021-02-03 NOTE — ASU PATIENT PROFILE, ADULT - PSH
H/O spinal fusion  1997 lumbar  H/O total knee replacement, right  10/2018  H/O tubal ligation    History of ovarian cystectomy    History of tonsillectomy  childhood

## 2021-02-03 NOTE — ASU PATIENT PROFILE, ADULT - PMH
Cataract    Erbs muscular dystrophy  left arm  Flat-back syndrome    H/O thyroid disease    History of discitis    Lower back pain    Lumbar spondylosis    Osteoarthritis    Pulmonary emboli  2/2019 completed 6 months of eliquis; saw hematology no clotting disorder  Thyroid cyst

## 2021-02-04 ENCOUNTER — OUTPATIENT (OUTPATIENT)
Dept: INPATIENT UNIT | Facility: HOSPITAL | Age: 76
LOS: 1 days | Discharge: HOME | End: 2021-02-04
Payer: MEDICARE

## 2021-02-04 VITALS
WEIGHT: 169.98 LBS | RESPIRATION RATE: 16 BRPM | OXYGEN SATURATION: 100 % | HEART RATE: 73 BPM | HEIGHT: 67.5 IN | DIASTOLIC BLOOD PRESSURE: 77 MMHG | SYSTOLIC BLOOD PRESSURE: 123 MMHG | TEMPERATURE: 97 F

## 2021-02-04 VITALS
OXYGEN SATURATION: 100 % | RESPIRATION RATE: 18 BRPM | DIASTOLIC BLOOD PRESSURE: 74 MMHG | HEART RATE: 61 BPM | SYSTOLIC BLOOD PRESSURE: 149 MMHG | TEMPERATURE: 97 F

## 2021-02-04 DIAGNOSIS — H25.12 AGE-RELATED NUCLEAR CATARACT, LEFT EYE: ICD-10-CM

## 2021-02-04 DIAGNOSIS — Z86.010 PERSONAL HISTORY OF COLONIC POLYPS: ICD-10-CM

## 2021-02-04 DIAGNOSIS — Z98.1 ARTHRODESIS STATUS: Chronic | ICD-10-CM

## 2021-02-04 DIAGNOSIS — M19.90 UNSPECIFIED OSTEOARTHRITIS, UNSPECIFIED SITE: ICD-10-CM

## 2021-02-04 DIAGNOSIS — Z96.651 PRESENCE OF RIGHT ARTIFICIAL KNEE JOINT: Chronic | ICD-10-CM

## 2021-02-04 DIAGNOSIS — H43.813 VITREOUS DEGENERATION, BILATERAL: ICD-10-CM

## 2021-02-04 DIAGNOSIS — H02.886 MEIBOMIAN GLAND DYSFUNCTION OF LEFT EYE, UNSPECIFIED EYELID: ICD-10-CM

## 2021-02-04 DIAGNOSIS — Z98.51 TUBAL LIGATION STATUS: Chronic | ICD-10-CM

## 2021-02-04 DIAGNOSIS — Z90.89 ACQUIRED ABSENCE OF OTHER ORGANS: Chronic | ICD-10-CM

## 2021-02-04 DIAGNOSIS — Z98.890 OTHER SPECIFIED POSTPROCEDURAL STATES: Chronic | ICD-10-CM

## 2021-02-04 DIAGNOSIS — H02.883 MEIBOMIAN GLAND DYSFUNCTION OF RIGHT EYE, UNSPECIFIED EYELID: ICD-10-CM

## 2021-02-04 DIAGNOSIS — Z98.1 ARTHRODESIS STATUS: ICD-10-CM

## 2021-02-04 PROCEDURE — V2632: CPT

## 2021-02-04 RX ORDER — ACETAMINOPHEN 500 MG
650 TABLET ORAL EVERY 6 HOURS
Refills: 0 | Status: DISCONTINUED | OUTPATIENT
Start: 2021-02-04 | End: 2021-02-04

## 2021-02-04 RX ORDER — CYCLOPENTOLATE HYDROCHLORIDE 10 MG/ML
1 SOLUTION/ DROPS OPHTHALMIC
Refills: 0 | Status: COMPLETED | OUTPATIENT
Start: 2021-02-04 | End: 2021-02-04

## 2021-02-04 RX ORDER — TROPICAMIDE 1 %
1 DROPS OPHTHALMIC (EYE)
Refills: 0 | Status: COMPLETED | OUTPATIENT
Start: 2021-02-04 | End: 2021-02-04

## 2021-02-04 RX ORDER — GABAPENTIN 400 MG/1
0 CAPSULE ORAL
Qty: 0 | Refills: 0 | DISCHARGE

## 2021-02-04 RX ORDER — OFLOXACIN 0.3 %
1 DROPS OPHTHALMIC (EYE)
Refills: 0 | Status: COMPLETED | OUTPATIENT
Start: 2021-02-04 | End: 2021-02-04

## 2021-02-04 RX ORDER — PHENYLEPHRINE HCL 2.5 %
1 DROPS OPHTHALMIC (EYE)
Refills: 0 | Status: COMPLETED | OUTPATIENT
Start: 2021-02-04 | End: 2021-02-04

## 2021-02-04 RX ADMIN — Medication 1 DROP(S): at 09:39

## 2021-02-04 RX ADMIN — CYCLOPENTOLATE HYDROCHLORIDE 1 DROP(S): 10 SOLUTION/ DROPS OPHTHALMIC at 09:39

## 2021-02-04 RX ADMIN — Medication 1 DROP(S): at 09:11

## 2021-02-04 RX ADMIN — Medication 1 DROP(S): at 09:22

## 2021-02-04 RX ADMIN — Medication 1 DROP(S): at 09:23

## 2021-02-04 RX ADMIN — CYCLOPENTOLATE HYDROCHLORIDE 1 DROP(S): 10 SOLUTION/ DROPS OPHTHALMIC at 09:22

## 2021-02-04 RX ADMIN — Medication 1 DROP(S): at 09:00

## 2021-02-04 RX ADMIN — Medication 1 DROP(S): at 09:38

## 2021-02-04 RX ADMIN — CYCLOPENTOLATE HYDROCHLORIDE 1 DROP(S): 10 SOLUTION/ DROPS OPHTHALMIC at 09:12

## 2021-07-19 NOTE — PROGRESS NOTE ADULT - REASON FOR ADMISSION
back pain
Lumbar fusion
S/P lumbar fusion
Anterior Lumbar discotomy
STAGE II thoracolumbar surgery
back p[ain
back pain
complex thoracolumbar surgery
low back pain
spine surgery
thoracolumbar surgery
Complex spinal surgery and reconstruction
Spinal surgery and reconstruction
Spinal surgery and reconstruction
lumbar fusion, discectomy
Opioid Counseling: I discussed with the patient the potential side effects of opioids including but not limited to addiction, altered mental status, and depression. I stressed avoiding alcohol, benzodiazepines, muscle relaxants and sleep aids unless specifically okayed by a physician. The patient verbalized understanding of the proper use and possible adverse effects of opioids. All of the patient's questions and concerns were addressed. They were instructed to flush the remaining pills down the toilet if they did not need them for pain.

## 2021-07-27 NOTE — ED ADULT TRIAGE NOTE - PAIN RATING/NUMBER SCALE (0-10): ACTIVITY
8 Libtayo Pregnancy And Lactation Text: This medication is contraindicated in pregnancy and when breast feeding.

## 2021-10-14 NOTE — ASU PREOP CHECKLIST - INTERNAL PROSTHESES
ALL SCHEDULED MEDS GIVEN. PT IS STABLE.NO DISTRESS NOTED. WILL CONTINUE TO MONITOR. spinal hardware, right total knee/yes(specify)

## 2021-12-27 NOTE — PATIENT PROFILE ADULT - ABILITY TO HEAR (WITH HEARING AID OR HEARING APPLIANCE IF NORMALLY USED):
CC:   Eye pain and left ear pain    HPI:    Patient presented today with 1 year history of intermittent left ear pain. Patient described the pain as a feeling of \"fullness\". Stated it occurs approximately 3 times a month and lasts for a few days and then self-resolves. Stated has associated decreased hearing in the left ear when she has these symptoms. Denied any tinnitus or loss of hearing at other times. Does endorse frequent excessive wax build-up. No associated headaches, dizziness. Patient stated she has gone to the ER once previous for this issue and was given ear drops and a spray which patient reported using for approximately 2 weeks with no improvement. Patient also reported feeling eye pain in in the left eye for the past few days. Noticed a bump in the eyelid. No drainage, fever, chills, changes in vision. No other associated symptoms. Has had similar bumps in her eyelids before with spontaneous resolution. Sibling with similar symptoms - styes - that improved with warm compresses, no other interventions needed. Endorses acne - improvement with prescribed gel/ointment. Requests refill. Requests refill of Albuterol inhaler. Use reported less frequently than 1-2 times per week. No activity limitation or night-time symptoms. No concern for present exacerbation. Also with irregular menses. Sometimes will have a cycle every month, sometimes it is 2-3 months (more often two months, skipping a month) between menstrual cycles. Occasionally after skipping a month or so then she'll have more frequent menstrual cycles, seeming to be twice monthly. Last menstrual period in October but unsure. Duration is variable but not exceeding 1 week; bleeding and cramping not excessive per history. Denies possible concern for pregnancy. Denies possible STI. Endorses hx of similar menstrual irregularity since onset around 89 years of age. No use of hormonal contraceptive at this time.      Allergies:   No Known Adequate: hears normal conversation without difficulty Systems:  Constitutional:  Denies fever or chills  Eyes:  Denies apparent visual deficit, denies eye drainage, denies redness of eyes  HENT:  Denies nasal congestion, ear tugging or discharge, or difficulty swallowing  Respiratory:  Denies cough or difficulty breathing  Cardiovascular:  Denies chest pain, leg swelling  GI:  Denies abdominal pain, nausea, vomiting, bloody stools or diarrhea  :  Denies decreased urinary frequency  Musculoskeletal:  Denies asymmetric movement of extremities, denies weakness  Integument:  Denies itching or rash  Neurologic:  Denies somnolence, decreased activity, shaking movements of extremities, denies headache  Endocrine:  Denies jitters, polyuria, polydipsia, polyphagi  Lymphatic:  Denies swollen gland  Psychiatric:  Alert, interactive, happy, playful  Hearing: when patient has ear fullness, reported hearing less than usual    Physical Examination:  Vitals:    12/27/21 1043   Temp: 97.9 °F (36.6 °C)   Weight: (!) 228 lb (103.4 kg)   Height: 5' 6\" (1.676 m)     Constitutional: Well-appearing, well-developed, well-nourished, obese, alert and active, and in no acute distress. Head: Normocephalic, atraumatic. Eyes: No periorbital edema or erythema, no discharge or proptosis, and EOM grossly intact. Conjunctivae are non-injected and non-icteric. Pupils are round, equal size, and reactive to light. Red reflex is present and symmetric bilaterally. Small style noted in left upper eyelid. Ears: Cerumen noted in ears bilaterally. Unable to appreciate tympanic membranes, will clean ears and re-examine. On re-check, TM visualized, clear, pearly, intact landmarks. Mild erythema of canal likely secondary to irrigation and wax removal.   Nose: No congestion or nasal drainage. Oral cavity: No oral lesions. Moist mucous membranes. Neck: Supple without thyromegaly or lymphadenopathy. Lymphatic: No cervical lymphadenopathy or inguinal lymphadenopathy.   Cardiovascular: Normal heart rate, Normal rhythm, No murmurs, No rubs, No gallops. Lungs: Normal breath sounds with good aeration. No respiratory distress. No wheezing, rales, or rhonchi. Abdomen: Bowel sounds normal, Soft, No tenderness, No masses. No hepatosplenomegaly. : Deferred. Skin: Rashes: None noted. Extremities: Intact distal pulses, no edema. Musculoskeletal: Spontaneous movement of all four extremities with no apparent asymmetry. Normal gait. Neurologic: Good tone and normal strength in all four extemities. Labs:  No results found for this or any previous visit (from the past 168 hour(s)). Assessment:  1. Hordeolum externum of left upper eyelid    2. Excessive cerumen in both ear canals    3. Irregular menses    4. Obesity with body mass index (BMI) in 95th to 98th percentile for age in pediatric patient, unspecified obesity type, unspecified whether serious comorbidity present    5. Screening procedure    6. Failed hearing screening        Plan:    Hordeolum of left upper eyelid  - provided reassurance  - warm compresses 5 times daily as needed  - call if fail to improve    Left ear tightness likely 2/2 to excessive cerumen   - will clean ears in office  - hearing screen  - ENT referral    Irregular menses, suspect due to anovulatory cycles   - POCT urine pregnancy negative  - additional labs ordered: HIV, GC chlamydia, syphilis, FSH, LH, testosterone, estradiol prolactin, TSH, T4, Vit D  - will follow for lab work-up, consider referral to Ob/Gyn pending findings and clinical course    Obesity  - 99% BMI  - labs ordered: A1c, CMP, CBC, lipid panel  - dietary and exercise counseling provided    Follow up 2 weeks for 14Y WC. Work permit signed.      Electronically signed by Gi Perez MD on 12/27/2021 at 11:39 AM

## 2022-06-23 PROBLEM — Z86.39 PERSONAL HISTORY OF OTHER ENDOCRINE, NUTRITIONAL AND METABOLIC DISEASE: Chronic | Status: ACTIVE | Noted: 2021-02-03

## 2022-08-09 NOTE — H&P PST ADULT - NEGATIVE GENERAL GENITOURINARY SYMPTOMS
Patient calling stating the pharmacy will not refill the prescription Micron[facility] since the prescribing provider who was Dr.Denver Hager,DO, is not contracted with Medicaid.     Patient requesting new prescription be sent to her pharmacy on file.     Patients [facility] on file verified with patient.    no dysuria/no hematuria

## 2022-10-17 NOTE — ED ADULT TRIAGE NOTE - SOURCE OF INFORMATION
Patient Full Thickness Lip Wedge Repair (Flap) Text: Given the location of the defect and the proximity to free margins a full thickness wedge repair was deemed most appropriate.  Using a sterile surgical marker, the appropriate repair was drawn incorporating the defect and placing the expected incisions perpendicular to the vermilion border.  The vermilion border was also meticulously outlined to ensure appropriate reapproximation during the repair.  The area thus outlined was incised through and through with a #15 scalpel blade.  The muscularis and dermis were reaproximated with deep sutures following hemostasis. Care was taken to realign the vermilion border before proceeding with the superficial closure.  Once the vermilion was realigned the superfical and mucosal closure was finished.

## 2022-11-14 ENCOUNTER — APPOINTMENT (OUTPATIENT)
Dept: NEUROLOGY | Facility: CLINIC | Age: 77
End: 2022-11-14

## 2022-12-14 ENCOUNTER — OUTPATIENT (OUTPATIENT)
Dept: OUTPATIENT SERVICES | Facility: HOSPITAL | Age: 77
LOS: 1 days | End: 2022-12-14
Payer: MEDICARE

## 2022-12-14 VITALS
TEMPERATURE: 98 F | HEIGHT: 66 IN | SYSTOLIC BLOOD PRESSURE: 124 MMHG | HEART RATE: 67 BPM | RESPIRATION RATE: 16 BRPM | OXYGEN SATURATION: 100 % | DIASTOLIC BLOOD PRESSURE: 52 MMHG | WEIGHT: 173.94 LBS

## 2022-12-14 DIAGNOSIS — Z98.51 TUBAL LIGATION STATUS: Chronic | ICD-10-CM

## 2022-12-14 DIAGNOSIS — M48.02 SPINAL STENOSIS, CERVICAL REGION: ICD-10-CM

## 2022-12-14 DIAGNOSIS — Z96.651 PRESENCE OF RIGHT ARTIFICIAL KNEE JOINT: Chronic | ICD-10-CM

## 2022-12-14 DIAGNOSIS — Z01.818 ENCOUNTER FOR OTHER PREPROCEDURAL EXAMINATION: ICD-10-CM

## 2022-12-14 DIAGNOSIS — Z98.890 OTHER SPECIFIED POSTPROCEDURAL STATES: Chronic | ICD-10-CM

## 2022-12-14 DIAGNOSIS — Z90.89 ACQUIRED ABSENCE OF OTHER ORGANS: Chronic | ICD-10-CM

## 2022-12-14 DIAGNOSIS — M54.2 CERVICALGIA: ICD-10-CM

## 2022-12-14 DIAGNOSIS — Z98.1 ARTHRODESIS STATUS: Chronic | ICD-10-CM

## 2022-12-14 LAB
A1C WITH ESTIMATED AVERAGE GLUCOSE RESULT: 5.4 % — SIGNIFICANT CHANGE UP (ref 4–5.6)
ANION GAP SERPL CALC-SCNC: 1 MMOL/L — LOW (ref 5–17)
APPEARANCE UR: CLEAR — SIGNIFICANT CHANGE UP
APTT BLD: 31.1 SEC — SIGNIFICANT CHANGE UP (ref 27.5–35.5)
BASOPHILS # BLD AUTO: 0.09 K/UL — SIGNIFICANT CHANGE UP (ref 0–0.2)
BASOPHILS NFR BLD AUTO: 1.6 % — SIGNIFICANT CHANGE UP (ref 0–2)
BILIRUB UR-MCNC: NEGATIVE — SIGNIFICANT CHANGE UP
BUN SERPL-MCNC: 16 MG/DL — SIGNIFICANT CHANGE UP (ref 7–23)
CALCIUM SERPL-MCNC: 9.1 MG/DL — SIGNIFICANT CHANGE UP (ref 8.5–10.1)
CHLORIDE SERPL-SCNC: 106 MMOL/L — SIGNIFICANT CHANGE UP (ref 96–108)
CO2 SERPL-SCNC: 31 MMOL/L — SIGNIFICANT CHANGE UP (ref 22–31)
COLOR SPEC: YELLOW — SIGNIFICANT CHANGE UP
CREAT SERPL-MCNC: 0.73 MG/DL — SIGNIFICANT CHANGE UP (ref 0.5–1.3)
DIFF PNL FLD: ABNORMAL
EGFR: 84 ML/MIN/1.73M2 — SIGNIFICANT CHANGE UP
EOSINOPHIL # BLD AUTO: 0.1 K/UL — SIGNIFICANT CHANGE UP (ref 0–0.5)
EOSINOPHIL NFR BLD AUTO: 1.8 % — SIGNIFICANT CHANGE UP (ref 0–6)
ESTIMATED AVERAGE GLUCOSE: 108 MG/DL — SIGNIFICANT CHANGE UP (ref 68–114)
GLUCOSE SERPL-MCNC: 97 MG/DL — SIGNIFICANT CHANGE UP (ref 70–99)
GLUCOSE UR QL: NEGATIVE — SIGNIFICANT CHANGE UP
HCT VFR BLD CALC: 41.9 % — SIGNIFICANT CHANGE UP (ref 34.5–45)
HGB BLD-MCNC: 13.9 G/DL — SIGNIFICANT CHANGE UP (ref 11.5–15.5)
IMM GRANULOCYTES NFR BLD AUTO: 0.5 % — SIGNIFICANT CHANGE UP (ref 0–0.9)
INR BLD: 1.03 RATIO — SIGNIFICANT CHANGE UP (ref 0.88–1.16)
KETONES UR-MCNC: NEGATIVE — SIGNIFICANT CHANGE UP
LEUKOCYTE ESTERASE UR-ACNC: ABNORMAL
LYMPHOCYTES # BLD AUTO: 2.06 K/UL — SIGNIFICANT CHANGE UP (ref 1–3.3)
LYMPHOCYTES # BLD AUTO: 37.1 % — SIGNIFICANT CHANGE UP (ref 13–44)
MCHC RBC-ENTMCNC: 32.3 PG — SIGNIFICANT CHANGE UP (ref 27–34)
MCHC RBC-ENTMCNC: 33.2 GM/DL — SIGNIFICANT CHANGE UP (ref 32–36)
MCV RBC AUTO: 97.2 FL — SIGNIFICANT CHANGE UP (ref 80–100)
MONOCYTES # BLD AUTO: 0.55 K/UL — SIGNIFICANT CHANGE UP (ref 0–0.9)
MONOCYTES NFR BLD AUTO: 9.9 % — SIGNIFICANT CHANGE UP (ref 2–14)
NEUTROPHILS # BLD AUTO: 2.73 K/UL — SIGNIFICANT CHANGE UP (ref 1.8–7.4)
NEUTROPHILS NFR BLD AUTO: 49.1 % — SIGNIFICANT CHANGE UP (ref 43–77)
NITRITE UR-MCNC: NEGATIVE — SIGNIFICANT CHANGE UP
PH UR: 6.5 — SIGNIFICANT CHANGE UP (ref 5–8)
PLATELET # BLD AUTO: 234 K/UL — SIGNIFICANT CHANGE UP (ref 150–400)
POTASSIUM SERPL-MCNC: 3.9 MMOL/L — SIGNIFICANT CHANGE UP (ref 3.5–5.3)
POTASSIUM SERPL-SCNC: 3.9 MMOL/L — SIGNIFICANT CHANGE UP (ref 3.5–5.3)
PROT UR-MCNC: NEGATIVE — SIGNIFICANT CHANGE UP
PROTHROM AB SERPL-ACNC: 12 SEC — SIGNIFICANT CHANGE UP (ref 10.5–13.4)
RBC # BLD: 4.31 M/UL — SIGNIFICANT CHANGE UP (ref 3.8–5.2)
RBC # FLD: 13.4 % — SIGNIFICANT CHANGE UP (ref 10.3–14.5)
SODIUM SERPL-SCNC: 138 MMOL/L — SIGNIFICANT CHANGE UP (ref 135–145)
SP GR SPEC: 1.01 — SIGNIFICANT CHANGE UP (ref 1.01–1.02)
UROBILINOGEN FLD QL: NEGATIVE — SIGNIFICANT CHANGE UP
WBC # BLD: 5.56 K/UL — SIGNIFICANT CHANGE UP (ref 3.8–10.5)
WBC # FLD AUTO: 5.56 K/UL — SIGNIFICANT CHANGE UP (ref 3.8–10.5)

## 2022-12-14 PROCEDURE — 86901 BLOOD TYPING SEROLOGIC RH(D): CPT

## 2022-12-14 PROCEDURE — 71046 X-RAY EXAM CHEST 2 VIEWS: CPT

## 2022-12-14 PROCEDURE — 87086 URINE CULTURE/COLONY COUNT: CPT

## 2022-12-14 PROCEDURE — 93005 ELECTROCARDIOGRAM TRACING: CPT

## 2022-12-14 PROCEDURE — 83036 HEMOGLOBIN GLYCOSYLATED A1C: CPT

## 2022-12-14 PROCEDURE — 86850 RBC ANTIBODY SCREEN: CPT

## 2022-12-14 PROCEDURE — 99214 OFFICE O/P EST MOD 30 MIN: CPT | Mod: 25

## 2022-12-14 PROCEDURE — 80048 BASIC METABOLIC PNL TOTAL CA: CPT

## 2022-12-14 PROCEDURE — 93010 ELECTROCARDIOGRAM REPORT: CPT

## 2022-12-14 PROCEDURE — 87641 MR-STAPH DNA AMP PROBE: CPT

## 2022-12-14 PROCEDURE — 85730 THROMBOPLASTIN TIME PARTIAL: CPT

## 2022-12-14 PROCEDURE — 87640 STAPH A DNA AMP PROBE: CPT

## 2022-12-14 PROCEDURE — 81001 URINALYSIS AUTO W/SCOPE: CPT

## 2022-12-14 PROCEDURE — 85025 COMPLETE CBC W/AUTO DIFF WBC: CPT

## 2022-12-14 PROCEDURE — 86900 BLOOD TYPING SEROLOGIC ABO: CPT

## 2022-12-14 PROCEDURE — 71046 X-RAY EXAM CHEST 2 VIEWS: CPT | Mod: 26

## 2022-12-14 PROCEDURE — 36415 COLL VENOUS BLD VENIPUNCTURE: CPT

## 2022-12-14 PROCEDURE — 85610 PROTHROMBIN TIME: CPT

## 2022-12-14 NOTE — H&P PST ADULT - NSICDXPASTSURGICALHX_GEN_ALL_CORE_FT
PAST SURGICAL HISTORY:  H/O spinal fusion 1997 lumbar    H/O total knee replacement, right 10/2018    H/O tubal ligation     History of ovarian cystectomy     History of tonsillectomy childhood

## 2022-12-14 NOTE — H&P PST ADULT - MUSCULOSKELETAL
strength 5/5 bilateral upper extremities/strength 5/5 bilateral lower extremities/abnormal gait details…

## 2022-12-14 NOTE — H&P PST ADULT - NSICDXPASTMEDICALHX_GEN_ALL_CORE_FT
PAST MEDICAL HISTORY:  Cataract     Cervical pain     Erbs muscular dystrophy left arm    Flat-back syndrome     H/O thyroid disease     History of discitis     Lower back pain     Lumbar spondylosis     Osteoarthritis     Pulmonary emboli 2/2019 completed 6 months of eliquis; saw hematology no clotting disorder    Thyroid cyst

## 2022-12-14 NOTE — H&P PST ADULT - ASSESSMENT
76 y/o female presents to PST for scheduled anterior cervical disectomy c3-4 possible partial spinal cord decompression anterior fusion with prosthetic spacers on 12/22/22.  78 y/o female presents to PST for scheduled anterior cervical disectomy c3-4 possible partial spinal cord decompression anterior fusion with prosthetic spacers on 22.    CAPRINI SCORE    AGE RELATED RISK FACTORS                                                       MOBILITY RELATED FACTORS  [ ] Age 41-60 years                                            (1 Point)                  [ ] Bed rest                                                        (1 Point)  [ ] Age: 61-74 years                                           (2 Points)                [ ] Plaster cast                                                   (2 Points)  [ x] Age= 75 years                                              (3 Points)                 [ ] Bed bound for more than 72 hours                   (2 Points)    DISEASE RELATED RISK FACTORS                                               GENDER SPECIFIC FACTORS  [ ] Edema in the lower extremities                       (1 Point)                  [ ] Pregnancy                                                     (1 Point)  [ ] Varicose veins                                               (1 Point)                  [ ] Post-partum < 6 weeks                                   (1 Point)             [ x] BMI > 25 Kg/m2                                            (1 Point)                  [ ] Hormonal therapy  or oral contraception            (1 Point)                 [ ] Sepsis (in the previous month)                        (1 Point)                  [ ] History of pregnancy complications  [ ] Pneumonia or serious lung disease                                               [ ] Unexplained or recurrent                       (1 Point)           (in the previous month)                               (1 Point)  [ ] Abnormal pulmonary function test                     (1 Point)                 SURGERY RELATED RISK FACTORS  [ ] Acute myocardial infarction                              (1 Point)                 [ ]  Section                                            (1 Point)  [ ] Congestive heart failure (in the previous month)  (1 Point)                 [ ] Minor surgery                                                 (1 Point)   [ ] Inflammatory bowel disease                             (1 Point)                 [ ] Arthroscopic surgery                                        (2 Points)  [ ] Central venous access                                    (2 Points)                [ ] General surgery lasting more than 45 minutes   (2 Points)       [ ] Stroke (in the previous month)                          (5 Points)               [ ] Elective arthroplasty                                        (5 Points)            [ ] malignancy                                                             (2 points)                                                                                                                                 HEMATOLOGY RELATED FACTORS                                                 TRAUMA RELATED RISK FACTORS  [x ] Prior episodes of VTE                                     (3 Points)                 [ ] Fracture of the hip, pelvis, or leg                       (5 Points)  [ ] Positive family history for VTE                         (3 Points)                 [ ] Acute spinal cord injury (in the previous month)  (5 Points)  [ ] Prothrombin 14989 A                                      (3 Points)                 [ ] Paralysis  (less than 1 month)                          (5 Points)  [ ] Factor V Leiden                                             (3 Points)                 [ ] Multiple Trauma within 1 month                         (5 Points)  [ ] Lupus anticoagulants                                     (3 Points)                                                           [ ] Anticardiolipin antibodies                                (3 Points)                                                       [ ] High homocysteine in the blood                      (3 Points)                                             [ ] Other congenital or acquired thrombophilia       (3 Points)                                                [ ] Heparin induced thrombocytopenia                  (3 Points)                                          Total Score [      8    ]   The Caprini score indicates that this patient is at high risk for a VTE event (score 6 or greater). Surgical patients in this group will benefit from both pharmacologic prophylaxis and intermittent compression devices.  The surgical team will determine the balance between VTE risk and bleeding risk, and other clinical considerations

## 2022-12-14 NOTE — H&P PST ADULT - HISTORY OF PRESENT ILLNESS
76 y/o female presents to PST for scheduled anterior cervical disectomy c3-4 possible partial spinal cord decompression anterior fusion with prosthetic spacers on 12/22/22. Patient reports difficulty walking seen by PMD and referred to neurosurgeon. Diagnotic testing done and spinal stenosis noted.

## 2022-12-14 NOTE — H&P PST ADULT - PROBLEM SELECTOR PLAN 1
Pre op, mupirocin  and chlorhexidine instructions given and explained.  Avoid NSAIDs and OTC supplements.   Patient verbalized understanding  medical consult requested by surgeon 12/16/22  cbc, bmp, type and screen, ptt/inr, a1c, mrsa, albumin, u/a, urine culture, chest xray done today in PST   covid 19 swab scheduled 12/19/22, advised to quarantine after test

## 2022-12-15 DIAGNOSIS — Z01.818 ENCOUNTER FOR OTHER PREPROCEDURAL EXAMINATION: ICD-10-CM

## 2022-12-15 DIAGNOSIS — M48.02 SPINAL STENOSIS, CERVICAL REGION: ICD-10-CM

## 2022-12-15 LAB
MRSA PCR RESULT.: SIGNIFICANT CHANGE UP
S AUREUS DNA NOSE QL NAA+PROBE: DETECTED

## 2022-12-15 NOTE — CHART NOTE - NSCHARTNOTEFT_GEN_A_CORE
MSSA detected from PCR nasal swab done in [facility] 12/14/2022. Patient to apply Mupirocin to nostrils 2 times per day for 5 days starting 12/17/2022.

## 2022-12-16 LAB
CULTURE RESULTS: SIGNIFICANT CHANGE UP
SPECIMEN SOURCE: SIGNIFICANT CHANGE UP

## 2022-12-22 ENCOUNTER — TRANSCRIPTION ENCOUNTER (OUTPATIENT)
Age: 77
End: 2022-12-22

## 2022-12-22 ENCOUNTER — INPATIENT (INPATIENT)
Facility: HOSPITAL | Age: 77
LOS: 1 days | Discharge: HOME | DRG: 472 | End: 2022-12-24
Attending: ORTHOPAEDIC SURGERY | Admitting: ORTHOPAEDIC SURGERY
Payer: MEDICARE

## 2022-12-22 VITALS
HEART RATE: 75 BPM | WEIGHT: 173.94 LBS | RESPIRATION RATE: 15 BRPM | HEIGHT: 66 IN | SYSTOLIC BLOOD PRESSURE: 126 MMHG | TEMPERATURE: 97 F | OXYGEN SATURATION: 96 % | DIASTOLIC BLOOD PRESSURE: 71 MMHG

## 2022-12-22 DIAGNOSIS — Z90.89 ACQUIRED ABSENCE OF OTHER ORGANS: Chronic | ICD-10-CM

## 2022-12-22 DIAGNOSIS — M48.02 SPINAL STENOSIS, CERVICAL REGION: ICD-10-CM

## 2022-12-22 DIAGNOSIS — M54.2 CERVICALGIA: ICD-10-CM

## 2022-12-22 DIAGNOSIS — Z98.890 OTHER SPECIFIED POSTPROCEDURAL STATES: Chronic | ICD-10-CM

## 2022-12-22 DIAGNOSIS — Z98.51 TUBAL LIGATION STATUS: Chronic | ICD-10-CM

## 2022-12-22 DIAGNOSIS — Z96.651 PRESENCE OF RIGHT ARTIFICIAL KNEE JOINT: Chronic | ICD-10-CM

## 2022-12-22 DIAGNOSIS — Z98.1 ARTHRODESIS STATUS: Chronic | ICD-10-CM

## 2022-12-22 LAB
GLUCOSE BLDC GLUCOMTR-MCNC: 112 MG/DL — HIGH (ref 70–99)
GLUCOSE BLDC GLUCOMTR-MCNC: 129 MG/DL — HIGH (ref 70–99)
GLUCOSE BLDC GLUCOMTR-MCNC: 91 MG/DL — SIGNIFICANT CHANGE UP (ref 70–99)
GLUCOSE BLDC GLUCOMTR-MCNC: 94 MG/DL — SIGNIFICANT CHANGE UP (ref 70–99)

## 2022-12-22 PROCEDURE — 88304 TISSUE EXAM BY PATHOLOGIST: CPT | Mod: 26

## 2022-12-22 PROCEDURE — 80048 BASIC METABOLIC PNL TOTAL CA: CPT

## 2022-12-22 PROCEDURE — 97163 PT EVAL HIGH COMPLEX 45 MIN: CPT | Mod: GP

## 2022-12-22 PROCEDURE — 88304 TISSUE EXAM BY PATHOLOGIST: CPT

## 2022-12-22 PROCEDURE — C1713: CPT

## 2022-12-22 PROCEDURE — 82962 GLUCOSE BLOOD TEST: CPT

## 2022-12-22 PROCEDURE — 97116 GAIT TRAINING THERAPY: CPT | Mod: GP

## 2022-12-22 PROCEDURE — 36415 COLL VENOUS BLD VENIPUNCTURE: CPT

## 2022-12-22 PROCEDURE — 85025 COMPLETE CBC W/AUTO DIFF WBC: CPT

## 2022-12-22 PROCEDURE — 76000 FLUOROSCOPY <1 HR PHYS/QHP: CPT

## 2022-12-22 PROCEDURE — C1889: CPT

## 2022-12-22 RX ORDER — SODIUM CHLORIDE 9 MG/ML
1000 INJECTION, SOLUTION INTRAVENOUS
Refills: 0 | Status: DISCONTINUED | OUTPATIENT
Start: 2022-12-22 | End: 2022-12-22

## 2022-12-22 RX ORDER — OXYCODONE HYDROCHLORIDE 5 MG/1
5 TABLET ORAL EVERY 4 HOURS
Refills: 0 | Status: DISCONTINUED | OUTPATIENT
Start: 2022-12-22 | End: 2022-12-24

## 2022-12-22 RX ORDER — GABAPENTIN 400 MG/1
300 CAPSULE ORAL ONCE
Refills: 0 | Status: COMPLETED | OUTPATIENT
Start: 2022-12-22 | End: 2022-12-22

## 2022-12-22 RX ORDER — DEXAMETHASONE 0.5 MG/5ML
4 ELIXIR ORAL ONCE
Refills: 0 | Status: COMPLETED | OUTPATIENT
Start: 2022-12-23 | End: 2022-12-23

## 2022-12-22 RX ORDER — GABAPENTIN 400 MG/1
300 CAPSULE ORAL THREE TIMES A DAY
Refills: 0 | Status: DISCONTINUED | OUTPATIENT
Start: 2022-12-22 | End: 2022-12-24

## 2022-12-22 RX ORDER — OXYCODONE HYDROCHLORIDE 5 MG/1
5 TABLET ORAL ONCE
Refills: 0 | Status: DISCONTINUED | OUTPATIENT
Start: 2022-12-22 | End: 2022-12-22

## 2022-12-22 RX ORDER — OMEGA-3 ACID ETHYL ESTERS 1 G
0 CAPSULE ORAL
Qty: 0 | Refills: 0 | DISCHARGE

## 2022-12-22 RX ORDER — TRANEXAMIC ACID 100 MG/ML
1000 INJECTION, SOLUTION INTRAVENOUS ONCE
Refills: 0 | Status: DISCONTINUED | OUTPATIENT
Start: 2022-12-22 | End: 2022-12-22

## 2022-12-22 RX ORDER — HYDROMORPHONE HYDROCHLORIDE 2 MG/ML
0.5 INJECTION INTRAMUSCULAR; INTRAVENOUS; SUBCUTANEOUS EVERY 4 HOURS
Refills: 0 | Status: DISCONTINUED | OUTPATIENT
Start: 2022-12-22 | End: 2022-12-22

## 2022-12-22 RX ORDER — CELECOXIB 200 MG/1
200 CAPSULE ORAL ONCE
Refills: 0 | Status: COMPLETED | OUTPATIENT
Start: 2022-12-22 | End: 2022-12-22

## 2022-12-22 RX ORDER — DEXAMETHASONE 0.5 MG/5ML
8 ELIXIR ORAL ONCE
Refills: 0 | Status: COMPLETED | OUTPATIENT
Start: 2022-12-22 | End: 2022-12-22

## 2022-12-22 RX ORDER — CYCLOBENZAPRINE HYDROCHLORIDE 10 MG/1
10 TABLET, FILM COATED ORAL EVERY 8 HOURS
Refills: 0 | Status: DISCONTINUED | OUTPATIENT
Start: 2022-12-22 | End: 2022-12-24

## 2022-12-22 RX ORDER — TRANEXAMIC ACID 100 MG/ML
1 INJECTION, SOLUTION INTRAVENOUS
Qty: 1000 | Refills: 0 | Status: DISCONTINUED | OUTPATIENT
Start: 2022-12-22 | End: 2022-12-22

## 2022-12-22 RX ORDER — ONDANSETRON 8 MG/1
4 TABLET, FILM COATED ORAL EVERY 6 HOURS
Refills: 0 | Status: DISCONTINUED | OUTPATIENT
Start: 2022-12-22 | End: 2022-12-24

## 2022-12-22 RX ORDER — TRANEXAMIC ACID 100 MG/ML
1 INJECTION, SOLUTION INTRAVENOUS ONCE
Refills: 0 | Status: DISCONTINUED | OUTPATIENT
Start: 2022-12-22 | End: 2022-12-24

## 2022-12-22 RX ORDER — OXYCODONE HYDROCHLORIDE 5 MG/1
2.5 TABLET ORAL EVERY 6 HOURS
Refills: 0 | Status: DISCONTINUED | OUTPATIENT
Start: 2022-12-22 | End: 2022-12-24

## 2022-12-22 RX ORDER — TRAMADOL HYDROCHLORIDE 50 MG/1
50 TABLET ORAL EVERY 6 HOURS
Refills: 0 | Status: DISCONTINUED | OUTPATIENT
Start: 2022-12-22 | End: 2022-12-24

## 2022-12-22 RX ORDER — FENTANYL CITRATE 50 UG/ML
50 INJECTION INTRAVENOUS
Refills: 0 | Status: DISCONTINUED | OUTPATIENT
Start: 2022-12-22 | End: 2022-12-22

## 2022-12-22 RX ORDER — SODIUM CHLORIDE 9 MG/ML
1000 INJECTION, SOLUTION INTRAVENOUS
Refills: 0 | Status: DISCONTINUED | OUTPATIENT
Start: 2022-12-22 | End: 2022-12-24

## 2022-12-22 RX ORDER — POLYETHYLENE GLYCOL 3350 17 G/17G
17 POWDER, FOR SOLUTION ORAL DAILY
Refills: 0 | Status: DISCONTINUED | OUTPATIENT
Start: 2022-12-22 | End: 2022-12-24

## 2022-12-22 RX ORDER — SENNA PLUS 8.6 MG/1
2 TABLET ORAL AT BEDTIME
Refills: 0 | Status: DISCONTINUED | OUTPATIENT
Start: 2022-12-22 | End: 2022-12-24

## 2022-12-22 RX ORDER — DEXAMETHASONE 0.5 MG/5ML
6 ELIXIR ORAL ONCE
Refills: 0 | Status: COMPLETED | OUTPATIENT
Start: 2022-12-23 | End: 2022-12-23

## 2022-12-22 RX ORDER — CEFAZOLIN SODIUM 1 G
2000 VIAL (EA) INJECTION EVERY 8 HOURS
Refills: 0 | Status: COMPLETED | OUTPATIENT
Start: 2022-12-22 | End: 2022-12-22

## 2022-12-22 RX ORDER — HYDROMORPHONE HYDROCHLORIDE 2 MG/ML
0.5 INJECTION INTRAMUSCULAR; INTRAVENOUS; SUBCUTANEOUS EVERY 4 HOURS
Refills: 0 | Status: DISCONTINUED | OUTPATIENT
Start: 2022-12-22 | End: 2022-12-24

## 2022-12-22 RX ORDER — OXYCODONE HYDROCHLORIDE 5 MG/1
10 TABLET ORAL ONCE
Refills: 0 | Status: DISCONTINUED | OUTPATIENT
Start: 2022-12-22 | End: 2022-12-22

## 2022-12-22 RX ORDER — ONDANSETRON 8 MG/1
4 TABLET, FILM COATED ORAL ONCE
Refills: 0 | Status: DISCONTINUED | OUTPATIENT
Start: 2022-12-22 | End: 2022-12-22

## 2022-12-22 RX ORDER — MAGNESIUM HYDROXIDE 400 MG/1
30 TABLET, CHEWABLE ORAL EVERY 12 HOURS
Refills: 0 | Status: DISCONTINUED | OUTPATIENT
Start: 2022-12-22 | End: 2022-12-24

## 2022-12-22 RX ADMIN — CYCLOBENZAPRINE HYDROCHLORIDE 10 MILLIGRAM(S): 10 TABLET, FILM COATED ORAL at 21:48

## 2022-12-22 RX ADMIN — OXYCODONE HYDROCHLORIDE 5 MILLIGRAM(S): 5 TABLET ORAL at 12:00

## 2022-12-22 RX ADMIN — CYCLOBENZAPRINE HYDROCHLORIDE 10 MILLIGRAM(S): 10 TABLET, FILM COATED ORAL at 14:15

## 2022-12-22 RX ADMIN — OXYCODONE HYDROCHLORIDE 10 MILLIGRAM(S): 5 TABLET ORAL at 06:52

## 2022-12-22 RX ADMIN — GABAPENTIN 300 MILLIGRAM(S): 400 CAPSULE ORAL at 06:49

## 2022-12-22 RX ADMIN — OXYCODONE HYDROCHLORIDE 5 MILLIGRAM(S): 5 TABLET ORAL at 17:02

## 2022-12-22 RX ADMIN — Medication 101.6 MILLIGRAM(S): at 21:48

## 2022-12-22 RX ADMIN — POLYETHYLENE GLYCOL 3350 17 GRAM(S): 17 POWDER, FOR SOLUTION ORAL at 21:48

## 2022-12-22 RX ADMIN — FENTANYL CITRATE 50 MICROGRAM(S): 50 INJECTION INTRAVENOUS at 11:45

## 2022-12-22 RX ADMIN — SODIUM CHLORIDE 125 MILLILITER(S): 9 INJECTION, SOLUTION INTRAVENOUS at 14:39

## 2022-12-22 RX ADMIN — GABAPENTIN 300 MILLIGRAM(S): 400 CAPSULE ORAL at 14:15

## 2022-12-22 RX ADMIN — GABAPENTIN 300 MILLIGRAM(S): 400 CAPSULE ORAL at 21:47

## 2022-12-22 RX ADMIN — OXYCODONE HYDROCHLORIDE 5 MILLIGRAM(S): 5 TABLET ORAL at 11:28

## 2022-12-22 RX ADMIN — CELECOXIB 200 MILLIGRAM(S): 200 CAPSULE ORAL at 06:50

## 2022-12-22 RX ADMIN — Medication 100 MILLIGRAM(S): at 16:33

## 2022-12-22 RX ADMIN — OXYCODONE HYDROCHLORIDE 10 MILLIGRAM(S): 5 TABLET ORAL at 06:49

## 2022-12-22 RX ADMIN — FENTANYL CITRATE 50 MICROGRAM(S): 50 INJECTION INTRAVENOUS at 11:28

## 2022-12-22 RX ADMIN — Medication 100 MILLIGRAM(S): at 23:58

## 2022-12-22 RX ADMIN — CELECOXIB 200 MILLIGRAM(S): 200 CAPSULE ORAL at 06:52

## 2022-12-22 RX ADMIN — SENNA PLUS 2 TABLET(S): 8.6 TABLET ORAL at 21:48

## 2022-12-22 RX ADMIN — OXYCODONE HYDROCHLORIDE 5 MILLIGRAM(S): 5 TABLET ORAL at 16:32

## 2022-12-22 NOTE — BRIEF OPERATIVE NOTE - NSICDXBRIEFPROCEDURE_GEN_ALL_CORE_FT
PROCEDURES:  Anterior cervical discectomy with fusion (ACDF) 22-Dec-2022 10:35:47 C3-C4 Escobar Schwartz

## 2022-12-22 NOTE — BRIEF OPERATIVE NOTE - NSICDXBRIEFPREOP_GEN_ALL_CORE_FT
PRE-OP DIAGNOSIS:  Stenosis of cervical spine with myelopathy 22-Dec-2022 10:36:07 C3-C4 Escobar Schwartz

## 2022-12-22 NOTE — PATIENT PROFILE ADULT - FALL HARM RISK - FACTORS NURSING JUDGEMENT
Quality 431: Preventive Care And Screening: Unhealthy Alcohol Use - Screening: Unhealthy alcohol use screening not performed, reason not otherwise specified Quality 110: Preventive Care And Screening: Influenza Immunization: Influenza Immunization Administered during Influenza season Detail Level: Detailed Quality 226: Preventive Care And Screening: Tobacco Use: Screening And Cessation Intervention: Patient screened for tobacco use and is an ex/non-smoker Quality 130: Documentation Of Current Medications In The Medical Record: Current Medications Documented No

## 2022-12-22 NOTE — PROGRESS NOTE ADULT - SUBJECTIVE AND OBJECTIVE BOX
Postop Check    Patient tolerated the procedure well. Patient seen and examined at bedside. No acute complaints at this time. Pain well controlled. Denies weakness, numbness or tingling. Denies chest pain, shortness of breath, nausea or vomiting.     PE:  Vital Signs Last 24 Hrs  T(C): 36.4 (12-22-22 @ 10:31), Max: 36.4 (12-22-22 @ 10:31)  T(F): 97.5 (12-22-22 @ 10:31), Max: 97.5 (12-22-22 @ 10:31)  HR: 81 (12-22-22 @ 12:30) (75 - 100)  BP: 116/51 (12-22-22 @ 12:30) (116/51 - 134/82)  BP(mean): --  RR: 14 (12-22-22 @ 12:30) (10 - 23)  SpO2: 97% (12-22-22 @ 12:30) (96% - 97%)    General: NAD, resting comforatbly in bed    Dressing C/D/I  1 JERSEY Drain present, ss  + radial pulses    Motor:                   C5                C6              C7               C8           T1   R             5/5                5/5            5/5              5/5          5/5  L             4/5*                4/5            4/5*              5/5          5/5  *pt states she has had chronic weakness since childhood in LUE                     L2                  L3             L4              L5            S1  R            5/5                5/5             5/5            5/5          5/5  L             5/5                5/5            5/5            5/5          5/5    Sensory:            C5         C6         C7      C8       T1        (0=absent, 1=impaired, 2=normal, NT=not testable)  R         2            2           2        2         2  L          2            2           2        2         2               L2          L3         L4      L5       S1         (0=absent, 1=impaired, 2=normal, NT=not testable)  R         2            2            2        2        2  L          2            2           2        2         2        A/P:  77y f s/p C3-4 ACDF POD 0  -PT/OT   -WBAT on the B/L LE  -Pain Control  -SCDs for DVT ppx  -Continue perioperative abx x 24 hours  -FU AM Labs  -Incentive Spirometry  -Medical management appreciated Postop Check    Patient tolerated the procedure well. Patient seen and examined at bedside. No acute complaints at this time. Pain well controlled. Denies weakness, numbness or tingling. Denies chest pain, shortness of breath, nausea or vomiting.     PE:  Vital Signs Last 24 Hrs  T(C): 36.4 (12-22-22 @ 10:31), Max: 36.4 (12-22-22 @ 10:31)  T(F): 97.5 (12-22-22 @ 10:31), Max: 97.5 (12-22-22 @ 10:31)  HR: 81 (12-22-22 @ 12:30) (75 - 100)  BP: 116/51 (12-22-22 @ 12:30) (116/51 - 134/82)  BP(mean): --  RR: 14 (12-22-22 @ 12:30) (10 - 23)  SpO2: 97% (12-22-22 @ 12:30) (96% - 97%)    General: NAD, resting comforatbly in bed    Dressing C/D/I  1 JERSEY Drain present, ss  + radial pulses  Aspen cervical collar in place     Motor:                   C5                C6              C7               C8           T1   R             5/5                5/5            5/5              5/5          5/5  L             4/5*                4/5            4/5*              5/5          5/5  *pt states she has had chronic weakness                     L2                  L3             L4              L5            S1  R            5/5                5/5             5/5            5/5          5/5  L             5/5                5/5            5/5            5/5          5/5    Sensory:            C5         C6         C7      C8       T1        (0=absent, 1=impaired, 2=normal, NT=not testable)  R         2            2           2        2         2  L          2            2           2        2         2               L2          L3         L4      L5       S1         (0=absent, 1=impaired, 2=normal, NT=not testable)  R         2            2            2        2        2  L          2            2           2        2         2        A/P:  77y f s/p C3-4 ACDF POD 0  -PT/OT   -WBAT on the B/L LE  - Aspen collar   -Pain Control  -SCDs for DVT ppx  -Continue perioperative abx x 24 hours  -FU AM Labs  -Incentive Spirometry  -Medical management appreciated

## 2022-12-22 NOTE — BRIEF OPERATIVE NOTE - NSICDXBRIEFPOSTOP_GEN_ALL_CORE_FT
POST-OP DIAGNOSIS:  Stenosis of cervical spine with myelopathy 22-Dec-2022 10:36:14 C3-C4 Escobar Schwartz

## 2022-12-23 LAB
ANION GAP SERPL CALC-SCNC: 5 MMOL/L — SIGNIFICANT CHANGE UP (ref 5–17)
BASOPHILS # BLD AUTO: 0.02 K/UL — SIGNIFICANT CHANGE UP (ref 0–0.2)
BASOPHILS NFR BLD AUTO: 0.2 % — SIGNIFICANT CHANGE UP (ref 0–2)
BUN SERPL-MCNC: 14 MG/DL — SIGNIFICANT CHANGE UP (ref 7–23)
CALCIUM SERPL-MCNC: 8.7 MG/DL — SIGNIFICANT CHANGE UP (ref 8.5–10.1)
CHLORIDE SERPL-SCNC: 110 MMOL/L — HIGH (ref 96–108)
CO2 SERPL-SCNC: 26 MMOL/L — SIGNIFICANT CHANGE UP (ref 22–31)
CREAT SERPL-MCNC: 0.77 MG/DL — SIGNIFICANT CHANGE UP (ref 0.5–1.3)
EGFR: 78 ML/MIN/1.73M2 — SIGNIFICANT CHANGE UP
EOSINOPHIL # BLD AUTO: 0 K/UL — SIGNIFICANT CHANGE UP (ref 0–0.5)
EOSINOPHIL NFR BLD AUTO: 0 % — SIGNIFICANT CHANGE UP (ref 0–6)
GLUCOSE BLDC GLUCOMTR-MCNC: 114 MG/DL — HIGH (ref 70–99)
GLUCOSE BLDC GLUCOMTR-MCNC: 125 MG/DL — HIGH (ref 70–99)
GLUCOSE SERPL-MCNC: 140 MG/DL — HIGH (ref 70–99)
HCT VFR BLD CALC: 35.6 % — SIGNIFICANT CHANGE UP (ref 34.5–45)
HGB BLD-MCNC: 12 G/DL — SIGNIFICANT CHANGE UP (ref 11.5–15.5)
IMM GRANULOCYTES NFR BLD AUTO: 0.6 % — SIGNIFICANT CHANGE UP (ref 0–0.9)
LYMPHOCYTES # BLD AUTO: 1.2 K/UL — SIGNIFICANT CHANGE UP (ref 1–3.3)
LYMPHOCYTES # BLD AUTO: 12.1 % — LOW (ref 13–44)
MCHC RBC-ENTMCNC: 32.7 PG — SIGNIFICANT CHANGE UP (ref 27–34)
MCHC RBC-ENTMCNC: 33.7 GM/DL — SIGNIFICANT CHANGE UP (ref 32–36)
MCV RBC AUTO: 97 FL — SIGNIFICANT CHANGE UP (ref 80–100)
MONOCYTES # BLD AUTO: 0.52 K/UL — SIGNIFICANT CHANGE UP (ref 0–0.9)
MONOCYTES NFR BLD AUTO: 5.3 % — SIGNIFICANT CHANGE UP (ref 2–14)
NEUTROPHILS # BLD AUTO: 8.08 K/UL — HIGH (ref 1.8–7.4)
NEUTROPHILS NFR BLD AUTO: 81.8 % — HIGH (ref 43–77)
PLATELET # BLD AUTO: 190 K/UL — SIGNIFICANT CHANGE UP (ref 150–400)
POTASSIUM SERPL-MCNC: 4.4 MMOL/L — SIGNIFICANT CHANGE UP (ref 3.5–5.3)
POTASSIUM SERPL-SCNC: 4.4 MMOL/L — SIGNIFICANT CHANGE UP (ref 3.5–5.3)
RBC # BLD: 3.67 M/UL — LOW (ref 3.8–5.2)
RBC # FLD: 13.5 % — SIGNIFICANT CHANGE UP (ref 10.3–14.5)
SODIUM SERPL-SCNC: 141 MMOL/L — SIGNIFICANT CHANGE UP (ref 135–145)
WBC # BLD: 9.88 K/UL — SIGNIFICANT CHANGE UP (ref 3.8–10.5)
WBC # FLD AUTO: 9.88 K/UL — SIGNIFICANT CHANGE UP (ref 3.8–10.5)

## 2022-12-23 RX ADMIN — TRAMADOL HYDROCHLORIDE 50 MILLIGRAM(S): 50 TABLET ORAL at 11:50

## 2022-12-23 RX ADMIN — SENNA PLUS 2 TABLET(S): 8.6 TABLET ORAL at 21:34

## 2022-12-23 RX ADMIN — TRAMADOL HYDROCHLORIDE 50 MILLIGRAM(S): 50 TABLET ORAL at 10:46

## 2022-12-23 RX ADMIN — CYCLOBENZAPRINE HYDROCHLORIDE 10 MILLIGRAM(S): 10 TABLET, FILM COATED ORAL at 16:50

## 2022-12-23 RX ADMIN — Medication 4 MILLIGRAM(S): at 21:33

## 2022-12-23 RX ADMIN — TRAMADOL HYDROCHLORIDE 50 MILLIGRAM(S): 50 TABLET ORAL at 22:30

## 2022-12-23 RX ADMIN — CYCLOBENZAPRINE HYDROCHLORIDE 10 MILLIGRAM(S): 10 TABLET, FILM COATED ORAL at 05:27

## 2022-12-23 RX ADMIN — CYCLOBENZAPRINE HYDROCHLORIDE 10 MILLIGRAM(S): 10 TABLET, FILM COATED ORAL at 21:35

## 2022-12-23 RX ADMIN — TRAMADOL HYDROCHLORIDE 50 MILLIGRAM(S): 50 TABLET ORAL at 21:34

## 2022-12-23 RX ADMIN — GABAPENTIN 300 MILLIGRAM(S): 400 CAPSULE ORAL at 05:27

## 2022-12-23 RX ADMIN — GABAPENTIN 300 MILLIGRAM(S): 400 CAPSULE ORAL at 16:50

## 2022-12-23 RX ADMIN — POLYETHYLENE GLYCOL 3350 17 GRAM(S): 17 POWDER, FOR SOLUTION ORAL at 10:09

## 2022-12-23 RX ADMIN — GABAPENTIN 300 MILLIGRAM(S): 400 CAPSULE ORAL at 21:34

## 2022-12-23 RX ADMIN — Medication 6 MILLIGRAM(S): at 08:10

## 2022-12-23 NOTE — PHYSICAL THERAPY INITIAL EVALUATION ADULT - ADDITIONAL COMMENTS
Patient has ataxia and owns both a RW and Cane but admits to NOT using them and ambulating Independently. No recent falls. Patient was performing all ADL's Independently PTA. Patient was driving to Outpatient Physical Therapy 2-3 times a week for recent back surgery rehabilitation but starting paying out of pocket for balance focused sessions.

## 2022-12-23 NOTE — PHYSICAL THERAPY INITIAL EVALUATION ADULT - GENERAL OBSERVATIONS, REHAB EVAL
Pt rec'd supine in bed in NAD with IV, BLE SCD's and C/S [place] Collar all Intact. Patient denied any pain or discomfort

## 2022-12-23 NOTE — PHYSICAL THERAPY INITIAL EVALUATION ADULT - ACTIVE RANGE OF MOTION EXAMINATION, REHAB EVAL
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

## 2022-12-23 NOTE — PHYSICAL THERAPY INITIAL EVALUATION ADULT - MODALITIES TREATMENT COMMENTS
Pt left OOB in chair in NAD with IV and C/S Aspen Collar both intact. CBIR. Pt instructed to not get up alone. WILD Saldana aware

## 2022-12-23 NOTE — PROGRESS NOTE ADULT - SUBJECTIVE AND OBJECTIVE BOX
[facility]                                                           Orthopedic Spine Progress Note      POST OPERATIVE DAY #: 1  STATUS POST: ACDF C3-4               Pre-Op Dx: Stenosis of cervical spine with myelopathy      Post-Op Dx:  Stenosis of cervical spine with myelopathy      SUBJECTIVE: Patient seen and examined, pain controlled, ambulates some w/PT but felt a bit unsteady, tolerating PO meds, has not eaten much yet.     Pain (0-10):   Current Pain Management:  [ ] PCA   [x] Po Analgesics [x] IM /IV Analgesics     Vital Signs Last 24 Hrs  T(C): 36.5 (23 Dec 2022 07:48), Max: 36.8 (22 Dec 2022 13:33)  T(F): 97.7 (23 Dec 2022 07:48), Max: 98.3 (22 Dec 2022 13:33)  HR: 78 (23 Dec 2022 07:48) (70 - 90)  BP: 115/47 (23 Dec 2022 07:48) (98/51 - 133/62)  BP(mean): --  RR: 17 (23 Dec 2022 07:48) (14 - 23)  SpO2: 94% (23 Dec 2022 07:48) (94% - 97%)    Parameters below as of 23 Dec 2022 07:48  Patient On (Oxygen Delivery Method): room air      I&O's Detail    22 Dec 2022 07:01  -  23 Dec 2022 07:00  --------------------------------------------------------  IN:    Other (mL): 1336 mL  Total IN: 1336 mL    OUT:    Bulb (mL): 70 mL    Other (mL): 150 mL    Voided (mL): 400 mL  Total OUT: 620 mL    Total NET: 716 mL          OBJECTIVE:       Wound /Dressing: dressing intact  Drain: 25cc - kept  Cervical ROM: not tested, collar in place  Neurological: A/O x 3         Sensation: [x] intact to light touch  [ ] decreased:          Motor exam: [x]          [x] Upper extremity           Delt      Bicp        Tri    Wrist ext  Wrst Flex          Digit Ext    Digit Flex                                     R         5/5        5/5        5/5       5/5            5/5            5/5       5/5          5/5                                     L          ** at baseline strength w/history of Erb's palsy         [x] Lower ext.         Hip Flx    Quad   Hamstrg         TA        EHL        GS                              R        5/5 5/5        5/5             5/5        5/5        5/5                                   L         5/5 5/5 5/5             5/5        5/5 5/5                                                             [x] Vascular: intact             Tension Signs: none          Long Tract Findings: none                                                LABS:                        12.0   9.88  )-----------( 190      ( 23 Dec 2022 08:15 )             35.6     12-23    141  |  110<H>  |  14  ----------------------------<  140<H>  4.4   |  26  |  0.77    Ca    8.7      23 Dec 2022 08:15

## 2022-12-23 NOTE — PROGRESS NOTE ADULT - SUBJECTIVE AND OBJECTIVE BOX
Patient seen and examined   Chart reviewed    Collar in place  Min drain output  Dysphagia not terrible     -Mobilize with PT again   - Expect D/C drain in AM   - D/C home when cleared by PT    ELIAZAR Epperson MD

## 2022-12-23 NOTE — PROGRESS NOTE ADULT - ASSESSMENT
s/p ACDF C3-4  --PT/mobilization  --bowel regimen  --DVT ppx w/SCDs  --maintain drain  --probable discharge home Sat am 12/24 s/p ACDF C3-4  --PT/mobilization  --bowel regimen  --DVT ppx w/SCDs  --maintain drain  --probable discharge home Sat am 12/24 - she will require a rolling walker for ambulation due to diagnosis of cervical fusion for cervical stenosis

## 2022-12-24 ENCOUNTER — TRANSCRIPTION ENCOUNTER (OUTPATIENT)
Age: 77
End: 2022-12-24

## 2022-12-24 VITALS
DIASTOLIC BLOOD PRESSURE: 53 MMHG | TEMPERATURE: 98 F | HEART RATE: 62 BPM | RESPIRATION RATE: 16 BRPM | OXYGEN SATURATION: 95 % | SYSTOLIC BLOOD PRESSURE: 122 MMHG

## 2022-12-24 RX ORDER — GABAPENTIN 400 MG/1
1 CAPSULE ORAL
Qty: 0 | Refills: 0 | DISCHARGE

## 2022-12-24 RX ORDER — GABAPENTIN 400 MG/1
1 CAPSULE ORAL
Qty: 0 | Refills: 0 | DISCHARGE
Start: 2022-12-24

## 2022-12-24 RX ORDER — OMEGA-3 ACID ETHYL ESTERS 1 G
1 CAPSULE ORAL
Qty: 0 | Refills: 0 | DISCHARGE

## 2022-12-24 RX ORDER — CYCLOBENZAPRINE HYDROCHLORIDE 10 MG/1
1 TABLET, FILM COATED ORAL
Qty: 0 | Refills: 0 | DISCHARGE
Start: 2022-12-24

## 2022-12-24 RX ORDER — OXYCODONE HYDROCHLORIDE 5 MG/1
1 TABLET ORAL
Qty: 0 | Refills: 0 | DISCHARGE
Start: 2022-12-24

## 2022-12-24 RX ADMIN — POLYETHYLENE GLYCOL 3350 17 GRAM(S): 17 POWDER, FOR SOLUTION ORAL at 10:02

## 2022-12-24 RX ADMIN — TRAMADOL HYDROCHLORIDE 50 MILLIGRAM(S): 50 TABLET ORAL at 05:50

## 2022-12-24 RX ADMIN — TRAMADOL HYDROCHLORIDE 50 MILLIGRAM(S): 50 TABLET ORAL at 06:50

## 2022-12-24 RX ADMIN — CYCLOBENZAPRINE HYDROCHLORIDE 10 MILLIGRAM(S): 10 TABLET, FILM COATED ORAL at 05:50

## 2022-12-24 RX ADMIN — GABAPENTIN 300 MILLIGRAM(S): 400 CAPSULE ORAL at 05:50

## 2022-12-24 NOTE — DISCHARGE NOTE PROVIDER - NSDCCPTREATMENT_GEN_ALL_CORE_FT
PRINCIPAL PROCEDURE  Procedure: Anterior cervical discectomy and fusion (ACDF)  Findings and Treatment: surgery, analgesia, physical therapy

## 2022-12-24 NOTE — DISCHARGE NOTE NURSING/CASE MANAGEMENT/SOCIAL WORK - PATIENT PORTAL LINK FT
You can access the FollowMyHealth Patient Portal offered by [facility] by registering at the following website: [email or web address]. By joining [employer]’s FollowMyHealth portal, you will also be able to view your health information using other applications (apps) compatible with our system.

## 2022-12-24 NOTE — DISCHARGE NOTE PROVIDER - CARE PROVIDER_API CALL
Maria G Epperson)  Orthopaedic Surgery  [address] [place] [address], [address] [place] [address]  Phone: [phone]  Fax: [phone]  Follow Up Time:

## 2022-12-24 NOTE — DISCHARGE NOTE PROVIDER - NSDCFUADDINST_GEN_ALL_CORE_FT
Continue cervical collar. May remove only for showering. May shower beginning Monday 12/26. May remove dressing(s) Monday 12/26. Leave any underlying steri strips in place to fall off over time. NO driving.

## 2022-12-24 NOTE — DISCHARGE NOTE PROVIDER - NSDCCPCAREPLAN_GEN_ALL_CORE_FT
PRINCIPAL DISCHARGE DIAGNOSIS  Diagnosis: S/P cervical spinal fusion  Assessment and Plan of Treatment: surgery, analgesia, physical therapy

## 2022-12-24 NOTE — DISCHARGE NOTE PROVIDER - HOSPITAL COURSE
The patient was admitted on 12- for elective ACDF C3-4 done on the day of admission. The patient tolerated surgery without complication and was transferred to  from the recovery room.    POD #1 - OOB in chair, VSS, afeb, neuro/motor intact, drain kept, mild dysphagia, began to mobilize w/PT  POD #2 - dysphagia improving, VSS, afebrile, neuro/motor intact, drain removed w/o incident, incision clean/dry, ambulated w/PT. Patient is discharged home in stable condition.

## 2022-12-24 NOTE — DISCHARGE NOTE NURSING/CASE MANAGEMENT/SOCIAL WORK - NSDCPEFALRISK_GEN_ALL_CORE
For information on Fall & Injury Prevention, visit: [email or web address] OR  [email or web address] OR  https://www.cdc.gov/steadi/patient.html

## 2022-12-24 NOTE — DISCHARGE NOTE NURSING/CASE MANAGEMENT/SOCIAL WORK - NSDCVIVACCINE_GEN_ALL_CORE_FT
influenza, injectable, quadrivalent, preservative free; 24-Oct-2018 10:57; Nupur Robert (RN); [employer]; [id number] (Exp. Date: 30-Jun-2019); IntraMuscular; Deltoid Left.; 0.5 milliLiter(s); VIS (VIS Published: 07-Aug-2015, VIS Presented: 24-Oct-2018);

## 2022-12-24 NOTE — DISCHARGE NOTE PROVIDER - NSDCMRMEDTOKEN_GEN_ALL_CORE_FT
Artificial Tears ophthalmic ointment: 1 application to each affected eye once a day, As Needed  calcium (as calcium citrate) 250 mg oral tablet:   cyclobenzaprine 10 mg oral tablet: 1 tab(s) orally every 8 hours  gabapentin 300 mg oral capsule: 1 cap(s) orally 3 times a day  Glucosamine and Chondroitin with MSM oral tablet: 1  orally once a day  oxyCODONE 5 mg oral tablet: 1 tab(s) orally every 4 hours, As needed, Severe Pain (7 - 10)  Sudafed 60 mg oral tablet: 1 tab(s) orally once a day, As Needed  Vitamin D3:

## 2022-12-27 ENCOUNTER — NON-APPOINTMENT (OUTPATIENT)
Age: 77
End: 2022-12-27

## 2022-12-27 LAB — SURGICAL PATHOLOGY STUDY: SIGNIFICANT CHANGE UP

## 2022-12-28 ENCOUNTER — INPATIENT (INPATIENT)
Facility: HOSPITAL | Age: 77
LOS: 4 days | Discharge: HOME | DRG: 177 | End: 2023-01-02
Attending: INTERNAL MEDICINE | Admitting: INTERNAL MEDICINE
Payer: MEDICARE

## 2022-12-28 VITALS — HEIGHT: 66 IN

## 2022-12-28 DIAGNOSIS — M48.02 SPINAL STENOSIS, CERVICAL REGION: ICD-10-CM

## 2022-12-28 DIAGNOSIS — E04.1 NONTOXIC SINGLE THYROID NODULE: ICD-10-CM

## 2022-12-28 DIAGNOSIS — Z98.890 OTHER SPECIFIED POSTPROCEDURAL STATES: Chronic | ICD-10-CM

## 2022-12-28 DIAGNOSIS — Z98.1 ARTHRODESIS STATUS: Chronic | ICD-10-CM

## 2022-12-28 DIAGNOSIS — Z98.51 TUBAL LIGATION STATUS: Chronic | ICD-10-CM

## 2022-12-28 DIAGNOSIS — Z86.711 PERSONAL HISTORY OF PULMONARY EMBOLISM: ICD-10-CM

## 2022-12-28 DIAGNOSIS — Z96.651 PRESENCE OF RIGHT ARTIFICIAL KNEE JOINT: Chronic | ICD-10-CM

## 2022-12-28 DIAGNOSIS — J18.9 PNEUMONIA, UNSPECIFIED ORGANISM: ICD-10-CM

## 2022-12-28 DIAGNOSIS — M50.01 CERVICAL DISC DISORDER WITH MYELOPATHY, HIGH CERVICAL REGION: ICD-10-CM

## 2022-12-28 DIAGNOSIS — R13.10 DYSPHAGIA, UNSPECIFIED: ICD-10-CM

## 2022-12-28 DIAGNOSIS — Z96.651 PRESENCE OF RIGHT ARTIFICIAL KNEE JOINT: ICD-10-CM

## 2022-12-28 DIAGNOSIS — Z90.89 ACQUIRED ABSENCE OF OTHER ORGANS: Chronic | ICD-10-CM

## 2022-12-28 DIAGNOSIS — G71.02 FACIOSCAPULOHUMERAL MUSCULAR DYSTROPHY: ICD-10-CM

## 2022-12-28 PROBLEM — M54.2 CERVICALGIA: Chronic | Status: ACTIVE | Noted: 2022-12-14

## 2022-12-28 LAB
ALBUMIN SERPL ELPH-MCNC: 3.1 G/DL — LOW (ref 3.3–5)
ALP SERPL-CCNC: 100 U/L — SIGNIFICANT CHANGE UP (ref 40–120)
ALT FLD-CCNC: 23 U/L — SIGNIFICANT CHANGE UP (ref 12–78)
ANION GAP SERPL CALC-SCNC: 12 MMOL/L — SIGNIFICANT CHANGE UP (ref 5–17)
APPEARANCE UR: CLEAR — SIGNIFICANT CHANGE UP
APTT BLD: 33.3 SEC — SIGNIFICANT CHANGE UP (ref 27.5–35.5)
AST SERPL-CCNC: 20 U/L — SIGNIFICANT CHANGE UP (ref 15–37)
BASOPHILS # BLD AUTO: 0.07 K/UL — SIGNIFICANT CHANGE UP (ref 0–0.2)
BASOPHILS NFR BLD AUTO: 0.8 % — SIGNIFICANT CHANGE UP (ref 0–2)
BILIRUB SERPL-MCNC: 1.2 MG/DL — SIGNIFICANT CHANGE UP (ref 0.2–1.2)
BILIRUB UR-MCNC: NEGATIVE — SIGNIFICANT CHANGE UP
BUN SERPL-MCNC: 24 MG/DL — HIGH (ref 7–23)
CALCIUM SERPL-MCNC: 9.2 MG/DL — SIGNIFICANT CHANGE UP (ref 8.5–10.1)
CHLORIDE SERPL-SCNC: 97 MMOL/L — SIGNIFICANT CHANGE UP (ref 96–108)
CO2 SERPL-SCNC: 22 MMOL/L — SIGNIFICANT CHANGE UP (ref 22–31)
COLOR SPEC: YELLOW — SIGNIFICANT CHANGE UP
CREAT SERPL-MCNC: 0.69 MG/DL — SIGNIFICANT CHANGE UP (ref 0.5–1.3)
DIFF PNL FLD: ABNORMAL
EGFR: 88 ML/MIN/1.73M2 — SIGNIFICANT CHANGE UP
EOSINOPHIL # BLD AUTO: 0.01 K/UL — SIGNIFICANT CHANGE UP (ref 0–0.5)
EOSINOPHIL NFR BLD AUTO: 0.1 % — SIGNIFICANT CHANGE UP (ref 0–6)
GLUCOSE SERPL-MCNC: 84 MG/DL — SIGNIFICANT CHANGE UP (ref 70–99)
GLUCOSE UR QL: NEGATIVE — SIGNIFICANT CHANGE UP
HCT VFR BLD CALC: 41.6 % — SIGNIFICANT CHANGE UP (ref 34.5–45)
HGB BLD-MCNC: 14.1 G/DL — SIGNIFICANT CHANGE UP (ref 11.5–15.5)
IMM GRANULOCYTES NFR BLD AUTO: 0.3 % — SIGNIFICANT CHANGE UP (ref 0–0.9)
INR BLD: 1.28 RATIO — HIGH (ref 0.88–1.16)
KETONES UR-MCNC: ABNORMAL
LACTATE SERPL-SCNC: 1.6 MMOL/L — SIGNIFICANT CHANGE UP (ref 0.7–2)
LEUKOCYTE ESTERASE UR-ACNC: NEGATIVE — SIGNIFICANT CHANGE UP
LYMPHOCYTES # BLD AUTO: 0.98 K/UL — LOW (ref 1–3.3)
LYMPHOCYTES # BLD AUTO: 11 % — LOW (ref 13–44)
MCHC RBC-ENTMCNC: 32.3 PG — SIGNIFICANT CHANGE UP (ref 27–34)
MCHC RBC-ENTMCNC: 33.9 GM/DL — SIGNIFICANT CHANGE UP (ref 32–36)
MCV RBC AUTO: 95.4 FL — SIGNIFICANT CHANGE UP (ref 80–100)
MONOCYTES # BLD AUTO: 0.9 K/UL — SIGNIFICANT CHANGE UP (ref 0–0.9)
MONOCYTES NFR BLD AUTO: 10.1 % — SIGNIFICANT CHANGE UP (ref 2–14)
NEUTROPHILS # BLD AUTO: 6.88 K/UL — SIGNIFICANT CHANGE UP (ref 1.8–7.4)
NEUTROPHILS NFR BLD AUTO: 77.7 % — HIGH (ref 43–77)
NITRITE UR-MCNC: NEGATIVE — SIGNIFICANT CHANGE UP
PH UR: 5 — SIGNIFICANT CHANGE UP (ref 5–8)
PLATELET # BLD AUTO: 244 K/UL — SIGNIFICANT CHANGE UP (ref 150–400)
POTASSIUM SERPL-MCNC: 4.6 MMOL/L — SIGNIFICANT CHANGE UP (ref 3.5–5.3)
POTASSIUM SERPL-SCNC: 4.6 MMOL/L — SIGNIFICANT CHANGE UP (ref 3.5–5.3)
PROT SERPL-MCNC: 7 GM/DL — SIGNIFICANT CHANGE UP (ref 6–8.3)
PROT UR-MCNC: 30 MG/DL
PROTHROM AB SERPL-ACNC: 14.9 SEC — HIGH (ref 10.5–13.4)
RAPID RVP RESULT: SIGNIFICANT CHANGE UP
RBC # BLD: 4.36 M/UL — SIGNIFICANT CHANGE UP (ref 3.8–5.2)
RBC # FLD: 12.3 % — SIGNIFICANT CHANGE UP (ref 10.3–14.5)
SARS-COV-2 RNA SPEC QL NAA+PROBE: SIGNIFICANT CHANGE UP
SODIUM SERPL-SCNC: 131 MMOL/L — LOW (ref 135–145)
SP GR SPEC: 1.02 — SIGNIFICANT CHANGE UP (ref 1.01–1.02)
TROPONIN I, HIGH SENSITIVITY RESULT: 23.21 NG/L — SIGNIFICANT CHANGE UP
UROBILINOGEN FLD QL: NEGATIVE — SIGNIFICANT CHANGE UP
WBC # BLD: 8.87 K/UL — SIGNIFICANT CHANGE UP (ref 3.8–10.5)
WBC # FLD AUTO: 8.87 K/UL — SIGNIFICANT CHANGE UP (ref 3.8–10.5)

## 2022-12-28 PROCEDURE — 99285 EMERGENCY DEPT VISIT HI MDM: CPT

## 2022-12-28 PROCEDURE — 36415 COLL VENOUS BLD VENIPUNCTURE: CPT

## 2022-12-28 PROCEDURE — 87449 NOS EACH ORGANISM AG IA: CPT

## 2022-12-28 PROCEDURE — 87070 CULTURE OTHR SPECIMN AEROBIC: CPT

## 2022-12-28 PROCEDURE — 71275 CT ANGIOGRAPHY CHEST: CPT | Mod: 26,MA

## 2022-12-28 PROCEDURE — 93010 ELECTROCARDIOGRAM REPORT: CPT

## 2022-12-28 PROCEDURE — 87640 STAPH A DNA AMP PROBE: CPT

## 2022-12-28 PROCEDURE — 87899 AGENT NOS ASSAY W/OPTIC: CPT

## 2022-12-28 PROCEDURE — 85027 COMPLETE CBC AUTOMATED: CPT

## 2022-12-28 PROCEDURE — 92526 ORAL FUNCTION THERAPY: CPT | Mod: GN

## 2022-12-28 PROCEDURE — 87641 MR-STAPH DNA AMP PROBE: CPT

## 2022-12-28 PROCEDURE — 71045 X-RAY EXAM CHEST 1 VIEW: CPT | Mod: 26

## 2022-12-28 PROCEDURE — 83605 ASSAY OF LACTIC ACID: CPT

## 2022-12-28 PROCEDURE — 92610 EVALUATE SWALLOWING FUNCTION: CPT | Mod: GN

## 2022-12-28 RX ORDER — CALCIUM CARBONATE 500(1250)
1 TABLET ORAL
Qty: 0 | Refills: 0 | DISCHARGE

## 2022-12-28 RX ORDER — GLUCOSAMINE/MSM/CHONDROITIN A 750-375MG
1 TABLET ORAL
Qty: 0 | Refills: 0 | DISCHARGE

## 2022-12-28 RX ORDER — SODIUM CHLORIDE 9 MG/ML
2000 INJECTION INTRAMUSCULAR; INTRAVENOUS; SUBCUTANEOUS ONCE
Refills: 0 | Status: COMPLETED | OUTPATIENT
Start: 2022-12-28 | End: 2022-12-28

## 2022-12-28 RX ORDER — CEFEPIME 1 G/1
1000 INJECTION, POWDER, FOR SOLUTION INTRAMUSCULAR; INTRAVENOUS ONCE
Refills: 0 | Status: COMPLETED | OUTPATIENT
Start: 2022-12-28 | End: 2022-12-28

## 2022-12-28 RX ORDER — PSEUDOEPHEDRINE HCL 30 MG
1 TABLET ORAL
Qty: 0 | Refills: 0 | DISCHARGE

## 2022-12-28 RX ORDER — OXYCODONE AND ACETAMINOPHEN 5; 325 MG/1; MG/1
1 TABLET ORAL
Qty: 0 | Refills: 0 | DISCHARGE

## 2022-12-28 RX ORDER — OMEGA-3 ACID ETHYL ESTERS 1 G
1 CAPSULE ORAL
Qty: 0 | Refills: 0 | DISCHARGE

## 2022-12-28 RX ORDER — CHOLECALCIFEROL (VITAMIN D3) 125 MCG
0 CAPSULE ORAL
Qty: 0 | Refills: 0 | DISCHARGE

## 2022-12-28 RX ORDER — VANCOMYCIN HCL 1 G
1250 VIAL (EA) INTRAVENOUS ONCE
Refills: 0 | Status: COMPLETED | OUTPATIENT
Start: 2022-12-28 | End: 2022-12-28

## 2022-12-28 RX ORDER — CEFEPIME 1 G/1
1000 INJECTION, POWDER, FOR SOLUTION INTRAMUSCULAR; INTRAVENOUS ONCE
Refills: 0 | Status: DISCONTINUED | OUTPATIENT
Start: 2022-12-28 | End: 2022-12-28

## 2022-12-28 RX ORDER — CHOLECALCIFEROL (VITAMIN D3) 125 MCG
1 CAPSULE ORAL
Qty: 0 | Refills: 0 | DISCHARGE

## 2022-12-28 RX ORDER — VANCOMYCIN HCL 1 G
1000 VIAL (EA) INTRAVENOUS ONCE
Refills: 0 | Status: DISCONTINUED | OUTPATIENT
Start: 2022-12-28 | End: 2022-12-28

## 2022-12-28 RX ADMIN — SODIUM CHLORIDE 2000 MILLILITER(S): 9 INJECTION INTRAMUSCULAR; INTRAVENOUS; SUBCUTANEOUS at 18:38

## 2022-12-28 RX ADMIN — Medication 166.67 MILLIGRAM(S): at 21:11

## 2022-12-28 RX ADMIN — CEFEPIME 1000 MILLIGRAM(S): 1 INJECTION, POWDER, FOR SOLUTION INTRAMUSCULAR; INTRAVENOUS at 18:38

## 2022-12-28 NOTE — ED ADULT TRIAGE NOTE - CHIEF COMPLAINT QUOTE
Patient comes to ED with c/o cough, SOB- went to urgent care and was told she might have pneumonia and was advised to come to ED for IV abx.

## 2022-12-28 NOTE — ED PROVIDER NOTE - ENMT, MLM
Airway patent, Nasal mucosa clear. Mouth with mildly dry mucosa. Throat has no vesicles, no oropharyngeal exudates and uvula is midline. voice mildly hoarse. tolerating own secretions.

## 2022-12-28 NOTE — ED ADULT NURSE NOTE - NS ED NURSE LEVEL OF CONSCIOUSNESS ORIENTATION
[facility] AMBULATORY ENCOUNTER  PULMONARY OFFICE VISIT    IMPRESSION:     Underlying severe sleep apnea since 2014  Bipolar disorder  ADHD  BMI 36  Smoker          PLAN:     Doing good  Increasing Compliance with machine discussed  Sleep hygiene discussed  Weight loss  Smoking cessation   year follow up       CHIEF COMPLAINT:  Sleep apnea     SUBJECTIVE:  Ashu Pelayo returns for follow up. Since last visit, He got his new CPAP machine, He like the machine, He sleeps at random times and that's why his compliance is low    He is working from home, and some times pull all nighter     He denies mask leak or efren fitting issues     He wakes up less tired when he uses the machine     Patient download showed 57% compliance with more than four hours, patient residual index is 2.0. Patient is on 11-20 pressure  Patient 95 th % pressure is 14.7    He denies any new medical problems    He had been trying to lose weight     He is ok on supplies .    HISTORIES:    Past Medical History:   Diagnosis Date   • Kidney stones 01/01/2002   • Tobacco abuse     pack per day for \"many years\"    • Traumatic amputation toe ([facility]) 08/28/2013    Left great and second toe lawnmower injury, partial amputations      Past Surgical History:   Procedure Laterality Date   • Toe surgery  08/28/2013    I&D  left great toe including bone; bone shortening and primary wound closure great toe over distal phalanx.    I&D left second toe, including middle and distal open phalangeal fractures and amputation at the PIP joint      ALLERGIES:  No Known Allergies   Social History     Tobacco Use   • Smoking status: Current Every Day Smoker     Packs/day: 1.00     Years: 15.00     Pack years: 15.00     Types: Cigarettes   • Smokeless tobacco: Never Used   Substance Use Topics   • Alcohol use: Yes     Alcohol/week: 0.8 standard drinks     Types: 1 Standard drinks or equivalent per week     Comment: 1 drink per month      Family History   Problem Relation Age of  Onset   • Cancer Maternal Grandfather    • Hypertension Mother    • High cholesterol Mother    • Hypertension Father    • High cholesterol Father    • Sleep Apnea Father            Current Outpatient Medications   Medication Sig Dispense Refill   • [START ON 5/8/2021] lisdexamfetamine (Vyvanse) 30 MG capsule Take 1 capsule by mouth every morning. Do not start before May 8, 2021. 30 capsule 0   • divalproex (DEPAKOTE ER) 500 MG 24 hr ER tablet Two tablets q AM, three tablets PM. 150 tablet 3   • Cariprazine HCl (Vraylar) 6 MG capsule Take 1 capsule by mouth daily. 30 capsule 3   • Cariprazine HCl (Vraylar) 1.5 MG capsule Take 1 capsule by mouth daily. 11 capsule 3   • Cholecalciferol (VITAMIN D3) 2000 units capsule TAKE 2 CAPSULES BY MOUTH DAILY 60 capsule 0     No current facility-administered medications for this visit.       REVIEW OF SYSTEMS:    All systems reviewed and are negative with the exception of the findings noted in the History of Present Illness.    OBJECTIVE:  Vital Signs:  Visit Vitals  /78   Pulse 88   Resp 16   Ht 6' 1\" (1.854 m)   Wt 121.8 kg   SpO2 95% Comment: RA at rest   BMI 35.44 kg/m²       PHYSICAL EXAM:  Constitutional:  Well developed, well nourished, no acute distress, non-toxic appearance.  HEENT:  Atraumatic, PERRL, conjunctivae normal, external ears normal, nose normal, oropharynx moist, no pharyngeal exudates. Neck- Normal range of motion, no tenderness, supple, no JVD or adenopathy. No accessory muscle use. Trachea midline.  Respiratory: Clear to auscultation, no wheezing, rhonchi or rub. No dullness to percussion or decreased tactile fremitus.  Cardiovascular:  Normal rate, normal rhythm, no murmurs, no gallops, no rubs.  Gastrointestinal:  Soft, nondistended, normal bowel sounds, nontender,   Musculoskeletal:  No edema.  No cyanosis or clubbing         Return in about 1 year (around 4/12/2022).    Instructions provided as documented in the After Visit Summary.      The  patient indicated understanding of the diagnosis and agreed with the plan of care.      Donald Garcia M.D, [facility]                         Oriented - self; Oriented - place; Oriented - time

## 2022-12-28 NOTE — ED PROVIDER NOTE - WR ORDER ID 1
Patient calling back, states she already had her blood drawn and does not understand why the message left on her phone was not detailed and just said to call us back. She would like to speak to medical staff, transferring to [facility].   [id number]

## 2022-12-28 NOTE — ED PROVIDER NOTE - CONSTITUTIONAL, MLM
white female adult alert, no obvious respiratory distress, no sentence shortening, mildly ill appearing normal...

## 2022-12-28 NOTE — ED ADULT NURSE NOTE - OBJECTIVE STATEMENT
Pt A&Ox4, presents to the ED c/o SOB and congestion since undergoing a cervical spinal fusion on 12/22 at . C-collar in place. Denies numbness/tingling, chest pain, or fevers. No medication PTA.

## 2022-12-28 NOTE — ED PROVIDER NOTE - OBJECTIVE STATEMENT
78 y/o female with PMHx of back pain, thyroid disease, PE presents to the ED after recent surgery, ACDF C3-4 on 12/24 now c/o cough and SOB. Pt went to  this morning and was told she might have PNA and to come in to the ED for evaluation. Pt has not taken gabapentin or oxycodone since last night. NKDA. 12/22 fusion cervical spine. Pt notes that she has had a buildup of mucous and thick, yellow and green phlegm, poorly productive x 12/24. Pt adds that she has mild difficulty swallowing food, which she was informed that could be case post-op. pt denies fever, cp  PCP: Dr. Shirley Lucero 78 y/o female with PMHx of back pain, thyroid disease, PE presents ambulatory to the ED after recent surgery, ACDF C3-4 on 12/24, now c/o cough and SOB. Pt went to  this morning and was told she might have PNA and to come in to the ED for evaluation. Pt has not taken gabapentin or oxycodone since last night. NKDA. 12/22 fusion cervical spine. Pt notes that she has had a buildup of mucous and thick, yellow and green phlegm, poorly productive x 12/24. Pt adds that she has mild difficulty swallowing food, which she was informed that could be case post-op. pt denies fever, cp  PCP: Dr. Shirley Lucero

## 2022-12-28 NOTE — ED STATDOCS - PROGRESS NOTE DETAILS
Riccardo Sales for Dr. Fitzgerald:   76 y/o female with PMHx of back pain, thyroid disease, PE presents to the ED after recent surgery, ACDF C3-4 on 12/24 now c/o cough and SOB. Pt went to  this morning and was told she might have PNA and to come in to the ED for evaluation. Pt has not taken or gabapentin or oxycodone since last night. NKDA. Will send pt to main ED for further evaluation.

## 2022-12-28 NOTE — ED PROVIDER NOTE - CARE PLAN
Principal Discharge DX:	Pneumonia of right middle lobe due to infectious organism  Secondary Diagnosis:	Acute respiratory failure with hypoxia   1

## 2022-12-28 NOTE — PHARMACOTHERAPY INTERVENTION NOTE - COMMENTS
Medication reconciliation completed.  Reviewed Medication list and confirmed med allergies with patient; confirmed with Dr. First Medsanna. 
first dose of vancomycin adjusted based on ER protocol

## 2022-12-28 NOTE — ED PROVIDER NOTE - CLINICAL SUMMARY MEDICAL DECISION MAKING FREE TEXT BOX
76 y/o female with PMHx of back pain, thyroid disease, PE presents to the ED after recent surgery, ACDF C3-4 on 12/24 now c/o cough and SOB. pt positive hypoxic requires o2 supplementation. pos r lower lobe crackles. plan: ekg, CXR, CTA, chest PE protocol (r/o pe, r/o pna), sepsis labs, sepsis iv fluid, sepsis vancomycin, monitor, observe, reassess. o2 NC  addendum: CTA negative for PE. positive atypical right middle lobe PNA. admit medicine. 78 y/o female with PMHx of back pain, thyroid disease, PE presents to the ED after recent surgery, ACDF C3-4 on 12/24 now c/o cough and SOB. pt positive hypoxic requires o2 supplementation. pos R lower lobe crackles.   Plan: ekg, CXR, CTA chest PE protocol (r/o pe, r/o pna), sepsis labs, sepsis iv fluid, sepsis IV Abx, monitor, observe, reassess. o2 NC  Addendum: CTA negative for PE. positive atypical right middle lobe PNA. admit medicine.

## 2022-12-29 PROCEDURE — 12345: CPT | Mod: NC

## 2022-12-29 PROCEDURE — 99223 1ST HOSP IP/OBS HIGH 75: CPT

## 2022-12-29 RX ORDER — VANCOMYCIN HCL 1 G
1000 VIAL (EA) INTRAVENOUS EVERY 12 HOURS
Refills: 0 | Status: DISCONTINUED | OUTPATIENT
Start: 2022-12-29 | End: 2022-12-29

## 2022-12-29 RX ORDER — PIPERACILLIN AND TAZOBACTAM 4; .5 G/20ML; G/20ML
3.38 INJECTION, POWDER, LYOPHILIZED, FOR SOLUTION INTRAVENOUS ONCE
Refills: 0 | Status: COMPLETED | OUTPATIENT
Start: 2022-12-29 | End: 2022-12-29

## 2022-12-29 RX ORDER — CEFTRIAXONE 500 MG/1
1000 INJECTION, POWDER, FOR SOLUTION INTRAMUSCULAR; INTRAVENOUS EVERY 24 HOURS
Refills: 0 | Status: DISCONTINUED | OUTPATIENT
Start: 2022-12-29 | End: 2022-12-29

## 2022-12-29 RX ORDER — CHOLECALCIFEROL (VITAMIN D3) 125 MCG
4000 CAPSULE ORAL DAILY
Refills: 0 | Status: DISCONTINUED | OUTPATIENT
Start: 2022-12-29 | End: 2023-01-02

## 2022-12-29 RX ORDER — PIPERACILLIN AND TAZOBACTAM 4; .5 G/20ML; G/20ML
3.38 INJECTION, POWDER, LYOPHILIZED, FOR SOLUTION INTRAVENOUS EVERY 8 HOURS
Refills: 0 | Status: DISCONTINUED | OUTPATIENT
Start: 2022-12-29 | End: 2023-01-02

## 2022-12-29 RX ORDER — CALCIUM CARBONATE 500(1250)
1 TABLET ORAL DAILY
Refills: 0 | Status: DISCONTINUED | OUTPATIENT
Start: 2022-12-29 | End: 2023-01-02

## 2022-12-29 RX ORDER — GABAPENTIN 400 MG/1
300 CAPSULE ORAL AT BEDTIME
Refills: 0 | Status: DISCONTINUED | OUTPATIENT
Start: 2022-12-29 | End: 2023-01-02

## 2022-12-29 RX ORDER — OXYCODONE AND ACETAMINOPHEN 5; 325 MG/1; MG/1
1 TABLET ORAL EVERY 4 HOURS
Refills: 0 | Status: DISCONTINUED | OUTPATIENT
Start: 2022-12-29 | End: 2023-01-02

## 2022-12-29 RX ORDER — GUAIFENESIN/DEXTROMETHORPHAN 600MG-30MG
10 TABLET, EXTENDED RELEASE 12 HR ORAL EVERY 4 HOURS
Refills: 0 | Status: DISCONTINUED | OUTPATIENT
Start: 2022-12-29 | End: 2023-01-02

## 2022-12-29 RX ORDER — ENOXAPARIN SODIUM 100 MG/ML
40 INJECTION SUBCUTANEOUS EVERY 24 HOURS
Refills: 0 | Status: DISCONTINUED | OUTPATIENT
Start: 2022-12-29 | End: 2023-01-02

## 2022-12-29 RX ORDER — AZITHROMYCIN 500 MG/1
500 TABLET, FILM COATED ORAL ONCE
Refills: 0 | Status: COMPLETED | OUTPATIENT
Start: 2022-12-29 | End: 2022-12-29

## 2022-12-29 RX ORDER — AZITHROMYCIN 500 MG/1
TABLET, FILM COATED ORAL
Refills: 0 | Status: DISCONTINUED | OUTPATIENT
Start: 2022-12-29 | End: 2022-12-29

## 2022-12-29 RX ADMIN — ENOXAPARIN SODIUM 40 MILLIGRAM(S): 100 INJECTION SUBCUTANEOUS at 09:11

## 2022-12-29 RX ADMIN — AZITHROMYCIN 255 MILLIGRAM(S): 500 TABLET, FILM COATED ORAL at 09:11

## 2022-12-29 RX ADMIN — Medication 1 DROP(S): at 09:12

## 2022-12-29 RX ADMIN — PIPERACILLIN AND TAZOBACTAM 25 GRAM(S): 4; .5 INJECTION, POWDER, LYOPHILIZED, FOR SOLUTION INTRAVENOUS at 21:32

## 2022-12-29 RX ADMIN — Medication 1 TABLET(S): at 09:12

## 2022-12-29 RX ADMIN — Medication 4000 UNIT(S): at 09:13

## 2022-12-29 RX ADMIN — PIPERACILLIN AND TAZOBACTAM 200 GRAM(S): 4; .5 INJECTION, POWDER, LYOPHILIZED, FOR SOLUTION INTRAVENOUS at 12:24

## 2022-12-29 RX ADMIN — PIPERACILLIN AND TAZOBACTAM 25 GRAM(S): 4; .5 INJECTION, POWDER, LYOPHILIZED, FOR SOLUTION INTRAVENOUS at 16:54

## 2022-12-29 RX ADMIN — GABAPENTIN 300 MILLIGRAM(S): 400 CAPSULE ORAL at 21:33

## 2022-12-29 NOTE — SWALLOW BEDSIDE ASSESSMENT ADULT - SPECIFY REASON(S)
pt recently s/p cervical spine fusion, C3-4, developed difficulty swallowing as per Pt.   pt describes feeling of food stuck in throat, coming back up and not completely swallowed.

## 2022-12-29 NOTE — H&P ADULT - NSHPLABSRESULTS_GEN_ALL_CORE
< from: CT Angio Chest PE Protocol w/ IV Cont (12.28.22 @ 18:36) >    ACC: [id number] EXAM:  CT ANGIO CHEST PULM ZIA GAMEZ                        PROCEDURE DATE:  12/28/2022      INTERPRETATION:  CLINICAL INFORMATION: Hypoxia. Recent surgery.    COMPARISON: CT chest 3/6/2019    CONTRAST/COMPLICATIONS:  IV Contrast: Omnipaque 350  90 cc administered   10 cc discarded  Oral Contrast: NONE  Complications: None reported at time of study completion    PROCEDURE:  CT Angiography of the Chest.  Sagittal and coronal reformats were performed as well as 3D (MIP)   reconstructions.    FINDINGS:    LUNGS AND AIRWAYS: Patent central airways.  Bilateral lower lobe   subsegmental atelectasis, left greater than right. Patchy right middle   lobe opacities with focal bronchiectasis. Biapical fibrosis.  PLEURA: No pleural effusion.  MEDIASTINUM AND ESTER: No lymphadenopathy.  VESSELS: No pulmonary embolus.  HEART: Heart size is normal. No pericardial effusion.  CHEST WALL AND LOWER NECK: Within normal limits.  VISUALIZED UPPER ABDOMEN: Within normal limits.  BONES: Posterior spinal fusion hardware creates extensive streak   artifact. Vertebroplasty material in T7 and T8. Wedge compression   deformity of T9. Intervertebral disc spacer at T12-L1..    IMPRESSION:  No pulmonary embolus.    Patchy right middle lobe opacities with focal bronchiectasis. Correlate   for atypical pneumonia such as XOCHITL.      --- End of Report ---  NAZ POLK MD; Attending Radiologist  This document has been electronically signed. Dec 28 2022  7:26PM  < end of copied text >

## 2022-12-29 NOTE — H&P ADULT - NSHPPHYSICALEXAM_GEN_ALL_CORE
ICU Vital Signs Last 24 Hrs  T(C): 37 (29 Dec 2022 02:00), Max: 37.6 (28 Dec 2022 16:57)  T(F): 98.6 (29 Dec 2022 02:00), Max: 99.6 (28 Dec 2022 16:57)  HR: 104 (29 Dec 2022 02:00) (78 - 120)  BP: 136/48 (29 Dec 2022 02:00) (136/48 - 141/99)  BP(mean): 79 (29 Dec 2022 00:50) (79 - 79)    RR: 18 (29 Dec 2022 02:00) (18 - 24)   SpO2: 95% (29 Dec 2022 02:00) (88% - 98%)    O2 Parameters below as of 29 Dec 2022 02:00  Patient On (Oxygen Delivery Method): nasal cannula  O2 Flow (L/min): 4

## 2022-12-29 NOTE — PATIENT PROFILE ADULT - NSPROPTRIGHTBILLOFRIGHTS_GEN_A_NUR
How Severe Are Your Molluscum?: mild Is This A New Presentation, Or A Follow-Up?: Skin Lesions Additional History: PCP advised her lesions will go away over time but mom is concerned now that they are appearing on his face patient

## 2022-12-29 NOTE — PATIENT PROFILE ADULT - NSPRESCRALCAMT_GEN_A_NUR
From: Rachel Ayala  To: Sis Medina MD  Sent: 11/22/2018 9:18 AM CST  Subject: TSH    Had this -results=2.932mc/Units ml   1 or 2

## 2022-12-29 NOTE — PATIENT PROFILE ADULT - FALL HARM RISK - HARM RISK INTERVENTIONS

## 2022-12-29 NOTE — H&P ADULT - ASSESSMENT
Pt is admitted with     RML Patchy Pneumonia,   Cough, Dyspnea  Hypoxia    - admit to medicine  - s/p 2000 mL NS in the ED   - started on Cefepime/Vanco in the ED, cont  - albuterol as needed   -  supportive care  - ID consult  - DVT proph: lovenox  - Full Code Pt is admitted with     RML Patchy Pneumonia,   Cough, Dyspnea  Hypoxia  Dysphagia    - admit to medicine  - s/p 2000 mL NS in the ED   - started on Cefepime/Vanco in the ED, cont with Zithromax, Rocephin, Vanco  - albuterol as needed   -  supportive care  - speech swallow eval  - ID consult  - DVT proph: lovenox  - Full Code

## 2022-12-29 NOTE — SWALLOW BEDSIDE ASSESSMENT ADULT - COMMENTS
78 y/o female with h/o back pain, thyroid disease, PE, recent cervical spine fusion (12/22/22) ACDF C3-4 was admitted on 12/28 for cough and worsening SOB.  Pt notes that she has had a buildup of mucous and thick, yellow and green phlegm, poorly productive on 12/24/22. Pt had some difficulty swallowing food. Pt was seen at Urgent Care on the day of admission and was told to come  to the ED for possible pneumonia. She denies fever/chills at home, chest pain. She is having continued difficulty swallowing. In ER she received ceftriaxone, vancomycin IV and azithromycin.   pt is seen by service for oral-pharyngeal swallow function. Pt was well able to explain her symptoms and admitted now to some anxiety about taking food and drink by mouth because of the difficulty with getting it down.   pt had constant non-productive coughing during the session.  While not painful, it was clearly effortful given expression on her face during these bouts.

## 2022-12-29 NOTE — SWALLOW BEDSIDE ASSESSMENT ADULT - SWALLOW EVAL: DIAGNOSIS
oral-pharyngeal dysphagia in a setting of recent cervical spine fusion, underlying co-morbidities, and now acute RML pneumonia.  Pt is also wearing an [place] collar, and is supposed to wear it even for eating.

## 2022-12-29 NOTE — SWALLOW BEDSIDE ASSESSMENT ADULT - NS SPL SWALLOW CLINIC TRIAL FT
Suggest Full liquid diet at this time as tolerated.  disc swallow strategies with pt. disc with nsg.  Call to Hospitalist.  Service will  follow pt inhouse.

## 2022-12-29 NOTE — CONSULT NOTE ADULT - ASSESSMENT
78 y/o female with h/o back pain, thyroid disease, PE, recent cervical spine fusion (12/22/22) ACDF C3-4 was admitted on 12/28 for cough and worsening SOB.  Pt notes that she has had a buildup of mucous and thick, yellow and green phlegm, poorly productive on 12/24/22. Pt had some difficulty swallowing food. Pt was seen at Urgent Care on the day of admission and was told to come  to the ED for possible pneumonia. She denies fever/chills at home, chest pain. She is having continued difficulty swallowing. In ER she received ceftriaxone, vancomycin IV and azithromycin.     1. Low grade fever. RML pneumonia with focal bronchiectasis.  -dysphagia  -obtain BC x 2, sputum c/s       78 y/o female with h/o back pain, thyroid disease, PE, recent cervical spine fusion (12/22/22) ACDF C3-4 was admitted on 12/28 for cough and worsening SOB.  Pt notes that she has had a buildup of mucous and thick, yellow and green phlegm, poorly productive on 12/24/22. Pt had some difficulty swallowing food. Pt was seen at Urgent Care on the day of admission and was told to come  to the ED for possible pneumonia. She denies fever/chills at home, chest pain. She is having continued difficulty swallowing. In ER she received ceftriaxone, vancomycin IV and azithromycin.     1. Low grade fever. RML pneumonia with focal bronchiectasis. Possible aspiration pneumonia. Cervical neck fusion with C-collar in place.  -dysphagia  -obtain BC x 2, sputum c/s  -start zosyn 3.375 gm IV q8h  -reason for abx use and side effects reviewed with patient; monitor BMP   -speech evaluation  -aspiration precautions  -old chart reviewed to assess prior cultures  -monitor temps  -f/u CBC  -supportive care  2. Other issues:   -care per medicine

## 2022-12-29 NOTE — SWALLOW BEDSIDE ASSESSMENT ADULT - PHARYNGEAL PHASE
Laryngeal lift on engagement of the swallow, but pt  makes a secondary swallow as if bolus was not clearing the pharynx. No overt s./s aspiration, but Pt also had coughing during ingestion as well as late after swallow.

## 2022-12-29 NOTE — H&P ADULT - HISTORY OF PRESENT ILLNESS
Pt is a pleasant  76 y/o female with PMHx of back pain, Thyroid disease, PE presents to [place] ED after recent surgery,12/24/22 fusion cervical spine. ACDF C3-4  now  presenting with c/o cough and SOB.  Pt notes that she has had a buildup of mucous and thick, yellow and green phlegm, poorly productive on 12/24/22.    Pt mentioned she has some difficulty swallowing food, which she was informed that could be case post-op.   Pt was seen at Urgent Care this morning and was told to come  to the ED for evaluation of possible Pneumonia.   Pt has not taken gabapentin or oxycodone since last night. She denies fever, chest pain,  no abd pain/v/d, no urinary complaints, no calf pain.      NKDA   Pt is a pleasant  76 y/o female with PMHx of back pain, Thyroid disease, PE presents to [place] ED after recent surgery,12/22/22 fusion cervical spine. ACDF C3-4  now  presenting with c/o cough and SOB.  Pt notes that she has had a buildup of mucous and thick, yellow and green phlegm, poorly productive on 12/24/22.    Pt mentioned she has some difficulty swallowing food, which she was informed that could be case post-op.   Pt was seen at Urgent Care this morning and was told to come  to the ED for evaluation of possible Pneumonia.   Pt has not taken gabapentin or oxycodone since last night. She denies fever/chills, chest pain,  no abd pain/v/d, no urinary complaints, no calf pain.  She is having continued difficulty swallowing.  No known sick contacts.      NKDA

## 2022-12-29 NOTE — SWALLOW BEDSIDE ASSESSMENT ADULT - SLP GENERAL OBSERVATIONS
Pt seated on the side of the bed, fully alert and aware ofh er condition: able to explain fully her symptoms and the events leading her admission:  wearing n [place] collar..  Without collar, neck can be palpated and is clearly swollen and feels tight. Note also anterior approach for cervical fusion, so there is likely laryngeal edema and soreness

## 2022-12-29 NOTE — H&P ADULT - SOCIAL HISTORY
No
maintain upright posture during/after eating for 30 mins/small sips/bites/allow for swallow between intakes/crush medication (when feasible)

## 2022-12-30 LAB
GRAM STN FLD: SIGNIFICANT CHANGE UP
LEGIONELLA AG UR QL: NEGATIVE — SIGNIFICANT CHANGE UP
MRSA PCR RESULT.: SIGNIFICANT CHANGE UP
S AUREUS DNA NOSE QL NAA+PROBE: DETECTED
S PNEUM AG UR QL: NEGATIVE — SIGNIFICANT CHANGE UP
SPECIMEN SOURCE: SIGNIFICANT CHANGE UP

## 2022-12-30 PROCEDURE — 99221 1ST HOSP IP/OBS SF/LOW 40: CPT

## 2022-12-30 PROCEDURE — 99233 SBSQ HOSP IP/OBS HIGH 50: CPT

## 2022-12-30 RX ADMIN — Medication 4000 UNIT(S): at 09:25

## 2022-12-30 RX ADMIN — GABAPENTIN 300 MILLIGRAM(S): 400 CAPSULE ORAL at 22:07

## 2022-12-30 RX ADMIN — PIPERACILLIN AND TAZOBACTAM 25 GRAM(S): 4; .5 INJECTION, POWDER, LYOPHILIZED, FOR SOLUTION INTRAVENOUS at 05:37

## 2022-12-30 RX ADMIN — Medication 1 TABLET(S): at 09:26

## 2022-12-30 RX ADMIN — Medication 100 MILLIGRAM(S): at 09:26

## 2022-12-30 RX ADMIN — PIPERACILLIN AND TAZOBACTAM 25 GRAM(S): 4; .5 INJECTION, POWDER, LYOPHILIZED, FOR SOLUTION INTRAVENOUS at 22:07

## 2022-12-30 RX ADMIN — Medication 1 DROP(S): at 09:26

## 2022-12-30 RX ADMIN — ENOXAPARIN SODIUM 40 MILLIGRAM(S): 100 INJECTION SUBCUTANEOUS at 09:26

## 2022-12-30 RX ADMIN — PIPERACILLIN AND TAZOBACTAM 25 GRAM(S): 4; .5 INJECTION, POWDER, LYOPHILIZED, FOR SOLUTION INTRAVENOUS at 13:49

## 2022-12-30 RX ADMIN — Medication 10 MILLILITER(S): at 09:25

## 2022-12-30 NOTE — DIETITIAN INITIAL EVALUATION ADULT - OTHER INFO
Pt is a pleasant  76 y/o female with PMHx of back pain, Thyroid disease, PE presents to [place] ED after recent surgery,12/22/22 fusion cervical spine. ACDF C3-4  now  presenting with c/o cough and SOB.  Pt notes that she has had a buildup of mucous and thick, yellow and green phlegm, poorly productive on 12/24/22. Pt mentioned she has some difficulty swallowing food, which she was informed that could be case post-op.   Pt was seen at Urgent Care this morning and was told to come to the ED for evaluation of possible Pneumonia.   Admit for RML Patchy Pneumonia, cough, dyspnea, hypoxia, and dysphagia     Seen by SLP on 12/29/22 - rec'd full liquid diet; with aspen collar during admission for comfort during the treatment of aspiration pneumonia and dysphagia; MD recommends pt resume wearing aspen collar when pt condition improved and tolerating collar - neck clearly swollen and feels tight. Pt reports continued difficulty swallowing; unable to consume oat meal on tray - only able to consume liquids (ensure, cranberry juice). Reports UBW of 175# x 1 month; unable to obtain bed scale wt 2/2 bed scale not working. Admit wt of 176# on 12/28/22. NFPE reveals moderate-severe muscle/ fat wasting - pt appears thin. Continue w/ full liquid diet as tolerated. See below for other recommendations.

## 2022-12-30 NOTE — DIETITIAN INITIAL EVALUATION ADULT - ADD RECOMMEND
1) Continue w/ full liquid diet as tolerated  2) Monitor bowel movements, if no BM for >3 days, consider implementing bowel regimen.  3) Encourage protein-rich foods, maximize food preferences when diet advances  4) MVI w/ minerals daily to ensure 100% RDA met  5) Consider adding thiamine 200 mg daily 2/2 poor PO intake/ malnutrition  6) Obtain vitamin D 25OH level to assess nutriture  7) Confirm goals of care regarding nutrition support - might need enteral feeds 2/2 dysphagia**  8) Consider SLP re-assessment 2/2 pt w/ continued difficulty swallowing  RD will continue to monitor PO intake, labs, hydration, and wt prn.

## 2022-12-30 NOTE — DIETITIAN INITIAL EVALUATION ADULT - PERTINENT MEDS FT
MEDICATIONS  (STANDING):  artificial  tears Solution 1 Drop(s) Both EYES daily  calcium carbonate    500 mG (Tums) Chewable 1 Tablet(s) Chew daily  cholecalciferol 4000 Unit(s) Oral daily  enoxaparin Injectable 40 milliGRAM(s) SubCutaneous every 24 hours  gabapentin 300 milliGRAM(s) Oral at bedtime  multivitamin 1 Tablet(s) Oral daily  piperacillin/tazobactam IVPB.. 3.375 Gram(s) IV Intermittent every 8 hours    MEDICATIONS  (PRN):  benzonatate 100 milliGRAM(s) Oral every 8 hours PRN Cough  guaifenesin/dextromethorphan Oral Liquid 10 milliLiter(s) Oral every 4 hours PRN Cough  oxycodone    5 mG/acetaminophen 325 mG 1 Tablet(s) Oral every 4 hours PRN Moderate Pain (4 - 6)

## 2022-12-30 NOTE — PROGRESS NOTE ADULT - NUTRITIONAL ASSESSMENT
This patient has been assessed with a concern for Malnutrition and has been determined to have a diagnosis/diagnoses of Severe protein-calorie malnutrition.    This patient is being managed with:   Diet Full Liquid-  Entered: Dec 29 2022  4:25PM

## 2022-12-30 NOTE — DIETITIAN INITIAL EVALUATION ADULT - PERTINENT LABORATORY DATA
12-28    131<L>  |  97  |  24<H>  ----------------------------<  84  4.6   |  22  |  0.69    Ca    9.2      28 Dec 2022 18:09    TPro  7.0  /  Alb  3.1<L>  /  TBili  1.2  /  DBili  x   /  AST  20  /  ALT  23  /  AlkPhos  100  12-28  A1C with Estimated Average Glucose Result: 5.4 % (12-14-22 @ 09:54)

## 2022-12-30 NOTE — DIETITIAN INITIAL EVALUATION ADULT - ORAL INTAKE PTA/DIET HISTORY
Pt lives at home w/ ; reports decreased appetite and consuming <50% of ENN x 1 week 2/2 sudden difficulty swallowing, pt describes feeling of food stuck in throat, coming back up and not completely swallowed, non-productive coughing during visit

## 2022-12-31 LAB
-  AMIKACIN: SIGNIFICANT CHANGE UP
-  AMOXICILLIN/CLAVULANIC ACID: SIGNIFICANT CHANGE UP
-  AMPICILLIN/SULBACTAM: SIGNIFICANT CHANGE UP
-  AMPICILLIN: SIGNIFICANT CHANGE UP
-  AMPICILLIN: SIGNIFICANT CHANGE UP
-  AZTREONAM: SIGNIFICANT CHANGE UP
-  CEFAZOLIN: SIGNIFICANT CHANGE UP
-  CEFEPIME: SIGNIFICANT CHANGE UP
-  CEFOXITIN: SIGNIFICANT CHANGE UP
-  CEFTRIAXONE: SIGNIFICANT CHANGE UP
-  CIPROFLOXACIN: SIGNIFICANT CHANGE UP
-  CIPROFLOXACIN: SIGNIFICANT CHANGE UP
-  ERTAPENEM: SIGNIFICANT CHANGE UP
-  GENTAMICIN: SIGNIFICANT CHANGE UP
-  IMIPENEM: SIGNIFICANT CHANGE UP
-  LEVOFLOXACIN: SIGNIFICANT CHANGE UP
-  LEVOFLOXACIN: SIGNIFICANT CHANGE UP
-  MEROPENEM: SIGNIFICANT CHANGE UP
-  NITROFURANTOIN: SIGNIFICANT CHANGE UP
-  NITROFURANTOIN: SIGNIFICANT CHANGE UP
-  PIPERACILLIN/TAZOBACTAM: SIGNIFICANT CHANGE UP
-  TETRACYCLINE: SIGNIFICANT CHANGE UP
-  TOBRAMYCIN: SIGNIFICANT CHANGE UP
-  TRIMETHOPRIM/SULFAMETHOXAZOLE: SIGNIFICANT CHANGE UP
-  VANCOMYCIN: SIGNIFICANT CHANGE UP
CULTURE RESULTS: SIGNIFICANT CHANGE UP
METHOD TYPE: SIGNIFICANT CHANGE UP
METHOD TYPE: SIGNIFICANT CHANGE UP
ORGANISM # SPEC MICROSCOPIC CNT: SIGNIFICANT CHANGE UP
SPECIMEN SOURCE: SIGNIFICANT CHANGE UP

## 2022-12-31 PROCEDURE — 99232 SBSQ HOSP IP/OBS MODERATE 35: CPT

## 2022-12-31 RX ADMIN — GABAPENTIN 300 MILLIGRAM(S): 400 CAPSULE ORAL at 22:02

## 2022-12-31 RX ADMIN — PIPERACILLIN AND TAZOBACTAM 25 GRAM(S): 4; .5 INJECTION, POWDER, LYOPHILIZED, FOR SOLUTION INTRAVENOUS at 22:01

## 2022-12-31 RX ADMIN — Medication 100 MILLIGRAM(S): at 22:01

## 2022-12-31 RX ADMIN — PIPERACILLIN AND TAZOBACTAM 25 GRAM(S): 4; .5 INJECTION, POWDER, LYOPHILIZED, FOR SOLUTION INTRAVENOUS at 05:41

## 2022-12-31 RX ADMIN — PIPERACILLIN AND TAZOBACTAM 25 GRAM(S): 4; .5 INJECTION, POWDER, LYOPHILIZED, FOR SOLUTION INTRAVENOUS at 14:25

## 2022-12-31 RX ADMIN — ENOXAPARIN SODIUM 40 MILLIGRAM(S): 100 INJECTION SUBCUTANEOUS at 10:32

## 2023-01-01 LAB
CULTURE RESULTS: SIGNIFICANT CHANGE UP
HCT VFR BLD CALC: 37.7 % — SIGNIFICANT CHANGE UP (ref 34.5–45)
HGB BLD-MCNC: 12.8 G/DL — SIGNIFICANT CHANGE UP (ref 11.5–15.5)
MCHC RBC-ENTMCNC: 31.6 PG — SIGNIFICANT CHANGE UP (ref 27–34)
MCHC RBC-ENTMCNC: 34 GM/DL — SIGNIFICANT CHANGE UP (ref 32–36)
MCV RBC AUTO: 93.1 FL — SIGNIFICANT CHANGE UP (ref 80–100)
PLATELET # BLD AUTO: 310 K/UL — SIGNIFICANT CHANGE UP (ref 150–400)
RBC # BLD: 4.05 M/UL — SIGNIFICANT CHANGE UP (ref 3.8–5.2)
RBC # FLD: 12.6 % — SIGNIFICANT CHANGE UP (ref 10.3–14.5)
SPECIMEN SOURCE: SIGNIFICANT CHANGE UP
WBC # BLD: 9.72 K/UL — SIGNIFICANT CHANGE UP (ref 3.8–10.5)
WBC # FLD AUTO: 9.72 K/UL — SIGNIFICANT CHANGE UP (ref 3.8–10.5)

## 2023-01-01 PROCEDURE — 99233 SBSQ HOSP IP/OBS HIGH 50: CPT

## 2023-01-01 RX ADMIN — PIPERACILLIN AND TAZOBACTAM 25 GRAM(S): 4; .5 INJECTION, POWDER, LYOPHILIZED, FOR SOLUTION INTRAVENOUS at 15:27

## 2023-01-01 RX ADMIN — ENOXAPARIN SODIUM 40 MILLIGRAM(S): 100 INJECTION SUBCUTANEOUS at 11:00

## 2023-01-01 RX ADMIN — Medication 1200 MILLIGRAM(S): at 15:27

## 2023-01-01 RX ADMIN — GABAPENTIN 300 MILLIGRAM(S): 400 CAPSULE ORAL at 21:42

## 2023-01-01 RX ADMIN — Medication 1200 MILLIGRAM(S): at 21:42

## 2023-01-01 RX ADMIN — PIPERACILLIN AND TAZOBACTAM 25 GRAM(S): 4; .5 INJECTION, POWDER, LYOPHILIZED, FOR SOLUTION INTRAVENOUS at 21:43

## 2023-01-01 RX ADMIN — PIPERACILLIN AND TAZOBACTAM 25 GRAM(S): 4; .5 INJECTION, POWDER, LYOPHILIZED, FOR SOLUTION INTRAVENOUS at 05:51

## 2023-01-01 NOTE — PROGRESS NOTE ADULT - NUTRITIONAL ASSESSMENT
This patient has been assessed with a concern for Malnutrition and has been determined to have a diagnosis/diagnoses of Severe protein-calorie malnutrition.    This patient is being managed with:   Diet Easy to Chew-  Entered: Jan 1 2023  2:03PM

## 2023-01-02 ENCOUNTER — TRANSCRIPTION ENCOUNTER (OUTPATIENT)
Age: 78
End: 2023-01-02

## 2023-01-02 VITALS
DIASTOLIC BLOOD PRESSURE: 49 MMHG | HEART RATE: 85 BPM | TEMPERATURE: 99 F | OXYGEN SATURATION: 98 % | SYSTOLIC BLOOD PRESSURE: 121 MMHG | RESPIRATION RATE: 18 BRPM

## 2023-01-02 PROCEDURE — 99239 HOSP IP/OBS DSCHRG MGMT >30: CPT

## 2023-01-02 RX ORDER — GUAIFENESIN/DEXTROMETHORPHAN 600MG-30MG
10 TABLET, EXTENDED RELEASE 12 HR ORAL
Qty: 0 | Refills: 0 | DISCHARGE
Start: 2023-01-02

## 2023-01-02 RX ADMIN — PIPERACILLIN AND TAZOBACTAM 25 GRAM(S): 4; .5 INJECTION, POWDER, LYOPHILIZED, FOR SOLUTION INTRAVENOUS at 05:32

## 2023-01-02 RX ADMIN — ENOXAPARIN SODIUM 40 MILLIGRAM(S): 100 INJECTION SUBCUTANEOUS at 09:47

## 2023-01-02 RX ADMIN — Medication 1 DROP(S): at 09:44

## 2023-01-02 RX ADMIN — Medication 1 TABLET(S): at 09:44

## 2023-01-02 RX ADMIN — Medication 1200 MILLIGRAM(S): at 09:44

## 2023-01-02 RX ADMIN — PIPERACILLIN AND TAZOBACTAM 25 GRAM(S): 4; .5 INJECTION, POWDER, LYOPHILIZED, FOR SOLUTION INTRAVENOUS at 13:25

## 2023-01-02 NOTE — DISCHARGE NOTE PROVIDER - NSDCMRMEDTOKEN_GEN_ALL_CORE_FT
amoxicillin-clavulanate 250 mg-62.5 mg/5 mL oral liquid: 15 milliliter(s) orally 2 times a day   calcium (as carbonate) 500 mg oral tablet: 1 tab(s) orally once a day  Fish Oil 1000 mg oral capsule: 1 cap(s) orally once a day  gabapentin 300 mg oral capsule: 1 cap(s) orally once a day (at bedtime)  Glucosamine Chondroitin oral capsule: 1 cap(s) orally once a day  guaifenesin-dextromethorphan 100 mg-10 mg/5 mL oral liquid: 10 milliliter(s) orally every 4 hours, As needed, Cough  Multiple Vitamins oral tablet: 1 tab(s) orally every other day  oxycodone-acetaminophen 5 mg-325 mg oral tablet: 1 tab(s) orally every 4 to 6 hours  Refresh ophthalmic solution: 1 drop(s) to each affected eye once a day  Vitamin D3: 1  orally once a day

## 2023-01-02 NOTE — DISCHARGE NOTE NURSING/CASE MANAGEMENT/SOCIAL WORK - NSDCVIVACCINE_GEN_ALL_CORE_FT
influenza, injectable, quadrivalent, preservative free; 24-Oct-2018 10:57; Nupur Robert (RN); [employer]; [id number] (Exp. Date: 30-Jun-2019); IntraMuscular; Deltoid Left.; 0.5 milliLiter(s); VIS (VIS Published: 07-Aug-2015, VIS Presented: 24-Oct-2018);

## 2023-01-02 NOTE — DISCHARGE NOTE NURSING/CASE MANAGEMENT/SOCIAL WORK - NSDCPEFALRISK_GEN_ALL_CORE
For information on Fall & Injury Prevention, visit: [email or web address] OR  [email or web address] OR  https://www.cdc.gov/steadi/patient.html

## 2023-01-02 NOTE — PROGRESS NOTE ADULT - ASSESSMENT
78 y/o female with h/o back pain, thyroid disease, PE, recent cervical spine fusion (12/22/22) ACDF C3-4 was admitted on 12/28 for cough and worsening SOB.  Pt notes that she has had a buildup of mucous and thick, yellow and green phlegm, poorly productive on 12/24/22. Pt had some difficulty swallowing food. Pt was seen at Urgent Care on the day of admission and was told to come  to the ED for possible pneumonia. She denies fever/chills at home, chest pain. She is having continued difficulty swallowing. In ER she received ceftriaxone, vancomycin IV and azithromycin.     1. Low grade fever. RML pneumonia with focal bronchiectasis. Possible aspiration pneumonia. Cervical neck fusion with C-collar in place.  -dysphagia  -BC x 2, sputum c/s noted  -on zosyn 3.375 gm IV q8h # 2  -tolerating abx well so far; no side effects noted  -urine showing multiple, small amount organisms - likely colonizants  -speech evaluation  -aspiration precautions  -continue abx coverage   -monitor temps  -f/u CBC  -supportive care  2. Other issues:   -care per medicine          
78 y/o female with h/o back pain, thyroid disease, PE, recent cervical spine fusion (12/22/22) ACDF C3-4 was admitted on 12/28 for cough and worsening SOB.  Pt notes that she has had a buildup of mucous and thick, yellow and green phlegm, poorly productive on 12/24/22. Pt had some difficulty swallowing food. Pt was seen at Urgent Care on the day of admission and was told to come  to the ED for possible pneumonia. She denies fever/chills at home, chest pain. She is having continued difficulty swallowing. In ER she received ceftriaxone, vancomycin IV and azithromycin.     1. Low grade fever. RML pneumonia with focal bronchiectasis. Possible aspiration pneumonia. Cervical neck fusion with C-collar in place.  -dysphagia improving  -BC x 2, sputum c/s noted  -on zosyn 3.375 gm IV q8h # 3  -tolerating abx well so far; no side effects noted  -urine showing multiple, small amount organisms - likely colonizants  -noted MRSa colonization, but sputum shows normal respiratory flara  -respiratory improving  -speech evaluation appreciated  -aspiration precautions  -continue abx coverage   -monitor temps  -f/u CBC  -supportive care  2. Other issues:   -care per medicine    d/w Dr. Ward      
76 y/o female with h/o back pain, thyroid disease, PE, recent cervical spine fusion (12/22/22) ACDF C3-4 was admitted on 12/28 for cough and worsening SOB.  Pt notes that she has had a buildup of mucous and thick, yellow and green phlegm, poorly productive on 12/24/22. Pt had some difficulty swallowing food. Pt was seen at Urgent Care on the day of admission and was told to come  to the ED for possible pneumonia. She denies fever/chills at home, chest pain. She is having continued difficulty swallowing. In ER she received ceftriaxone, vancomycin IV and azithromycin.     1. Low grade fever. RML pneumonia with focal bronchiectasis. Probable aspiration pneumonia. Cervical neck fusion with C-collar in place.  -fever is resolving  -dysphagia improving  -BC x 2, sputum c/s noted  -on zosyn 3.375 gm IV q8h # 4  -tolerating abx well so far; no side effects noted  -urine showing multiple, small amount organisms - likely colonizants  -noted MRSa colonization, but sputum shows normal respiratory flara  -respiratory improving slowly  -speech evaluation appreciated  -aspiration precautions  -continue abx coverage   -monitor temps  -f/u CBC  -supportive care  2. Other issues:   -care per medicine    d/w Dr. Ward      
78 y/o female with h/o back pain, thyroid disease, PE, recent cervical spine fusion (12/22/22) ACDF C3-4 was admitted on 12/28 for cough and worsening SOB.  Pt notes that she has had a buildup of mucous and thick, yellow and green phlegm, poorly productive on 12/24/22. Pt had some difficulty swallowing food. Pt was seen at Urgent Care on the day of admission and was told to come  to the ED for possible pneumonia. She denies fever/chills at home, chest pain. She is having continued difficulty swallowing. In ER she received ceftriaxone, vancomycin IV and azithromycin.     1. Low grade fever resolved. RML pneumonia with focal bronchiectasis. Probable aspiration pneumonia. Cervical neck fusion s/p C-collar removed.  -fever is resolving  -dysphagia improving  -BC x 2, sputum c/s noted  -on zosyn 3.375 gm IV q8h # 5  -tolerating abx well so far; no side effects noted  -urine showing multiple, small amount organisms - likely colonizants  -noted MRSA colonization, but sputum shows normal respiratory flara  -respiratory improving slowly  -speech evaluation appreciated  -aspiration precautions  -may change abx to augmentin 875 mg PO q12h for 7 more days  -monitor temps  -f/u CBC  -supportive care  2. Other issues:   -care per medicine    d/w Dr. Ward      
Pt is admitted with     RML Patchy Pneumonia, suspect Asp pna  Cough, Dyspnea  Hypoxia resolved  Dysphagia    - admit to medicine  - s/p 2000 mL NS in the ED   - agree with zosyn for suspected gram negative keenan pneumonia  - albuterol as needed   -  supportive care  - speech swallow eval: FLD  - ID consult  - DVT proph: lovenox  - Full Code
s/p ACDF C3-4  --pain control as needed  --keep dressing in place until just prior to discharge    right middle lobe pneumonia  --antibiotics as per ID  --care as per medicine
s/p ACDF C3-4, pneumonia  --continue care as per medicine and ID  --will need to resume collar on discharge, will need outpatient adjustment/revision of collar with [facility] or [facility] after discharge
76 y/o female with PMHx of back pain, Thyroid disease, PE presents to [place] ED after recent surgery,12/22/22 fusion cervical spine. ACDF C3-4  now  presenting with c/o cough and SOB.  Pt notes that she has had a buildup of mucous and thick, yellow and green phlegm, poorly productive on 12/24/22.    Pt mentioned she has some difficulty swallowing food, which she was informed that could be case post-op.   Pt was seen at Urgent Care this morning and was told to come  to the ED for evaluation of possible Pneumonia. Pt has not taken gabapentin or oxycodone since last night. She denies fever/chills, chest pain,  no abd pain/v/d, no urinary complaints, no calf pain.  She is having continued difficulty swallowing.  No known sick contacts.    #Aspiration PNA:    Present on arrival.    Unclear if related to recent surgery with anesthesia.    Patient denies hx of dysphagia prior to surgery.    Cont Zosyn.      #Dysphagia:    Appreciate speech eval.    Change diet to soft for now-- unable to eat regular.  Choking.    GI eval.    ? need for further imaging/ testing/ EGD.    Possible related to post op complication/ anesthesia complication.      #UTI  Cont zosyn.      #Recent cervical spine surgery:    Cont PT/ post op care.      #DVT proph: lovenox  - Full Code
Pt is admitted with     RML Patchy Pneumonia, suspect Asp pna  Cough, Dyspnea  Hypoxia resolved  Dysphagia    - asp risk  - off supp o2  - would have on cont pulse ox for next 24 hours  - Ortho consult: C collar causing some airway obstruction which is increasing her risk of aspiration and difficulty clearing her secretions  - agree with zosyn for suspected gram negative keenan pneumonia  - albuterol as needed   -  supportive care  - speech swallow eval: FLD  - ID consult appreciated  - DVT proph: lovenox  - Full Code
Pt is admitted with     RML Patchy Pneumonia, suspect Asp pna  Cough, Dyspnea  Hypoxia resolved  Dysphagia  UTI  - asp risk  - off supp o2  - would have on cont pulse ox for next 24 hours  - Ortho consult: appreciated  - MRS PCR positive - colonizer. Sputum negative  - Cont with zosyn for now  - Ucx: morganella sensitive to zosyn  - albuterol as needed   -  supportive care  - speech swallow eval: advance to regular diet  - ID consult appreciated  - DVT proph: lovenox  - Full Code

## 2023-01-02 NOTE — CONSULT NOTE ADULT - SUBJECTIVE AND OBJECTIVE BOX
HPI:  Pt is a pleasant  78 y/o female with PMHx of back pain, Thyroid disease, PE presents to [place] ED after recent surgery,12/22/22 fusion cervical spine. ACDF C3-4  now  presenting with c/o cough and SOB.  Pt notes that she has had a buildup of mucous and thick, yellow and green phlegm, poorly productive on 12/24/22.    Pt mentioned she has some difficulty swallowing food, which she was informed that could be case post-op.   Pt was seen at Urgent Care this morning and was told to come  to the ED for evaluation of possible Pneumonia.   Pt has not taken gabapentin or oxycodone since last night. She denies fever/chills, chest pain,  no abd pain/v/d, no urinary complaints, no calf pain.  She is having continued difficulty swallowing.  No known sick contacts.  ----------  Patient notes difficulty more with solids but also with liqudis -- has some trouble getting it into the back of the throat, then coughs, brings it back up  No chest pain  Symptoms started with surgery -- no issues before    NKDA   (29 Dec 2022 05:24)      PAST MEDICAL & SURGICAL HISTORY:  Osteoarthritis      Thyroid cyst      Erbs muscular dystrophy  left arm      Lower back pain      Pulmonary emboli  2/2019 completed 6 months of eliquis; saw hematology no clotting disorder      History of discitis      Cataract      Flat-back syndrome      Lumbar spondylosis      H/O thyroid disease      Cervical pain      H/O spinal fusion  1997 lumbar      H/O total knee replacement, right  10/2018      H/O tubal ligation      History of tonsillectomy  childhood      History of ovarian cystectomy          Home Medications:  calcium (as carbonate) 500 mg oral tablet: 1 tab(s) orally once a day (28 Dec 2022 21:56)  Fish Oil 1000 mg oral capsule: 1 cap(s) orally once a day (28 Dec 2022 21:56)  gabapentin 300 mg oral capsule: 1 cap(s) orally once a day (at bedtime) (28 Dec 2022 21:56)  Glucosamine Chondroitin oral capsule: 1 cap(s) orally once a day (28 Dec 2022 21:56)  Multiple Vitamins oral tablet: 1 tab(s) orally every other day (28 Dec 2022 21:56)  oxycodone-acetaminophen 5 mg-325 mg oral tablet: 1 tab(s) orally every 4 to 6 hours (28 Dec 2022 21:56)  Refresh ophthalmic solution: 1 drop(s) to each affected eye once a day (28 Dec 2022 21:56)  Vitamin D3: 1  orally once a day (28 Dec 2022 21:56)      MEDICATIONS  (STANDING):  artificial  tears Solution 1 Drop(s) Both EYES daily  calcium carbonate    500 mG (Tums) Chewable 1 Tablet(s) Chew daily  cholecalciferol 4000 Unit(s) Oral daily  enoxaparin Injectable 40 milliGRAM(s) SubCutaneous every 24 hours  gabapentin 300 milliGRAM(s) Oral at bedtime  guaiFENesin ER 1200 milliGRAM(s) Oral every 12 hours  multivitamin 1 Tablet(s) Oral daily  piperacillin/tazobactam IVPB.. 3.375 Gram(s) IV Intermittent every 8 hours    MEDICATIONS  (PRN):  benzonatate 100 milliGRAM(s) Oral every 8 hours PRN Cough  guaifenesin/dextromethorphan Oral Liquid 10 milliLiter(s) Oral every 4 hours PRN Cough  oxycodone    5 mG/acetaminophen 325 mG 1 Tablet(s) Oral every 4 hours PRN Moderate Pain (4 - 6)      Allergies    No Known Allergies    Intolerances        SOCIAL HISTORY:    FAMILY HISTORY:  No known problems        ROS  As above  Otherwise unremarkable    Vital Signs Last 24 Hrs  T(C): 36.6 (02 Jan 2023 07:43), Max: 36.9 (01 Jan 2023 15:10)  T(F): 97.8 (02 Jan 2023 07:43), Max: 98.5 (01 Jan 2023 15:10)  HR: 96 (02 Jan 2023 07:43) (77 - 96)  BP: 116/80 (02 Jan 2023 07:43) (105/64 - 116/80)  BP(mean): 73 (01 Jan 2023 15:10) (73 - 73)  RR: 18 (02 Jan 2023 07:43) (18 - 18)  SpO2: 97% (02 Jan 2023 07:43) (97% - 99%)    Parameters below as of 02 Jan 2023 07:43  Patient On (Oxygen Delivery Method): room air        Constitutional: NAD, well-developed  Respiratory: CTAB  Cardiovascular: S1 and S2, RRR  Gastrointestinal: BS+, soft, NT/ND  Extremities: No peripheral edema  Psychiatric: Normal mood, normal affect  Skin: No rashes    LABS:                        12.8   9.72  )-----------( 310      ( 01 Jan 2023 06:03 )             37.7                 RADIOLOGY & ADDITIONAL STUDIES:
Patient is a 77y old  Female who presents with a chief complaint of RML Pna     HPI:  78 y/o female with h/o back pain, thyroid disease, PE, recent cervical spine fusion (22) ACDF C3-4 was admitted on  for cough and worsening SOB.  Pt notes that she has had a buildup of mucous and thick, yellow and green phlegm, poorly productive on 22. Pt had some difficulty swallowing food. Pt was seen at Urgent Care on the day of admission and was told to come  to the ED for possible pneumonia. She denies fever/chills at home, chest pain. She is having continued difficulty swallowing. In ER she received ceftriaxone, vancomycin IV and azithromycin.     PMH: as above  PSH: as above  Meds: per reconciliation sheet, noted below  MEDICATIONS  (STANDING):  artificial  tears Solution 1 Drop(s) Both EYES daily  azithromycin  IVPB      azithromycin  IVPB 500 milliGRAM(s) IV Intermittent once  calcium carbonate    500 mG (Tums) Chewable 1 Tablet(s) Chew daily  cefTRIAXone Injectable. 1000 milliGRAM(s) IV Push every 24 hours  cholecalciferol 4000 Unit(s) Oral daily  enoxaparin Injectable 40 milliGRAM(s) SubCutaneous every 24 hours  gabapentin 300 milliGRAM(s) Oral at bedtime  multivitamin 1 Tablet(s) Oral daily  vancomycin  IVPB 1000 milliGRAM(s) IV Intermittent every 12 hours    MEDICATIONS  (PRN):  benzonatate 100 milliGRAM(s) Oral every 8 hours PRN Cough  guaifenesin/dextromethorphan Oral Liquid 10 milliLiter(s) Oral every 4 hours PRN Cough  oxycodone    5 mG/acetaminophen 325 mG 1 Tablet(s) Oral every 4 hours PRN Moderate Pain (4 - 6)    Allergies    No Known Allergies    Intolerances      Social: no smoking, no alcohol, no illegal drugs; no recent travel, no exposure to TB  FAMILY HISTORY:  No known problems      no history of premature cardiovascular disease in first degree relatives    ROS: the patient denies fever, no chills, no HA, no seizures, no dizziness, no sore throat, no nasal congestion, no blurry vision, no CP, no palpitations, has SOB, has cough, no abdominal pain, no diarrhea, no N/V, no dysuria, no leg pain, no claudication, no rash, no joint aches, no rectal pain or bleeding, no night sweats, has dysphagia  All other systems reviewed and are negative    Vital Signs Last 24 Hrs  T(C): 37.4 (29 Dec 2022 07:20), Max: 37.6 (28 Dec 2022 16:57)  T(F): 99.3 (29 Dec 2022 07:20), Max: 99.6 (28 Dec 2022 16:57)  HR: 95 (29 Dec 2022 07:20) (78 - 120)  BP: 119/44 (29 Dec 2022 07:20) (119/44 - 141/99)  BP(mean): 79 (29 Dec 2022 00:50) (79 - 79)  RR: 16 (29 Dec 2022 07:20) (16 - 24)  SpO2: 98% (29 Dec 2022 07:20) (88% - 98%)    Parameters below as of 29 Dec 2022 07:20  Patient On (Oxygen Delivery Method): room air      Daily Height in cm: 167.64 (28 Dec 2022 15:43)    Daily     PE:    Constitutional:  No acute distress  HEENT: NC/AT, EOMI, PERRLA, conjunctivae clear; ears and nose atraumatic; pharynx benign  Neck: supple; thyroid not palpable  Back: no tenderness  Respiratory: respiratory effort normal; crackles at bases  Cardiovascular: S1S2 regular, no murmurs  Abdomen: soft, not tender, not distended, positive BS; no liver or spleen organomegaly  Genitourinary: no suprapubic tenderness  Lymphatic: no LN palpable  Musculoskeletal: no muscle tenderness, no joint swelling or tenderness  Extremities: no pedal edema  Neurological/ Psychiatric: AxOx3, judgement and insight normal; moving all extremities  Skin: no rashes; no palpable lesions    Labs: all available labs reviewed                        14.1   8.87  )-----------( 244      ( 28 Dec 2022 18:09 )             41.6     12-    131<L>  |  97  |  24<H>  ----------------------------<  84  4.6   |  22  |  0.69    Ca    9.2      28 Dec 2022 18:09    TPro  7.0  /  Alb  3.1<L>  /  TBili  1.2  /  DBili  x   /  AST  20  /  ALT  23  /  AlkPhos  100  12-28     LIVER FUNCTIONS - ( 28 Dec 2022 18:09 )  Alb: 3.1 g/dL / Pro: 7.0 gm/dL / ALK PHOS: 100 U/L / ALT: 23 U/L / AST: 20 U/L / GGT: x           Urinalysis Basic - ( 28 Dec 2022 18:06 )    Color: Yellow / Appearance: Clear / S.020 / pH: x  Gluc: x / Ketone: Large  / Bili: Negative / Urobili: Negative   Blood: x / Protein: 30 mg/dL / Nitrite: Negative   Leuk Esterase: Negative / RBC: 6-10 /HPF / WBC 3-5 /HPF   Sq Epi: x / Non Sq Epi: Few / Bacteria: Few    ( @ 18:09)  [facility]    Radiology: all available radiological tests reviewed    < from: CT Angio Chest PE Protocol w/ IV Cont (22 @ 18:36) >  No pulmonary embolus.  Patchy right middle lobe opacities with focal bronchiectasis. Correlate   for atypical pneumonia such as XOCHITL.  < end of copied text >      Advanced directives addressed: full resuscitation
 Orthopedic Spine Consult Note:     This is a 77y Female admitted with PNA yesterday. Pt is s/p C3-4 ACDF on  and orthopedics consulted to  follow-up. Pt seen at bedside c/o discomfort in cervical collar 2'  phlegm/cough related to pnemonia and reports some dysphagia while wearing collar. Pt reports otherwise she is doing well since her surgery. Pt denies any new injury or trauma. Pt denies numbness, tingling, paresthesias or any new weakness. Denies bowel or bladder incontinence. Pt ambulates with no assistive device at baseline.       PAST MEDICAL & SURGICAL HISTORY:  Osteoarthritis    Thyroid cyst    Erbs muscular dystrophy  left arm    Lower back pain    Pulmonary emboli  2019 completed 6 months of eliquis; saw hematology no clotting disorder    History of discitis    Cataract    Flat-back syndrome    Lumbar spondylosis    H/O thyroid disease    Cervical pain    H/O spinal fusion   lumbar    H/O total knee replacement, right  10/2018    H/O ovarian cystectomy    H/O tubal ligation    History of tonsillectomy  childhood    History of ovarian cystectomy        MEDICATIONS  (STANDING):  artificial  tears Solution 1 Drop(s) Both EYES daily  calcium carbonate    500 mG (Tums) Chewable 1 Tablet(s) Chew daily  cholecalciferol 4000 Unit(s) Oral daily  enoxaparin Injectable 40 milliGRAM(s) SubCutaneous every 24 hours  gabapentin 300 milliGRAM(s) Oral at bedtime  multivitamin 1 Tablet(s) Oral daily  piperacillin/tazobactam IVPB.. 3.375 Gram(s) IV Intermittent every 8 hours      Allergies    No Known Allergies    Intolerances          Labs:                          14.1   8.87  )-----------( 244      ( 28 Dec 2022 18:09 )             41.6       28 Dec 2022 18:09    131    |  97     |  24     ----------------------------<  84     4.6     |  22     |  0.69     Ca    9.2        28 Dec 2022 18:09    TPro  7.0    /  Alb  3.1    /  TBili  1.2    /  DBili  x      /  AST  20     /  ALT  23     /  AlkPhos  100    28 Dec 2022 18:09      PT/INR - ( 28 Dec 2022 18:09 )   PT: 14.9 sec;   INR: 1.28 ratio         PTT - ( 28 Dec 2022 18:09 )  PTT:33.3 sec    Urinalysis Basic - ( 28 Dec 2022 18:06 )    Color: Yellow / Appearance: Clear / S.020 / pH: x  Gluc: x / Ketone: Large  / Bili: Negative / Urobili: Negative   Blood: x / Protein: 30 mg/dL / Nitrite: Negative   Leuk Esterase: Negative / RBC: 6-10 /HPF / WBC 3-5 /HPF   Sq Epi: x / Non Sq Epi: Few / Bacteria: Few          Imaging:  No new imaging.    Vital Signs Last 24 Hrs  T(C): 36.8 (22 @ 07:55), Max: 37.5 (22 @ 23:45)  T(F): 98.2 (22 @ 07:55), Max: 99.5 (22 @ 23:45)  HR: 89 (22 @ 07:55) (89 - 102)  BP: 132/60 (22 @ 07:55) (115/44 - 145/62)  BP(mean): 81 (22 @ 23:45) (60 - 81)  RR: 18 (22 @ 07:55) (18 - 18)  SpO2: 99% (22 @ 07:55) (94% - 99%)    Physical Exam:  General:   AAOx3, No acute distress  Musculoskeletal:  Spine:  Cervical collar in place, removed for exam. Dressing in place, clean, dry and intact. +mild swelling, no erythema, no ecchymosis,. Normothermic.   No midnline TTP C/T/L/S spine. No bony step offs. No paraspinal muscle TTP/hypertonicity   Negative clonus. Negative babinski. Negative escalante.             Deltoid        Bicep        Tricep          Wrist Ext    Wrist Flex    Finger Flex          Finger Abd  R            5/5          5/5           5/5              5/5                5/5	           5/5                5/5  L             ***baseline weakness secondary to history of Erb's Pal;sy***                Hip Flex       Quad     Ankle DF        Ankle PF       Toe Ext        Hamstring    R            5/5              5/5          5/5                 5/5                 5/5	                5/5  L            5/5              5/5           5/5                 5/5                 5/5                5/5    Sensory:            C5         C6         C7      C8       T1        (0=absent, 1=impaired, 2=normal, NT=not testable)  R         2            2           2        2         2  L          2            2           2        2         2               L2          L3         L4      L5       S1         (0=absent, 1=impaired, 2=normal, NT=not testable)  R         2            2            2        2        2  L          2            2           2        2         2      Assessment:  This is a 77y Female s/p recent C3-4 ACDF on  now readmitted with PNA, possible aspiration.     Plan:  No acute Orthopedic Spine intervention indicated at this time.  Pt can remove Beulah collar during admission for comfort during the treatment of aspiration pneumonia and dysphagia. We recommend pt resume wearing Aspen collar when pt condition improved and tolerating collar. Discussed with patient who expresses understanding and agreeable.  Pain control.  Ice application.  OOB ambulate daily.  DVT/PE Prophylaxis.  Orthopedically stable for discharge.  Follow-up with Dr. Epperson in the office as previously planned.    Case discussed with and plan as per Dr. Epperson          Total Encounter Time: 30 min    Encounter time included reviewing the history, patient records, speaking with pt, discussing the nature of the injury and coordinating plan w Attending and relaying plan to Medical Team.

## 2023-01-02 NOTE — PROGRESS NOTE ADULT - SUBJECTIVE AND OBJECTIVE BOX
Date of service: 23 @ 11:03    OOB to chair  Has dysphagia, but able to swallow mashed food  Has dr ny cough    ROS: no fever or chills; denies dizziness, no HA, no abdominal pain, no diarrhea or constipation; no dysuria, no legs pain, no rashes    MEDICATIONS  (STANDING):  artificial  tears Solution 1 Drop(s) Both EYES daily  calcium carbonate    500 mG (Tums) Chewable 1 Tablet(s) Chew daily  cholecalciferol 4000 Unit(s) Oral daily  enoxaparin Injectable 40 milliGRAM(s) SubCutaneous every 24 hours  gabapentin 300 milliGRAM(s) Oral at bedtime  multivitamin 1 Tablet(s) Oral daily  piperacillin/tazobactam IVPB.. 3.375 Gram(s) IV Intermittent every 8 hours    Vital Signs Last 24 Hrs  T(C): 37.1 (2023 08:41), Max: 37.1 (2023 08:41)  T(F): 98.7 (2023 08:41), Max: 98.7 (2023 08:41)  HR: 62 (2023 08:41) (62 - 100)  BP: 115/43 (2023 08:41) (115/43 - 130/71)  BP(mean): --  RR: 18 (2023 08:41) (18 - 18)  SpO2: 94% (2023 08:41) (94% - 100%)    Parameters below as of 2023 08:41  Patient On (Oxygen Delivery Method): room air     Physical exam:    Constitutional:  No acute distress  HEENT: NC/AT, EOMI, PERRLA, conjunctivae clear; ears and nose atraumatic  Neck: supple; thyroid not palpable  Back: no tenderness  Respiratory: respiratory effort normal; crackles at bases  Cardiovascular: S1S2 regular, no murmurs  Abdomen: soft, not tender, not distended, positive BS  Genitourinary: no suprapubic tenderness  Lymphatic: no LN palpable  Musculoskeletal: no muscle tenderness, no joint swelling or tenderness  Extremities: no pedal edema  Neurological/ Psychiatric: AxOx3, moving all extremities  Skin: no rashes; no palpable lesions    Labs: reviewed                        12.8   9.72  )-----------( 310      ( 2023 06:03 )             37.7                         14.1   8.87  )-----------( 244      ( 28 Dec 2022 18:09 )             41.6     12    131<L>  |  97  |  24<H>  ----------------------------<  84  4.6   |  22  |  0.69    Ca    9.2      28 Dec 2022 18:09    TPro  7.0  /  Alb  3.1<L>  /  TBili  1.2  /  DBili  x   /  AST  20  /  ALT  23  /  AlkPhos  100       LIVER FUNCTIONS - ( 28 Dec 2022 18:09 )  Alb: 3.1 g/dL / Pro: 7.0 gm/dL / ALK PHOS: 100 U/L / ALT: 23 U/L / AST: 20 U/L / GGT: x           Urinalysis Basic - ( 28 Dec 2022 18:06 )    Color: Yellow / Appearance: Clear / S.020 / pH: x  Gluc: x / Ketone: Large  / Bili: Negative / Urobili: Negative   Blood: x / Protein: 30 mg/dL / Nitrite: Negative   Leuk Esterase: Negative / RBC: 6-10 /HPF / WBC 3-5 /HPF   Sq Epi: x / Non Sq Epi: Few / Bacteria: Few    ( @ 18:09)  NotDete      Culture - Sputum (collected 29 Dec 2022 17:00)  Source: .Sputum Sputum  Gram Stain (30 Dec 2022 07:09):    Moderate polymorphonuclear leukocytes per low power field    No Squamous epithelial cells per low power field    Numerous Gram Negative Coccobacilli seen per oil power field    Few Gram positive cocci in pairs seen per oil power field  Preliminary Report (31 Dec 2022 08:20):    Normal Respiratory Marysol present    Culture - Blood (collected 28 Dec 2022 18:09)  Source: .Blood None  Preliminary Report (30 Dec 2022 01:02):    No growth to date.    Culture - Blood (collected 28 Dec 2022 18:09)  Source: .Blood None  Preliminary Report (30 Dec 2022 01:02):    No growth to date.    Culture - Urine (collected 28 Dec 2022 18:06)  Source: Clean Catch Clean Catch (Midstream)  Preliminary Report (30 Dec 2022 11:36):    50,000 - 99,000 CFU/mL Morganella morganii    10,000 - 49,000 CFU/mL Enterococcus faecalis  Organism: Morganella morganii (31 Dec 2022 07:58)  Organism: Morganella morganii (31 Dec 2022 07:58)      -  Amikacin: S <=16      -  Amoxicillin/Clavulanic Acid: R >16/8      -  Ampicillin: R >16 These ampicillin results predict results for amoxicillin      -  Ampicillin/Sulbactam: I 16/8 Enterobacter, Klebsiella aerogenes, Citrobacter, and Serratia may develop resistance during prolonged therapy (3-4 days)      -  Aztreonam: S <=4      -  Cefazolin: R >16      -  Cefepime: S <=2      -  Cefoxitin: S <=8      -  Ceftriaxone: S <=1 Enterobacter, Klebsiella aerogenes, Citrobacter, and Serratia may develop resistance during prolonged therapy      -  Ciprofloxacin: S <=0.25      -  Ertapenem: S <=0.5      -  Gentamicin: S <=2      -  Imipenem: I 2      -  Levofloxacin: S <=0.5      -  Meropenem: S <=1      -  Nitrofurantoin: R 64 Should not be used to treat pyelonephritis      -  Piperacillin/Tazobactam: S <=8      -  Tobramycin: S <=2      -  Trimethoprim/Sulfamethoxazole: S <=0.5/9.5      Method Type: BENITO    Radiology: all available radiological tests reviewed    < from: CT Angio Chest PE Protocol w/ IV Cont (22 @ 18:36) >  No pulmonary embolus.  Patchy right middle lobe opacities with focal bronchiectasis. Correlate   for atypical pneumonia such as XOCHITL.  < end of copied text >      Advanced directives addressed: full resuscitation
Date of service: 23 @ 11:12    Lying in bed in NAD  Has dry cough  Able to swallow, but she feels anxious  No SOB at rest    ROS: no fever or chills; denies dizziness, no HA, no abdominal pain, no diarrhea or constipation; no dysuria, no legs pain, no rashes    MEDICATIONS  (STANDING):  artificial  tears Solution 1 Drop(s) Both EYES daily  calcium carbonate    500 mG (Tums) Chewable 1 Tablet(s) Chew daily  cholecalciferol 4000 Unit(s) Oral daily  enoxaparin Injectable 40 milliGRAM(s) SubCutaneous every 24 hours  gabapentin 300 milliGRAM(s) Oral at bedtime  guaiFENesin ER 1200 milliGRAM(s) Oral every 12 hours  multivitamin 1 Tablet(s) Oral daily  piperacillin/tazobactam IVPB.. 3.375 Gram(s) IV Intermittent every 8 hours    Vital Signs Last 24 Hrs  T(C): 36.6 (2023 07:43), Max: 36.9 (2023 15:10)  T(F): 97.8 (2023 07:43), Max: 98.5 (2023 15:10)  HR: 96 (2023 07:43) (77 - 96)  BP: 116/80 (2023 07:43) (105/64 - 116/80)  BP(mean): 73 (2023 15:10) (73 - 73)  RR: 18 (2023 07:43) (18 - 18)  SpO2: 97% (2023 07:43) (97% - 99%)    Parameters below as of 2023 07:43  Patient On (Oxygen Delivery Method): room air     Physical exam:    Constitutional:  No acute distress  HEENT: NC/AT, EOMI, PERRLA, conjunctivae clear; ears and nose atraumatic  Neck: supple; thyroid not palpable  Back: no tenderness  Respiratory: respiratory effort normal; crackles at bases  Cardiovascular: S1S2 regular, no murmurs  Abdomen: soft, not tender, not distended, positive BS  Genitourinary: no suprapubic tenderness  Lymphatic: no LN palpable  Musculoskeletal: no muscle tenderness, no joint swelling or tenderness  Extremities: no pedal edema  Neurological/ Psychiatric: AxOx3, moving all extremities  Skin: no rashes; no palpable lesions    Labs: reviewed                        12.8   9.72  )-----------( 310      ( 2023 06:03 )             37.7                         14.1   8.87  )-----------( 244      ( 28 Dec 2022 18:09 )             41.6         131<L>  |  97  |  24<H>  ----------------------------<  84  4.6   |  22  |  0.69    Ca    9.2      28 Dec 2022 18:09    TPro  7.0  /  Alb  3.1<L>  /  TBili  1.2  /  DBili  x   /  AST  20  /  ALT  23  /  AlkPhos  100       LIVER FUNCTIONS - ( 28 Dec 2022 18:09 )  Alb: 3.1 g/dL / Pro: 7.0 gm/dL / ALK PHOS: 100 U/L / ALT: 23 U/L / AST: 20 U/L / GGT: x           Urinalysis Basic - ( 28 Dec 2022 18:06 )    Color: Yellow / Appearance: Clear / S.020 / pH: x  Gluc: x / Ketone: Large  / Bili: Negative / Urobili: Negative   Blood: x / Protein: 30 mg/dL / Nitrite: Negative   Leuk Esterase: Negative / RBC: 6-10 /HPF / WBC 3-5 /HPF   Sq Epi: x / Non Sq Epi: Few / Bacteria: Few    ( @ 18:09)  [facility]      Culture - Sputum (collected 29 Dec 2022 17:00)  Source: .Sputum Sputum  Gram Stain (30 Dec 2022 07:09):    Moderate polymorphonuclear leukocytes per low power field    No Squamous epithelial cells per low power field    Numerous Gram Negative Coccobacilli seen per oil power field    Few Gram positive cocci in pairs seen per oil power field  Preliminary Report (31 Dec 2022 08:20):    Normal Respiratory Marysol present    Culture - Blood (collected 28 Dec 2022 18:09)  Source: .Blood None  Preliminary Report (30 Dec 2022 01:02):    No growth to date.    Culture - Blood (collected 28 Dec 2022 18:09)  Source: .Blood None  Preliminary Report (30 Dec 2022 01:02):    No growth to date.    Culture - Urine (collected 28 Dec 2022 18:06)  Source: Clean Catch Clean Catch (Midstream)  Preliminary Report (30 Dec 2022 11:36):    50,000 - 99,000 CFU/mL Morganella morganii    10,000 - 49,000 CFU/mL Enterococcus faecalis  Organism: Morganella morganii (31 Dec 2022 07:58)  Organism: Morganella morganii (31 Dec 2022 07:58)      -  Amikacin: S <=16      -  Amoxicillin/Clavulanic Acid: R >16/8      -  Ampicillin: R >16 These ampicillin results predict results for amoxicillin      -  Ampicillin/Sulbactam: I 16/8 Enterobacter, Klebsiella aerogenes, Citrobacter, and Serratia may develop resistance during prolonged therapy (3-4 days)      -  Aztreonam: S <=4      -  Cefazolin: R >16      -  Cefepime: S <=2      -  Cefoxitin: S <=8      -  Ceftriaxone: S <=1 Enterobacter, Klebsiella aerogenes, Citrobacter, and Serratia may develop resistance during prolonged therapy      -  Ciprofloxacin: S <=0.25      -  Ertapenem: S <=0.5      -  Gentamicin: S <=2      -  Imipenem: I 2      -  Levofloxacin: S <=0.5      -  Meropenem: S <=1      -  Nitrofurantoin: R 64 Should not be used to treat pyelonephritis      -  Piperacillin/Tazobactam: S <=8      -  Tobramycin: S <=2      -  Trimethoprim/Sulfamethoxazole: S <=0.5/9.5      Method Type: BENITO    Radiology: all available radiological tests reviewed    < from: CT Angio Chest PE Protocol w/ IV Cont (28. @ 18:36) >  No pulmonary embolus.  Patchy right middle lobe opacities with focal bronchiectasis. Correlate   for atypical pneumonia such as XOCHITL.  < end of copied text >      Advanced directives addressed: full resuscitation
Patient is a 77y old  Female who presents with a chief complaint of RML Pna (29 Dec 2022 08:48)      SUBJECTIVE:   HPI:  Pt is a pleasant  78 y/o female with PMHx of back pain, Thyroid disease, PE presents to [place] ED after recent surgery,12/22/22 fusion cervical spine. ACDF C3-4  now  presenting with c/o cough and SOB.  Pt notes that she has had a buildup of mucous and thick, yellow and green phlegm, poorly productive on 12/24/22.    Pt mentioned she has some difficulty swallowing food, which she was informed that could be case post-op.   Pt was seen at Urgent Care this morning and was told to come  to the ED for evaluation of possible Pneumonia.   Pt has not taken gabapentin or oxycodone since last night. She denies fever/chills, chest pain,  no abd pain/v/d, no urinary complaints, no calf pain.  She is having continued difficulty swallowing.  No known sick contacts.      NKDA   (29 Dec 2022 05:24)      sub: audible gurgling but able to expectorate. C collar in place for 6 weeks. No resp distress. No hypoxia. High risk for further aspiration and decompensation. She is able to move secretions if she takes C collar off. Notified Ortho spine for re eval - different collar to mitigate her asp risk?...        REVIEW OF SYSTEMS:    CONSTITUTIONAL: No weakness, fevers or chills  EYES/ENT: No visual changes;  No vertigo or throat pain   NECK: No pain or stiffness  RESPIRATORY: No cough, wheezing, hemoptysis; No shortness of breath  CARDIOVASCULAR: No chest pain or palpitations  GASTROINTESTINAL: No abdominal or epigastric pain. No nausea, vomiting, or hematemesis; No diarrhea or constipation. No melena or hematochezia.  GENITOURINARY: No dysuria, frequency or hematuria  NEUROLOGICAL: No numbness or weakness  SKIN: No itching, burning, rashes, or lesions   All other review of systems is negative unless indicated above    ICU Vital Signs Last 24 Hrs  T(C): 36.8 (30 Dec 2022 07:55), Max: 37.5 (29 Dec 2022 23:45)  T(F): 98.2 (30 Dec 2022 07:55), Max: 99.5 (29 Dec 2022 23:45)  HR: 89 (30 Dec 2022 07:55) (89 - 102)  BP: 132/60 (30 Dec 2022 07:55) (115/44 - 145/62)  BP(mean): 81 (29 Dec 2022 23:45) (60 - 81)  ABP: --  ABP(mean): --  RR: 18 (30 Dec 2022 07:55) (18 - 18)  SpO2: 99% (30 Dec 2022 07:55) (94% - 99%)    O2 Parameters below as of 30 Dec 2022 07:55  Patient On (Oxygen Delivery Method): room air            PHYSICAL EXAM:    Constitutional: NAD, awake and alert,   HEENT: PERR, EOMI, Normal Hearing, MMM  Neck: Soft and supple, No LAD, No JVD  Respiratory: Breath sounds are clear bilaterally, No wheezing, rales or rhonchi  Cardiovascular: S1 and S2, regular rate and rhythm, no Murmurs, gallops or rubs  Gastrointestinal: Bowel Sounds present, soft, nontender, nondistended, no guarding, no rebound  Extremities: No peripheral edema  Vascular: 2+ peripheral pulses  Neurological: A/O x 3, no focal deficits  Musculoskeletal: 5/5 strength b/l upper and lower extremities  Skin: No rashes    MEDICATIONS:  MEDICATIONS  (STANDING):  artificial  tears Solution 1 Drop(s) Both EYES daily  calcium carbonate    500 mG (Tums) Chewable 1 Tablet(s) Chew daily  cholecalciferol 4000 Unit(s) Oral daily  enoxaparin Injectable 40 milliGRAM(s) SubCutaneous every 24 hours  gabapentin 300 milliGRAM(s) Oral at bedtime  multivitamin 1 Tablet(s) Oral daily  piperacillin/tazobactam IVPB.- 3.375 Gram(s) IV Intermittent once  piperacillin/tazobactam IVPB.. 3.375 Gram(s) IV Intermittent every 8 hours      LABS: All Labs Reviewed:                        14.1   8.87  )-----------( 244      ( 28 Dec 2022 18:09 )             41.6     12-28    131<L>  |  97  |  24<H>  ----------------------------<  84  4.6   |  22  |  0.69    Ca    9.2      28 Dec 2022 18:09    TPro  7.0  /  Alb  3.1<L>  /  TBili  1.2  /  DBili  x   /  AST  20  /  ALT  23  /  AlkPhos  100  12-28    PT/INR - ( 28 Dec 2022 18:09 )   PT: 14.9 sec;   INR: 1.28 ratio         PTT - ( 28 Dec 2022 18:09 )  PTT:33.3 sec          Blood Culture:     RADIOLOGY/EKG: reviewed        
                                                           [facility]                                                           Orthopedic Spine Progress Note      SUBJECTIVE: Patient seen and examined, neuro/motor intact, incision healing well, still w/some dysphagia w/bulkier food items but overall improvement.     Pain (0-10):   Current Pain Management:  [ ] PCA   [x] Po Analgesics [ ] IM /IV Analgesics     Vital Signs Last 24 Hrs  T(C): 37.1 (01 Jan 2023 08:41), Max: 37.1 (01 Jan 2023 08:41)  T(F): 98.7 (01 Jan 2023 08:41), Max: 98.7 (01 Jan 2023 08:41)  HR: 62 (01 Jan 2023 08:41) (62 - 100)  BP: 115/43 (01 Jan 2023 08:41) (115/43 - 130/71)  BP(mean): --  RR: 18 (01 Jan 2023 08:41) (18 - 18)  SpO2: 94% (01 Jan 2023 08:41) (94% - 100%)    Parameters below as of 01 Jan 2023 08:41  Patient On (Oxygen Delivery Method): room air      I&O's Detail      OBJECTIVE:       Wound /Dressing: incision clean/dry/intact - dressing removed  Cervical ROM: not tested, collar off (as allowed this hospitalization  Neurological: A/O x 3              Sensation: [x] intact to light touch  [ ] decreased:          Motor exam: [x]          [x] Upper extremity           Delt      Bicp        Tri    Wrist ext  Wrst Flex         Digit Ext   Digit Flex                                     R         5/5        5/5        5/5       5/5            5/5            5/5       5/5          5/5                                     L          5/5        5/5        5/5       5/5            5/5            5/5       5/5          5/5         [x] Lower ext.         Hip Flx    Quad   Hamstrg         TA        EHL        GS                              R        5/5        5/5        5/5             5/5        5/5        5/5                                     L         5/5        5/5        5/5             5/5        5/5        5/5                                                              [x] Vascular: intact           Tension Signs: none          Long Tract Findings: none                                                 LABS:                        12.8   9.72  )-----------( 310      ( 01 Jan 2023 06:03 )             37.7               Blood Culture: 12-28 @ 18:06 Organism: Morganella morganii Gram Stain Blood -- Organism ID Morganella morganii  Enterococcus faecalis    Wound Culture: 12-28 @ 18:06 Wound: -- Organism: Morganella morganii Organism ID: Morganella morganii  Enterococcus faecalis           
                                                           [facility]                                                           Orthopedic Spine Progress Note          SUBJECTIVE: Patient seen and examined, POD #9 s/p ACDF C3-4, admitted for dysphagia, phlegm, and non-productive cough. CT with right mid-lobe pneumonia, probable aspiration pneumonia. Improving on Zosyn as per ID. Had speech/swallow evaluation as well - diet being slowly advanced as tolerated. Neck pain well-controlled, anterior cervical dressing in place. Permitted to have collar off for the time being as per Dr. Epperson    Pain (0-10):   Current Pain Management:  [ ] PCA   [x ] Po Analgesics [ ] IM /IV Analgesics     Vital Signs Last 24 Hrs  T(C): 36.8 (31 Dec 2022 08:21), Max: 37.3 (30 Dec 2022 15:26)  T(F): 98.2 (31 Dec 2022 08:21), Max: 99.2 (30 Dec 2022 15:26)  HR: 81 (31 Dec 2022 08:21) (81 - 109)  BP: 133/45 (31 Dec 2022 08:21) (109/40 - 133/45)  BP(mean): 56 (30 Dec 2022 15:26) (56 - 56)  RR: 18 (31 Dec 2022 08:21) (18 - 18)  SpO2: 96% (31 Dec 2022 08:21) (93% - 97%)    Parameters below as of 31 Dec 2022 08:21  Patient On (Oxygen Delivery Method): room air      I&O's Detail      OBJECTIVE:       Wound /Dressing: clean/dry/intact  Cervical ROM: not tested  Neurological: A/O x 3             Sensation: [x intact to light touch  [ ] decreased:          Motor exam: [x]         [x Upper extremity           Delt       Bicp        Tri   Wrist ext   Wrst Flex          Digit Ext   Digit Flex                                     R         5/5        5/5        5/5       5/5            5/5            5/5       5/5          5/5                                     L          5/5        5/5        5/5       5/5            5/5            5/5       5/5          5/5         [x] Lower ext.         Hip Flx    Quad   Hamstrg         TA        EHL        GS                              R        5/5        5/5        5/5             5/5        5/5        5/5                                     L         5/5        5/5        5/5             5/5        5/5        5/5                                                                [x] Vascular: intact           Tension Signs: none          Long Tract Findings: none                                                               Blood Culture: 12-28 @ 18:06 Organism: Morganella morganii Gram Stain Blood -- Organism ID Morganella morganii    Wound Culture: 12-28 @ 18:06 Wound: -- Organism: Morganella morganii Organism ID: Morganella morganii           
Pt comf.  Ambulating independently  Slight cough, mild dysphagia  no neck pain  No UE issues  No issues with the anterior wound    AF, VSS  WBC normal yesterday  On Zosyn  moving arms strongly, sens intact arms  Steris in place anterior neck    Plan  Overall doing well  Stable from Spine viewpoint  OK to remove collar for comfort  OK to DC home on antibiotics per ID/Medical team  all questions answered  F/U with Dr Epperson upon DC
Date of service: 22 @ 10:42    OOB to chair  Weak looking  Neck collar is off  Has dry cough  Diet advanced by speech therapist    ROS: no fever or chills; denies dizziness, no HA, no abdominal pain, no diarrhea or constipation; no dysuria, no legs pain, no rashes    MEDICATIONS  (STANDING):  artificial  tears Solution 1 Drop(s) Both EYES daily  calcium carbonate    500 mG (Tums) Chewable 1 Tablet(s) Chew daily  cholecalciferol 4000 Unit(s) Oral daily  enoxaparin Injectable 40 milliGRAM(s) SubCutaneous every 24 hours  gabapentin 300 milliGRAM(s) Oral at bedtime  multivitamin 1 Tablet(s) Oral daily  piperacillin/tazobactam IVPB.. 3.375 Gram(s) IV Intermittent every 8 hours    Vital Signs Last 24 Hrs  T(C): 36.8 (31 Dec 2022 08:21), Max: 37.3 (30 Dec 2022 15:26)  T(F): 98.2 (31 Dec 2022 08:21), Max: 99.2 (30 Dec 2022 15:26)  HR: 81 (31 Dec 2022 08:21) (81 - 109)  BP: 133/45 (31 Dec 2022 08:21) (109/40 - 133/45)  BP(mean): 56 (30 Dec 2022 15:26) (56 - 56)  RR: 18 (31 Dec 2022 08:21) (18 - 18)  SpO2: 96% (31 Dec 2022 08:21) (93% - 97%)    Parameters below as of 31 Dec 2022 08:21  Patient On (Oxygen Delivery Method): room air     Physical exam:    Constitutional:  No acute distress  HEENT: NC/AT, EOMI, PERRLA, conjunctivae clear; ears and nose atraumatic  Neck: supple; thyroid not palpable  Back: no tenderness  Respiratory: respiratory effort normal; crackles at bases  Cardiovascular: S1S2 regular, no murmurs  Abdomen: soft, not tender, not distended, positive BS  Genitourinary: no suprapubic tenderness  Lymphatic: no LN palpable  Musculoskeletal: no muscle tenderness, no joint swelling or tenderness  Extremities: no pedal edema  Neurological/ Psychiatric: AxOx3, moving all extremities  Skin: no rashes; no palpable lesions    Labs: reviewed                        14.1   8.87  )-----------( 244      ( 28 Dec 2022 18:09 )             41.6         131<L>  |  97  |  24<H>  ----------------------------<  84  4.6   |  22  |  0.69    Ca    9.2      28 Dec 2022 18:09    TPro  7.0  /  Alb  3.1<L>  /  TBili  1.2  /  DBili  x   /  AST  20  /  ALT  23  /  AlkPhos  100       LIVER FUNCTIONS - ( 28 Dec 2022 18:09 )  Alb: 3.1 g/dL / Pro: 7.0 gm/dL / ALK PHOS: 100 U/L / ALT: 23 U/L / AST: 20 U/L / GGT: x           Urinalysis Basic - ( 28 Dec 2022 18:06 )    Color: Yellow / Appearance: Clear / S.020 / pH: x  Gluc: x / Ketone: Large  / Bili: Negative / Urobili: Negative   Blood: x / Protein: 30 mg/dL / Nitrite: Negative   Leuk Esterase: Negative / RBC: 6-10 /HPF / WBC 3-5 /HPF   Sq Epi: x / Non Sq Epi: Few / Bacteria: Few    ( @ 18:09)  NotDete      Culture - Sputum (collected 29 Dec 2022 17:00)  Source: .Sputum Sputum  Gram Stain (30 Dec 2022 07:09):    Moderate polymorphonuclear leukocytes per low power field    No Squamous epithelial cells per low power field    Numerous Gram Negative Coccobacilli seen per oil power field    Few Gram positive cocci in pairs seen per oil power field  Preliminary Report (31 Dec 2022 08:20):    Normal Respiratory Marysol present    Culture - Blood (collected 28 Dec 2022 18:09)  Source: .Blood None  Preliminary Report (30 Dec 2022 01:02):    No growth to date.    Culture - Blood (collected 28 Dec 2022 18:09)  Source: .Blood None  Preliminary Report (30 Dec 2022 01:02):    No growth to date.    Culture - Urine (collected 28 Dec 2022 18:06)  Source: Clean Catch Clean Catch (Midstream)  Preliminary Report (30 Dec 2022 11:36):    50,000 - 99,000 CFU/mL Morganella morganii    10,000 - 49,000 CFU/mL Enterococcus faecalis  Organism: Morganella morganii (31 Dec 2022 07:58)  Organism: Morganella morganii (31 Dec 2022 07:58)      -  Amikacin: S <=16      -  Amoxicillin/Clavulanic Acid: R >16/8      -  Ampicillin: R >16 These ampicillin results predict results for amoxicillin      -  Ampicillin/Sulbactam: I 16/8 Enterobacter, Klebsiella aerogenes, Citrobacter, and Serratia may develop resistance during prolonged therapy (3-4 days)      -  Aztreonam: S <=4      -  Cefazolin: R >16      -  Cefepime: S <=2      -  Cefoxitin: S <=8      -  Ceftriaxone: S <=1 Enterobacter, Klebsiella aerogenes, Citrobacter, and Serratia may develop resistance during prolonged therapy      -  Ciprofloxacin: S <=0.25      -  Ertapenem: S <=0.5      -  Gentamicin: S <=2      -  Imipenem: I 2      -  Levofloxacin: S <=0.5      -  Meropenem: S <=1      -  Nitrofurantoin: R 64 Should not be used to treat pyelonephritis      -  Piperacillin/Tazobactam: S <=8      -  Tobramycin: S <=2      -  Trimethoprim/Sulfamethoxazole: S <=0.5/9.5      Method Type: BENITO    Radiology: all available radiological tests reviewed    < from: CT Angio Chest PE Protocol w/ IV Cont (22 @ 18:36) >  No pulmonary embolus.  Patchy right middle lobe opacities with focal bronchiectasis. Correlate   for atypical pneumonia such as XOCHITL.  < end of copied text >      Advanced directives addressed: full resuscitation
Date of service: 22 @ 10:44    Lying in bed in NAD  Has dry cough  Has difficulty swallowing  Has low grade fever    ROS: denies dizziness, no HA, no abdominal pain, no diarrhea or constipation; no dysuria, no legs pain, no rashes    MEDICATIONS  (STANDING):  artificial  tears Solution 1 Drop(s) Both EYES daily  calcium carbonate    500 mG (Tums) Chewable 1 Tablet(s) Chew daily  cholecalciferol 4000 Unit(s) Oral daily  enoxaparin Injectable 40 milliGRAM(s) SubCutaneous every 24 hours  gabapentin 300 milliGRAM(s) Oral at bedtime  multivitamin 1 Tablet(s) Oral daily  piperacillin/tazobactam IVPB.. 3.375 Gram(s) IV Intermittent every 8 hours    Vital Signs Last 24 Hrs  T(C): 36.8 (30 Dec 2022 07:55), Max: 37.5 (29 Dec 2022 23:45)  T(F): 98.2 (30 Dec 2022 07:55), Max: 99.5 (29 Dec 2022 23:45)  HR: 89 (30 Dec 2022 07:55) (89 - 102)  BP: 132/60 (30 Dec 2022 07:55) (115/44 - 145/62)  BP(mean): 81 (29 Dec 2022 23:45) (60 - 81)  RR: 18 (30 Dec 2022 07:55) (18 - 18)  SpO2: 99% (30 Dec 2022 07:55) (94% - 99%)    Parameters below as of 30 Dec 2022 07:55  Patient On (Oxygen Delivery Method): room air     Physical exam:    Constitutional:  No acute distress  HEENT: NC/AT, EOMI, PERRLA, conjunctivae clear; ears and nose atraumatic  Neck: supple; thyroid not palpable  Back: no tenderness  Respiratory: respiratory effort normal; crackles at bases  Cardiovascular: S1S2 regular, no murmurs  Abdomen: soft, not tender, not distended, positive BS  Genitourinary: no suprapubic tenderness  Lymphatic: no LN palpable  Musculoskeletal: no muscle tenderness, no joint swelling or tenderness  Extremities: no pedal edema  Neurological/ Psychiatric: AxOx3, moving all extremities  Skin: no rashes; no palpable lesions    Labs: reviewed                        14.1   8.87  )-----------( 244      ( 28 Dec 2022 18:09 )             41.6         131<L>  |  97  |  24<H>  ----------------------------<  84  4.6   |  22  |  0.69    Ca    9.2      28 Dec 2022 18:09    TPro  7.0  /  Alb  3.1<L>  /  TBili  1.2  /  DBili  x   /  AST  20  /  ALT  23  /  AlkPhos  100       LIVER FUNCTIONS - ( 28 Dec 2022 18:09 )  Alb: 3.1 g/dL / Pro: 7.0 gm/dL / ALK PHOS: 100 U/L / ALT: 23 U/L / AST: 20 U/L / GGT: x           Urinalysis Basic - ( 28 Dec 2022 18:06 )    Color: Yellow / Appearance: Clear / S.020 / pH: x  Gluc: x / Ketone: Large  / Bili: Negative / Urobili: Negative   Blood: x / Protein: 30 mg/dL / Nitrite: Negative   Leuk Esterase: Negative / RBC: 6-10 /HPF / WBC 3-5 /HPF   Sq Epi: x / Non Sq Epi: Few / Bacteria: Few    ( @ 18:09)  [facility]      Culture - Sputum (collected 29 Dec 2022 17:00)  Source: .Sputum Sputum  Gram Stain (30 Dec 2022 07:09):    Moderate polymorphonuclear leukocytes per low power field    No Squamous epithelial cells per low power field    Numerous Gram Negative Coccobacilli seen per oil power field    Few Gram positive cocci in pairs seen per oil power field    Culture - Blood (collected 28 Dec 2022 18:09)  Source: .Blood None  Preliminary Report (30 Dec 2022 01:02):    No growth to date.    Culture - Blood (collected 28 Dec 2022 18:09)  Source: .Blood None  Preliminary Report (30 Dec 2022 01:02):    No growth to date.    Culture - Urine (collected 28 Dec 2022 18:06)  Source: Clean Catch Clean Catch (Midstream)  Preliminary Report (30 Dec 2022 08:07):    50,000 - 99,000 CFU/mL Gram Negative Rods    10,000 - 49,000 CFU/mL Gram positive organisms    Radiology: all available radiological tests reviewed    < from: CT Angio Chest PE Protocol w/ IV Cont (12.28.22 @ 18:36) >  No pulmonary embolus.  Patchy right middle lobe opacities with focal bronchiectasis. Correlate   for atypical pneumonia such as XOCHITL.  < end of copied text >      Advanced directives addressed: full resuscitation
Patient is a 77y old  Female who presents with a chief complaint of RML Pna (29 Dec 2022 08:48)      SUBJECTIVE:   HPI:  Pt is a pleasant  76 y/o female with PMHx of back pain, Thyroid disease, PE presents to [place] ED after recent surgery,12/22/22 fusion cervical spine. ACDF C3-4  now  presenting with c/o cough and SOB.  Pt notes that she has had a buildup of mucous and thick, yellow and green phlegm, poorly productive on 12/24/22.    Pt mentioned she has some difficulty swallowing food, which she was informed that could be case post-op.   Pt was seen at Urgent Care this morning and was told to come  to the ED for evaluation of possible Pneumonia.   Pt has not taken gabapentin or oxycodone since last night. She denies fever/chills, chest pain,  no abd pain/v/d, no urinary complaints, no calf pain.  She is having continued difficulty swallowing.  No known sick contacts.      NKDA   (29 Dec 2022 05:24)      sub: audible gurgling but able to expectorate. C collar in place for 6 weeks. No resp distress. No hypoxia        REVIEW OF SYSTEMS:    CONSTITUTIONAL: No weakness, fevers or chills  EYES/ENT: No visual changes;  No vertigo or throat pain   NECK: No pain or stiffness  RESPIRATORY: No cough, wheezing, hemoptysis; No shortness of breath  CARDIOVASCULAR: No chest pain or palpitations  GASTROINTESTINAL: No abdominal or epigastric pain. No nausea, vomiting, or hematemesis; No diarrhea or constipation. No melena or hematochezia.  GENITOURINARY: No dysuria, frequency or hematuria  NEUROLOGICAL: No numbness or weakness  SKIN: No itching, burning, rashes, or lesions   All other review of systems is negative unless indicated above      ICU Vital Signs Last 24 Hrs  T(C): 36.9 (29 Dec 2022 15:10), Max: 37.6 (28 Dec 2022 16:57)  T(F): 98.5 (29 Dec 2022 15:10), Max: 99.6 (28 Dec 2022 16:57)  HR: 102 (29 Dec 2022 15:10) (78 - 120)  BP: 115/44 (29 Dec 2022 15:10) (115/44 - 141/99)  BP(mean): 60 (29 Dec 2022 15:10) (60 - 79)  ABP: --  ABP(mean): --  RR: 16 (29 Dec 2022 07:20) (16 - 24)  SpO2: 99% (29 Dec 2022 15:10) (88% - 99%)    O2 Parameters below as of 29 Dec 2022 15:10  Patient On (Oxygen Delivery Method): room air            Parameters below as of 29 Dec 2022 15:10  Patient On (Oxygen Delivery Method): room air        I&O's Summary      CAPILLARY BLOOD GLUCOSE          PHYSICAL EXAM:    Constitutional: NAD, awake and alert,   HEENT: PERR, EOMI, Normal Hearing, MMM  Neck: Soft and supple, No LAD, No JVD  Respiratory: Breath sounds are clear bilaterally, No wheezing, rales or rhonchi  Cardiovascular: S1 and S2, regular rate and rhythm, no Murmurs, gallops or rubs  Gastrointestinal: Bowel Sounds present, soft, nontender, nondistended, no guarding, no rebound  Extremities: No peripheral edema  Vascular: 2+ peripheral pulses  Neurological: A/O x 3, no focal deficits  Musculoskeletal: 5/5 strength b/l upper and lower extremities  Skin: No rashes    MEDICATIONS:  MEDICATIONS  (STANDING):  artificial  tears Solution 1 Drop(s) Both EYES daily  calcium carbonate    500 mG (Tums) Chewable 1 Tablet(s) Chew daily  cholecalciferol 4000 Unit(s) Oral daily  enoxaparin Injectable 40 milliGRAM(s) SubCutaneous every 24 hours  gabapentin 300 milliGRAM(s) Oral at bedtime  multivitamin 1 Tablet(s) Oral daily  piperacillin/tazobactam IVPB.- 3.375 Gram(s) IV Intermittent once  piperacillin/tazobactam IVPB.. 3.375 Gram(s) IV Intermittent every 8 hours      LABS: All Labs Reviewed:                        14.1   8.87  )-----------( 244      ( 28 Dec 2022 18:09 )             41.6     12-28    131<L>  |  97  |  24<H>  ----------------------------<  84  4.6   |  22  |  0.69    Ca    9.2      28 Dec 2022 18:09    TPro  7.0  /  Alb  3.1<L>  /  TBili  1.2  /  DBili  x   /  AST  20  /  ALT  23  /  AlkPhos  100  12-28    PT/INR - ( 28 Dec 2022 18:09 )   PT: 14.9 sec;   INR: 1.28 ratio         PTT - ( 28 Dec 2022 18:09 )  PTT:33.3 sec          Blood Culture:     RADIOLOGY/EKG: reviewed        
Patient is a 77y old  Female who presents with a chief complaint of RML Pna (29 Dec 2022 08:48)    HPI:  76 y/o female with PMHx of back pain, Thyroid disease, PE presents to [facility] ED after recent surgery,12/22/22 fusion cervical spine. ACDF C3-4  now  presenting with c/o cough and SOB.  Pt notes that she has had a buildup of mucous and thick, yellow and green phlegm, poorly productive on 12/24/22.    Pt mentioned she has some difficulty swallowing food, which she was informed that could be case post-op.   Pt was seen at Urgent Care this morning and was told to come  to the ED for evaluation of possible Pneumonia. Pt has not taken gabapentin or oxycodone since last night. She denies fever/chills, chest pain,  no abd pain/v/d, no urinary complaints, no calf pain.  She is having continued difficulty swallowing.  No known sick contacts.    12/31:  audible gurgling but able to readily expectorate. O2S wnl. C collar removed for now as per Ortho spine reccs to help mitigate her asp risk.   1/1:  Pt notes ongoing mucus in throat.  Couldn't eat breakfast this morning.      ROS:   All 10 systems reviewed and found to be negative with the exception of what has been described above.    Vital Signs Last 24 Hrs  T(C): 36.9 (01 Jan 2023 15:10), Max: 37.1 (01 Jan 2023 08:41)  T(F): 98.5 (01 Jan 2023 15:10), Max: 98.7 (01 Jan 2023 08:41)  HR: 77 (01 Jan 2023 15:10) (62 - 77)  BP: 105/64 (01 Jan 2023 15:10) (105/64 - 115/43)  BP(mean): 73 (01 Jan 2023 15:10) (73 - 73)  RR: 18 (01 Jan 2023 15:10) (18 - 18)  SpO2: 97% (01 Jan 2023 15:10) (94% - 97%)    Parameters below as of 01 Jan 2023 15:10  Patient On (Oxygen Delivery Method): room air      PHYSICAL EXAM:  Constitutional: NAD, awake and alert,   HEENT: PERR, EOMI, Normal Hearing, MMM  Neck: Soft and supple, No LAD, No JVD  Respiratory: Breath sounds are clear bilaterally, No wheezing, rales or rhonchi  Cardiovascular: S1 and S2, regular rate and rhythm, no Murmurs, gallops or rubs  Gastrointestinal: Bowel Sounds present, soft, nontender, nondistended, no guarding, no rebound  Extremities: No peripheral edema  Vascular: 2+ peripheral pulses  Neurological: A/O x 3, no focal deficits  Musculoskeletal: 5/5 strength b/l upper and lower extremities  Skin: No rashes      LABS: All Labs Reviewed:                          12.8   9.72  )-----------( 310      ( 01 Jan 2023 06:03 )             37.7       MEDICATIONS  (STANDING):  artificial  tears Solution 1 Drop(s) Both EYES daily  calcium carbonate    500 mG (Tums) Chewable 1 Tablet(s) Chew daily  cholecalciferol 4000 Unit(s) Oral daily  enoxaparin Injectable 40 milliGRAM(s) SubCutaneous every 24 hours  gabapentin 300 milliGRAM(s) Oral at bedtime  guaiFENesin ER 1200 milliGRAM(s) Oral every 12 hours  multivitamin 1 Tablet(s) Oral daily  piperacillin/tazobactam IVPB.. 3.375 Gram(s) IV Intermittent every 8 hours    MEDICATIONS  (PRN):  benzonatate 100 milliGRAM(s) Oral every 8 hours PRN Cough  guaifenesin/dextromethorphan Oral Liquid 10 milliLiter(s) Oral every 4 hours PRN Cough  oxycodone    5 mG/acetaminophen 325 mG 1 Tablet(s) Oral every 4 hours PRN Moderate Pain (4 - 6)  
Patient is a 77y old  Female who presents with a chief complaint of RML Pna (29 Dec 2022 08:48)      SUBJECTIVE:   HPI:  Pt is a pleasant  76 y/o female with PMHx of back pain, Thyroid disease, PE presents to [place] ED after recent surgery,12/22/22 fusion cervical spine. ACDF C3-4  now  presenting with c/o cough and SOB.  Pt notes that she has had a buildup of mucous and thick, yellow and green phlegm, poorly productive on 12/24/22.    Pt mentioned she has some difficulty swallowing food, which she was informed that could be case post-op.   Pt was seen at Urgent Care this morning and was told to come  to the ED for evaluation of possible Pneumonia.   Pt has not taken gabapentin or oxycodone since last night. She denies fever/chills, chest pain,  no abd pain/v/d, no urinary complaints, no calf pain.  She is having continued difficulty swallowing.  No known sick contacts.      NKDA   (29 Dec 2022 05:24)      sub: audible gurgling but able to readily expectorate. O2S wnl. C collar removed for now as per Ortho spine reccs to help mitigate her asp risk.         REVIEW OF SYSTEMS:    CONSTITUTIONAL: No weakness, fevers or chills  EYES/ENT: No visual changes;  No vertigo or throat pain   NECK: No pain or stiffness  RESPIRATORY: No cough, wheezing, hemoptysis; No shortness of breath  CARDIOVASCULAR: No chest pain or palpitations  GASTROINTESTINAL: No abdominal or epigastric pain. No nausea, vomiting, or hematemesis; No diarrhea or constipation. No melena or hematochezia.  GENITOURINARY: No dysuria, frequency or hematuria  NEUROLOGICAL: No numbness or weakness  SKIN: No itching, burning, rashes, or lesions   All other review of systems is negative unless indicated above  ICU Vital Signs Last 24 Hrs  T(C): 36.8 (31 Dec 2022 08:21), Max: 37.3 (30 Dec 2022 15:26)  T(F): 98.2 (31 Dec 2022 08:21), Max: 99.2 (30 Dec 2022 15:26)  HR: 81 (31 Dec 2022 08:21) (81 - 109)  BP: 133/45 (31 Dec 2022 08:21) (109/40 - 133/45)  BP(mean): 56 (30 Dec 2022 15:26) (56 - 56)  ABP: --  ABP(mean): --  RR: 18 (31 Dec 2022 08:21) (18 - 18)  SpO2: 96% (31 Dec 2022 08:21) (93% - 97%)    O2 Parameters below as of 31 Dec 2022 08:21  Patient On (Oxygen Delivery Method): room air                  PHYSICAL EXAM:    Constitutional: NAD, awake and alert,   HEENT: PERR, EOMI, Normal Hearing, MMM  Neck: Soft and supple, No LAD, No JVD  Respiratory: Breath sounds are clear bilaterally, No wheezing, rales or rhonchi  Cardiovascular: S1 and S2, regular rate and rhythm, no Murmurs, gallops or rubs  Gastrointestinal: Bowel Sounds present, soft, nontender, nondistended, no guarding, no rebound  Extremities: No peripheral edema  Vascular: 2+ peripheral pulses  Neurological: A/O x 3, no focal deficits  Musculoskeletal: 5/5 strength b/l upper and lower extremities  Skin: No rashes    MEDICATIONS:  MEDICATIONS  (STANDING):  artificial  tears Solution 1 Drop(s) Both EYES daily  calcium carbonate    500 mG (Tums) Chewable 1 Tablet(s) Chew daily  cholecalciferol 4000 Unit(s) Oral daily  enoxaparin Injectable 40 milliGRAM(s) SubCutaneous every 24 hours  gabapentin 300 milliGRAM(s) Oral at bedtime  multivitamin 1 Tablet(s) Oral daily  piperacillin/tazobactam IVPB.- 3.375 Gram(s) IV Intermittent once  piperacillin/tazobactam IVPB.. 3.375 Gram(s) IV Intermittent every 8 hours      LABS: All Labs Reviewed:                        14.1   8.87  )-----------( 244      ( 28 Dec 2022 18:09 )             41.6     12-28    131<L>  |  97  |  24<H>  ----------------------------<  84  4.6   |  22  |  0.69    Ca    9.2      28 Dec 2022 18:09    TPro  7.0  /  Alb  3.1<L>  /  TBili  1.2  /  DBili  x   /  AST  20  /  ALT  23  /  AlkPhos  100  12-28    PT/INR - ( 28 Dec 2022 18:09 )   PT: 14.9 sec;   INR: 1.28 ratio         PTT - ( 28 Dec 2022 18:09 )  PTT:33.3 sec          Blood Culture:     RADIOLOGY/EKG: reviewed

## 2023-01-02 NOTE — DISCHARGE NOTE PROVIDER - HOSPITAL COURSE
78 y/o female with PMHx of back pain, Thyroid disease, PE presents to [place] ED after recent surgery,12/22/22 fusion cervical spine. ACDF C3-4  now  presenting with c/o cough and SOB.  Pt notes that she has had a buildup of mucous and thick, yellow and green phlegm, poorly productive on 12/24/22.    Pt mentioned she has some difficulty swallowing food, which she was informed that could be case post-op.   Pt was seen at Urgent Care this morning and was told to come  to the ED for evaluation of possible Pneumonia. Pt has not taken gabapentin or oxycodone since last night. She denies fever/chills, chest pain,  no abd pain/v/d, no urinary complaints, no calf pain.  She is having continued difficulty swallowing.  No known sick contacts.    Patient admitted.  Found to have PNA.  Started on IV Zosyn.  Patient's hypoxia and cough overall improved.  Patient seen by speech and swallow.  Cleared for solid food but patient doing better with soft/ pureed food.  Patient clinically improved-- off oxygen, no fever or WBC.      Vital Signs Last 24 Hrs:  T(C): 36.6 (02 Jan 2023 07:43), Max: 36.8 (01 Jan 2023 21:55)  T(F): 97.8 (02 Jan 2023 07:43), Max: 98.3 (01 Jan 2023 21:55)  HR: 96 (02 Jan 2023 07:43) (81 - 96)  BP: 116/80 (02 Jan 2023 07:43) (108/61 - 116/80)  BP(mean): --  RR: 18 (02 Jan 2023 07:43) (18 - 18)  SpO2: 97% (02 Jan 2023 07:43) (97% - 99%)    PHYSICAL EXAM:  Constitutional: NAD, awake and alert,   HEENT: PERR, EOMI, Normal Hearing, MMM  Neck: Soft and supple, No LAD, No JVD  Respiratory: Breath sounds are clear bilaterally, No wheezing, rales or rhonchi  Cardiovascular: S1 and S2, regular rate and rhythm, no Murmurs, gallops or rubs  Gastrointestinal: Bowel Sounds present, soft, nontender, nondistended, no guarding, no rebound  Extremities: No peripheral edema  Vascular: 2+ peripheral pulses  Neurological: A/O x 3, no focal deficits  Musculoskeletal: 5/5 strength b/l upper and lower extremities  Skin: No rashes    #Aspiration PNA:    Present on arrival.    Suspect related to swallowing issues after neck surgery.    S/p zosyn-- complete augmentin @ d/c.      #Dysphagia:    Soft/ puree diet @ d/c.    Appreciate speech and GI evals.    Likely postop related after neck surgery.      #UTI  Cont zosyn.    Morganella/ Enterococcus in culture.    S/p zosyn--- augmentin @ d/c.      #Recent cervical spine surgery:    Cont PT/ post op care.      #DVT proph: lovenox  - Full Code    Stable for d/c home.    F/u spine surgery THURS.    F/u PMD.    Total time spent 45 min.    Verified with pharmacy that augmentin suspension is available.

## 2023-01-02 NOTE — DISCHARGE NOTE NURSING/CASE MANAGEMENT/SOCIAL WORK - PATIENT PORTAL LINK FT
You can access the FollowMyHealth Patient Portal offered by [facility] by registering at the following website: [email or web address]. By joining [facility]’s FollowMyHealth portal, you will also be able to view your health information using other applications (apps) compatible with our system.

## 2023-01-02 NOTE — PROGRESS NOTE ADULT - PROVIDER SPECIALTY LIST ADULT
Hospitalist
Orthopedics
Orthopedics
Infectious Disease
Orthopedics
Hospitalist
-Limit visiting for 8 weeks and avoid public places.

## 2023-01-02 NOTE — DISCHARGE NOTE PROVIDER - NSDCCPCAREPLAN_GEN_ALL_CORE_FT
PRINCIPAL DISCHARGE DIAGNOSIS  Diagnosis: Pneumonia of right middle lobe due to infectious organism  Assessment and Plan of Treatment: Start Augmentin and take for 7 days.    Follow up with Spine surgery and PMD.      SECONDARY DISCHARGE DIAGNOSES  Diagnosis: Acute respiratory failure with hypoxia  Assessment and Plan of Treatment: improved.

## 2023-01-02 NOTE — CONSULT NOTE ADULT - ASSESSMENT
Imp:  I observed bedside swallowing of ouatmeal and water -- issues seems to be in the pharynx  Given temporal association with surgery, doubt an esophageal problem    Rec;  Observation for now  Dr. Cardozo resumes care tomorrow (patient of Dr. Watkins)

## 2023-01-03 ENCOUNTER — TRANSCRIPTION ENCOUNTER (OUTPATIENT)
Age: 78
End: 2023-01-03

## 2023-01-04 ENCOUNTER — TRANSCRIPTION ENCOUNTER (OUTPATIENT)
Age: 78
End: 2023-01-04

## 2023-01-06 ENCOUNTER — TRANSCRIPTION ENCOUNTER (OUTPATIENT)
Age: 78
End: 2023-01-06

## 2023-01-06 ENCOUNTER — APPOINTMENT (OUTPATIENT)
Dept: CARE COORDINATION | Facility: HOME HEALTH | Age: 78
End: 2023-01-06
Payer: MEDICARE

## 2023-01-06 VITALS
OXYGEN SATURATION: 96 % | RESPIRATION RATE: 16 BRPM | DIASTOLIC BLOOD PRESSURE: 64 MMHG | SYSTOLIC BLOOD PRESSURE: 118 MMHG | HEART RATE: 83 BPM

## 2023-01-06 DIAGNOSIS — Z98.1 ARTHRODESIS STATUS: ICD-10-CM

## 2023-01-06 DIAGNOSIS — N39.0 URINARY TRACT INFECTION, SITE NOT SPECIFIED: ICD-10-CM

## 2023-01-06 DIAGNOSIS — M15.9 POLYOSTEOARTHRITIS, UNSPECIFIED: ICD-10-CM

## 2023-01-06 DIAGNOSIS — J69.0 PNEUMONITIS DUE TO INHALATION OF FOOD AND VOMIT: ICD-10-CM

## 2023-01-06 DIAGNOSIS — Z86.711 PERSONAL HISTORY OF PULMONARY EMBOLISM: ICD-10-CM

## 2023-01-06 DIAGNOSIS — E43 UNSPECIFIED SEVERE PROTEIN-CALORIE MALNUTRITION: ICD-10-CM

## 2023-01-06 DIAGNOSIS — Z86.69 PERSONAL HISTORY OF OTHER DISEASES OF THE NERVOUS SYSTEM AND SENSE ORGANS: ICD-10-CM

## 2023-01-06 DIAGNOSIS — J96.01 ACUTE RESPIRATORY FAILURE WITH HYPOXIA: ICD-10-CM

## 2023-01-06 DIAGNOSIS — G71.02 FACIOSCAPULOHUMERAL MUSCULAR DYSTROPHY: ICD-10-CM

## 2023-01-06 DIAGNOSIS — R13.10 DYSPHAGIA, UNSPECIFIED: ICD-10-CM

## 2023-01-06 DIAGNOSIS — M40.30 FLATBACK SYNDROME, SITE UNSPECIFIED: ICD-10-CM

## 2023-01-06 PROCEDURE — 99495 TRANSJ CARE MGMT MOD F2F 14D: CPT

## 2023-01-06 RX ORDER — OXYCODONE AND ACETAMINOPHEN 5; 325 MG/1; MG/1
5-325 TABLET ORAL
Qty: 12 | Refills: 0 | Status: ACTIVE | COMMUNITY
Start: 2023-01-06

## 2023-01-06 RX ORDER — PSEUDOEPHED/ACETAMINOPHEN/CPM 30-500-2MG
100-10 TABLET ORAL
Refills: 0 | Status: ACTIVE | COMMUNITY
Start: 2023-01-06

## 2023-01-06 RX ORDER — MULTIVITAMIN/IRON/FOLIC ACID 18MG-0.4MG
1000 TABLET ORAL
Qty: 30 | Refills: 0 | Status: ACTIVE | COMMUNITY
Start: 2023-01-06

## 2023-01-06 RX ORDER — EYELID EMOLLIENT COMB. NO.1
SPRAY, NON-AEROSOL (ML) TOPICAL
Refills: 0 | Status: ACTIVE | COMMUNITY
Start: 2023-01-06

## 2023-01-06 RX ORDER — AMOXICILLIN AND CLAVULANATE POTASSIUM 250; 62.5 MG/5ML; MG/5ML
250-62.5 FOR SUSPENSION ORAL TWICE DAILY
Refills: 0 | Status: ACTIVE | COMMUNITY
Start: 2023-01-06

## 2023-01-06 RX ORDER — GABAPENTIN 300 MG/1
300 CAPSULE ORAL AT BEDTIME
Refills: 0 | Status: ACTIVE | COMMUNITY
Start: 2023-01-06

## 2023-01-06 RX ORDER — CALCIUM CARBONATE 500 MG/1
500 TABLET, CHEWABLE ORAL DAILY
Refills: 0 | Status: ACTIVE | COMMUNITY
Start: 2023-01-06

## 2023-01-06 NOTE — PHYSICAL EXAM
[No Acute Distress] : no acute distress [Well Nourished] : well nourished [Well Developed] : well developed [Well-Appearing] : well-appearing [Normal Sclera/Conjunctiva] : normal sclera/conjunctiva [Normal Outer Ear/Nose] : the outer ears and nose were normal in appearance [Normal Oropharynx] : the oropharynx was normal [Supple] : supple [No Respiratory Distress] : no respiratory distress  [Clear to Auscultation] : lungs were clear to auscultation bilaterally [No Accessory Muscle Use] : no accessory muscle use [Normal Rate] : normal rate  [Regular Rhythm] : with a regular rhythm [Normal S1, S2] : normal S1 and S2 [Pedal Pulses Present] : the pedal pulses are present [No Edema] : there was no peripheral edema [No Extremity Clubbing/Cyanosis] : no extremity clubbing/cyanosis [Soft] : abdomen soft [Non Tender] : non-tender [Non-distended] : non-distended [Normal Bowel Sounds] : normal bowel sounds [No Spinal Tenderness] : no spinal tenderness [No Rash] : no rash [Coordination Grossly Intact] : coordination grossly intact [Normal Affect] : the affect was normal [Alert and Oriented x3] : oriented to person, place, and time [Normal Insight/Judgement] : insight and judgment were intact [de-identified] : no thrush [de-identified] : steristrip L anterior neck [de-identified] : slight rhonchi clear with cough [de-identified] : steristrip to L anterior neck, incision well approximated [de-identified] : STEPH Baer

## 2023-01-06 NOTE — REVIEW OF SYSTEMS
[Cough] : cough [Negative] : Psychiatric [FreeTextEntry6] : improving [FreeTextEntry9] : LUE Erbs Palsy, LROM Back multiple level spinal fusions unable to bend at waist [de-identified] : steristrips to L anterior neck

## 2023-01-06 NOTE — HISTORY OF PRESENT ILLNESS
[Post-hospitalization from ___ Hospital] : Post-hospitalization from [unfilled] Hospital [Admitted on: ___] : The patient was admitted on [unfilled] [Discharged on ___] : discharged on [unfilled] [Discharge Summary] : discharge summary [Discharge Med List] : discharge medication list [Med Reconciliation] : medication reconciliation has been completed [Patient Contacted By: ____] : and contacted by [unfilled] [FreeTextEntry2] : Hospital Course	\par 76 y/o female with PMHx of back pain, Thyroid disease, PE presents to [facility] ED after recent surgery,12/22/22 fusion cervical spine. ACDF C3-4  now  presenting with c/o cough and SOB.  Pt notes that she has had a buildup of mucous and thick, yellow and green phlegm, poorly productive on 12/24/22.    Pt mentioned she has some difficulty swallowing food, which she was informed that could be case post-op.   Pt was seen at Urgent Care this morning and was told to come  to the ED for evaluation of possible Pneumonia. Pt has not taken gabapentin or oxycodone since last night. She denies fever/chills, chest pain,  no abd pain/v/d, no urinary complaints, no calf pain.  She is having continued difficulty swallowing.  No known sick contacts.\par \par Patient admitted.  Found to have PNA.  Started on IV Zosyn.  Patient's hypoxia and cough overall improved.  Patient seen by speech and swallow.  Cleared for solid food but patient doing better with soft/ pureed food.  Patient clinically improved-- off oxygen, no fever or WBC.  \par \par CC:\par Pt is seen at home s/p recent admission for PNA. Pt is temporarily unable to leave home as it requires a considerable and taxing effort\par HPI:\par Pt Is a 76 y/o female enrolled in the [facility] program seen at home s/p a recent admission for PNA. Pt was contacted by TCM RN on 1/4/23 and med rec was done within 48 hours of DC.\par \par Upon examination A&O x 3 NAD. Lives with spouse. S/P cervical spinal fusion on 12/22 with Dr. Epperson and admitted on 12/28 with aspiration PNA after having lots of Phlegm post op. Reports cough is better did not take the mucinex last night or this morning. She will complete course Augmentin. Aspiration precautions doing better with soft slippery foods at present. S/P C-spinal fusion anterior approach with steri strips to neck, no longer wearing c collar and denies pain at this time. 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 Limited active ROM, FPROM. \par Had f/u Dr. Epperson on 1/5. Advise to make f/u appt with her PCP as well. Declined HC services\par \par

## 2023-01-06 NOTE — PLAN
[FreeTextEntry1] : #Aspiration PNA:  \par - Suspect related to swallowing issues after neck surgery.  \par - S/p zosyn-- complete augmentin @ d/c.  \par - call for any worsening of sx, aspiration precautions\par \par #Dysphagia:  \par - Soft/ puree diet @ d/c.  \par - Appreciate speech and GI evals.  \par - Likely postop related after neck surgery.  \par \par #Recent cervical spine surgery:  \par - Cont post op care, pain management\par - f/u surgeon  \par \par # Congenital Erb's Palsy:\par - worsening with age, supportive care\par - limited AROM, FPROM

## 2023-01-10 ENCOUNTER — TRANSCRIPTION ENCOUNTER (OUTPATIENT)
Age: 78
End: 2023-01-10

## 2023-01-11 ENCOUNTER — TRANSCRIPTION ENCOUNTER (OUTPATIENT)
Age: 78
End: 2023-01-11

## 2023-01-18 ENCOUNTER — TRANSCRIPTION ENCOUNTER (OUTPATIENT)
Age: 78
End: 2023-01-18

## 2023-01-19 ENCOUNTER — TRANSCRIPTION ENCOUNTER (OUTPATIENT)
Age: 78
End: 2023-01-19

## 2023-01-24 ENCOUNTER — TRANSCRIPTION ENCOUNTER (OUTPATIENT)
Age: 78
End: 2023-01-24

## 2023-01-27 NOTE — PHYSICAL THERAPY INITIAL EVALUATION ADULT - SITTING BALANCE: DYNAMIC
PROVIDER SAID SHE WILL COME TO PATIENT BEDSIDE TO ASSESS PATIENT. wILL CONTINUE TO MONITOR PATIENT As per provider "I just put in a new order for insulin." Will continue to monitor patient as per provider, " give 6 units as per slinding scale and I will consult endocrinology." Will continue to monitor patient. good minus

## 2023-01-31 ENCOUNTER — TRANSCRIPTION ENCOUNTER (OUTPATIENT)
Age: 78
End: 2023-01-31

## 2023-03-27 NOTE — ED ADULT TRIAGE NOTE - LOCATION:
"\"I am doing ok, my right foot fells better, not red, swelling was down this morning, but up a little now bc I have been up a couple hours.\"" Left arm;

## 2023-05-18 ENCOUNTER — APPOINTMENT (OUTPATIENT)
Dept: ORTHOPEDIC SURGERY | Facility: CLINIC | Age: 78
End: 2023-05-18
Payer: MEDICARE

## 2023-05-18 DIAGNOSIS — M25.811 OTHER SPECIFIED JOINT DISORDERS, RIGHT SHOULDER: ICD-10-CM

## 2023-05-18 PROCEDURE — 20610 DRAIN/INJ JOINT/BURSA W/O US: CPT | Mod: RT

## 2023-05-18 PROCEDURE — 73030 X-RAY EXAM OF SHOULDER: CPT | Mod: RT

## 2023-05-18 PROCEDURE — 73010 X-RAY EXAM OF SHOULDER BLADE: CPT | Mod: RT

## 2023-05-18 PROCEDURE — 99213 OFFICE O/P EST LOW 20 MIN: CPT | Mod: 25

## 2023-05-21 NOTE — REASON FOR VISIT
[FreeTextEntry2] : Patient is here for right shoulder pain that is chronic in nature. She is s/p extensive cervical/thoracic/lumbar surgeries.

## 2023-05-21 NOTE — ASSESSMENT
[FreeTextEntry1] : The patient has chronic right shoulder pain. She reports no new trauma but has had multiple extensive cervical/lumbar/thoracic surgeries. She reports pain with ADLs and repetitive exercises. She has slight weakness with overhead motion and lifting objects overhead or to shoulder height. She is completing PT with excellent relief. She denies the need for oral AIs. \par \par Right shoulder exam: The patient presents with no apparent distress. Neurovascularly intact. Sensation intact to right upper extremity. No scars, rashes, or abrasions. 2+ pulses to the distal radius.Tender to palpation at anterior shoulder and supraspinatus. AROM   ABD 90 with pain ER 55 with pain IR L5.  4+/5 RC strength. 5/5 Deltoid strength. + Neers. + Clay. \par \par Right shoulder xrays taken today in office 5 views including weightbearing view shows mild degenerative changes with some cyst formation in the head.  There is no narrowing of the glenohumeral joint.  There is no proximal migration.  There were no obvious tumors or masses seen.\par \par Under sterile conditions the right shoulder was injected in the subacromial space with 5 cc quarter percent plain Marcaine 5 cc of 2% plain lidocaine and 40 mg of Kenalog.  Patient tolerated the procedure well.\par \par Plan at this time is ice and activity modification we will see her back in the office as needed.\par

## 2023-05-21 NOTE — HISTORY OF PRESENT ILLNESS
[Right Arm] : right arm [Gradual] : gradual [6] : 6 [2] : 2 [Diffuse] : diffuse [Dull/Aching] : dull/aching [Throbbing] : throbbing [Intermittent] : intermittent [Exercising] : exercising [FreeTextEntry5] : Chronic right shoulder pain  [de-identified] : lifting.

## 2023-07-02 ENCOUNTER — NON-APPOINTMENT (OUTPATIENT)
Age: 78
End: 2023-07-02

## 2023-07-03 ENCOUNTER — OUTPATIENT (OUTPATIENT)
Dept: OUTPATIENT SERVICES | Facility: HOSPITAL | Age: 78
LOS: 1 days | End: 2023-07-03
Payer: MEDICARE

## 2023-07-03 ENCOUNTER — APPOINTMENT (OUTPATIENT)
Dept: ULTRASOUND IMAGING | Facility: CLINIC | Age: 78
End: 2023-07-03
Payer: MEDICARE

## 2023-07-03 DIAGNOSIS — Z90.89 ACQUIRED ABSENCE OF OTHER ORGANS: Chronic | ICD-10-CM

## 2023-07-03 DIAGNOSIS — Z96.651 PRESENCE OF RIGHT ARTIFICIAL KNEE JOINT: Chronic | ICD-10-CM

## 2023-07-03 DIAGNOSIS — Z98.51 TUBAL LIGATION STATUS: Chronic | ICD-10-CM

## 2023-07-03 DIAGNOSIS — M79.89 OTHER SPECIFIED SOFT TISSUE DISORDERS: ICD-10-CM

## 2023-07-03 PROCEDURE — 93971 EXTREMITY STUDY: CPT

## 2023-07-03 PROCEDURE — 93971 EXTREMITY STUDY: CPT | Mod: 26,RT

## 2023-11-29 NOTE — PATIENT PROFILE ADULT - NSPROGENANESREACTION_GEN_A_NUR
LMP 10/04/23, approx 8w0d per LMP  Patient was scheduled today for 1st OB and canceled.     Spoke with patient who reports she missed her 1st OB today because her father  today unexpectedly due to blood clot in lung. Patient is crying and breathing rapidly, enc patient to take a deep breath, and enc her to continue taking deep breaths when feeling worked up. Patient reports she has anxiety and is feeling very anxious right now. Patient prev worked at [employer] and is aware of crisis resources, also provided her with number to [facility]. Patient asking if stress can cause elevated body temp. Her temp was 100.3, explained to patient that yes, being under stress and crying a lot could temporarily elevate body temp. Enc patient to try to calm down and stay well hydrated. Patient denies any spotting, admits to intermittent cramping - thinks it is due to stress. Patient looking to reschedule her 1st OB appointment, advised patient will ask PSR to look at schedule and see when patient can be scheduled. Patient amenable to plan. No additional questions at this time.    no previous reaction

## 2023-12-27 NOTE — ASU PREOP CHECKLIST - HEIGHT IN INCHES
**For informational purposes only** - Your patient's state of permanent residence requires your approval to increase dispensed quantity.  [facility] has processed the prescription as written to prevent therapy delays.  If you would like to increase the quantity to maximize their insurance benefits. Please send a new prescription for a 100 day supply for future fills.     Placed form in doctor's bin.  
7

## 2024-01-19 NOTE — ED STATDOCS - NS_ATTENDINGSCRIBE_ED_ALL_ED
I personally performed the service described in the documentation recorded by the scribe in my presence, and it accurately and completely records my words and actions. oral

## 2024-02-21 NOTE — ASU PREOP CHECKLIST - WEIGHT IN LBS
Physical Therapy    Visit Type: treatment  Co-treat with: rehab aidLaurita posadas and PT student, [place].  SUBJECTIVE  Patient agreed to participate in therapy this date.  RN in agreement to work with patient for therapy session.  Patient verbally agrees to allow the following to be present during session: spouse    Pt standing up with walker with RN and CNA upon therapy entering room, with pt trying to get back to bed.  PT assisted with patient upon entering room.  Pt. reports that he is tired.   Patient / Family Goal: unable to state    Pain     Location: Bilateral LEs - pt did not rate pain.     OBJECTIVE     Cognitive Status   Level of Consciousness   - alert  Orientation    - Oriented to: person   - Disoriented to: place, time and situation  Functional Communication   - Overall Status: impaired   - Forms of Communication: verbal  Following Direction   - follows one step commands inconsistently and follows one step commands with increased time  Safety Awareness/Insight   - decreased awareness of need for safety and decreased awareness of need for assistance  Awareness of Deficits   - decreased awareness of deficits and assistance required to compensate for deficits    Patient Activity Tolerance: 1 to 2 activity to rest      Range of Motion (ROM)   (degrees unless noted; active unless noted; norms in ( ); negative=lacking to 0, positive=beyond 0)  WFL: LLE, RLE    Strength  (out of 5 unless noted, standard test position unless noted)   Comments / Details: Decreased LE strength as noted with bed mobility and transfers; pt required heavy assist for transfer with Carmen Cosby and with 2 WW.       Sitting Balance  (TARA = base of support)  Static      - Trial 1 details: with double UE support, with double LE support, contact guard and with back unsupported  Dynamic      - Trial 1 details: with double UE support, with double LE support, with back unsupported and total assist (mod assist x 2 to boost hip back onto bed upon  169.9 sitting down at edge of bed)    Standing Balance  (TARA = base of support)  Firm Surface: Double Leg      - Static, Eyes Open       - Trial 1 details: total assist (max assist x 2 - 3 with 2 WW.)    Max assist x 2-3 with 2 WW.        Bed Mobility  - Repositioning in bed: 2 person, total assist - dependent  (boost in bed with max assist x 2)  - Supine to sit: total assist - non-dependent, with verbal cues, with tactile cues (max assist x 2)  - Sit to supine: total assist - non-dependent (max assist x 1 and mod assist x 1; x 2 trials)  Pt able to advance LEs towards edge of bed with assistance but then required use of TAPs and assist of 2 for trunk and LEs with supine to sit.  Pt. needed mod assist at trunk and max assist x at LEs with sit to supine; x 2 trials.  Cues for sequencing, and encouragement given for maximal participation.     Transfers  Assistive devices: gait belt, non-mechanical sit to stand lift  - Sit to stand: total assist - non-dependent, with verbal cues, with tactile cues (unsuccessful with max x 3 - see notes below)  Pt. standing and pivoting to bed upon PT arrival, with pt requiring max assist x 2-3 for support and balance with use of 2 WW.  Cues for sequencing and step length. Pt. with increased difficulty moving LEs, and pt needed assist to maintain standing position, as pt beginning to flex at knees and hips during pivot.  Max cues and assist needed to safely help pt turn and sit down on edge of bed. Assist x 2 to boost hips back onto bed.  Pt. wanting to rest prior to standing again, so pt assisted to supine position for rest.  After rest period, therapist atttempted to get pt standing with Carmen Cosby, but pt unable to clear buttocks from bed x 3 separate trials with max assist x 2-3.  Therapist worked on anterior weight shifting with encouraging pt to flex at hips/trunk to lean forward and touch pt's forehead to therapist's hand.  Pt. worked on this x 3 trials and then was almost able to come  to standing on trial 3 with Stedy.  However, pt too fatigued and needed to return to supine.   Attempted sit>stand from edge of bed (with elevated bed height) x 3 trials with maxAx2 using 2WW. Pt resistive and demonstrates retropulsion. On third attempt, writer anterior and rehab aide posterior with chuxs to boost hips - pt able to clear buttocks however significant posterior lean and unable to stand upright.         Ambulation / Gait  - Assist Level: not attempted due to not medically appropriate or safe     Interventions     Supine    Lower Extremity: Bilateral: ankle pumps, AAROM, 10 reps, 1 sets  Seated    Lower Extremity: Bilateral: knee extensions, AROM, 5 reps, 1 sets  Skilled input: Verbal instruction/cues, tactile instruction/cues, as detailed above, posture correction, facilitation and inhibition  Verbal Consent: Writer verbally educated and received verbal consent for hand placement, positioning of patient, and techniques to be performed today from patient for clothing adjustments for techniques, hand placement and palpation for techniques and therapist position for techniques as described above and how they are pertinent to the patient's plan of care.         Education:   - Present and ready to learn: patient  Education provided during session:  - Results of above outlined education: Needs reinforcement    ASSESSMENT   Progress: slow progress.  Interferring components: decreased activity tolerance, decreased insight into deficit and cognitive deficits    Discharge needs based on today's assessment:   - Current level of function: significantly below baseline level of function   - Therapy needs at discharge: therapy 5 or more times per week   - Activities of daily living (ADLs) requiring support at discharge: bed mobility, transfers, ambulation and stairs   - Instrumental activities of daily living (IADLs) requiring support at discharge: community mobility   - Impairments that require further therapy  intervention: balance, pain, strength, cognition, safety awareness, activity tolerance, executive functioning, ROM and coordination    AM-PAC  - Generalized Prior Level of Function: Needs a little help ([facility] 12-21)       Key: MOD A=moderate assistance, IND/MOD I=independent/modified independent  - Generalized Current Level of Function     - Current Mobility Score: 6       AM-PAC Scoring Key= >21 Modified Independent; 20-21 Supervision; 18-19 Minimal assist; 13-17 Moderate assist; 9-12 Max assist; <9 Total assist      PLAN (while hospitalized)  Suggestions for next session as indicated: Bring aide, progress transfers with Carmen Cosby vs 2 WW, progress to ambulation as able.  Progress LE therex    PT Frequency: 3-5 x per week      PT/OT Mobility Equipment for Discharge: To be determined  Agreement to plan and goals: patient agrees with goals and treatment plan        GOALS  Review Date: 2/28/2024  Long Term Goals: (to be met by time of discharge from hospital)  Sit to supine: Patient will complete sit to supine minimal assist.  Status: progressing/ongoing  Supine to sit: Patient will complete supine to sit minimal assist.  Status: progressing/ongoing  Sit to stand: Patient will complete sit to stand transfer with 2-wheeled walker, maximal assist.   Status: progressing/ongoing  Stand to sit: Patient will complete stand to sit transfer with 2-wheeled walker, maximal assist.   Status: progressing/ongoing  Stand pivot: Patient will complete stand pivot transfer with 2-wheeled walker, maximal assist.   Status: progressing/ongoing  Ambulation (even): Patient will ambulate on even surface for 5 feet with 2-wheeled walker, maximal assist.   Status: not met    Will progress goals to mod I and write a goal for stoop stairs (1 + 1) as pt progresses with mobility.       Goals were reviewed on 2/21/24 and remain appropriate.  Goal review date was extended.     Documented in the chart in the following areas:  Assessment/Plan.      Patient at End of Session:   Location: in chair  Safety measures: call light within reach, lines intact and alarm system in place/re-engaged  Handoff to: nurse      Therapy procedure time and total treatment time can be found documented on the Time Entry flowsheet

## 2024-03-15 NOTE — CONSULT NOTE ADULT - ATTENDING COMMENTS
Pt presents today with CP, SOB, N/V, headache, sore throat, and runny nose since Tuesday night. Pt denies sick contacts or hx of asthma. SPO2 between 91-93%.  
THANK YOU

## 2024-04-29 NOTE — DISCHARGE NOTE ADULT - SMOKING EVEN A SINGLE PUFF INCREASES THE LIKELIHOOD OF A FULL RELAPSE, WITHDRAWAL SYMPTOMS PEAK WITHIN 1-2 WEEKS, BUT CAN PERSIST FOR MONTHS
Medication:    metoPROLOL succinate (TOPROL-XL) 200 MG 24 hr tablet    passed protocol.   Last office visit date: 3/7/2024  Next appointment scheduled?: Yes   Number of refills given: 3   
Statement Selected

## 2024-06-29 ENCOUNTER — NON-APPOINTMENT (OUTPATIENT)
Age: 79
End: 2024-06-29

## 2024-12-30 ENCOUNTER — APPOINTMENT (OUTPATIENT)
Facility: CLINIC | Age: 79
End: 2024-12-30
Payer: MEDICARE

## 2024-12-30 ENCOUNTER — RESULT REVIEW (OUTPATIENT)
Age: 79
End: 2024-12-30

## 2024-12-30 VITALS — BODY MASS INDEX: 25.07 KG/M2 | HEIGHT: 66 IN | WEIGHT: 156 LBS

## 2024-12-30 DIAGNOSIS — M16.11 UNILATERAL PRIMARY OSTEOARTHRITIS, RIGHT HIP: ICD-10-CM

## 2024-12-30 PROCEDURE — 73502 X-RAY EXAM HIP UNI 2-3 VIEWS: CPT

## 2024-12-30 PROCEDURE — 99213 OFFICE O/P EST LOW 20 MIN: CPT

## 2024-12-30 RX ORDER — ROSUVASTATIN CALCIUM 5 MG/1
TABLET, FILM COATED ORAL
Refills: 0 | Status: ACTIVE | COMMUNITY

## 2024-12-30 RX ORDER — FUROSEMIDE 80 MG/1
TABLET ORAL
Refills: 0 | Status: ACTIVE | COMMUNITY

## 2025-01-22 NOTE — PATIENT PROFILE ADULT - NSASFUNCLEVELADLAMBULATE_GEN_A_NUR
1 = assistive equipment
Alert-The patient is alert, awake and responds to voice. The patient is oriented to time, place, and person. The triage nurse is able to obtain subjective information.

## 2025-05-15 ENCOUNTER — APPOINTMENT (OUTPATIENT)
Facility: CLINIC | Age: 80
End: 2025-05-15
Payer: MEDICARE

## 2025-05-15 VITALS — BODY MASS INDEX: 24.11 KG/M2 | HEIGHT: 66 IN | WEIGHT: 150 LBS

## 2025-05-15 DIAGNOSIS — M25.811 OTHER SPECIFIED JOINT DISORDERS, RIGHT SHOULDER: ICD-10-CM

## 2025-05-15 PROCEDURE — 73030 X-RAY EXAM OF SHOULDER: CPT | Mod: RT

## 2025-05-15 PROCEDURE — 73010 X-RAY EXAM OF SHOULDER BLADE: CPT | Mod: RT

## 2025-05-15 PROCEDURE — 20610 DRAIN/INJ JOINT/BURSA W/O US: CPT | Mod: RT

## 2025-05-15 PROCEDURE — 99213 OFFICE O/P EST LOW 20 MIN: CPT | Mod: 25

## 2025-07-30 ENCOUNTER — APPOINTMENT (OUTPATIENT)
Facility: CLINIC | Age: 80
End: 2025-07-30
Payer: MEDICARE

## 2025-07-30 VITALS — BODY MASS INDEX: 24.11 KG/M2 | HEIGHT: 66 IN | WEIGHT: 150 LBS

## 2025-07-30 DIAGNOSIS — Z78.9 OTHER SPECIFIED HEALTH STATUS: ICD-10-CM

## 2025-07-30 DIAGNOSIS — M19.011 PRIMARY OSTEOARTHRITIS, RIGHT SHOULDER: ICD-10-CM

## 2025-07-30 PROCEDURE — 20610 DRAIN/INJ JOINT/BURSA W/O US: CPT | Mod: RT

## 2025-07-30 PROCEDURE — 99213 OFFICE O/P EST LOW 20 MIN: CPT | Mod: 25

## 2025-07-31 ENCOUNTER — TRANSCRIPTION ENCOUNTER (OUTPATIENT)
Age: 80
End: 2025-07-31